# Patient Record
Sex: MALE | Race: BLACK OR AFRICAN AMERICAN | NOT HISPANIC OR LATINO | Employment: OTHER | URBAN - METROPOLITAN AREA
[De-identification: names, ages, dates, MRNs, and addresses within clinical notes are randomized per-mention and may not be internally consistent; named-entity substitution may affect disease eponyms.]

---

## 2017-03-29 ENCOUNTER — HOSPITAL ENCOUNTER (EMERGENCY)
Facility: HOSPITAL | Age: 67
Discharge: HOME | End: 2017-03-29
Attending: EMERGENCY MEDICINE
Payer: MEDICARE

## 2017-03-29 VITALS
OXYGEN SATURATION: 95 % | TEMPERATURE: 99 F | WEIGHT: 235 LBS | RESPIRATION RATE: 18 BRPM | SYSTOLIC BLOOD PRESSURE: 157 MMHG | HEIGHT: 70 IN | HEART RATE: 77 BPM | BODY MASS INDEX: 33.64 KG/M2 | DIASTOLIC BLOOD PRESSURE: 84 MMHG

## 2017-03-29 DIAGNOSIS — I88.9 LYMPHADENITIS: ICD-10-CM

## 2017-03-29 DIAGNOSIS — C62.92 SEMINOMA, LEFT: Primary | ICD-10-CM

## 2017-03-29 DIAGNOSIS — N50.89 MASS OF LEFT TESTICLE: ICD-10-CM

## 2017-03-29 PROCEDURE — 99283 EMERGENCY DEPT VISIT LOW MDM: CPT

## 2017-03-29 RX ORDER — DOXYCYCLINE 100 MG/1
100 CAPSULE ORAL 2 TIMES DAILY
Qty: 20 CAPSULE | Refills: 0 | Status: SHIPPED | OUTPATIENT
Start: 2017-03-29 | End: 2017-04-08

## 2017-03-29 NOTE — ED AVS SNAPSHOT
[facility]  [address] [place] [address]               Bharathi Garrido   3/29/2017  7:26 PM   ED    Description:  Male : 1950   Department:  [facility]           Your Care was Coordinated By:     Provider Role From To    Sanjeev Campos MD Attending Provider [id number] --      Reason for Visit     Groin Swelling           Diagnoses this Visit        Comments    Seminoma, left    -  Primary     Mass of left testicle         Lymphadenitis           ED Disposition     None           To Do List           Follow-up Information     Follow up with Miguel Dick MD. Schedule an appointment as soon as possible for a visit in 1 day.    Specialty:  Urology    Contact information:    [address]  [address]  [place] [address]  [phone]         These Medications        Disp Refills Start End    doxycycline (VIBRAMYCIN) 100 MG Cap 20 capsule 0 3/29/2017 2017    Take 1 capsule (100 mg total) by mouth 2 (two) times daily. - Oral      [facility]     [facility] Care [facility] -  Assistance  Registered nurses in the [facility] provide clinical advisement, health education, appointment booking, and other advisory services.  Call for this free service at [phone].             Medications           START taking these NEW medications        Refills    doxycycline (VIBRAMYCIN) 100 MG Cap 0    Sig: Take 1 capsule (100 mg total) by mouth 2 (two) times daily.    Class: Print    Route: Oral           Verify that the below list of medications is an accurate representation of the medications you are currently taking.  If none reported, the list may be blank. If incorrect, please contact your healthcare provider. Carry this list with you in case of emergency.           Current Medications     doxycycline (VIBRAMYCIN) 100 MG Cap Take 1 capsule (100 mg total) by mouth 2 (two) times daily.           Clinical Reference  "Information           Your Vitals Were     BP Pulse Temp Resp Height Weight    166/80 84 99.4 °F (37.4 °C) (Oral) 18 5' 10" (1.778 m) 106.6 kg (235 lb)    SpO2 BMI             95% 33.72 kg/m2         Allergies as of 3/29/2017     No Known Allergies      Immunizations Administered on Date of Encounter - 3/29/2017     None      ED Micro, Lab, POCT     None      ED Imaging Orders     Start Ordered       Status Ordering Provider    03/29/17 1950 03/29/17 1949  US Scrotum And Testicles  1 time imaging      Final result         Discharge Instructions       Follow up with urology as soon as possible.  Return to the emergency Department with any worsening, Not better, or any other concerns.    [facility] Sign-Up     Activating your [facility] account is as easy as 1-2-3!     1) Visit [email or web address], select Sign Up Now, enter this activation code and your date of birth, then select Next.  [email or web address]  Expires: 5/13/2017  9:22 PM      2) Create a username and password to use when you visit [facility] in the future and select a security question in case you lose your password and select Next.    3) Enter your e-mail address and click Sign Up!    Additional Information  If you have questions, please e-mail [email or web address] or call [phone] to talk to our [facility] staff. Remember, [facility] is NOT to be used for urgent needs. For medical emergencies, dial 911.         Smoking Cessation     If you would like to quit smoking:   You may be eligible for free services if you are a [place] resident and started smoking cigarettes before September 1, 1988.  Call the [employer]oking Cessation [employer] (SCT) toll free at (913) 833-7712 or (382) 036-9125.   Call 9-988-QUIT-NOW if you do not meet the above criteria.             [facility] complies with applicable Federal civil rights laws and does not discriminate on the basis of race, color, national origin, age, disability, or sex.        Language Assistance " Services     ATTENTION: Language assistance services are available, free of charge. Please call [phone].      ATENCIÓN: Si habla español, tiene a grissom disposición servicios gratuitos de asistencia lingüística. Llame al [phone].     CHÚ Ý: N?u b?n nói Ti?ng Vi?t, có các d?ch v? h? tr? ngôn ng? mi?n phí dành cho b?n. G?i s? [phone].

## 2017-03-30 ENCOUNTER — LAB VISIT (OUTPATIENT)
Dept: LAB | Facility: HOSPITAL | Age: 67
End: 2017-03-30
Attending: UROLOGY
Payer: MEDICARE

## 2017-03-30 ENCOUNTER — OFFICE VISIT (OUTPATIENT)
Dept: UROLOGY | Facility: CLINIC | Age: 67
End: 2017-03-30
Payer: MEDICARE

## 2017-03-30 ENCOUNTER — TELEPHONE (OUTPATIENT)
Dept: UROLOGY | Facility: CLINIC | Age: 67
End: 2017-03-30

## 2017-03-30 VITALS
WEIGHT: 235 LBS | SYSTOLIC BLOOD PRESSURE: 134 MMHG | TEMPERATURE: 99 F | BODY MASS INDEX: 33.64 KG/M2 | DIASTOLIC BLOOD PRESSURE: 78 MMHG | HEART RATE: 93 BPM | HEIGHT: 70 IN

## 2017-03-30 DIAGNOSIS — R93.89 NONSPECIFIC (ABNORMAL) FINDINGS ON RADIOLOGICAL AND OTHER EXAMINATION OF GENITOURINARY ORGANS: ICD-10-CM

## 2017-03-30 DIAGNOSIS — L72.3 INFECTED SEBACEOUS CYST OF SKIN: ICD-10-CM

## 2017-03-30 DIAGNOSIS — L08.9 INFECTED SEBACEOUS CYST OF SKIN: ICD-10-CM

## 2017-03-30 DIAGNOSIS — Z12.5 PROSTATE CANCER SCREENING: ICD-10-CM

## 2017-03-30 DIAGNOSIS — L08.9 INFECTED SEBACEOUS CYST OF SKIN: Primary | ICD-10-CM

## 2017-03-30 DIAGNOSIS — C62.90 SEMINOMA, UNSPECIFIED LATERALITY: ICD-10-CM

## 2017-03-30 DIAGNOSIS — L72.3 INFECTED SEBACEOUS CYST OF SKIN: Primary | ICD-10-CM

## 2017-03-30 LAB
ALBUMIN SERPL BCP-MCNC: 3.8 G/DL
ALP SERPL-CCNC: 77 U/L
ALT SERPL W/O P-5'-P-CCNC: 22 U/L
ANION GAP SERPL CALC-SCNC: 11 MMOL/L
AST SERPL-CCNC: 17 U/L
BASOPHILS # BLD AUTO: 0.03 K/UL
BASOPHILS NFR BLD: 0.2 %
BILIRUB SERPL-MCNC: 0.8 MG/DL
BUN SERPL-MCNC: 10 MG/DL
CALCIUM SERPL-MCNC: 9.9 MG/DL
CHLORIDE SERPL-SCNC: 104 MMOL/L
CO2 SERPL-SCNC: 24 MMOL/L
COMPLEXED PSA SERPL-MCNC: 21.8 NG/ML
CREAT SERPL-MCNC: 1 MG/DL
DIFFERENTIAL METHOD: ABNORMAL
EOSINOPHIL # BLD AUTO: 0.2 K/UL
EOSINOPHIL NFR BLD: 1.3 %
ERYTHROCYTE [DISTWIDTH] IN BLOOD BY AUTOMATED COUNT: 13.7 %
EST. GFR  (AFRICAN AMERICAN): >60 ML/MIN/1.73 M^2
EST. GFR  (NON AFRICAN AMERICAN): >60 ML/MIN/1.73 M^2
GLUCOSE SERPL-MCNC: 86 MG/DL
HCT VFR BLD AUTO: 41.7 %
HGB BLD-MCNC: 14.4 G/DL
LDH SERPL L TO P-CCNC: 222 U/L
LYMPHOCYTES # BLD AUTO: 2.2 K/UL
LYMPHOCYTES NFR BLD: 15.3 %
MCH RBC QN AUTO: 28.2 PG
MCHC RBC AUTO-ENTMCNC: 34.5 %
MCV RBC AUTO: 82 FL
MONOCYTES # BLD AUTO: 1.7 K/UL
MONOCYTES NFR BLD: 11.7 %
NEUTROPHILS # BLD AUTO: 10.2 K/UL
NEUTROPHILS NFR BLD: 71.3 %
PLATELET # BLD AUTO: 217 K/UL
PMV BLD AUTO: 11.6 FL
POTASSIUM SERPL-SCNC: 4 MMOL/L
PROT SERPL-MCNC: 8.2 G/DL
RBC # BLD AUTO: 5.1 M/UL
SODIUM SERPL-SCNC: 139 MMOL/L
WBC # BLD AUTO: 14.24 K/UL

## 2017-03-30 PROCEDURE — 85025 COMPLETE CBC W/AUTO DIFF WBC: CPT

## 2017-03-30 PROCEDURE — 82105 ALPHA-FETOPROTEIN SERUM: CPT

## 2017-03-30 PROCEDURE — 83615 LACTATE (LD) (LDH) ENZYME: CPT

## 2017-03-30 PROCEDURE — 99999 PR PBB SHADOW E&M-EST. PATIENT-LVL III: CPT | Mod: PBBFAC,,, | Performed by: UROLOGY

## 2017-03-30 PROCEDURE — 36415 COLL VENOUS BLD VENIPUNCTURE: CPT

## 2017-03-30 PROCEDURE — 99204 OFFICE O/P NEW MOD 45 MIN: CPT | Mod: S$PBB,,, | Performed by: UROLOGY

## 2017-03-30 PROCEDURE — 80053 COMPREHEN METABOLIC PANEL: CPT

## 2017-03-30 PROCEDURE — 99213 OFFICE O/P EST LOW 20 MIN: CPT | Mod: PBBFAC,PO | Performed by: UROLOGY

## 2017-03-30 PROCEDURE — 84153 ASSAY OF PSA TOTAL: CPT

## 2017-03-30 RX ORDER — CIPROFLOXACIN 500 MG/1
500 TABLET ORAL 2 TIMES DAILY
Qty: 28 TABLET | Refills: 0 | Status: SHIPPED | OUTPATIENT
Start: 2017-03-30 | End: 2017-04-06 | Stop reason: SDUPTHER

## 2017-03-30 RX ORDER — BRIMONIDINE TARTRATE 2 MG/ML
1 SOLUTION/ DROPS OPHTHALMIC 2 TIMES DAILY
Refills: 6 | COMMUNITY
Start: 2017-03-16

## 2017-03-30 RX ORDER — TIMOLOL MALEATE 5 MG/ML
1 SOLUTION/ DROPS OPHTHALMIC 2 TIMES DAILY
Refills: 7 | COMMUNITY
Start: 2017-03-16

## 2017-03-30 NOTE — TELEPHONE ENCOUNTER
----- Message from Ivelisse Baez sent at 3/30/2017  7:50 AM CDT -----  Contact: [phone]  Pt would like to be seen today. Pt was seen in the ER dept on last night. Please advise.

## 2017-03-30 NOTE — ED PROVIDER NOTES
Encounter Date: 3/29/2017       History     Chief Complaint   Patient presents with    Groin Swelling     pt c/o groin swelling for months that has now worsened.  Pt states he has been dealing with this for years     Review of patient's allergies indicates:  No Known Allergies  HPI   67 y.o. male presents to ER with complaint of left testicle swelling worsening of the last few months.  Has been present for several years.  No associated fever nausea or vomiting.  Patient states that he has been treated with antibiotics in the past and it has resolved.      Past Medical History:   Diagnosis Date    Glaucoma      History reviewed. No pertinent surgical history.  History reviewed. No pertinent family history.  Social History   Substance Use Topics    Smoking status: Former Smoker    Smokeless tobacco: None    Alcohol use None     Review of Systems  Constitution - denies fever   Eyes - No change in vision   Ear, Nose, Mouth, Throat - no dysphagia  Respiratory - no shortness of breath   Cardiovascular - Denies chest pain   Gastrointestinal -  Denies abdominal pain  Genitourinary - denies dysuria  Musculoskeletal - no change in joint pain  Skin - No rash or changes in skin   Neurological - No weakness, headache, loss of consciousness   All other systems reviewed and negative    Physical Exam   Initial Vitals   BP Pulse Resp Temp SpO2   03/29/17 1911 03/29/17 1911 03/29/17 1911 03/29/17 1911 03/29/17 1911   166/80 84 18 99.4 °F (37.4 °C) 95 %     Physical Exam  Nurses notes reviewed and confirmed.  Constitutional: Vitals reviewed.    Vitals:    03/29/17 1911   BP: (!) 166/80   Pulse: 84   Resp: 18   Temp: 99.4 °F (37.4 °C)    . No apparent distress   Head: Atraumatic. Normocephalic   Eyes: EOM are normal. Pupils are equal, round, and reactive to light. Normal conjunctiva and lids   HENT: Mucous membranes moist, pharynx normal, external ears and nose normal in appearance, Hearing grossly normal   Neck: Normal range of  motion. Neck supple. No masses, trachea midline  Pulmonary/Chest: normal respiratory effort  Abdominal:  Not Distended  : uncircumcised male with no urethral discharge, bilateral testicles nontender, mild edema present to the foreskin and left testicle, no masses, no lesions, matted left inguinal lymphadenopathy, left testicle is firm irregular and large  Musculoskeletal: Normal range of motion without pain of major joints. No clubbing or digits, no obvious effusions.   Neurological: Alert and oriented to person, place, and time. No focal neurological deficits.  Skin: Skin is warm and dry. No evidence of rash or cellulitis   Lymphatics: no cervical or axillary lymphadenopathy   Psychiatric: Normal mood and affect. Normal recent and remote memory    ED Course   Procedures  Labs Reviewed - No data to display     67-year-old male with left inguinal swelling.  We will obtain an ultrasound to determine the nature of this testicular abnormality.  I'm concerned the patient has testicular cancer.  Differential diagnosis includes abscess versus cellulitis.  Skin finding is not consistent with cellulitis.  No fluctuance palpable on exam.    Given the edematous skin in the scrotum and foreskin we will treat with doxycycline since the patient states that this is helped in the past.  Ultrasound demonstrates seminoma.  Patient requires urgent follow up with urology.    I have discussed these findings with the patient and the need to follow-up with urology.                             ED Course     Clinical Impression:   The primary encounter diagnosis was Seminoma, left. Diagnoses of Mass of left testicle and Lymphadenitis were also pertinent to this visit.          Sanjeev Campos MD  03/29/17 [id number]

## 2017-03-30 NOTE — PROGRESS NOTES
"HPI:  Bharathi Garrido is a 67 y.o. year old male that  presents with No chief complaint on file.  .  This patient referred from the emergency room with possible testicular cancer per ultrasound.  Patient was seen in the emergency room for groin swelling which patient was started on doxycycline.  Scrotal ultrasound was obtained which shows "subcentimeter hypoechoic lesion in each testicle nonspecific finding, question seminoma spectrum of disease versus possible metastasis".  This image is reviewed by me and I do not feel that these lesions are in fact consistent with testicular cancer.  I will however review these images with the radiologist.  Patient states that over the past several years he has had intermittent skin infections of his groin.  He states that one time he needed operative incision of this but that usually they drain with antibiotics.  She has been having no fever or chills.  He does not have diabetes however he does not follow with a primary care physician    Was a strong stream with minimal nocturia.  He does not strain to void and he has no dysuria    There is no family history of prostate cancer and the patient has never had urologic surgery      Past Medical History:   Diagnosis Date    Glaucoma      Social History     Social History    Marital status:      Spouse name: N/A    Number of children: N/A    Years of education: N/A     Occupational History    Not on file.     Social History Main Topics    Smoking status: Former Smoker    Smokeless tobacco: Not on file    Alcohol use No    Drug use: Not on file    Sexual activity: Yes     Partners: Female     Other Topics Concern    Not on file     Social History Narrative     History reviewed. No pertinent surgical history.  Family History   Problem Relation Age of Onset    Prostate cancer Neg Hx     Kidney disease Neg Hx            Review of Systems  The patient has no chest pains.  The patient has no shortness of breath  Patient " "wears glasses.  All other review of systems are negative.      Physical Exam:  /78  Pulse 93  Temp 98.7 °F (37.1 °C)  Ht 5' 10" (1.778 m)  Wt 106.6 kg (235 lb)  BMI 33.72 kg/m2  General appearance: alert, cooperative, no distress  Constitutional:Oriented to person, place, and time.appears well-developed and well-nourished.   HEENT: Normocephalic, atraumatic, neck symmetrical, no nasal discharge   Eyes: conjunctivae/corneas clear, PERRL, EOM's intact  Lungs: clear to auscultation bilaterally, no dullness to percussion bilaterally  Heart: regular rate and rhythm without rub; no displacement of the PMI   Abdomen: soft, non-tender; bowel sounds normoactive; no organomegaly  :Penis/perineum without lesions,  urethral meatus in normal location normal size, no penile plaques palpated, prostate:   Smooth enlarged and not suspicious                    seminal vesicles not palpated.  No rectal masses, sphincter tone normal.  Normal right testicle without masses.  Induration of left scrotal skin with infected appearing sebaceous cyst.  I am unable to palpate left testicle due to scrotal skin edema  Extremities: extremities symmetric; no clubbing, cyanosis, or edema  Integument: Skin color, texture, turgor normal; no rashes; hair distrubution normal  Neurologic: Alert and oriented X 3, normal strength, normal coordination and gait  Psychiatric: no pressured speech; normal affect; no evidence of impaired cognition     LABS:    Complete Blood Count  No results found for: RBC, HGB, HCT, MCV, MCH, MCHC, RDW, PLT, MPV, GRAN, LYMPH, MONO, EOS, BASO, GRAN, LYMPH, MONO, EOSINOPHIL, BASOPHIL, DIFFMETHOD    Comprehensive Metabolic Panel  No results found for: GLU, BUN, CREATININE, NA, K, CL, PROT, ALBUMIN, BILITOT, AST, ALKPHOS, CO2, ALT, ANIONGAP, EGFRNONAA, ESTGFRAFRICA    PSA  No results found for: PSA      Assessment:    ICD-10-CM ICD-9-CM    1. Infected sebaceous cyst of skin L72.3 706.2 CBC auto differential    L08.9  " Comprehensive metabolic panel   2. Nonspecific (abnormal) findings on radiological and other examination of genitourinary organs R93.8 793.5 Lactate dehydrogenase   3. Prostate cancer screening Z12.5 V76.44 PSA, Screening   4. Seminoma, unspecified laterality C62.90 186.9 AFP tumor marker     The primary encounter diagnosis was Infected sebaceous cyst of skin. Diagnoses of Nonspecific (abnormal) findings on radiological and other examination of genitourinary organs, Prostate cancer screening, and Seminoma, unspecified laterality were also pertinent to this visit.      Plan: #1 infected sebaceous cyst of left hemiscrotal skin.  Prescription for Cipro given.  Patient instructed on sitz baths 3 times per day.  Follow-up one week.  Patient instructed to go to emergency room if he has spiking fevers or there is significant progression of skin abnormality.  Both patient and wife voice understanding.    Numbers 2 and 3 nonspecific finding on scrotal ultrasound of testicles with possible diagnoses of seminoma.  Plan.  I discussed at length in detail with the patient and his wife that I doubt that cancer is present.  Lab tests obtained however the computer will not let me enter in a beta hCG.  Further evaluation of testicles after current infection process has resolved    #4 states cancer screening.  Plan.  We will check a PSA today  Orders Placed This Encounter   Procedures    CBC auto differential    Lactate dehydrogenase    PSA, Screening    Comprehensive metabolic panel    AFP tumor marker           Melvin Gee MD

## 2017-03-30 NOTE — ED TRIAGE NOTES
pt c/o groin swelling for months that has now worsened.  Pt states he has been dealing with this for years, states he has been treated for infection in the past and that helps swelling go away, but always returns, pt denies any urinary symptoms, denies pain

## 2017-03-30 NOTE — DISCHARGE INSTRUCTIONS
Follow up with urology as soon as possible.  Return to the emergency Department with any worsening, Not better, or any other concerns.

## 2017-03-31 LAB — AFP SERPL-MCNC: 2.1 NG/ML

## 2017-04-06 ENCOUNTER — OFFICE VISIT (OUTPATIENT)
Dept: UROLOGY | Facility: CLINIC | Age: 67
End: 2017-04-06
Payer: COMMERCIAL

## 2017-04-06 VITALS
HEIGHT: 70 IN | TEMPERATURE: 98 F | DIASTOLIC BLOOD PRESSURE: 82 MMHG | SYSTOLIC BLOOD PRESSURE: 128 MMHG | WEIGHT: 235 LBS | BODY MASS INDEX: 33.64 KG/M2 | HEART RATE: 78 BPM

## 2017-04-06 DIAGNOSIS — N40.0 BPH NOS W/O UR OBS/LUTS: ICD-10-CM

## 2017-04-06 DIAGNOSIS — L08.9 INFECTED SEBACEOUS CYST OF SKIN: Primary | ICD-10-CM

## 2017-04-06 DIAGNOSIS — L72.3 INFECTED SEBACEOUS CYST OF SKIN: Primary | ICD-10-CM

## 2017-04-06 DIAGNOSIS — R93.89 NONSPECIFIC (ABNORMAL) FINDINGS ON RADIOLOGICAL AND OTHER EXAMINATION OF GENITOURINARY ORGANS: ICD-10-CM

## 2017-04-06 DIAGNOSIS — R97.20 ELEVATED PSA: ICD-10-CM

## 2017-04-06 DIAGNOSIS — R30.0 DYSURIA: ICD-10-CM

## 2017-04-06 PROCEDURE — 99999 PR PBB SHADOW E&M-EST. PATIENT-LVL III: CPT | Mod: PBBFAC,,, | Performed by: UROLOGY

## 2017-04-06 PROCEDURE — 99215 OFFICE O/P EST HI 40 MIN: CPT | Mod: S$GLB,,, | Performed by: UROLOGY

## 2017-04-06 PROCEDURE — 99213 OFFICE O/P EST LOW 20 MIN: CPT | Mod: PBBFAC,PO | Performed by: UROLOGY

## 2017-04-06 RX ORDER — CIPROFLOXACIN 500 MG/1
500 TABLET ORAL 2 TIMES DAILY
Qty: 28 TABLET | Refills: 0 | Status: SHIPPED | OUTPATIENT
Start: 2017-04-06 | End: 2017-04-13 | Stop reason: SDUPTHER

## 2017-04-06 NOTE — PROGRESS NOTES
"This patient was last seen by me last week for evaluation of infected sebaceous cyst on scrotum and abnormal scrotal ultrasound.  At that time patient was started on Cipro and sitz baths and he now comes in follow-up.  She reports no fevers and chills.  He reports that his scrotum is getting better    Alpha-fetoprotein protein, comprehensive metabolic panel and LDH were normal.  Serum PSA came back elevated at 21.8    Patient has in-between strong strength of stream with minimal occasional dysuria.  He does not strain to void he has minimal nocturia    Physical exam reveals reveals a well-developed well-nourished patient  in no acute distress.  Patient is alert and oriented ×3 with normal mood and affect.  Respiratory effort is normal and there is no peripheral edema.  Skin is normal to inspection and palpation.Penis/perineum without lesions,  epididymis nontender bilaterally, urethral meatus in normal location normal size, no penile plaques palpated, prostate:   Smooth enlarged and not really suspicious                    seminal vesicles not palpated.  No rectal masses, sphincter tone normal.  Testes equal in size without masses, left hemiscrotum has significantly less edema.  There are still induration present    /82  Pulse 78  Temp 98.3 °F (36.8 °C)  Ht 5' 10" (1.778 m)  Wt 106.6 kg (235 lb)  BMI 33.72 kg/m2  Review of Systems  General ROS: negative for chills, fever or weight loss  Respiratory ROS: no cough, shortness of breath, or wheezing  Cardiovascular ROS: no chest pain or dyspnea on exertion  Musculoskeletal ROS: negative for gait disturbance or muscular weakness    Family History   Problem Relation Age of Onset    Prostate cancer Neg Hx     Kidney disease Neg Hx      Past Medical History:   Diagnosis Date    Glaucoma      Family History   Problem Relation Age of Onset    Prostate cancer Neg Hx     Kidney disease Neg Hx      Social History   Substance Use Topics    Smoking status: Former " Smoker    Smokeless tobacco: Not on file    Alcohol use No       Impression:      ICD-10-CM ICD-9-CM    1. Infected sebaceous cyst of skin L72.3 706.2     L08.9     2. Nonspecific (abnormal) findings on radiological and other examination of genitourinary organs R93.8 793.5    3. Elevated PSA R97.20 790.93    4. BPH NOS w/o ur obs/LUTS N40.0 600.90    5. Dysuria R30.0 788.1 Urine culture    plan #1 infected sebaceous cyst of skin.  Plan.  Patient to continue sitz baths and Cipro.  Follow-up one week.  Reviewed indications for visit to emergency room including spiking fevers and progression of skin infection.  At next office visit we will assess as to whether needs formal incision and drainage in the operating room    #2.  Tiny abnormalities on scrotal ultrasound.  Plan.  Findings reviewed with radiologist.  Given normal testicular exam, at this point we will plan on rechecking scrotal ultrasound once skin infection has resolved.  Patient and wife voice understanding and agrees    #3 elevated PSA.  Plan.  I discussed the possibility presence of prostate cancer.  We'll plan on rechecking after resolution of skin infection.  Discussed the possible ability of need for prostate biopsy but that this needs to wait until skin infection has completely resolved.  Patient wife voice understanding and agree    #4 BPH with minimal symptoms.  Plan.  No therapy needed    #5 dysuria, minimal.Patient's urine will be cultured and once results are available ,we will contact the patient if antibiotics are indicated.

## 2017-04-06 NOTE — MR AVS SNAPSHOT
[facility]  [address] [place] [address] Ave  [place] [address]  Phone: [phone]                  Bharathi Garrido   2017 3:15 PM   Office Visit    Description:  Male : 1950   Provider:  Melvin Gee MD   Department:  [facility]           Diagnoses this Visit        Comments    Infected sebaceous cyst of skin    -  Primary     Nonspecific (abnormal) findings on radiological and other examination of genitourinary organs         Elevated PSA         BPH NOS w/o ur obs/LUTS         Dysuria                To Do List           Future Appointments        Provider Department Dept Phone    2017 11:15 AM Melvin Gee MD [facility] [phone]      Goals (5 Years of Data)     None      Follow-Up and Disposition     Return in about 1 week (around 2017).       These Medications        Disp Refills Start End    ciprofloxacin HCl (CIPRO) 500 MG tablet 28 tablet 0 2017    Take 1 tablet (500 mg total) by mouth 2 (two) times daily. - Oral      [facility]     [facility] On [facility] Nurse Care Line -  Assistance  Unless otherwise directed by your provider, please contact [facility], our nurse care line that is available for  assistance.     Registered nurses in the [facility] provide: appointment scheduling, clinical advisement, health education, and other advisory services.  Call: [phone] (toll free)               Medications                Verify that the below list of medications is an accurate representation of the medications you are currently taking.  If none reported, the list may be blank. If incorrect, please contact your healthcare provider. Carry this list with you in case of emergency.           Current Medications     brimonidine 0.2% (ALPHAGAN) 0.2 % Drop Place 1 drop into both eyes 2 (two) times daily.    ciprofloxacin HCl (CIPRO) 500 MG tablet Take 1 tablet (500 mg total) by mouth 2 (two) times daily.    doxycycline  "(VIBRAMYCIN) 100 MG Cap Take 1 capsule (100 mg total) by mouth 2 (two) times daily.    timolol maleate 0.5% (TIMOPTIC) 0.5 % Drop Place 1 drop into both eyes 2 (two) times daily.           Clinical Reference Information           Your Vitals Were     BP Pulse Temp Height Weight BMI    128/82 78 98.3 °F (36.8 °C) 5' 10" (1.778 m) 106.6 kg (235 lb) 33.72 kg/m2      Blood Pressure          Most Recent Value    BP  128/82      Allergies as of 4/6/2017     No Known Allergies      Immunizations Administered on Date of Encounter - 4/6/2017     None      Orders Placed During Today's Visit      Normal Orders This Visit    Urine culture       [facility] Sign-Up     Activating your [facility] account is as easy as 1-2-3!     1) Visit [email or web address], select Sign Up Now, enter this activation code and your date of birth, then select Next.  [email or web address]  Expires: 5/13/2017  9:22 PM      2) Create a username and password to use when you visit [facility] in the future and select a security question in case you lose your password and select Next.    3) Enter your e-mail address and click Sign Up!    Additional Information  If you have questions, please e-mail [email or web address] or call [phone] to talk to our [facility] staff. Remember, [facility] is NOT to be used for urgent needs. For medical emergencies, dial 911.         Language Assistance Services     ATTENTION: Language assistance services are available, free of charge. Please call [phone].      ATENCIÓN: Si habla español, tiene a grissom disposición servicios gratuitos de asistencia lingüística. Llame al [phone].     CHÚ Ý: N?u b?n nói Ti?ng Vi?t, có các d?ch v? h? tr? ngôn ng? mi?n phí dành cho b?n. G?i s? [phone].         [place] [facility] complies with applicable Federal civil rights laws and does not discriminate on the basis of race, color, national origin, age, disability, or sex.        "

## 2017-04-13 ENCOUNTER — OFFICE VISIT (OUTPATIENT)
Dept: UROLOGY | Facility: CLINIC | Age: 67
End: 2017-04-13
Payer: COMMERCIAL

## 2017-04-13 VITALS
TEMPERATURE: 98 F | BODY MASS INDEX: 33.64 KG/M2 | HEIGHT: 70 IN | WEIGHT: 235 LBS | HEART RATE: 68 BPM | SYSTOLIC BLOOD PRESSURE: 125 MMHG | DIASTOLIC BLOOD PRESSURE: 78 MMHG

## 2017-04-13 DIAGNOSIS — L08.9 INFECTED SEBACEOUS CYST OF SKIN: Primary | ICD-10-CM

## 2017-04-13 DIAGNOSIS — R30.0 DYSURIA: ICD-10-CM

## 2017-04-13 DIAGNOSIS — R93.89 NONSPECIFIC (ABNORMAL) FINDINGS ON RADIOLOGICAL AND OTHER EXAMINATION OF GENITOURINARY ORGANS: ICD-10-CM

## 2017-04-13 DIAGNOSIS — R97.20 ELEVATED PSA: ICD-10-CM

## 2017-04-13 DIAGNOSIS — L72.3 INFECTED SEBACEOUS CYST OF SKIN: Primary | ICD-10-CM

## 2017-04-13 PROCEDURE — 99214 OFFICE O/P EST MOD 30 MIN: CPT | Mod: S$GLB,,, | Performed by: UROLOGY

## 2017-04-13 PROCEDURE — 87086 URINE CULTURE/COLONY COUNT: CPT

## 2017-04-13 PROCEDURE — 99213 OFFICE O/P EST LOW 20 MIN: CPT | Mod: PBBFAC,PO | Performed by: UROLOGY

## 2017-04-13 PROCEDURE — 99999 PR PBB SHADOW E&M-EST. PATIENT-LVL III: CPT | Mod: PBBFAC,,, | Performed by: UROLOGY

## 2017-04-13 RX ORDER — CIPROFLOXACIN 500 MG/1
500 TABLET ORAL 2 TIMES DAILY
Qty: 28 TABLET | Refills: 0 | Status: SHIPPED | OUTPATIENT
Start: 2017-04-13 | End: 2017-04-27

## 2017-04-13 NOTE — PROGRESS NOTES
"This patient was last seen by me April 6, 2017 in follow-up for infected sebaceous cyst left hemiscrotum.  Patient is continued on sitz baths and oral antibiotics and reports some improvement of his left hemiscrotum.  He has had no fevers chills and has minimal dysuria or gross hematuria    Physical exam reveals reveals a well-developed well-nourished patient  in no acute distress.  Patient is alert and oriented ×3 with normal mood and affect.  Respiratory effort is normal and there is no peripheral edema.  Skin is normal to inspection and palpation.  Scrotal exam shows continued improvement of the left hemiscrotum.  No drainage present however significant induration is still present.  No erythema.  Testicles are palpable and are without masses.  Prostate is smooth and large not really suspicious    /78  Pulse 68  Temp 97.8 °F (36.6 °C)  Ht 5' 10" (1.778 m)  Wt 106.6 kg (235 lb)  BMI 33.72 kg/m2  Review of Systems  General ROS: negative for chills, fever or weight loss  Respiratory ROS: no cough, shortness of breath, or wheezing  Cardiovascular ROS: no chest pain or dyspnea on exertion  Musculoskeletal ROS: negative for gait disturbance or muscular weakness    Family History   Problem Relation Age of Onset    Prostate cancer Neg Hx     Kidney disease Neg Hx      Past Medical History:   Diagnosis Date    Glaucoma      Family History   Problem Relation Age of Onset    Prostate cancer Neg Hx     Kidney disease Neg Hx      Social History   Substance Use Topics    Smoking status: Former Smoker    Smokeless tobacco: Not on file    Alcohol use No       Impression:      ICD-10-CM ICD-9-CM    1. Infected sebaceous cyst of skin L72.3 706.2     L08.9     2. Nonspecific (abnormal) findings on radiological and other examination of genitourinary organs R93.8 793.5    3. Elevated PSA R97.20 790.93    4. Dysuria R30.0 788.1 Urine culture       Plan #1.  Infected sebaceous cyst left hemiscrotum.  Plan.  Continue " antibiotics and sitz baths.  Follow-up 2 weeks.  If still no complete resolution, then we'll consider incision and drainage    #2.  Elevated PSA.  Plan.  Discussed need for recheck once scrotal infection resolved and subsequent possible need for biopsy  #3.  Nonspecific finding on scrotal ultrasound of testicles.  Plan.  Plan on repeat ultrasound once scrotal infection resolved    #4 minimal dysuria.  Plan.Patient's urine will be cultured and once results are available ,we will contact the patient if antibiotics are indicated.

## 2017-04-13 NOTE — MR AVS SNAPSHOT
[facility]  [address] [place] [address] Ave  [place] [address]  Phone: [phone]                  Bharathi Garrido   2017 11:15 AM   Office Visit    Description:  Male : 1950   Provider:  Melvin Gee MD   Department:  [facility]           Diagnoses this Visit        Comments    Infected sebaceous cyst of skin    -  Primary     Nonspecific (abnormal) findings on radiological and other examination of genitourinary organs         Elevated PSA         Dysuria                To Do List           Future Appointments        Provider Department Dept Phone    2017 3:00 PM Melvin Gee MD [facility] [phone]      Goals (5 Years of Data)     None      Follow-Up and Disposition     Return in about 2 weeks (around 2017).       These Medications        Disp Refills Start End    ciprofloxacin HCl (CIPRO) 500 MG tablet 28 tablet 0 2017    Take 1 tablet (500 mg total) by mouth 2 (two) times daily. - Oral      [facility]     [facility] On Call Nurse Care Line -  Assistance  Unless otherwise directed by your provider, please contact [facility], our nurse care line that is available for  assistance.     Registered nurses in the [facility] On Call Center provide: appointment scheduling, clinical advisement, health education, and other advisory services.  Call: [phone] (toll free)               Medications                Verify that the below list of medications is an accurate representation of the medications you are currently taking.  If none reported, the list may be blank. If incorrect, please contact your healthcare provider. Carry this list with you in case of emergency.           Current Medications     brimonidine 0.2% (ALPHAGAN) 0.2 % Drop Place 1 drop into both eyes 2 (two) times daily.    ciprofloxacin HCl (CIPRO) 500 MG tablet Take 1 tablet (500 mg total) by mouth 2 (two) times daily.    timolol maleate 0.5% (TIMOPTIC) 0.5 %  "Drop Place 1 drop into both eyes 2 (two) times daily.           Clinical Reference Information           Your Vitals Were     BP Pulse Temp Height Weight BMI    125/78 68 97.8 °F (36.6 °C) 5' 10" (1.778 m) 106.6 kg (235 lb) 33.72 kg/m2      Blood Pressure          Most Recent Value    BP  125/78      Allergies as of 4/13/2017     No Known Allergies      Immunizations Administered on Date of Encounter - 4/13/2017     None      Orders Placed During Today's Visit      Normal Orders This Visit    Urine culture       [facility] Sign-Up     Activating your [facility] account is as easy as 1-2-3!     1) Visit [email or web address], select Sign Up Now, enter this activation code and your date of birth, then select Next.  [email or web address]  Expires: 5/13/2017  9:22 PM      2) Create a username and password to use when you visit [facility] in the future and select a security question in case you lose your password and select Next.    3) Enter your e-mail address and click Sign Up!    Additional Information  If you have questions, please e-mail [email or web address] or call [phone] to talk to our [facility] staff. Remember, [facility] is NOT to be used for urgent needs. For medical emergencies, dial 911.         Language Assistance Services     ATTENTION: Language assistance services are available, free of charge. Please call [phone].      ATENCIÓN: Si habla español, tiene a grissom disposición servicios gratuitos de asistencia lingüística. Llame al [phone].     CHÚ Ý: N?u b?n nói Ti?ng Vi?t, có các d?ch v? h? tr? ngôn ng? mi?n phí dành cho b?n. G?i s? [phone].         [facility] complies with applicable Federal civil rights laws and does not discriminate on the basis of race, color, national origin, age, disability, or sex.        "

## 2017-04-14 LAB — BACTERIA UR CULT: NO GROWTH

## 2017-04-28 ENCOUNTER — OFFICE VISIT (OUTPATIENT)
Dept: UROLOGY | Facility: CLINIC | Age: 67
End: 2017-04-28
Payer: COMMERCIAL

## 2017-04-28 VITALS
WEIGHT: 235 LBS | BODY MASS INDEX: 33.64 KG/M2 | SYSTOLIC BLOOD PRESSURE: 111 MMHG | HEIGHT: 70 IN | DIASTOLIC BLOOD PRESSURE: 74 MMHG | TEMPERATURE: 99 F | HEART RATE: 76 BPM

## 2017-04-28 DIAGNOSIS — L08.9 INFECTED SEBACEOUS CYST OF SKIN: Primary | ICD-10-CM

## 2017-04-28 DIAGNOSIS — R97.20 ELEVATED PSA: ICD-10-CM

## 2017-04-28 DIAGNOSIS — L72.3 INFECTED SEBACEOUS CYST OF SKIN: Primary | ICD-10-CM

## 2017-04-28 DIAGNOSIS — R93.89 NONSPECIFIC (ABNORMAL) FINDINGS ON RADIOLOGICAL AND OTHER EXAMINATION OF GENITOURINARY ORGANS: ICD-10-CM

## 2017-04-28 PROCEDURE — 99215 OFFICE O/P EST HI 40 MIN: CPT | Mod: S$GLB,,, | Performed by: UROLOGY

## 2017-04-28 PROCEDURE — 99999 PR PBB SHADOW E&M-EST. PATIENT-LVL III: CPT | Mod: PBBFAC,,, | Performed by: UROLOGY

## 2017-04-28 PROCEDURE — 99213 OFFICE O/P EST LOW 20 MIN: CPT | Mod: PBBFAC,PO | Performed by: UROLOGY

## 2017-04-28 RX ORDER — LATANOPROST 50 UG/ML
SOLUTION/ DROPS OPHTHALMIC
Refills: 6 | COMMUNITY
Start: 2017-04-20

## 2017-04-28 NOTE — PROGRESS NOTES
"This patient was last seen by me April 13, 2017 in follow-up for infected sebaceous cyst of left hemiscrotum.  Patient has no fevers or chills and has been taking antibiotics and doing sitz baths.  He still has some drainage from the area    Physical exam reveals reveals a well-developed well-nourished patient  in no acute distress.  Patient is alert and oriented ×3 with normal mood and affect.  Respiratory effort is normal and there is no peripheral edema.  Skin is normal to inspection and palpation. Penis/perineum without lesions, scrotum without rash/cysts, epididymis nontender bilaterally, urethral meatus in normal location normal size, no penile plaques palpated, prostate:      Smooth enlarged and not really suspicious                 seminal vesicles not palpated.  No rectal masses, sphincter tone normal.  Testes equal in size without masses, infected area left hemiscrotum about the same with a small amount of purulent material present.  No erythema    /74  Pulse 76  Temp 98.7 °F (37.1 °C)  Ht 5' 10" (1.778 m)  Wt 106.6 kg (235 lb)  BMI 33.72 kg/m2  Review of Systems  General ROS: negative for chills, fever or weight loss  Respiratory ROS: no cough, shortness of breath, or wheezing  Cardiovascular ROS: no chest pain or dyspnea on exertion  Musculoskeletal ROS: negative for gait disturbance or muscular weakness    Family History   Problem Relation Age of Onset    Prostate cancer Neg Hx     Kidney disease Neg Hx      Past Medical History:   Diagnosis Date    Glaucoma      Family History   Problem Relation Age of Onset    Prostate cancer Neg Hx     Kidney disease Neg Hx      Social History   Substance Use Topics    Smoking status: Former Smoker    Smokeless tobacco: Not on file    Alcohol use No       Impression:      ICD-10-CM ICD-9-CM    1. Infected sebaceous cyst of skin L72.3 706.2 Basic metabolic panel    L08.9  CBC auto differential      EKG 12-lead      X-Ray Chest PA And Lateral   2. " Nonspecific (abnormal) findings on radiological and other examination of genitourinary organs R93.8 793.5    3. Elevated PSA R97.20 790.93        Plan:    #1.  Infected sebaceous cyst of scrotal skin.  Plan.  Conservative therapy has not resolved the issue and therefore incision drainage and possible debridement needed.  Patient wants to do this on May 9.  Patient understands that since this has been a recurrent problem, that in the future he might have recurrence of infection of his scrotal skin.  Patient further understands that the wound will be left open to heal by secondary intention and then wound care including sitz baths and packing will be needed.  Patient understands that this will be done as an outpatient.    #2.  Nonspecific findings on scrotal ultrasound.  Plan.  We'll plan on repeating scrotal ultrasound once scrotal skin is fully healed    #3.  Elevated PSA.  Plan.  We'll plan on repeating PSA once patient has healed from this current episode.  I again discussed the possibility of need of prostate biopsy once this current episode has resolved    Today I spent more than 40 minutes with the patient, more than half of which was spent in counseling and coordinating care with discussions as detailed above.    PLEASE NOTE:  Please be advised that portions of this note were dictated using voice recognition software and may contain dictation related errors in spelling/grammar/appropriate pronouns/syntax or other errors that might have not been found and or corrected on text review.

## 2017-05-04 ENCOUNTER — CLINICAL SUPPORT (OUTPATIENT)
Dept: LAB | Facility: HOSPITAL | Age: 67
End: 2017-05-04
Attending: UROLOGY
Payer: COMMERCIAL

## 2017-05-04 ENCOUNTER — HOSPITAL ENCOUNTER (OUTPATIENT)
Dept: PREADMISSION TESTING | Facility: HOSPITAL | Age: 67
Discharge: HOME | End: 2017-05-04
Attending: UROLOGY
Payer: COMMERCIAL

## 2017-05-04 ENCOUNTER — HOSPITAL ENCOUNTER (OUTPATIENT)
Dept: RADIOLOGY | Facility: HOSPITAL | Age: 67
Discharge: HOME | End: 2017-05-04
Attending: UROLOGY
Payer: COMMERCIAL

## 2017-05-04 ENCOUNTER — ANESTHESIA EVENT (OUTPATIENT)
Dept: SURGERY | Facility: HOSPITAL | Age: 67
End: 2017-05-04
Payer: COMMERCIAL

## 2017-05-04 VITALS
DIASTOLIC BLOOD PRESSURE: 77 MMHG | RESPIRATION RATE: 18 BRPM | WEIGHT: 245.38 LBS | HEART RATE: 83 BPM | TEMPERATURE: 99 F | SYSTOLIC BLOOD PRESSURE: 130 MMHG | BODY MASS INDEX: 36.34 KG/M2 | OXYGEN SATURATION: 95 % | HEIGHT: 69 IN

## 2017-05-04 DIAGNOSIS — L08.9 INFECTED SEBACEOUS CYST OF SKIN: ICD-10-CM

## 2017-05-04 DIAGNOSIS — L72.3 INFECTED SEBACEOUS CYST OF SKIN: ICD-10-CM

## 2017-05-04 PROCEDURE — 71020 XR CHEST PA AND LATERAL: CPT | Mod: TC

## 2017-05-04 PROCEDURE — 93005 ELECTROCARDIOGRAM TRACING: CPT

## 2017-05-04 PROCEDURE — 71020 XR CHEST PA AND LATERAL: CPT | Mod: 26,,, | Performed by: RADIOLOGY

## 2017-05-04 RX ORDER — LIDOCAINE HYDROCHLORIDE 10 MG/ML
1 INJECTION, SOLUTION EPIDURAL; INFILTRATION; INTRACAUDAL; PERINEURAL ONCE
Status: CANCELLED | OUTPATIENT
Start: 2017-05-04 | End: 2017-05-04

## 2017-05-04 RX ORDER — SODIUM CHLORIDE, SODIUM LACTATE, POTASSIUM CHLORIDE, CALCIUM CHLORIDE 600; 310; 30; 20 MG/100ML; MG/100ML; MG/100ML; MG/100ML
INJECTION, SOLUTION INTRAVENOUS CONTINUOUS
Status: CANCELLED | OUTPATIENT
Start: 2017-05-04

## 2017-05-04 NOTE — IP AVS SNAPSHOT
[facility]  [address]  [place] [address]  Phone: [phone]           Patient Discharge Instructions  Our goal is to set you up for success. This packet includes information on your condition, medications, and your home care. It will help you care for yourself to prevent having to return to the hospital.     Please ask your nurse if you have any questions.      There are many details to remember when preparing for your surgery. Here is what you will need to do, please ask your nurse if there are more specific instructions and if you have any questions:    1. Before procedure Do not smoke or drink alcoholic beverages 24 hours prior to your procedure. Do not eat or drink anything 8 hours before your procedure - this includes gum, mints, and candy.     2. Day of procedure Please remove all jewelry for the procedure. If you wear contact lenses, dentures, hearing aids or glasses, bring a container to put them in during your surgery and give to a family member.  If your doctor has scheduled you for an overnight stay, bring a small overnight bag with any personal items that you need.      3. After procedure  Make arrangements in advance for transportation home by a responsible adult. It is not safe to drive a vehicle during the 24 hours following surgery.     PLEASE NOTE: You may be contacted the day before your surgery to confirm your surgery date and arrival time. The Surgery schedule has many variables which may affect the time of your surgery case. Family members should be available if your surgery time changes.           [facility]  Unless otherwise directed by your provider, please   contact [facility], our nurse care line   that is available for 24/7 assistance.     [phone] (toll-free)     Registered nurses in the [facility]   provide: appointment scheduling, clinical advisement, health education, and other advisory services.                  ** Verify the list  of medication(s) below is accurate and up to date. Carry this with you in case of emergency. If your medications have changed, please notify your healthcare provider.             Medication List      TAKE these medications        Additional Info                      brimonidine 0.2% 0.2 % Drop   Commonly known as:  ALPHAGAN   Refills:  6   Dose:  1 drop    Instructions:  Place 1 drop into both eyes 2 (two) times daily.     Begin Date    AM    Noon    PM    Bedtime       latanoprost 0.005 % ophthalmic solution   Refills:  6    Instructions:  INTALL 1 DROP TO BOTH EYES CONNOR NIGHT     Begin Date    AM    Noon    PM    Bedtime       timolol maleate 0.5% 0.5 % Drop   Commonly known as:  TIMOPTIC   Refills:  7   Dose:  1 drop    Instructions:  Place 1 drop into both eyes 2 (two) times daily.     Begin Date    AM    Noon    PM    Bedtime                  Please bring to all follow up appointments:    1. A copy of your discharge instructions.  2. All medicines you are currently taking in their original bottles.  3. Identification and insurance card.    Please arrive 15 minutes ahead of scheduled appointment time.    Please call 24 hours in advance if you must reschedule your appointment and/or time.        Your Future Surgeries/Procedures     May 09, 2017   Surgery with Melvin Gee MD   [facility] ([facility])    [address] [place] [address] [id number] [phone]                  Discharge Instructions       Your surgery is scheduled for 05/09/17.    Please report to Outpatient Surgery Intake Office on the 2nd FLOOR at 0530 a.m.          INSTRUCTIONS IMPORTANT!!!  ¨ Do not eat or drink after 11pm -including water. OK to brush teeth, no   gum, candy or mints!    ¨         ____  Proceed to [facility] on 05/04/17 for additional blood test.        ____  Do not wear makeup, including mascara.  ____  No powder, lotions or creams to surgical area.  ____  Please  remove all jewelry, including piercings and leave at home.  ____  No money or valuables needed. Please leave at home.  ____  Please bring any documents given by your doctor.  ____  If going home the same day, arrange for a ride home. You will not be able to             drive if Anesthesia was used.  ____  Children under 18 years require a parent / guardian present the entire time             they are in surgery / recovery.  ____  Wear loose fitting clothing. Allow for dressings, bandages.  ____  Stop Aspirin, Ibuprofen, Motrin and Aleve at least 3-5 days before surgery, unless otherwise instructed by your doctor, or the nurse.   You MAY use Tylenol/acetaminophen until day of surgery.  ____  Wash the surgical area with the night before surgery, and again the             morning of surgery.  Be sure to rinse  off completely (if instructed by   nurse).  ____  If you take diabetic medication, do not take am of surgery unless instructed by Doctor.  ____  Call MD for temperature above 101 degrees.  ____ Stop taking any Fish Oil supplement or any Vitamins that contain Vitamin E at least 5 days prior to surgery.  ____ Do Not wear your contact lenses the day of your procedure.  You may wear your glasses.        I have read or had read and explained to me, and understand the above information.  Additional comments or instructions:  For additional questions call [phone]     Take a  shower twice a day for 3 days prior to surgery, including the morning of surgery.   Gargle with Listerine twice a day for 3 days prior to surgery, including the morning of surgery.      Pre-Op Bathing Instructions    Before surgery, you can play an important role in your own health.    Because skin is not sterile, we need to be sure that your skin is as free of germs as possible. By following the instructions below, you can reduce the number of germs on your skin before surgery.    IMPORTANT: You will need to shower with a special soap called  Hibiclens*, available at any pharmacy.  If you are allergic to Chlorhexidine (the antiseptic in Hibiclens), use an antibacterial soap such as Dial Soap for your preoperative shower.  You will shower with Hibiclens both the night before your surgery and the morning of your surgery.  Do not use Hibiclens on the head, face or genitals to avoid injury to those areas.    STEP #1: THE NIGHT BEFORE YOUR SURGERY     1. Do not shave the area of your body where your surgery will be performed.  2. Shower and wash your hair and body as usual with your normal soap and shampoo.  3. Rinse your hair and body thoroughly after you shower to remove all soap residue.  4. With your hand, apply one packet of Hibiclens soap to the surgical site.   5. Wash the site gently for five (5) minutes. Do not scrub your skin too hard.   6. Do not wash with your regular soap after Hibiclens is used.  7. Rinse your body thoroughly.  8. Pat yourself dry with a clean, soft towel.  9. Do not use lotion, cream, or powder.  10. Wear clean clothes.    STEP #2: THE MORNING OF YOUR SURGERY     1. Repeat Step #1.    * Not to be used by people allergic to Chlorhexidine.          Anesthesia: General Anesthesia  Youre due to have surgery. During surgery, youll be given medication called anesthesia. (It is also called anesthetic.) This will keep you comfortable and pain-free. Your anesthesia provider will use general anesthesia. This sheet tells you more about it.  What is general anesthesia?     You are watched continuously during your procedure by the anesthesia provider   General anesthesia puts you into a state like deep sleep. It goes into the bloodstream (IV anesthetics), into the lungs (gas anesthetics), or both. You feel nothing during the procedure. You will not remember it. During the procedure, the anesthesia provider monitors you continuously. He or she checks your heart rate and rhythm, blood pressure, breathing, and blood oxygen.  · IV  Anesthetics. IV anesthetics are given through an IV line in your arm. Theyre often given first. This is so you are asleep before a gas anesthetic is started. Some kinds of IV anesthetics relieve pain. Others relax you. Your doctor will decide which kind is best in your case.  · Gas Anesthetics. Gas anesthetics are breathed into the lungs. They are often used to keep you asleep. They can be given through a facemask or a tube placed in your larynx or trachea (breathing tube).  ¨ If you have a facemask, your anesthesia provider will most likely place it over your nose and mouth while youre still awake. Youll breathe oxygen through the mask as your IV anesthetic is started. Gas anesthetic may be added through the mask.  ¨ If you have a tube in the larynx or trachea, it will be inserted into your throat after youre asleep.  Anesthesia tools and medications  You will likely have:  · IV anesthetics. These are put into an IV line into your bloodstream.  · Gas anesthetics. You breathe these anesthetics into your lungs, where they pass into your bloodstream.  · Pulse oximeter. This is a small clip that is attached to the end of your finger. This measures your blood oxygen level.  · Electrocardiography leads (electrodes). These are small sticky pads that are placed on your chest. They record your heart rate and rhythm.  · Blood pressure cuff. This reads your blood pressure.  Risks and possible complications  General anesthesia has some risks. These include:  · Breathing problems  · Nausea and vomiting  · Sore throat or hoarseness (usually temporary)  · Allergic reaction to the anesthetic  · Irregular heartbeat (rare)  · Cardiac arrest (rare)   Anesthesia safety  · Follow all instructions you are given for how long not to eat or drink before your procedure.  · Be sure your doctor knows what medications and drugs you take. This includes over-the-counter medications, herbs, supplements, alcohol or other drugs. You will be  "asked when those were last taken.  · Have an adult family member or friend drive you home after the procedure.  · For the first 24 hours after your surgery:  ¨ Do not drive or use heavy equipment.  ¨ Have a trusted family member or spouse make important decisions or sign documents.  ¨ Avoid alcohol.  ¨ Have a responsible adult stay with you. He or she can watch for problems and help keep you safe.  Date Last Reviewed: 10/16/2014  © 1064-7367 [employer]. [address] [place] [address]. All rights reserved. This information is not intended as a substitute for professional medical care. Always follow your healthcare professional's instructions.            Admission Information     Date & Time Provider Department CSN    5/4/2017  9:30 AM Melvin Gee MD [facility] [id number]      Care Providers     Provider Role Specialty Primary office phone    Melvin Gee MD Attending Provider Urology [phone]      Your Vitals Were     BP Pulse Temp Resp Height Weight    130/77 (BP Location: Left arm, Patient Position: Sitting, BP Method: Automatic) 83 98.7 °F (37.1 °C) (Oral) 18 5' 9" (1.753 m) 111.3 kg (245 lb 6 oz)    SpO2 BMI             95% 36.24 kg/m2         Recent Lab Values     No lab values to display.      Allergies as of 5/4/2017     No Known Allergies      Advance Directives     An advance directive is a document which, in the event you are no longer able to make decisions for yourself, tells your healthcare team what kind of treatment you do or do not want to receive, or who you would like to make those decisions for you.  If you do not currently have an advance directive, [facility] encourages you to create one.  For more information call:  [phone]), [phone] ([phone]),  or log on to [email or web address].        Smoking Cessation     If you would like to quit smoking:   You may be eligible for free services if you are a " [place] resident and started smoking cigarettes before September 1, 1988.  Call the Smoking Cessation Trust (SCT) toll free at (876) 903-2407 or (677) 643-6977.   Call 1-800-QUIT-NOW if you do not meet the above criteria.   Contact us via email: [email or web address]   View our website for more information: [email or web address]        Language Assistance Services     ATTENTION: Language assistance services are available, free of charge. Please call [phone].      ATENCIÓN: Si habla español, tiene a grissom disposición servicios gratuitos de asistencia lingüística. Llame al [phone].     CHÚ Ý: N?u b?n nói Ti?ng Vi?t, có các d?ch v? h? tr? ngôn ng? mi?n phí dành cho b?n. G?i s? [phone].        [facility] Sign-Up     Activating your [facility] account is as easy as 1-2-3!     1) Visit [email or web address], select Sign Up Now, enter this activation code and your date of birth, then select Next.  [email or web address]  Expires: 5/13/2017  9:22 PM      2) Create a username and password to use when you visit [facility] in the future and select a security question in case you lose your password and select Next.    3) Enter your e-mail address and click Sign Up!    Additional Information  If you have questions, please e-mail [email or web address] or call [phone] to talk to our [facility] staff. Remember, [facility] is NOT to be used for urgent needs. For medical emergencies, dial 911.          [facility] complies with applicable Federal civil rights laws and does not discriminate on the basis of race, color, national origin, age, disability, or sex.

## 2017-05-04 NOTE — DISCHARGE INSTRUCTIONS
Your surgery is scheduled for 05/09/17.    Please report to Outpatient Surgery Intake Office on the 2nd FLOOR at 0530 a.m.          INSTRUCTIONS IMPORTANT!!!  ¨ Do not eat or drink after 11pm -including water. OK to brush teeth, no   gum, candy or mints!    ¨         ____  Proceed to [facility] on 05/04/17 for additional blood test.        ____  Do not wear makeup, including mascara.  ____  No powder, lotions or creams to surgical area.  ____  Please remove all jewelry, including piercings and leave at home.  ____  No money or valuables needed. Please leave at home.  ____  Please bring any documents given by your doctor.  ____  If going home the same day, arrange for a ride home. You will not be able to             drive if Anesthesia was used.  ____  Children under 18 years require a parent / guardian present the entire time             they are in surgery / recovery.  ____  Wear loose fitting clothing. Allow for dressings, bandages.  ____  Stop Aspirin, Ibuprofen, Motrin and Aleve at least 3-5 days before surgery, unless otherwise instructed by your doctor, or the nurse.   You MAY use Tylenol/acetaminophen until day of surgery.  ____  Wash the surgical area with the night before surgery, and again the             morning of surgery.  Be sure to rinse  off completely (if instructed by   nurse).  ____  If you take diabetic medication, do not take am of surgery unless instructed by Doctor.  ____  Call MD for temperature above 101 degrees.  ____ Stop taking any Fish Oil supplement or any Vitamins that contain Vitamin E at least 5 days prior to surgery.  ____ Do Not wear your contact lenses the day of your procedure.  You may wear your glasses.        I have read or had read and explained to me, and understand the above information.  Additional comments or instructions:  For additional questions call [phone]     Take a  shower twice a day for 3 days prior to surgery, including the morning of  surgery.   Gargle with Listerine twice a day for 3 days prior to surgery, including the morning of surgery.      Pre-Op Bathing Instructions    Before surgery, you can play an important role in your own health.    Because skin is not sterile, we need to be sure that your skin is as free of germs as possible. By following the instructions below, you can reduce the number of germs on your skin before surgery.    IMPORTANT: You will need to shower with a special soap called Hibiclens*, available at any pharmacy.  If you are allergic to Chlorhexidine (the antiseptic in Hibiclens), use an antibacterial soap such as Dial Soap for your preoperative shower.  You will shower with Hibiclens both the night before your surgery and the morning of your surgery.  Do not use Hibiclens on the head, face or genitals to avoid injury to those areas.    STEP #1: THE NIGHT BEFORE YOUR SURGERY     1. Do not shave the area of your body where your surgery will be performed.  2. Shower and wash your hair and body as usual with your normal soap and shampoo.  3. Rinse your hair and body thoroughly after you shower to remove all soap residue.  4. With your hand, apply one packet of Hibiclens soap to the surgical site.   5. Wash the site gently for five (5) minutes. Do not scrub your skin too hard.   6. Do not wash with your regular soap after Hibiclens is used.  7. Rinse your body thoroughly.  8. Pat yourself dry with a clean, soft towel.  9. Do not use lotion, cream, or powder.  10. Wear clean clothes.    STEP #2: THE MORNING OF YOUR SURGERY     1. Repeat Step #1.    * Not to be used by people allergic to Chlorhexidine.          Anesthesia: General Anesthesia  Youre due to have surgery. During surgery, youll be given medication called anesthesia. (It is also called anesthetic.) This will keep you comfortable and pain-free. Your anesthesia provider will use general anesthesia. This sheet tells you more about it.  What is general  anesthesia?     You are watched continuously during your procedure by the anesthesia provider   General anesthesia puts you into a state like deep sleep. It goes into the bloodstream (IV anesthetics), into the lungs (gas anesthetics), or both. You feel nothing during the procedure. You will not remember it. During the procedure, the anesthesia provider monitors you continuously. He or she checks your heart rate and rhythm, blood pressure, breathing, and blood oxygen.  · IV Anesthetics. IV anesthetics are given through an IV line in your arm. Theyre often given first. This is so you are asleep before a gas anesthetic is started. Some kinds of IV anesthetics relieve pain. Others relax you. Your doctor will decide which kind is best in your case.  · Gas Anesthetics. Gas anesthetics are breathed into the lungs. They are often used to keep you asleep. They can be given through a facemask or a tube placed in your larynx or trachea (breathing tube).  ¨ If you have a facemask, your anesthesia provider will most likely place it over your nose and mouth while youre still awake. Youll breathe oxygen through the mask as your IV anesthetic is started. Gas anesthetic may be added through the mask.  ¨ If you have a tube in the larynx or trachea, it will be inserted into your throat after youre asleep.  Anesthesia tools and medications  You will likely have:  · IV anesthetics. These are put into an IV line into your bloodstream.  · Gas anesthetics. You breathe these anesthetics into your lungs, where they pass into your bloodstream.  · Pulse oximeter. This is a small clip that is attached to the end of your finger. This measures your blood oxygen level.  · Electrocardiography leads (electrodes). These are small sticky pads that are placed on your chest. They record your heart rate and rhythm.  · Blood pressure cuff. This reads your blood pressure.  Risks and possible complications  General anesthesia has some risks. These  include:  · Breathing problems  · Nausea and vomiting  · Sore throat or hoarseness (usually temporary)  · Allergic reaction to the anesthetic  · Irregular heartbeat (rare)  · Cardiac arrest (rare)   Anesthesia safety  · Follow all instructions you are given for how long not to eat or drink before your procedure.  · Be sure your doctor knows what medications and drugs you take. This includes over-the-counter medications, herbs, supplements, alcohol or other drugs. You will be asked when those were last taken.  · Have an adult family member or friend drive you home after the procedure.  · For the first 24 hours after your surgery:  ¨ Do not drive or use heavy equipment.  ¨ Have a trusted family member or spouse make important decisions or sign documents.  ¨ Avoid alcohol.  ¨ Have a responsible adult stay with you. He or she can watch for problems and help keep you safe.  Date Last Reviewed: 10/16/2014  © 5943-4149 [employer]. [address] [place] [address], [place] [address]. All rights reserved. This information is not intended as a substitute for professional medical care. Always follow your healthcare professional's instructions.

## 2017-05-04 NOTE — ANESTHESIA PREPROCEDURE EVALUATION
05/04/2017     Bharathi Garrido is a 67 y.o., male is scheduled for I&D of infected sebaceous cyst of scrotum under GETA on 5/09/2017.    Past Surgical History:   Procedure Laterality Date    INCISION AND DRAINAGE OF WOUND Left 2011    scrotum         Anesthesia Evaluation    I have reviewed the Patient Summary Reports.    I have reviewed the Nursing Notes.   I have reviewed the Medications.     Review of Systems  Anesthesia Hx:  No problems with previous Anesthesia  History of prior surgery of interest to airway management or planning: Previous anesthesia: General Denies Family Hx of Anesthesia complications.   Denies Personal Hx of Anesthesia complications.   Social:  Former Smoker, Social Alcohol Use    Hematology/Oncology:  Hematology Normal        EENT/Dental:  EENT/Dental Normal Eyes: Eye Disease: Glaucoma:      Cardiovascular:   Exercise tolerance: good Denies Hypertension.  Denies Dysrhythmias.   Denies Angina.        Pulmonary:   Denies Shortness of breath.    Renal/:  Renal/ Normal     Hepatic/GI:   GERD, well controlled    Neurological:  Neurology Normal    Endocrine:  Endocrine Normal    Dermatological:   Painful cyst to scrotum; pt on antibiotics; denies fever/chills       Physical Exam  General:  Obesity    Airway/Jaw/Neck:  Airway Findings: Mouth Opening: Small, but > 3cm Tongue: Normal  General Airway Assessment: Adult  Mallampati: II  TM Distance: Normal, at least 6 cm  Jaw/Neck Findings:  Neck ROM: Normal ROM  Neck Findings:  Girth Increased, Short Neck     Eyes/Ears/Nose:  EYES/EARS/NOSE FINDINGS: Normal   Dental:  Dental Findings: Periodontal disease, Severe    Chest/Lungs:  Chest/Lungs Clear    Heart/Vascular:  Heart Findings: Normal Heart murmur: negative    Abdomen:  Abdomen Findings: Normal     Skin:  Skin Findings:  Sebaceous cyst to scrotum no examined     Mental Status:  Mental  Status Findings: Normal        Anesthesia Plan  Type of Anesthesia, risks & benefits discussed:  Anesthesia Type:  general  Patient's Preference:   Intra-op Monitoring Plan:   Intra-op Monitoring Plan Comments:   Post Op Pain Control Plan:   Post Op Pain Control Plan Comments:   Induction:   IV  Beta Blocker:         Informed Consent: Patient understands risks and agrees with Anesthesia plan.  Questions answered. Anesthesia consent signed with patient.  ASA Score: 2     Day of Surgery Review of History & Physical:        Anesthesia Plan Notes:           Ready For Surgery From Anesthesia Perspective.

## 2017-05-09 ENCOUNTER — ANESTHESIA (OUTPATIENT)
Dept: SURGERY | Facility: HOSPITAL | Age: 67
End: 2017-05-09
Payer: COMMERCIAL

## 2017-05-09 ENCOUNTER — HOSPITAL ENCOUNTER (OUTPATIENT)
Facility: HOSPITAL | Age: 67
Discharge: HOME | End: 2017-05-09
Attending: UROLOGY | Admitting: UROLOGY
Payer: COMMERCIAL

## 2017-05-09 DIAGNOSIS — N49.2 SCROTAL INFECTION: ICD-10-CM

## 2017-05-09 PROCEDURE — 88305 TISSUE EXAM BY PATHOLOGIST: CPT | Performed by: PATHOLOGY

## 2017-05-09 PROCEDURE — 87075 CULTR BACTERIA EXCEPT BLOOD: CPT

## 2017-05-09 PROCEDURE — 63600175 PHARM REV CODE 636 W HCPCS: Performed by: NURSE ANESTHETIST, CERTIFIED REGISTERED

## 2017-05-09 PROCEDURE — 63600175 PHARM REV CODE 636 W HCPCS: Performed by: ANESTHESIOLOGY

## 2017-05-09 PROCEDURE — 37000008 HC ANESTHESIA 1ST 15 MINUTES: Performed by: UROLOGY

## 2017-05-09 PROCEDURE — 11004 DBRDMT SKIN XTRNL GENT&PER: CPT | Mod: ,,, | Performed by: UROLOGY

## 2017-05-09 PROCEDURE — 87076 CULTURE ANAEROBE IDENT EACH: CPT | Mod: 59

## 2017-05-09 PROCEDURE — 87077 CULTURE AEROBIC IDENTIFY: CPT

## 2017-05-09 PROCEDURE — 36000704 HC OR TIME LEV I 1ST 15 MIN: Performed by: UROLOGY

## 2017-05-09 PROCEDURE — 63600175 PHARM REV CODE 636 W HCPCS: Performed by: UROLOGY

## 2017-05-09 PROCEDURE — 25000003 PHARM REV CODE 250: Performed by: NURSE PRACTITIONER

## 2017-05-09 PROCEDURE — 71000016 HC POSTOP RECOV ADDL HR: Performed by: UROLOGY

## 2017-05-09 PROCEDURE — 71000039 HC RECOVERY, EACH ADD'L HOUR: Performed by: UROLOGY

## 2017-05-09 PROCEDURE — 87070 CULTURE OTHR SPECIMN AEROBIC: CPT

## 2017-05-09 PROCEDURE — 37000009 HC ANESTHESIA EA ADD 15 MINS: Performed by: UROLOGY

## 2017-05-09 PROCEDURE — 71000033 HC RECOVERY, INTIAL HOUR: Performed by: UROLOGY

## 2017-05-09 PROCEDURE — 25000003 PHARM REV CODE 250: Performed by: NURSE ANESTHETIST, CERTIFIED REGISTERED

## 2017-05-09 PROCEDURE — 36000705 HC OR TIME LEV I EA ADD 15 MIN: Performed by: UROLOGY

## 2017-05-09 PROCEDURE — 88305 TISSUE EXAM BY PATHOLOGIST: CPT | Mod: 26,,, | Performed by: PATHOLOGY

## 2017-05-09 PROCEDURE — 71000015 HC POSTOP RECOV 1ST HR: Performed by: UROLOGY

## 2017-05-09 PROCEDURE — 25000003 PHARM REV CODE 250: Performed by: UROLOGY

## 2017-05-09 RX ORDER — ONDANSETRON 2 MG/ML
4 INJECTION INTRAMUSCULAR; INTRAVENOUS DAILY PRN
Status: DISCONTINUED | OUTPATIENT
Start: 2017-05-09 | End: 2017-05-09 | Stop reason: HOSPADM

## 2017-05-09 RX ORDER — SODIUM CHLORIDE 0.9 % (FLUSH) 0.9 %
3 SYRINGE (ML) INJECTION
Status: DISCONTINUED | OUTPATIENT
Start: 2017-05-09 | End: 2017-05-09 | Stop reason: HOSPADM

## 2017-05-09 RX ORDER — PHENYLEPHRINE HYDROCHLORIDE 10 MG/ML
INJECTION INTRAVENOUS
Status: DISCONTINUED | OUTPATIENT
Start: 2017-05-09 | End: 2017-05-09

## 2017-05-09 RX ORDER — SUCCINYLCHOLINE CHLORIDE 20 MG/ML
INJECTION INTRAMUSCULAR; INTRAVENOUS
Status: DISCONTINUED | OUTPATIENT
Start: 2017-05-09 | End: 2017-05-09

## 2017-05-09 RX ORDER — GLYCOPYRROLATE 0.2 MG/ML
INJECTION INTRAMUSCULAR; INTRAVENOUS
Status: DISCONTINUED | OUTPATIENT
Start: 2017-05-09 | End: 2017-05-09

## 2017-05-09 RX ORDER — SODIUM CHLORIDE, SODIUM LACTATE, POTASSIUM CHLORIDE, CALCIUM CHLORIDE 600; 310; 30; 20 MG/100ML; MG/100ML; MG/100ML; MG/100ML
INJECTION, SOLUTION INTRAVENOUS CONTINUOUS
Status: DISCONTINUED | OUTPATIENT
Start: 2017-05-09 | End: 2017-05-09 | Stop reason: HOSPADM

## 2017-05-09 RX ORDER — PROPOFOL 10 MG/ML
VIAL (ML) INTRAVENOUS
Status: DISCONTINUED | OUTPATIENT
Start: 2017-05-09 | End: 2017-05-09

## 2017-05-09 RX ORDER — SODIUM CHLORIDE 0.9 % (FLUSH) 0.9 %
3 SYRINGE (ML) INJECTION EVERY 8 HOURS
Status: DISCONTINUED | OUTPATIENT
Start: 2017-05-09 | End: 2017-05-09 | Stop reason: HOSPADM

## 2017-05-09 RX ORDER — MIDAZOLAM HYDROCHLORIDE 1 MG/ML
INJECTION INTRAMUSCULAR; INTRAVENOUS
Status: DISCONTINUED | OUTPATIENT
Start: 2017-05-09 | End: 2017-05-09

## 2017-05-09 RX ORDER — LIDOCAINE HCL/PF 100 MG/5ML
SYRINGE (ML) INTRAVENOUS
Status: DISCONTINUED | OUTPATIENT
Start: 2017-05-09 | End: 2017-05-09

## 2017-05-09 RX ORDER — HYDROCODONE BITARTRATE AND ACETAMINOPHEN 5; 325 MG/1; MG/1
1 TABLET ORAL EVERY 6 HOURS PRN
Qty: 20 TABLET | Refills: 0 | Status: SHIPPED | OUTPATIENT
Start: 2017-05-09 | End: 2017-06-23

## 2017-05-09 RX ORDER — HYDROCODONE BITARTRATE AND ACETAMINOPHEN 5; 325 MG/1; MG/1
1 TABLET ORAL EVERY 4 HOURS PRN
Status: DISCONTINUED | OUTPATIENT
Start: 2017-05-09 | End: 2017-05-09 | Stop reason: HOSPADM

## 2017-05-09 RX ORDER — FENTANYL CITRATE 50 UG/ML
INJECTION, SOLUTION INTRAMUSCULAR; INTRAVENOUS
Status: DISCONTINUED | OUTPATIENT
Start: 2017-05-09 | End: 2017-05-09

## 2017-05-09 RX ORDER — HYDROMORPHONE HYDROCHLORIDE 2 MG/ML
0.4 INJECTION, SOLUTION INTRAMUSCULAR; INTRAVENOUS; SUBCUTANEOUS EVERY 5 MIN PRN
Status: DISCONTINUED | OUTPATIENT
Start: 2017-05-09 | End: 2017-05-09 | Stop reason: HOSPADM

## 2017-05-09 RX ORDER — LIDOCAINE HYDROCHLORIDE 10 MG/ML
1 INJECTION, SOLUTION EPIDURAL; INFILTRATION; INTRACAUDAL; PERINEURAL ONCE
Status: DISCONTINUED | OUTPATIENT
Start: 2017-05-09 | End: 2017-05-09 | Stop reason: HOSPADM

## 2017-05-09 RX ORDER — CEFAZOLIN SODIUM 2 G/50ML
2 SOLUTION INTRAVENOUS
Status: COMPLETED | OUTPATIENT
Start: 2017-05-09 | End: 2017-05-09

## 2017-05-09 RX ORDER — CIPROFLOXACIN 500 MG/1
500 TABLET ORAL EVERY 12 HOURS
COMMUNITY
End: 2017-05-19

## 2017-05-09 RX ORDER — ONDANSETRON HYDROCHLORIDE 2 MG/ML
INJECTION, SOLUTION INTRAMUSCULAR; INTRAVENOUS
Status: DISCONTINUED | OUTPATIENT
Start: 2017-05-09 | End: 2017-05-09

## 2017-05-09 RX ORDER — ROCURONIUM BROMIDE 10 MG/ML
INJECTION, SOLUTION INTRAVENOUS
Status: DISCONTINUED | OUTPATIENT
Start: 2017-05-09 | End: 2017-05-09

## 2017-05-09 RX ADMIN — PROPOFOL 10 MG: 10 INJECTION, EMULSION INTRAVENOUS at 07:05

## 2017-05-09 RX ADMIN — SUCCINYLCHOLINE CHLORIDE 160 MG: 20 INJECTION, SOLUTION INTRAMUSCULAR; INTRAVENOUS at 07:05

## 2017-05-09 RX ADMIN — HYDROMORPHONE HYDROCHLORIDE 0.4 MG: 2 INJECTION INTRAMUSCULAR; INTRAVENOUS; SUBCUTANEOUS at 09:05

## 2017-05-09 RX ADMIN — HYDROCODONE BITARTRATE AND ACETAMINOPHEN 1 TABLET: 5; 325 TABLET ORAL at 10:05

## 2017-05-09 RX ADMIN — GLYCOPYRROLATE 0.2 MG: 0.2 INJECTION, SOLUTION INTRAMUSCULAR; INTRAVENOUS at 08:05

## 2017-05-09 RX ADMIN — SODIUM CHLORIDE, SODIUM LACTATE, POTASSIUM CHLORIDE, AND CALCIUM CHLORIDE: .6; .31; .03; .02 INJECTION, SOLUTION INTRAVENOUS at 07:05

## 2017-05-09 RX ADMIN — ROCURONIUM BROMIDE 10 MG: 10 INJECTION, SOLUTION INTRAVENOUS at 07:05

## 2017-05-09 RX ADMIN — MIDAZOLAM HYDROCHLORIDE 2 MG: 1 INJECTION, SOLUTION INTRAMUSCULAR; INTRAVENOUS at 06:05

## 2017-05-09 RX ADMIN — FENTANYL CITRATE 50 MCG: 50 INJECTION, SOLUTION INTRAMUSCULAR; INTRAVENOUS at 07:05

## 2017-05-09 RX ADMIN — SODIUM CHLORIDE, SODIUM LACTATE, POTASSIUM CHLORIDE, AND CALCIUM CHLORIDE: .6; .31; .03; .02 INJECTION, SOLUTION INTRAVENOUS at 08:05

## 2017-05-09 RX ADMIN — FENTANYL CITRATE 100 MCG: 50 INJECTION, SOLUTION INTRAMUSCULAR; INTRAVENOUS at 07:05

## 2017-05-09 RX ADMIN — PROPOFOL 200 MG: 10 INJECTION, EMULSION INTRAVENOUS at 07:05

## 2017-05-09 RX ADMIN — CEFAZOLIN SODIUM 2 G: 2 SOLUTION INTRAVENOUS at 07:05

## 2017-05-09 RX ADMIN — ONDANSETRON 8 MG: 2 INJECTION, SOLUTION INTRAMUSCULAR; INTRAVENOUS at 07:05

## 2017-05-09 RX ADMIN — LIDOCAINE HYDROCHLORIDE 100 MG: 20 INJECTION, SOLUTION INTRAVENOUS at 07:05

## 2017-05-09 RX ADMIN — SODIUM CHLORIDE, SODIUM LACTATE, POTASSIUM CHLORIDE, AND CALCIUM CHLORIDE: .6; .31; .03; .02 INJECTION, SOLUTION INTRAVENOUS at 06:05

## 2017-05-09 RX ADMIN — PHENYLEPHRINE HYDROCHLORIDE 100 MCG: 10 INJECTION INTRAVENOUS at 08:05

## 2017-05-09 NOTE — PLAN OF CARE
Pt resting in bed. Respirations even and unlabored. Pt encouraged to cough and deep breath. No signs of discomfort noted. Dressing remains c/d/i

## 2017-05-09 NOTE — OP NOTE
[facility]  Surgery Department  Operative Note    SUMMARY     Date of Procedure: 5/9/2017     Procedure: Procedure(s) (LRB):  INCISION AND DRAINAGE (I&D), SCROTAL (N/A)     Surgeon(s) and Role:     * Melvin Gee MD - Primary    Assisting Surgeon: None    Pre-Operative Diagnosis: Infected sebaceous cyst of skin [L72.3, L08.9]    Post-Operative Diagnosis: Post-Op Diagnosis Codes:     * Infected sebaceous cyst of skin [L72.3, L08.9]    Anesthesia: General    Technical Procedures Used: Incision and drainage of scrotal abscess with excision of infected scrotal skin    Description of the Findings of the Procedure: After placed on the operating room table and induction of general endotracheal anesthesia, patient's genitalia are prepped and draped in the usual sterile fashion.  Preliminary material is seen draining from one site of the scrotum which is sent for aerobic and anaerobic cultures.  This draining area is incised.  Next using a marking pen the surrounding indurated area is marked.  Using sharp dissection, all of this indurated and infected appearing scrotal skin is excised and sent for permanent section.  The measurement of the total excised area is approximately 3 x 4 cm.  Excision is brought down to fascia which appears uninfected.  Pinpoint hemostasis is assured.  The wound is left open and packed with iodoform gauze.  Dry sterile dressings are applied as is a scrotal support and the patient was awakened and returned to the recovery room having tolerated the procedure well.    Significant Surgical Tasks Conducted by the Assistant(s), if Applicable: None    Complications: No    Estimated Blood Loss (EBL): * No values recorded between 5/9/2017  7:27 AM and 5/9/2017  8:42 AM *           Implants: * No implants in log *    Specimens:   Specimen (12h ago through future)    Start     Ordered    05/09/17 0821  Specimen to Pathology - Surgery  Once     Comments:  Scrotal abscess  - perm     05/09/17 0821                  Condition: Good    Disposition: PACU - hemodynamically stable.    Attestation: I was present and scrubbed for the entire procedure.

## 2017-05-09 NOTE — DISCHARGE SUMMARY
[facility]  Discharge Note  Short Stay    Admit Date: 5/9/2017    Discharge Date and Time: 5/9/2017 12:50 PM     Attending Physician: Melvin Gee    Discharge Provider: Melvin Gee    Diagnoses:  Active Hospital Problems    Diagnosis  POA    Scrotal infection [N49.2]  Yes      Resolved Hospital Problems    Diagnosis Date Resolved POA   No resolved problems to display.       Discharged Condition: good    Hospital Course: Patient was admitted for an outpatient procedure and tolerated the procedure well with no complications.    Final Diagnoses: Same as principal problem.    Disposition: Home or Self Care    Follow up/Patient Instructions:    Medications:  Reconciled Home Medications:   Discharge Medication List as of 5/9/2017 11:36 AM      START taking these medications    Details   hydrocodone-acetaminophen 5-325mg (NORCO) 5-325 mg per tablet Take 1 tablet by mouth every 6 (six) hours as needed for Pain., Starting 5/9/2017, Until Discontinued, Print         CONTINUE these medications which have NOT CHANGED    Details   brimonidine 0.2% (ALPHAGAN) 0.2 % Drop Place 1 drop into both eyes 2 (two) times daily., Starting 3/16/2017, Until Discontinued, Historical Med      ciprofloxacin HCl (CIPRO) 500 MG tablet Take 500 mg by mouth every 12 (twelve) hours., Until Discontinued, Historical Med      latanoprost 0.005 % ophthalmic solution INTALL 1 DROP TO BOTH EYES CONNOR NIGHT, Historical Med      timolol maleate 0.5% (TIMOPTIC) 0.5 % Drop Place 1 drop into both eyes 2 (two) times daily., Starting 3/16/2017, Until Discontinued, Historical Med             Discharge Procedure Orders  Diet general     Keep surgical extremity elevated     Ice to affected area     Call MD for:  severe uncontrolled pain     Leave dressing on - Keep it clean, dry, and intact until clinic visit       Follow-up Information     Follow up with Melvin Gee MD In 1 day.    Specialty:  Urology    Why:  For wound re-check     Contact information:    [address]  [address]  [place]  [phone]          Follow up with Melvin Gee MD. Call in 1 day.    Specialty:  Urology    Contact information:    [address]  [address]  [id number] [phone]            Discharge Procedure Orders (must include Diet, Follow-up, Activity):    Discharge Procedure Orders (must include Diet, Follow-up, Activity)  Diet general     Keep surgical extremity elevated     Ice to affected area     Call MD for:  severe uncontrolled pain     Leave dressing on - Keep it clean, dry, and intact until clinic visit

## 2017-05-09 NOTE — ANESTHESIA POSTPROCEDURE EVALUATION
"Anesthesia Post Evaluation    Patient: Bharathi Garrido    Procedure(s) Performed: Procedure(s) (LRB):  INCISION AND DRAINAGE (I&D), SCROTAL (N/A)    Final Anesthesia Type: general  Patient location during evaluation: PACU  Patient participation: Yes- Able to Participate  Level of consciousness: awake and alert  Post-procedure vital signs: reviewed and stable  Pain management: adequate  Airway patency: patent  PONV status at discharge: No PONV  Anesthetic complications: no      Cardiovascular status: blood pressure returned to baseline and hemodynamically stable  Respiratory status: spontaneous ventilation, unassisted and room air  Hydration status: euvolemic  Follow-up not needed.        Visit Vitals    /86    Pulse 74    Temp 36.3 °C (97.3 °F)    Resp 20    Ht 5' 9" (1.753 m)    Wt 111.1 kg (245 lb)    SpO2 96%    BMI 36.18 kg/m2       Pain/Sarah Score: Pain Assessment Performed: Yes (5/9/2017 12:45 PM)  Presence of Pain: denies (5/9/2017 12:45 PM)  Pain Rating Prior to Med Admin: 3 (5/9/2017 10:05 AM)  Sarah Score: 10 (5/9/2017 12:45 PM)      "

## 2017-05-09 NOTE — IP AVS SNAPSHOT
[facility]  [address]  [place] [address]  Phone: [phone]           Patient Discharge Instructions   Our goal is to set you up for success. This packet includes information on your condition, medications, and your home care.  It will help you care for yourself to prevent having to return to the hospital.     Please ask your nurse if you have any questions.      There are many details to remember when preparing to leave the hospital. Here is what you will need to do:    1. Take your medicine. If you are prescribed medications, review your Medication List on the following pages. You may have new medications to  at the pharmacy and others that you'll need to stop taking. Review the instructions for how and when to take your medications. Talk with your doctor or nurses if you are unsure of what to do.     2. Go to your follow-up appointments. Specific follow-up information is listed in the following pages. Your may be contacted by a nurse or clinical provider about future appointments. Be sure we have all of the phone numbers to reach you. Please contact your provider's office if you are unable to make an appointment.     3. Watch for warning signs. Your doctor or nurse will give you detailed warning signs to watch for and when to call for assistance. These instructions may also include educational information about your condition. If you experience any of warning signs to your health, call your doctor.               ** Verify the list of medication(s) below is accurate and up to date. Carry this with you in case of emergency. If your medications have changed, please notify your healthcare provider.             Medication List      START taking these medications        Additional Info                      hydrocodone-acetaminophen 5-325mg 5-325 mg per tablet   Commonly known as:  NORCO   Quantity:  20 tablet   Refills:  0   Dose:  1 tablet    Last time this was given:  1 tablet on  5/9/2017 10:05 AM   Instructions:  Take 1 tablet by mouth every 6 (six) hours as needed for Pain.     Begin Date    AM    Noon    PM    Bedtime         CONTINUE taking these medications        Additional Info                      brimonidine 0.2% 0.2 % Drop   Commonly known as:  ALPHAGAN   Refills:  6   Dose:  1 drop    Instructions:  Place 1 drop into both eyes 2 (two) times daily.     Begin Date    AM    Noon    PM    Bedtime       ciprofloxacin HCl 500 MG tablet   Commonly known as:  CIPRO   Refills:  0   Dose:  500 mg    Instructions:  Take 500 mg by mouth every 12 (twelve) hours.     Begin Date    AM    Noon    PM    Bedtime       latanoprost 0.005 % ophthalmic solution   Refills:  6    Instructions:  INTALL 1 DROP TO BOTH EYES CONNOR NIGHT     Begin Date    AM    Noon    PM    Bedtime       timolol maleate 0.5% 0.5 % Drop   Commonly known as:  TIMOPTIC   Refills:  7   Dose:  1 drop    Instructions:  Place 1 drop into both eyes 2 (two) times daily.     Begin Date    AM    Noon    PM    Bedtime            Where to Get Your Medications      You can get these medications from any pharmacy     Bring a paper prescription for each of these medications     hydrocodone-acetaminophen 5-325mg 5-325 mg per tablet                  Please bring to all follow up appointments:    1. A copy of your discharge instructions.  2. All medicines you are currently taking in their original bottles.  3. Identification and insurance card.    Please arrive 15 minutes ahead of scheduled appointment time.    Please call 24 hours in advance if you must reschedule your appointment and/or time.        Follow-up Information     Follow up with Melvin Gee MD In 1 day.    Specialty:  Urology    Why:  For wound re-check    Contact information:    [address] [address] [phone]          Follow up with Melvin Gee MD. Call in 1 day.    Specialty:  Urology    Contact information:    [address]  [facility]  [address]  [place] [address]  [phone]          Discharge Instructions     Future Orders    Call MD for:  severe uncontrolled pain     Diet general     Questions:    Total calories:      Fat restriction, if any:      Protein restriction, if any:      Na restriction, if any:      Fluid restriction:      Additional restrictions:      Leave dressing on - Keep it clean, dry, and intact until clinic visit         Discharge Instructions       DIET: You may resume your home diet. If nausea is present, increase your diet gradually with fluids and bland foods    ACTIVITY LEVEL: If you have received sedation or an anesthetic, you may feel sleepy for several hours. Rest until you are more awake. Gradually resume your normal activities    Medications: Pain medication should be taken only if needed and as directed. If antibiotics are prescribed, the medication should be taken until completed. You will be given an updated list of you medications.    No driving, alcoholic beverages or signing legal documents for next 24 hours or while taking pain medication.       CALL THE DOCTOR:    For any obvious bleeding (some dried blood over the incision is normal).      Redness, swelling, foul smell around incision or fever over 101.   Shortness of breath, Coughing up Bloody sputum, Pains or Swelling in your Calves .   Persistent pain or nausea not relieved by medication.    If any unusual problems or difficulties occur contact your doctor. If you cannot contact your doctor but feel your signs and symptoms warrant a physicians attention return to the emergency room.          Admission Information     Date & Time Provider Department CSN    5/9/2017  5:27 AM Melvin Gee MD [facility] [id number]      Care Providers     Provider Role Specialty Primary office phone    Melvin Gee MD Attending Provider Urology [phone]    Melvin Gee MD Surgeon  Urology [phone]     "  Your Vitals Were     BP Pulse Temp Resp Height Weight    136/86 72 97.2 °F (36.2 °C) 20 5' 9" (1.753 m) 111.1 kg (245 lb)    SpO2 BMI             95% 36.18 kg/m2         Recent Lab Values     No lab values to display.      Pending Labs     Order Current Status    Aerobic culture In process    Culture, Anaerobe In process    Specimen to Pathology - Surgery In process      Allergies as of 5/9/2017     No Known Allergies      [facility]     [facility] Care Line - 24/7 Assistance  Unless otherwise directed by your provider, please contact [facility], our nurse care line that is available for 24/7 assistance.     Registered nurses in the [facility] provide clinical advisement, health education, appointment booking, and other advisory services.  Call for this free service at [phone].        Advance Directives     An advance directive is a document which, in the event you are no longer able to make decisions for yourself, tells your healthcare team what kind of treatment you do or do not want to receive, or who you would like to make those decisions for you.  If you do not currently have an advance directive, [facility] encourages you to create one.  For more information call:  [phone]), [phone] ([phone]),  or log on to [email or web address].        Smoking Cessation     If you would like to quit smoking:   You may be eligible for free services if you are a [place] resident and started smoking cigarettes before September 1, 1988.  Call the Smoking Cessation Trust (SCT) toll free at (172) 444-9848 or (696) 079-0021.   Call 1-094-QUIT-NOW if you do not meet the above criteria.   Contact us via email: [email or web address]   View our website for more information: [email or web address]        Language Assistance Services     ATTENTION: Language assistance services are available, free of charge. Please call [phone].      ATENCIÓN: Si " habla emperatrizañol, tiene a grissom disposición servicios gratuitos de asistencia lingüística. Llame al [phone].     TYLOR Ý: N?u b?n nói Ti?ng Vi?t, có các d?ch v? h? tr? ngôn ng? mi?n phí dành cho b?n. G?i s? [phone].        [facility] Sign-Up     Activating your [facility] account is as easy as 1-2-3!     1) Visit [email or web address], select Sign Up Now, enter this activation code and your date of birth, then select Next.  [email or web address]  Expires: 5/13/2017  9:22 PM      2) Create a username and password to use when you visit [facility] in the future and select a security question in case you lose your password and select Next.    3) Enter your e-mail address and click Sign Up!    Additional Information  If you have questions, please e-mail [email or web address] or call [phone] to talk to our [facility] staff. Remember, [facility] is NOT to be used for urgent needs. For medical emergencies, dial 911.          [facility] complies with applicable Federal civil rights laws and does not discriminate on the basis of race, color, national origin, age, disability, or sex.

## 2017-05-09 NOTE — TRANSFER OF CARE
"Anesthesia Transfer of Care Note    Patient: Bharathi Garrido    Procedure(s) Performed: Procedure(s) (LRB):  INCISION AND DRAINAGE (I&D), SCROTAL (N/A)    Patient location: PACU    Anesthesia Type: general    Transport from OR: Transported from OR on 100% O2 by closed face mask with adequate spontaneous ventilation    Post pain: adequate analgesia    Post assessment: no apparent anesthetic complications    Post vital signs: stable    Level of consciousness: awake and alert    Nausea/Vomiting: no nausea/vomiting    Complications: none    Transfer of care protocol was followed      Last vitals:   Visit Vitals    /89 (BP Location: Left arm, Patient Position: Lying, BP Method: Automatic)    Pulse 66    Temp 36.9 °C (98.5 °F) (Oral)    Resp 20    Ht 5' 9" (1.753 m)    Wt 111.1 kg (245 lb)    SpO2 (!) 94%    BMI 36.18 kg/m2     "

## 2017-05-09 NOTE — PLAN OF CARE
Pt arrived to PACU  With KATHIA Shah and JANNETTE Younger. Pt sleeping but arousable with touch. Dressing to scrotum c/d/i

## 2017-05-09 NOTE — OR NURSING
Pt arrived to OR with his dentures in. Pt removed them and placed them into a denture cup. The cup was labeled with a patient label. The dentures will be transported to recovery with the patient after completion of surgery.

## 2017-05-09 NOTE — PLAN OF CARE
Report called to TERRENCE Borrero RN. Pt resting comfortably in bed. Pt transported to OPS by ADILENE Martínez.

## 2017-05-09 NOTE — PLAN OF CARE
Pt aaox3. Pt complaining of mild pain. Pain med administered as per md order. Dressing remains c/d/i

## 2017-05-09 NOTE — BRIEF OP NOTE
[facility]  Brief Operative Note    SUMMARY     Surgery Date: 5/9/2017     Surgeon(s) and Role:     * Melvin Gee MD - Primary    Assisting Surgeon: None    Pre-op Diagnosis:  Infected sebaceous cyst of skin [L72.3, L08.9]    Post-op Diagnosis:  Post-Op Diagnosis Codes:     * Infected sebaceous cyst of skin [L72.3, L08.9]    Procedure(s) (LRB):  INCISION AND DRAINAGE (I&D), SCROTAL (N/A)    Anesthesia: General    Description of Procedure: I&D infected scrotal skin with excision of infected scrotal skin    Description of the findings of the procedure: infected sebaceous cyst scrotal skin    Estimated Blood Loss: minimal    Specimens: scrotal abcess  Specimen (12h ago through future)    Start     Ordered    05/09/17 0821  Specimen to Pathology - Surgery  Once     Comments:  Scrotal abscess  - perm    05/09/17 0821

## 2017-05-10 ENCOUNTER — OFFICE VISIT (OUTPATIENT)
Dept: UROLOGY | Facility: CLINIC | Age: 67
End: 2017-05-10
Payer: COMMERCIAL

## 2017-05-10 VITALS
SYSTOLIC BLOOD PRESSURE: 133 MMHG | RESPIRATION RATE: 20 BRPM | OXYGEN SATURATION: 96 % | BODY MASS INDEX: 36.29 KG/M2 | DIASTOLIC BLOOD PRESSURE: 86 MMHG | TEMPERATURE: 97 F | HEART RATE: 74 BPM | WEIGHT: 245 LBS | HEIGHT: 69 IN

## 2017-05-10 VITALS
WEIGHT: 245 LBS | BODY MASS INDEX: 36.29 KG/M2 | SYSTOLIC BLOOD PRESSURE: 114 MMHG | HEIGHT: 69 IN | DIASTOLIC BLOOD PRESSURE: 64 MMHG | TEMPERATURE: 99 F | HEART RATE: 73 BPM

## 2017-05-10 DIAGNOSIS — L72.3 INFECTED SEBACEOUS CYST OF SKIN: Primary | ICD-10-CM

## 2017-05-10 DIAGNOSIS — R93.89 NONSPECIFIC (ABNORMAL) FINDINGS ON RADIOLOGICAL AND OTHER EXAMINATION OF GENITOURINARY ORGANS: ICD-10-CM

## 2017-05-10 DIAGNOSIS — R97.20 ELEVATED PSA: ICD-10-CM

## 2017-05-10 DIAGNOSIS — L08.9 INFECTED SEBACEOUS CYST OF SKIN: Primary | ICD-10-CM

## 2017-05-10 PROCEDURE — 99214 OFFICE O/P EST MOD 30 MIN: CPT | Mod: S$GLB,,, | Performed by: UROLOGY

## 2017-05-10 PROCEDURE — 99999 PR PBB SHADOW E&M-EST. PATIENT-LVL III: CPT | Mod: PBBFAC,,, | Performed by: UROLOGY

## 2017-05-10 RX ORDER — OXYCODONE AND ACETAMINOPHEN 5; 325 MG/1; MG/1
1 TABLET ORAL EVERY 6 HOURS PRN
Qty: 20 TABLET | Refills: 0 | Status: SHIPPED | OUTPATIENT
Start: 2017-05-10 | End: 2017-06-23

## 2017-05-10 NOTE — MR AVS SNAPSHOT
[facility]  [address]  [place] [address]  Phone: [phone]                  Bharathi Garrido   5/10/2017 11:15 AM   Office Visit    Description:  Male : 1950   Provider:  Melvin Gee MD   Department:  [facility]           Diagnoses this Visit        Comments    Infected sebaceous cyst of skin    -  Primary     Elevated PSA                To Do List           Future Appointments        Provider Department Dept Phone    5/10/2017 11:15 AM Melvin Gee MD [facility] [phone]    2017 9:45 AM Melvin Gee MD [facility] [phone]      Goals (5 Years of Data)     None       These Medications        Disp Refills Start End    oxycodone-acetaminophen (PERCOCET) 5-325 mg per tablet 20 tablet 0 5/10/2017     Take 1 tablet by mouth every 6 (six) hours as needed for Pain. [id number] - Oral      [facility]     [facility] On Call Nurse Care Line -  Assistance  Unless otherwise directed by your provider, please contact [facility], our nurse care line that is available for  assistance.     Registered nurses in the [facility] On Call Center provide: appointment scheduling, clinical advisement, health education, and other advisory services.  Call: [phone] (toll free)               Medications           START taking these NEW medications        Refills    oxycodone-acetaminophen (PERCOCET) 5-325 mg per tablet 0    Sig: Take 1 tablet by mouth every 6 (six) hours as needed for Pain. [id number]    Class: Print    Route: Oral           Verify that the below list of medications is an accurate representation of the medications you are currently taking.  If none reported, the list may be blank. If incorrect, please contact your healthcare provider. Carry this list with you in case of emergency.           Current Medications     brimonidine 0.2% (ALPHAGAN) 0.2 % Drop Place 1 drop into both eyes 2 (two) times daily.    ciprofloxacin HCl  "(CIPRO) 500 MG tablet Take 500 mg by mouth every 12 (twelve) hours.    hydrocodone-acetaminophen 5-325mg (NORCO) 5-325 mg per tablet Take 1 tablet by mouth every 6 (six) hours as needed for Pain.    latanoprost 0.005 % ophthalmic solution INTALL 1 DROP TO BOTH EYES CONNOR NIGHT    timolol maleate 0.5% (TIMOPTIC) 0.5 % Drop Place 1 drop into both eyes 2 (two) times daily.    oxycodone-acetaminophen (PERCOCET) 5-325 mg per tablet Take 1 tablet by mouth every 6 (six) hours as needed for Pain. [id number]           Clinical Reference Information           Your Vitals Were     BP Pulse Temp Height Weight BMI    114/64 73 98.6 °F (37 °C) 5' 9" (1.753 m) 111.1 kg (245 lb) 36.18 kg/m2      Blood Pressure          Most Recent Value    BP  114/64      Allergies as of 5/10/2017     No Known Allergies      Immunizations Administered on Date of Encounter - 5/10/2017     None      [facility] Sign-Up     Activating your [facility] account is as easy as 1-2-3!     1) Visit [email or web address], select Sign Up Now, enter this activation code and your date of birth, then select Next.  [email or web address]  Expires: 5/13/2017  9:22 PM      2) Create a username and password to use when you visit [facility] in the future and select a security question in case you lose your password and select Next.    3) Enter your e-mail address and click Sign Up!    Additional Information  If you have questions, please e-mail [email or web address] or call [phone] to talk to our [facility] staff. Remember, [facility] is NOT to be used for urgent needs. For medical emergencies, dial 911.         Language Assistance Services     ATTENTION: Language assistance services are available, free of charge. Please call [phone].      ATENCIÓN: Si habla español, tiene a grissom disposición servicios gratuitos de asistencia lingüística. Llame al [phone].     CHÚ Ý: N?u b?n nói Ti?ng Vi?t, có các d?ch v? h? tr? ngôn ng? mi?n phí roseh cho b?n. G?i s? [phone].      "    [place]  [facility] complies with applicable Federal civil rights laws and does not discriminate on the basis of race, color, national origin, age, disability, or sex.

## 2017-05-10 NOTE — PROGRESS NOTES
"HPI:  Bharathi Garrido is a 67 y.o. year old male that  presents with No chief complaint on file.  .  This patient comes in follow-up for incision and drainage of scrotal abscess with excision of  infected scrotal skin.  He has some pain from his surgical site and minimal drainage.  He reports no fevers or chills    The patient does have a history of elevated PSA and will need repeat PSA with possible prostate biopsy once he is healed from this current surgery    The patient does have nonspecific x-ray findings of his testicles which also needs to be rechecked once he is healed from this current surgery      Past Medical History:   Diagnosis Date    Glaucoma      Social History     Social History    Marital status:      Spouse name: N/A    Number of children: N/A    Years of education: N/A     Occupational History    Not on file.     Social History Main Topics    Smoking status: Former Smoker    Smokeless tobacco: Not on file    Alcohol use No    Drug use: No    Sexual activity: Yes     Partners: Female     Other Topics Concern    Not on file     Social History Narrative     Past Surgical History:   Procedure Laterality Date    INCISION AND DRAINAGE OF WOUND Left 2011    scrotum     Family History   Problem Relation Age of Onset    Prostate cancer Neg Hx     Kidney disease Neg Hx            Review of Systems  The patient has no chest pains.  The patient has no shortness of breath  Patient wears        glasses.  All other review of systems are negative.      Physical Exam:  /64  Pulse 73  Temp 98.6 °F (37 °C)  Ht 5' 9" (1.753 m)  Wt 111.1 kg (245 lb)  BMI 36.18 kg/m2  General appearance: alert, cooperative, no distress  Constitutional:Oriented to person, place, and time.appears well-developed and well-nourished.   HEENT: Normocephalic, atraumatic, neck symmetrical, no nasal discharge   Eyes: conjunctivae/corneas clear, PERRL, EOM's intact  Lungs: clear to auscultation bilaterally, no " dullness to percussion bilaterally  Heart: regular rate and rhythm without rub; no displacement of the PMI   Abdomen: soft, non-tender; bowel sounds normoactive; no organomegaly  :Penis/perineum without lesions, scrotum without rash/cysts, epididymis nontender bilaterally, urethral meatus in normal location normal size, no penile plaques palpated, prostate:      Smooth enlarged and benign feeling                 seminal vesicles not palpated.  No rectal masses, sphincter tone normal.  Testes equal in size without masses, packing removed from scrotal surgical site and it looks clean without infection  Extremities: extremities symmetric; no clubbing, cyanosis, or edema  Integument: Skin color, texture, turgor normal; no rashes; hair distrubution normal  Neurologic: Alert and oriented X 3, normal strength, normal coordination and gait  Psychiatric: no pressured speech; normal affect; no evidence of impaired cognition     LABS:    Complete Blood Count  Lab Results   Component Value Date    RBC 4.91 05/04/2017    HGB 13.6 (L) 05/04/2017    HCT 40.1 05/04/2017    MCV 82 05/04/2017    MCH 27.7 05/04/2017    MCHC 33.9 05/04/2017    RDW 13.9 05/04/2017     05/04/2017    MPV 12.2 05/04/2017    GRAN 5.1 05/04/2017    GRAN 60.8 05/04/2017    LYMPH 2.2 05/04/2017    LYMPH 26.1 05/04/2017    MONO 0.9 05/04/2017    MONO 10.6 05/04/2017    EOS 0.2 05/04/2017    BASO 0.04 05/04/2017    EOSINOPHIL 1.9 05/04/2017    BASOPHIL 0.5 05/04/2017    DIFFMETHOD Automated 05/04/2017       Comprehensive Metabolic Panel  Lab Results   Component Value Date    GLU 96 05/04/2017    BUN 12 05/04/2017    CREATININE 1.0 05/04/2017     05/04/2017    K 4.0 05/04/2017     05/04/2017    PROT 8.2 03/30/2017    ALBUMIN 3.8 03/30/2017    BILITOT 0.8 03/30/2017    AST 17 03/30/2017    ALKPHOS 77 03/30/2017    CO2 26 05/04/2017    ALT 22 03/30/2017    ANIONGAP 8 05/04/2017    EGFRNONAA >60 05/04/2017    ESTGFRAFRICA >60 05/04/2017        PSA  Lab Results   Component Value Date    PSA 21.8 (H) 03/30/2017         Assessment:    ICD-10-CM ICD-9-CM    1. Infected sebaceous cyst of skin L72.3 706.2     L08.9     2. Elevated PSA R97.20 790.93    3. Nonspecific (abnormal) findings on radiological and other examination of genitourinary organs R93.8 793.5      The primary encounter diagnosis was Infected sebaceous cyst of skin. Diagnoses of Elevated PSA and Nonspecific (abnormal) findings on radiological and other examination of genitourinary organs were also pertinent to this visit.      Plan: #1.  Stable status post I&D and excision infected scrotal skin.  Finish current course of antibiotics.  2-3 times per day sitz baths.  Continued packing of wound.  Discussed need for healing with secondary intention.  Follow-up 10 days    #2.  Elevated PSA.  Plan.  This will be addressed after patient has healed from current surgery    #3.  On specific findings of testicle on ultrasound.  Plan.  This also will be addressed after patient has healed from current surgery  No orders of the defined types were placed in this encounter.          Melvin Gee MD    PLEASE NOTE:  Please be advised that portions of this note were dictated using voice recognition software and may contain dictation related errors in spelling/grammar/appropriate pronouns/syntax or other errors that might have not been found and or corrected on text review.

## 2017-05-11 LAB — BACTERIA SPEC AEROBE CULT: NORMAL

## 2017-05-15 LAB
BACTERIA SPEC ANAEROBE CULT: NORMAL
BACTERIA SPEC ANAEROBE CULT: NORMAL

## 2017-05-19 ENCOUNTER — OFFICE VISIT (OUTPATIENT)
Dept: UROLOGY | Facility: CLINIC | Age: 67
End: 2017-05-19
Payer: COMMERCIAL

## 2017-05-19 VITALS
HEART RATE: 70 BPM | DIASTOLIC BLOOD PRESSURE: 68 MMHG | TEMPERATURE: 99 F | WEIGHT: 245 LBS | BODY MASS INDEX: 36.29 KG/M2 | HEIGHT: 69 IN | SYSTOLIC BLOOD PRESSURE: 120 MMHG

## 2017-05-19 DIAGNOSIS — L72.3 INFECTED SEBACEOUS CYST OF SKIN: Primary | ICD-10-CM

## 2017-05-19 DIAGNOSIS — L08.9 INFECTED SEBACEOUS CYST OF SKIN: Primary | ICD-10-CM

## 2017-05-19 DIAGNOSIS — R03.0 BLOOD PRESSURE ELEVATED WITHOUT HISTORY OF HTN: ICD-10-CM

## 2017-05-19 DIAGNOSIS — R97.20 ELEVATED PSA: ICD-10-CM

## 2017-05-19 DIAGNOSIS — R93.89 NONSPECIFIC (ABNORMAL) FINDINGS ON RADIOLOGICAL AND OTHER EXAMINATION OF GENITOURINARY ORGANS: ICD-10-CM

## 2017-05-19 PROCEDURE — 99214 OFFICE O/P EST MOD 30 MIN: CPT | Mod: S$GLB,,, | Performed by: UROLOGY

## 2017-05-19 PROCEDURE — 99999 PR PBB SHADOW E&M-EST. PATIENT-LVL III: CPT | Mod: PBBFAC,,, | Performed by: UROLOGY

## 2017-05-19 NOTE — PROGRESS NOTES
"This patient was last seen by me May 10, 2017 which was postop day 1 status post incision and drainage of scrotal abscess with excision of surrounding infected skin.     He now comes in follow-up and reports no problems with his surgical site.  He has been doing sitz baths is had no fevers or chills.  There is been no drainage from the site    Physical exam reveals reveals a well-developed well-nourished patient  in no acute distress.  Patient is alert and oriented ×3 with normal mood and affect.  Respiratory effort is normal and there is no peripheral edema.  Skin is normal to inspection and palpation.  Surgical site on scrotum shows healthy-appearing granulation tissue with no evidence of infection    /68  Pulse 70  Temp 98.8 °F (37.1 °C)  Ht 5' 9" (1.753 m)  Wt 111.1 kg (245 lb)  BMI 36.18 kg/m2  Review of Systems  General ROS: negative for chills, fever or weight loss  Respiratory ROS: no cough, shortness of breath, or wheezing  Cardiovascular ROS: no chest pain or dyspnea on exertion  Musculoskeletal ROS: negative for gait disturbance or muscular weakness    Family History   Problem Relation Age of Onset    Prostate cancer Neg Hx     Kidney disease Neg Hx      Past Medical History:   Diagnosis Date    Glaucoma      Family History   Problem Relation Age of Onset    Prostate cancer Neg Hx     Kidney disease Neg Hx      Social History   Substance Use Topics    Smoking status: Former Smoker    Smokeless tobacco: Not on file    Alcohol use No       Impression:      ICD-10-CM ICD-9-CM    1. Infected sebaceous cyst of skin L72.3 706.2     L08.9     2. Elevated PSA R97.20 790.93 Prostate Specific Antigen, Diagnostic   3. Nonspecific (abnormal) findings on radiological and other examination of genitourinary organs R93.8 793.5        Plan:    #1 status post incision and drainage of scrotal abscess.  Plan.  Continue wound care.  Can decrease sitz baths to 1 per day.  Follow-up 1 month for recheck    #2. "  Elevated PSA.  Plan.  We'll check PSA one week prior to next office visit and  review results with patient at that time    #3.  Nonspecific findings on ultrasound of testicles.  Plan.  Once patient completely healed from surgery we will plan on rechecking scrotal ultrasound    PLEASE NOTE:  Please be advised that portions of this note were dictated using voice recognition software and may contain dictation related errors in spelling/grammar/appropriate pronouns/syntax or other errors that might have not been found and or corrected on text review.

## 2017-05-19 NOTE — MR AVS SNAPSHOT
[facility]  [address]  [place] [address]  Phone: [phone]                  Bharathi Garrido   2017 9:45 AM   Office Visit    Description:  Male : 1950   Provider:  Melvin Gee MD   Department:  [facility]           Diagnoses this Visit        Comments    Infected sebaceous cyst of skin    -  Primary     Elevated PSA         Nonspecific (abnormal) findings on radiological and other examination of genitourinary organs                To Do List           Future Appointments        Provider Department Dept Phone    2017 9:45 AM Melvin Gee MD [facility] [phone]      Goals (5 Years of Data)     None      Follow-Up and Disposition     Return in about 1 month (around 2017).      [facility]     [facility] Nurse Care Line -  Assistance  Unless otherwise directed by your provider, please contact [facility], our nurse care line that is available for  assistance.     Registered nurses in the [facility] provide: appointment scheduling, clinical advisement, health education, and other advisory services.  Call: [phone] (toll free)               Medications           STOP taking these medications     ciprofloxacin HCl (CIPRO) 500 MG tablet Take 500 mg by mouth every 12 (twelve) hours.           Verify that the below list of medications is an accurate representation of the medications you are currently taking.  If none reported, the list may be blank. If incorrect, please contact your healthcare provider. Carry this list with you in case of emergency.           Current Medications     brimonidine 0.2% (ALPHAGAN) 0.2 % Drop Place 1 drop into both eyes 2 (two) times daily.    hydrocodone-acetaminophen 5-325mg (NORCO) 5-325 mg per tablet Take 1 tablet by mouth every 6 (six) hours as needed for Pain.    latanoprost 0.005 % ophthalmic solution INTALL 1 DROP TO BOTH EYES CONNOR NIGHT    oxycodone-acetaminophen  "(PERCOCET) 5-325 mg per tablet Take 1 tablet by mouth every 6 (six) hours as needed for Pain. [id number]    timolol maleate 0.5% (TIMOPTIC) 0.5 % Drop Place 1 drop into both eyes 2 (two) times daily.           Clinical Reference Information           Your Vitals Were     BP Pulse Temp Height Weight BMI    120/68 70 98.8 °F (37.1 °C) 5' 9" (1.753 m) 111.1 kg (245 lb) 36.18 kg/m2      Blood Pressure          Most Recent Value    BP  120/68      Allergies as of 5/19/2017     No Known Allergies      Immunizations Administered on Date of Encounter - 5/19/2017     None      Orders Placed During Today's Visit     Future Labs/Procedures Expected by Expires    Prostate Specific Antigen, Diagnostic  5/19/2017 7/18/2018      [facility] Sign-Up     Activating your [facility] account is as easy as 1-2-3!     1) Visit [email or web address], select Sign Up Now, enter this activation code and your date of birth, then select Next.  [email or web address]  Expires: 7/3/2017 10:12 AM      2) Create a username and password to use when you visit [facility] in the future and select a security question in case you lose your password and select Next.    3) Enter your e-mail address and click Sign Up!    Additional Information  If you have questions, please e-mail [email or web address] or call [phone] to talk to our [facility] staff. Remember, [facility] is NOT to be used for urgent needs. For medical emergencies, dial 911.         Language Assistance Services     ATTENTION: Language assistance services are available, free of charge. Please call [phone].      ATENCIÓN: Si habla español, tiene a grissom disposición servicios gratuitos de asistencia lingüística. Llame al [phone].     TYLOR Ý: N?u b?n nói Ti?ng Vi?t, có các d?ch v? h? tr? ngôn ng? mi?n phí dành cho b?n. G?i s? [phone].         [facility] complies with applicable Federal civil rights laws and does not discriminate on the basis of race, color, national origin, age, " disability, or sex.

## 2017-06-23 ENCOUNTER — OFFICE VISIT (OUTPATIENT)
Dept: UROLOGY | Facility: CLINIC | Age: 67
End: 2017-06-23
Payer: COMMERCIAL

## 2017-06-23 VITALS
BODY MASS INDEX: 36.29 KG/M2 | WEIGHT: 245 LBS | DIASTOLIC BLOOD PRESSURE: 74 MMHG | TEMPERATURE: 99 F | HEIGHT: 69 IN | HEART RATE: 63 BPM | SYSTOLIC BLOOD PRESSURE: 122 MMHG

## 2017-06-23 DIAGNOSIS — L72.3 INFECTED SEBACEOUS CYST OF SKIN: ICD-10-CM

## 2017-06-23 DIAGNOSIS — R97.20 ELEVATED PSA: Primary | ICD-10-CM

## 2017-06-23 DIAGNOSIS — L08.9 INFECTED SEBACEOUS CYST OF SKIN: ICD-10-CM

## 2017-06-23 DIAGNOSIS — R93.89 NONSPECIFIC (ABNORMAL) FINDINGS ON RADIOLOGICAL AND OTHER EXAMINATION OF GENITOURINARY ORGANS: ICD-10-CM

## 2017-06-23 PROCEDURE — 99214 OFFICE O/P EST MOD 30 MIN: CPT | Mod: S$GLB,,, | Performed by: UROLOGY

## 2017-06-23 PROCEDURE — 99999 PR PBB SHADOW E&M-EST. PATIENT-LVL III: CPT | Mod: PBBFAC,,, | Performed by: UROLOGY

## 2017-06-23 PROCEDURE — 87086 URINE CULTURE/COLONY COUNT: CPT

## 2017-06-23 NOTE — PROGRESS NOTES
"This patient was last seen by me May 19, 2017 in follow-up for excision of infected sebaceous cyst of the scrotum    Outcomes in follow-up and scrotum is almost completely healed.  There is no evidence of any drainage.  A small amount of granulation tissue present looks healthy    Patient has a history narrative elevated serum PSA and recheck shows to be stable at 21.    Physical exam reveals reveals a well-developed well-nourished patient  in no acute distress.  Patient is alert and oriented ×3 with normal mood and affect.  Respiratory effort is normal and there is no peripheral edema.  Skin is normal to inspection and palpation. Penis/perineum without lesions, , epididymis nontender bilaterally, urethral meatus in normal location normal size, no penile plaques palpated, prostate:     Smooth significantly enlarged and benign feeling                  seminal vesicles not palpated.  No rectal masses, sphincter tone normal.  Testes equal in size without masses.  Scrotal wound is healing nicely.  Small amount of granulation tissue still present.  Towards the bottom part of the site of resection there still is a small amount of firm feeling subcutaneous tissue.  /74   Pulse 63   Temp 98.7 °F (37.1 °C)   Ht 5' 9" (1.753 m)   Wt 111.1 kg (245 lb)   BMI 36.18 kg/m²   Review of Systems  General ROS: negative for chills, fever or weight loss  Respiratory ROS: no cough, shortness of breath, or wheezing  Cardiovascular ROS: no chest pain or dyspnea on exertion  Musculoskeletal ROS: negative for gait disturbance or muscular weakness    Family History   Problem Relation Age of Onset    Prostate cancer Neg Hx     Kidney disease Neg Hx      Past Medical History:   Diagnosis Date    Glaucoma      Family History   Problem Relation Age of Onset    Prostate cancer Neg Hx     Kidney disease Neg Hx      Social History   Substance Use Topics    Smoking status: Former Smoker    Smokeless tobacco: Not on file    Alcohol " use No       Impression:      ICD-10-CM ICD-9-CM    1. Elevated PSA R97.20 790.93    2. Infected sebaceous cyst of skin L72.3 706.2 Urine culture    L08.9     3. Nonspecific (abnormal) findings on radiological and other examination of genitourinary organs R93.8 793.5        Plan:    #1.  Elevated PSA.  Plan.  PSA has been stable over 2 months.  I discussed at length in detail with the patient and his wife that at some point he will need prostate biopsy however I think it wisest to wait until his scrotum is entirely healed before proceeding with this.  Patient and wife voice understanding and agreement with this plan    #2.  Infected sebaceous cyst of skin of scrotum.  Plan.  This is healing nicely.  I'll up 6 weeks for recheck    #3.  Nonspecific finding on ultrasound of testicles.  Plan.  1 scrotum is completely healed we will recheck scrotal ultrasound    PLEASE NOTE:  Please be advised that portions of this note were dictated using voice recognition software and may contain dictation related errors in spelling/grammar/appropriate pronouns/syntax or other errors that might have not been found and or corrected on text review.

## 2017-06-25 LAB — BACTERIA UR CULT: NO GROWTH

## 2017-07-24 ENCOUNTER — TELEPHONE (OUTPATIENT)
Dept: UROLOGY | Facility: CLINIC | Age: 67
End: 2017-07-24

## 2017-07-24 NOTE — TELEPHONE ENCOUNTER
----- Message from Teresa Rodriguez sent at 7/24/2017  1:38 PM CDT -----  Contact: [phone] / Dinora pt wife   Requesting to speak with you regarding the patients [facility] paperwork. Please advise.

## 2017-08-18 ENCOUNTER — LAB VISIT (OUTPATIENT)
Dept: LAB | Facility: HOSPITAL | Age: 67
End: 2017-08-18
Attending: UROLOGY
Payer: COMMERCIAL

## 2017-08-18 ENCOUNTER — OFFICE VISIT (OUTPATIENT)
Dept: UROLOGY | Facility: CLINIC | Age: 67
End: 2017-08-18
Payer: COMMERCIAL

## 2017-08-18 ENCOUNTER — CLINICAL SUPPORT (OUTPATIENT)
Dept: LAB | Facility: HOSPITAL | Age: 67
End: 2017-08-18
Attending: UROLOGY
Payer: COMMERCIAL

## 2017-08-18 VITALS
DIASTOLIC BLOOD PRESSURE: 81 MMHG | HEART RATE: 66 BPM | SYSTOLIC BLOOD PRESSURE: 129 MMHG | BODY MASS INDEX: 36.29 KG/M2 | HEIGHT: 69 IN | WEIGHT: 245 LBS

## 2017-08-18 DIAGNOSIS — L72.3 INFECTED SEBACEOUS CYST OF SKIN: ICD-10-CM

## 2017-08-18 DIAGNOSIS — L08.9 INFECTED SEBACEOUS CYST OF SKIN: ICD-10-CM

## 2017-08-18 DIAGNOSIS — R97.20 ELEVATED PSA: ICD-10-CM

## 2017-08-18 DIAGNOSIS — R93.89 NONSPECIFIC (ABNORMAL) FINDINGS ON RADIOLOGICAL AND OTHER EXAMINATION OF GENITOURINARY ORGANS: ICD-10-CM

## 2017-08-18 DIAGNOSIS — L72.3 INFECTED SEBACEOUS CYST OF SKIN: Primary | ICD-10-CM

## 2017-08-18 DIAGNOSIS — L08.9 INFECTED SEBACEOUS CYST OF SKIN: Primary | ICD-10-CM

## 2017-08-18 LAB
ANION GAP SERPL CALC-SCNC: 7 MMOL/L
BASOPHILS # BLD AUTO: 0.05 K/UL
BASOPHILS NFR BLD: 0.9 %
BUN SERPL-MCNC: 12 MG/DL
CALCIUM SERPL-MCNC: 9.3 MG/DL
CHLORIDE SERPL-SCNC: 107 MMOL/L
CO2 SERPL-SCNC: 26 MMOL/L
CREAT SERPL-MCNC: 0.9 MG/DL
DIFFERENTIAL METHOD: ABNORMAL
EOSINOPHIL # BLD AUTO: 0.2 K/UL
EOSINOPHIL NFR BLD: 3.2 %
ERYTHROCYTE [DISTWIDTH] IN BLOOD BY AUTOMATED COUNT: 14.5 %
EST. GFR  (AFRICAN AMERICAN): >60 ML/MIN/1.73 M^2
EST. GFR  (NON AFRICAN AMERICAN): >60 ML/MIN/1.73 M^2
GLUCOSE SERPL-MCNC: 80 MG/DL
HCT VFR BLD AUTO: 40.4 %
HGB BLD-MCNC: 13.7 G/DL
LYMPHOCYTES # BLD AUTO: 2.5 K/UL
LYMPHOCYTES NFR BLD: 45.3 %
MCH RBC QN AUTO: 27.5 PG
MCHC RBC AUTO-ENTMCNC: 33.9 G/DL
MCV RBC AUTO: 81 FL
MONOCYTES # BLD AUTO: 0.6 K/UL
MONOCYTES NFR BLD: 10.6 %
NEUTROPHILS # BLD AUTO: 2.2 K/UL
NEUTROPHILS NFR BLD: 40 %
PLATELET # BLD AUTO: 205 K/UL
PMV BLD AUTO: 11.3 FL
POTASSIUM SERPL-SCNC: 3.8 MMOL/L
RBC # BLD AUTO: 4.98 M/UL
SODIUM SERPL-SCNC: 140 MMOL/L
WBC # BLD AUTO: 5.56 K/UL

## 2017-08-18 PROCEDURE — 80048 BASIC METABOLIC PNL TOTAL CA: CPT

## 2017-08-18 PROCEDURE — 3008F BODY MASS INDEX DOCD: CPT | Mod: S$GLB,,, | Performed by: UROLOGY

## 2017-08-18 PROCEDURE — 81001 URINALYSIS AUTO W/SCOPE: CPT | Mod: S$GLB,,, | Performed by: UROLOGY

## 2017-08-18 PROCEDURE — 99214 OFFICE O/P EST MOD 30 MIN: CPT | Mod: 25,S$GLB,, | Performed by: UROLOGY

## 2017-08-18 PROCEDURE — 93010 EKG 12-LEAD: ICD-10-PCS | Mod: ,,, | Performed by: INTERNAL MEDICINE

## 2017-08-18 PROCEDURE — 93010 ELECTROCARDIOGRAM REPORT: CPT | Mod: ,,, | Performed by: INTERNAL MEDICINE

## 2017-08-18 PROCEDURE — 93005 ELECTROCARDIOGRAM TRACING: CPT

## 2017-08-18 PROCEDURE — 85025 COMPLETE CBC W/AUTO DIFF WBC: CPT

## 2017-08-18 PROCEDURE — 87086 URINE CULTURE/COLONY COUNT: CPT

## 2017-08-18 PROCEDURE — 99999 PR PBB SHADOW E&M-EST. PATIENT-LVL V: CPT | Mod: PBBFAC,,, | Performed by: UROLOGY

## 2017-08-18 PROCEDURE — 36415 COLL VENOUS BLD VENIPUNCTURE: CPT

## 2017-08-18 NOTE — PROGRESS NOTES
"This patient was last seen by me June 23, 2017 in follow-up for incision and drainage of infected scrotal skin.    Hemiscrotum has almost healed however there is a tiny spot laterally with continued granulation tissue and appears to be a small sinus with minimal purulent drainage..  Patient has no fever or chills    Physical exam reveals reveals a well-developed well-nourished patient  in no acute distress.  Patient is alert and oriented ×3 with normal mood and affect.  Respiratory effort is normal and there is no peripheral edema.  Skin is normal to inspection and palpationPenis/perineum without lesions,, epididymis nontender bilaterally, urethral meatus in normal location normal size, no penile plaques palpated, prostate:    Smooth and enlarged                   seminal vesicles not palpated.  No rectal masses, sphincter tone normal.  Testes equal in size without masses.  Scrotal exam as above    /81   Pulse 66   Ht 5' 9" (1.753 m)   Wt 111.1 kg (245 lb)   BMI 36.18 kg/m²   Review of Systems  General ROS: negative for chills, fever or weight loss  Respiratory ROS: no cough, shortness of breath, or wheezing  Cardiovascular ROS: no chest pain or dyspnea on exertion  Musculoskeletal ROS: negative for gait disturbance or muscular weakness    Family History   Problem Relation Age of Onset    Prostate cancer Neg Hx     Kidney disease Neg Hx      Past Medical History:   Diagnosis Date    Glaucoma      Family History   Problem Relation Age of Onset    Prostate cancer Neg Hx     Kidney disease Neg Hx      Social History   Substance Use Topics    Smoking status: Former Smoker    Smokeless tobacco: Not on file    Alcohol use No       Impression:      ICD-10-CM ICD-9-CM    1. Infected sebaceous cyst of skin L72.3 706.2 Basic metabolic panel    L08.9  CBC auto differential      EKG 12-lead      Urine culture   2. Elevated PSA R97.20 790.93    3. Nonspecific (abnormal) findings on radiological and other " examination of genitourinary organs R93.8 793.5        Plan:    #1.  Infected area on scrotum.  This is management including continued conservative therapy versus excision discussed.  I recommend excision to which the patient agrees.  Possible complications including for subsequent excision discussed.  Consent obtained.  Patient understands that he will have an open wound as per before however this will be much smaller than his first operation.  This will be posted for September 5    #2.  Elevated PSA.  Plan.  Patient will need prostate biopsy once he is infection free    #3.  Nonspecific finding on scrotal ultrasound.  Plan.  Patient will need repeat scrotal ultrasound once scrotum is completely healed    PLEASE NOTE:  Please be advised that portions of this note were dictated using voice recognition software and may contain dictation related errors in spelling/grammar/appropriate pronouns/syntax or other errors that might have not been found and or corrected on text review.

## 2017-08-20 LAB — BACTERIA UR CULT: NO GROWTH

## 2017-08-23 ENCOUNTER — TELEPHONE (OUTPATIENT)
Dept: UROLOGY | Facility: CLINIC | Age: 67
End: 2017-08-23

## 2017-08-23 LAB
BILIRUB SERPL-MCNC: NORMAL MG/DL
BLOOD URINE, POC: NORMAL
COLOR, POC UA: NORMAL
GLUCOSE UR QL STRIP: NORMAL
KETONES UR QL STRIP: NORMAL
LEUKOCYTE ESTERASE URINE, POC: NORMAL
NITRITE, POC UA: NORMAL
PH, POC UA: 5
PROTEIN, POC: NORMAL
SPECIFIC GRAVITY, POC UA: 1025
UROBILINOGEN, POC UA: NORMAL

## 2017-08-23 NOTE — TELEPHONE ENCOUNTER
----- Message from Melvin Gee MD sent at 8/23/2017  3:03 PM CDT -----  Contact: wife/mary ann/[phone]  Please let wife know that I have filled out the forms today.  ----- Message -----  From: Cornelia Lockwood MA  Sent: 8/22/2017   2:00 PM  To: Melvin Gee MD        ----- Message -----  From: Dennys Hodgson  Sent: 8/21/2017   4:26 PM  To: [facility] Staff    She is calling in reference to her [facility] paperwork.

## 2017-09-01 ENCOUNTER — HOSPITAL ENCOUNTER (OUTPATIENT)
Dept: PREADMISSION TESTING | Facility: HOSPITAL | Age: 67
Discharge: HOME | End: 2017-09-01
Attending: UROLOGY
Payer: COMMERCIAL

## 2017-09-01 ENCOUNTER — ANESTHESIA EVENT (OUTPATIENT)
Dept: SURGERY | Facility: HOSPITAL | Age: 67
End: 2017-09-01
Payer: COMMERCIAL

## 2017-09-01 VITALS
WEIGHT: 228 LBS | RESPIRATION RATE: 18 BRPM | DIASTOLIC BLOOD PRESSURE: 72 MMHG | SYSTOLIC BLOOD PRESSURE: 123 MMHG | OXYGEN SATURATION: 98 % | HEIGHT: 69 IN | BODY MASS INDEX: 33.77 KG/M2 | HEART RATE: 62 BPM

## 2017-09-01 DIAGNOSIS — H40.10X0 OPEN-ANGLE GLAUCOMA OF BOTH EYES, UNSPECIFIED GLAUCOMA STAGE, UNSPECIFIED OPEN-ANGLE GLAUCOMA TYPE: ICD-10-CM

## 2017-09-01 PROBLEM — H40.9 GLAUCOMA: Status: ACTIVE | Noted: 2017-09-01

## 2017-09-01 RX ORDER — SODIUM CHLORIDE, SODIUM LACTATE, POTASSIUM CHLORIDE, CALCIUM CHLORIDE 600; 310; 30; 20 MG/100ML; MG/100ML; MG/100ML; MG/100ML
INJECTION, SOLUTION INTRAVENOUS CONTINUOUS
Status: CANCELLED | OUTPATIENT
Start: 2017-09-01

## 2017-09-01 RX ORDER — LIDOCAINE HYDROCHLORIDE 10 MG/ML
1 INJECTION, SOLUTION EPIDURAL; INFILTRATION; INTRACAUDAL; PERINEURAL ONCE
Status: CANCELLED | OUTPATIENT
Start: 2017-09-01 | End: 2017-09-01

## 2017-09-01 NOTE — ANESTHESIA PREPROCEDURE EVALUATION
09/01/2017     Bharathi Garrido is a 67 y.o., male is scheduled for I&D of infected sebaceous cyst of scrotum under GETA on 9/05/2017.    Past Surgical History:   Procedure Laterality Date    INCISION AND DRAINAGE OF WOUND Left 2011, 2017    scrotum; multiple I&D         Anesthesia Evaluation    I have reviewed the Patient Summary Reports.    I have reviewed the Nursing Notes.   I have reviewed the Medications.     Review of Systems  Anesthesia Hx:  No problems with previous Anesthesia  History of prior surgery of interest to airway management or planning: Previous anesthesia: General, MAC  Procedure performed at an [facility].  colonoscopy with MAC.  Procedure performed at an [facility]. Denies Family Hx of Anesthesia complications.   Denies Personal Hx of Anesthesia complications.   Social:  Former Smoker, Social Alcohol Use    Hematology/Oncology:  Hematology Normal        EENT/Dental:  EENT/Dental Normal Eyes: Eye Disease: Glaucoma:      Cardiovascular:   Exercise tolerance: good Denies Hypertension.  Denies Dysrhythmias.   Denies Angina.        Pulmonary:   Denies Shortness of breath.    Renal/:  Renal/ Normal     Hepatic/GI:   GERD, well controlled    Neurological:  Neurology Normal    Endocrine:  Endocrine Normal    Dermatological:   Painful cyst to scrotum that is much improved since last visit 5/2017; denies fever/chills       Physical Exam  General:  Obesity    Airway/Jaw/Neck:  Airway Findings: Mouth Opening: Small, but > 3cm Tongue: Normal  General Airway Assessment: Adult  Mallampati: II  TM Distance: Normal, at least 6 cm  Jaw/Neck Findings:  Neck ROM: Normal ROM  Neck Findings:  Girth Increased, Short Neck     Eyes/Ears/Nose:  EYES/EARS/NOSE FINDINGS: Normal   Dental:  Dental Findings: Periodontal disease, Severe    Chest/Lungs:  Chest/Lungs Clear    Heart/Vascular:  Heart  Findings: Normal Heart murmur: negative    Abdomen:  Abdomen Findings: Normal     Skin:  Skin Findings:  Sebaceous cyst to scrotum not examined     Mental Status:  Mental Status Findings: Normal      EKG 8/18/2017  Sinus bradycardia  Otherwise normal ECG  No previous ECGs available  Confirmed by Luiza Albrecht MD ([id number]) on 8/18/2017 1:05:22 PM    Anesthesia Plan  Type of Anesthesia, risks & benefits discussed:  Anesthesia Type:  general  Patient's Preference:   Intra-op Monitoring Plan:   Intra-op Monitoring Plan Comments:   Post Op Pain Control Plan:   Post Op Pain Control Plan Comments:   Induction:   IV  Beta Blocker:  Patient is not currently on a Beta-Blocker (No further documentation required).       Informed Consent: Patient understands risks and agrees with Anesthesia plan.  Questions answered.   ASA Score: 2     Day of Surgery Review of History & Physical:    H&P update referred to the surgeon.     Anesthesia Plan Notes: Anesthesia consent will be obtained prior to procedure on 9/05/2017.          Ready For Surgery From Anesthesia Perspective.

## 2017-09-01 NOTE — DISCHARGE INSTRUCTIONS
Your surgery is scheduled for 9/5/17.    Please report to Outpatient Surgery Intake Office on the 2nd FLOOR at 5:30 a.m.          INSTRUCTIONS IMPORTANT!!!  ¨ Do not eat or drink after 12 midnight-including water. OK to brush teeth, no   gum, candy or mints!          ____  No powder, lotions or creams to surgical area.  ____  Please remove all jewelry, including piercings and leave at home.  ____  No money or valuables needed. Please leave at home.  ____  Please bring any documents given by your doctor.  ____  If going home the same day, arrange for a ride home. You will not be able to             drive if Anesthesia was used.  ____  Wear loose fitting clothing. Allow for dressings, bandages.  ____  Stop Aspirin, Ibuprofen, Motrin and Aleve at least 3-5 days before surgery, unless otherwise instructed by your doctor, or the nurse.   You MAY use Tylenol/acetaminophen until day of surgery.  ____  Wash the surgical area with Hibiclens the night before surgery, and again the             morning of surgery.  Be sure to rinse hibiclens off completely (if instructed by   nurse).  ____  If you take diabetic medication, do not take am of surgery unless instructed by Doctor.  ____  Call MD for temperature above 101 degrees.  ____ Stop taking any Fish Oil supplement or any Vitamins that contain Vitamin E at least 5 days prior to surgery.  ____ Do Not wear your contact lenses the day of your procedure.  You may wear your glasses.        I have read or had read and explained to me, and understand the above information.  Additional comments or instructions:  For additional questions [phone]     Take a Hibiclens shower twice a day for 3 days prior to surgery, including the morning of surgery.   Gargle with Listerine twice a day for 3 days prior to surgery, including the morning of surgery.      Pre-Op Bathing Instructions    Before surgery, you can play an important role in your own health.    Because skin is not sterile,  we need to be sure that your skin is as free of germs as possible. By following the instructions below, you can reduce the number of germs on your skin before surgery.    IMPORTANT: You will need to shower with a special soap called Hibiclens*, available at any pharmacy.  If you are allergic to Chlorhexidine (the antiseptic in Hibiclens), use an antibacterial soap such as Dial Soap for your preoperative shower.  You will shower with Hibiclens both the night before your surgery and the morning of your surgery.  Do not use Hibiclens on the head, face or genitals to avoid injury to those areas.    STEP #1: THE NIGHT BEFORE YOUR SURGERY     1. Do not shave the area of your body where your surgery will be performed.  2. Shower and wash your hair and body as usual with your normal soap and shampoo.  3. Rinse your hair and body thoroughly after you shower to remove all soap residue.  4. With your hand, apply one packet of Hibiclens soap to the surgical site.   5. Wash the site gently for five (5) minutes. Do not scrub your skin too hard.   6. Do not wash with your regular soap after Hibiclens is used.  7. Rinse your body thoroughly.  8. Pat yourself dry with a clean, soft towel.  9. Do not use lotion, cream, or powder.  10. Wear clean clothes.    STEP #2: THE MORNING OF YOUR SURGERY     1. Repeat Step #1.    * Not to be used by people allergic to Chlorhexidine.      Anesthesia: General Anesthesia  Youre due to have surgery. During surgery, youll be given medication called anesthesia. (It is also called anesthetic.) This will keep you comfortable and pain-free. Your anesthesia provider will use general anesthesia. This sheet tells you more about it.  What is general anesthesia?     You are watched continuously during your procedure by the anesthesia provider   General anesthesia puts you into a state like deep sleep. It goes into the bloodstream (IV anesthetics), into the lungs (gas anesthetics), or both. You feel nothing  during the procedure. You will not remember it. During the procedure, the anesthesia provider monitors you continuously. He or she checks your heart rate and rhythm, blood pressure, breathing, and blood oxygen.  · IV Anesthetics. IV anesthetics are given through an IV line in your arm. Theyre often given first. This is so you are asleep before a gas anesthetic is started. Some kinds of IV anesthetics relieve pain. Others relax you. Your doctor will decide which kind is best in your case.  · Gas Anesthetics. Gas anesthetics are breathed into the lungs. They are often used to keep you asleep. They can be given through a facemask or a tube placed in your larynx or trachea (breathing tube).  ¨ If you have a facemask, your anesthesia provider will most likely place it over your nose and mouth while youre still awake. Youll breathe oxygen through the mask as your IV anesthetic is started. Gas anesthetic may be added through the mask.  ¨ If you have a tube in the larynx or trachea, it will be inserted into your throat after youre asleep.  Anesthesia tools and medications  You will likely have:  · IV anesthetics. These are put into an IV line into your bloodstream.  · Gas anesthetics. You breathe these anesthetics into your lungs, where they pass into your bloodstream.  · Pulse oximeter. This is a small clip that is attached to the end of your finger. This measures your blood oxygen level.  · Electrocardiography leads (electrodes). These are small sticky pads that are placed on your chest. They record your heart rate and rhythm.  · Blood pressure cuff. This reads your blood pressure.  Risks and possible complications  General anesthesia has some risks. These include:  · Breathing problems  · Nausea and vomiting  · Sore throat or hoarseness (usually temporary)  · Allergic reaction to the anesthetic  · Irregular heartbeat (rare)  · Cardiac arrest (rare)   Anesthesia safety  · Follow all instructions you are given for how  long not to eat or drink before your procedure.  · Be sure your doctor knows what medications and drugs you take. This includes over-the-counter medications, herbs, supplements, alcohol or other drugs. You will be asked when those were last taken.  · Have an adult family member or friend drive you home after the procedure.  · For the first 24 hours after your surgery:  ¨ Do not drive or use heavy equipment.  ¨ Have a trusted family member or spouse make important decisions or sign documents.  ¨ Avoid alcohol.  ¨ Have a responsible adult stay with you. He or she can watch for problems and help keep you safe.  Date Last Reviewed: 10/16/2014  © 9849-1141 [employer]. [address] [place] [address], [place] [address]. All rights reserved. This information is not intended as a substitute for professional medical care. Always follow your healthcare professional's instructions.

## 2017-09-05 ENCOUNTER — SURGERY (OUTPATIENT)
Age: 67
End: 2017-09-05

## 2017-09-05 ENCOUNTER — HOSPITAL ENCOUNTER (OUTPATIENT)
Facility: HOSPITAL | Age: 67
Discharge: HOME | End: 2017-09-05
Attending: UROLOGY | Admitting: UROLOGY
Payer: COMMERCIAL

## 2017-09-05 ENCOUNTER — ANESTHESIA (OUTPATIENT)
Dept: SURGERY | Facility: HOSPITAL | Age: 67
End: 2017-09-05
Payer: COMMERCIAL

## 2017-09-05 DIAGNOSIS — N49.2 SCROTAL INFECTION: ICD-10-CM

## 2017-09-05 DIAGNOSIS — N49.2 SCROTAL ABSCESS: Primary | ICD-10-CM

## 2017-09-05 PROCEDURE — 11426 EXC H-F-NK-SP B9+MARG >4 CM: CPT | Mod: ,,, | Performed by: UROLOGY

## 2017-09-05 PROCEDURE — 88305 TISSUE EXAM BY PATHOLOGIST: CPT | Mod: 26,,, | Performed by: PATHOLOGY

## 2017-09-05 PROCEDURE — 71000033 HC RECOVERY, INTIAL HOUR: Performed by: UROLOGY

## 2017-09-05 PROCEDURE — 88305 TISSUE EXAM BY PATHOLOGIST: CPT | Performed by: PATHOLOGY

## 2017-09-05 PROCEDURE — 25000003 PHARM REV CODE 250: Performed by: UROLOGY

## 2017-09-05 PROCEDURE — 25000003 PHARM REV CODE 250: Performed by: NURSE ANESTHETIST, CERTIFIED REGISTERED

## 2017-09-05 PROCEDURE — 37000009 HC ANESTHESIA EA ADD 15 MINS: Performed by: UROLOGY

## 2017-09-05 PROCEDURE — 37000008 HC ANESTHESIA 1ST 15 MINUTES: Performed by: UROLOGY

## 2017-09-05 PROCEDURE — 71000039 HC RECOVERY, EACH ADD'L HOUR: Performed by: UROLOGY

## 2017-09-05 PROCEDURE — 25000003 PHARM REV CODE 250: Performed by: ANESTHESIOLOGY

## 2017-09-05 PROCEDURE — 87075 CULTR BACTERIA EXCEPT BLOOD: CPT

## 2017-09-05 PROCEDURE — 36000705 HC OR TIME LEV I EA ADD 15 MIN: Performed by: UROLOGY

## 2017-09-05 PROCEDURE — 63600175 PHARM REV CODE 636 W HCPCS: Performed by: NURSE ANESTHETIST, CERTIFIED REGISTERED

## 2017-09-05 PROCEDURE — 25000003 PHARM REV CODE 250: Performed by: NURSE PRACTITIONER

## 2017-09-05 PROCEDURE — 71000016 HC POSTOP RECOV ADDL HR: Performed by: UROLOGY

## 2017-09-05 PROCEDURE — 87076 CULTURE ANAEROBE IDENT EACH: CPT

## 2017-09-05 PROCEDURE — 63600175 PHARM REV CODE 636 W HCPCS: Performed by: ANESTHESIOLOGY

## 2017-09-05 PROCEDURE — 71000015 HC POSTOP RECOV 1ST HR: Performed by: UROLOGY

## 2017-09-05 PROCEDURE — 87070 CULTURE OTHR SPECIMN AEROBIC: CPT

## 2017-09-05 PROCEDURE — 36000704 HC OR TIME LEV I 1ST 15 MIN: Performed by: UROLOGY

## 2017-09-05 RX ORDER — SODIUM CHLORIDE 0.9 % (FLUSH) 0.9 %
3 SYRINGE (ML) INJECTION
Status: DISCONTINUED | OUTPATIENT
Start: 2017-09-05 | End: 2017-09-05 | Stop reason: HOSPADM

## 2017-09-05 RX ORDER — LIDOCAINE HYDROCHLORIDE 10 MG/ML
INJECTION INFILTRATION; PERINEURAL
Status: DISCONTINUED | OUTPATIENT
Start: 2017-09-05 | End: 2017-09-05 | Stop reason: HOSPADM

## 2017-09-05 RX ORDER — ROCURONIUM BROMIDE 10 MG/ML
INJECTION, SOLUTION INTRAVENOUS
Status: DISCONTINUED | OUTPATIENT
Start: 2017-09-05 | End: 2017-09-05

## 2017-09-05 RX ORDER — LIDOCAINE HYDROCHLORIDE 10 MG/ML
1 INJECTION, SOLUTION EPIDURAL; INFILTRATION; INTRACAUDAL; PERINEURAL ONCE
Status: DISCONTINUED | OUTPATIENT
Start: 2017-09-05 | End: 2017-09-05 | Stop reason: HOSPADM

## 2017-09-05 RX ORDER — ONDANSETRON 2 MG/ML
4 INJECTION INTRAMUSCULAR; INTRAVENOUS DAILY PRN
Status: DISCONTINUED | OUTPATIENT
Start: 2017-09-05 | End: 2017-09-05 | Stop reason: HOSPADM

## 2017-09-05 RX ORDER — OXYCODONE AND ACETAMINOPHEN 5; 325 MG/1; MG/1
1 TABLET ORAL
Status: DISCONTINUED | OUTPATIENT
Start: 2017-09-05 | End: 2017-09-05 | Stop reason: HOSPADM

## 2017-09-05 RX ORDER — MIDAZOLAM HYDROCHLORIDE 1 MG/ML
INJECTION INTRAMUSCULAR; INTRAVENOUS
Status: DISCONTINUED | OUTPATIENT
Start: 2017-09-05 | End: 2017-09-05

## 2017-09-05 RX ORDER — FENTANYL CITRATE 50 UG/ML
INJECTION, SOLUTION INTRAMUSCULAR; INTRAVENOUS
Status: DISCONTINUED | OUTPATIENT
Start: 2017-09-05 | End: 2017-09-05

## 2017-09-05 RX ORDER — SODIUM CHLORIDE, SODIUM LACTATE, POTASSIUM CHLORIDE, CALCIUM CHLORIDE 600; 310; 30; 20 MG/100ML; MG/100ML; MG/100ML; MG/100ML
INJECTION, SOLUTION INTRAVENOUS CONTINUOUS
Status: DISCONTINUED | OUTPATIENT
Start: 2017-09-05 | End: 2017-09-05 | Stop reason: HOSPADM

## 2017-09-05 RX ORDER — ACETAMINOPHEN 10 MG/ML
INJECTION, SOLUTION INTRAVENOUS
Status: DISCONTINUED | OUTPATIENT
Start: 2017-09-05 | End: 2017-09-05

## 2017-09-05 RX ORDER — HYDROMORPHONE HYDROCHLORIDE 2 MG/ML
0.4 INJECTION, SOLUTION INTRAMUSCULAR; INTRAVENOUS; SUBCUTANEOUS EVERY 5 MIN PRN
Status: DISPENSED | OUTPATIENT
Start: 2017-09-05 | End: 2017-09-05

## 2017-09-05 RX ORDER — SUCCINYLCHOLINE CHLORIDE 20 MG/ML
INJECTION INTRAMUSCULAR; INTRAVENOUS
Status: DISCONTINUED | OUTPATIENT
Start: 2017-09-05 | End: 2017-09-05

## 2017-09-05 RX ORDER — PROPOFOL 10 MG/ML
VIAL (ML) INTRAVENOUS
Status: DISCONTINUED | OUTPATIENT
Start: 2017-09-05 | End: 2017-09-05

## 2017-09-05 RX ORDER — HYDROCODONE BITARTRATE AND ACETAMINOPHEN 5; 325 MG/1; MG/1
1 TABLET ORAL EVERY 4 HOURS PRN
Status: DISCONTINUED | OUTPATIENT
Start: 2017-09-05 | End: 2017-09-05 | Stop reason: HOSPADM

## 2017-09-05 RX ORDER — SODIUM CHLORIDE 0.9 % (FLUSH) 0.9 %
3 SYRINGE (ML) INJECTION EVERY 8 HOURS
Status: DISCONTINUED | OUTPATIENT
Start: 2017-09-05 | End: 2017-09-05 | Stop reason: HOSPADM

## 2017-09-05 RX ORDER — CEPHALEXIN 500 MG/1
500 CAPSULE ORAL 3 TIMES DAILY
Qty: 30 CAPSULE | Refills: 0 | Status: SHIPPED | OUTPATIENT
Start: 2017-09-05 | End: 2017-09-15

## 2017-09-05 RX ORDER — CEFAZOLIN SODIUM 2 G/50ML
2 SOLUTION INTRAVENOUS
Status: DISCONTINUED | OUTPATIENT
Start: 2017-09-05 | End: 2017-09-05 | Stop reason: HOSPADM

## 2017-09-05 RX ORDER — ONDANSETRON HYDROCHLORIDE 2 MG/ML
INJECTION, SOLUTION INTRAMUSCULAR; INTRAVENOUS
Status: DISCONTINUED | OUTPATIENT
Start: 2017-09-05 | End: 2017-09-05

## 2017-09-05 RX ORDER — OXYCODONE AND ACETAMINOPHEN 5; 325 MG/1; MG/1
1 TABLET ORAL EVERY 4 HOURS PRN
Qty: 20 TABLET | Refills: 0 | Status: SHIPPED | OUTPATIENT
Start: 2017-09-05 | End: 2017-11-07

## 2017-09-05 RX ORDER — LIDOCAINE HCL/PF 100 MG/5ML
SYRINGE (ML) INTRAVENOUS
Status: DISCONTINUED | OUTPATIENT
Start: 2017-09-05 | End: 2017-09-05

## 2017-09-05 RX ADMIN — LIDOCAINE HYDROCHLORIDE 20 ML: 10 INJECTION, SOLUTION INFILTRATION; PERINEURAL at 10:09

## 2017-09-05 RX ADMIN — FENTANYL CITRATE 50 MCG: 50 INJECTION, SOLUTION INTRAMUSCULAR; INTRAVENOUS at 10:09

## 2017-09-05 RX ADMIN — SUCCINYLCHOLINE CHLORIDE 100 MG: 20 INJECTION, SOLUTION INTRAMUSCULAR; INTRAVENOUS at 09:09

## 2017-09-05 RX ADMIN — MIDAZOLAM HYDROCHLORIDE 2 MG: 1 INJECTION, SOLUTION INTRAMUSCULAR; INTRAVENOUS at 09:09

## 2017-09-05 RX ADMIN — PROPOFOL 40 MG: 10 INJECTION, EMULSION INTRAVENOUS at 10:09

## 2017-09-05 RX ADMIN — OXYCODONE HYDROCHLORIDE AND ACETAMINOPHEN 1 TABLET: 5; 325 TABLET ORAL at 11:09

## 2017-09-05 RX ADMIN — LIDOCAINE HYDROCHLORIDE 100 MG: 20 INJECTION, SOLUTION INTRAVENOUS at 09:09

## 2017-09-05 RX ADMIN — PROPOFOL 100 MG: 10 INJECTION, EMULSION INTRAVENOUS at 10:09

## 2017-09-05 RX ADMIN — HYDROMORPHONE HYDROCHLORIDE 0.4 MG: 2 INJECTION, SOLUTION INTRAMUSCULAR; INTRAVENOUS; SUBCUTANEOUS at 12:09

## 2017-09-05 RX ADMIN — ONDANSETRON 8 MG: 2 INJECTION, SOLUTION INTRAMUSCULAR; INTRAVENOUS at 09:09

## 2017-09-05 RX ADMIN — ROCURONIUM BROMIDE 5 MG: 10 INJECTION, SOLUTION INTRAVENOUS at 09:09

## 2017-09-05 RX ADMIN — PROPOFOL 160 MG: 10 INJECTION, EMULSION INTRAVENOUS at 09:09

## 2017-09-05 RX ADMIN — FENTANYL CITRATE 100 MCG: 50 INJECTION, SOLUTION INTRAMUSCULAR; INTRAVENOUS at 09:09

## 2017-09-05 RX ADMIN — SODIUM CHLORIDE, SODIUM LACTATE, POTASSIUM CHLORIDE, AND CALCIUM CHLORIDE: .6; .31; .03; .02 INJECTION, SOLUTION INTRAVENOUS at 09:09

## 2017-09-05 RX ADMIN — ACETAMINOPHEN 1000 MG: 10 INJECTION, SOLUTION INTRAVENOUS at 10:09

## 2017-09-05 NOTE — PLAN OF CARE
Problem: Patient Care Overview  Goal: Individualization & Mutuality  Outcome: Outcome(s) achieved Date Met: 09/05/17  No issues

## 2017-09-05 NOTE — ANESTHESIA POSTPROCEDURE EVALUATION
"Anesthesia Post Evaluation    Patient: Bharathi Garrido    Procedure(s) Performed: Procedure(s) (LRB):  INCISION AND DRAINAGE (I&D), SCROTAL (Left)    Final Anesthesia Type: general  Patient location during evaluation: PACU  Patient participation: Yes- Able to Participate  Level of consciousness: awake and alert  Post-procedure vital signs: reviewed and stable  Pain management: adequate  Airway patency: patent  PONV status at discharge: No PONV  Anesthetic complications: no      Cardiovascular status: blood pressure returned to baseline  Respiratory status: unassisted  Hydration status: euvolemic  Follow-up not needed.        Visit Vitals  BP (!) 144/77   Pulse (!) 17   Temp 36.6 °C (97.9 °F) (Oral)   Resp 18   Ht 5' 9" (1.753 m)   Wt 104.3 kg (230 lb)   SpO2 95%   BMI 33.97 kg/m²       Pain/Sarah Score: Pain Assessment Performed: Yes (9/5/2017  2:19 PM)  Presence of Pain: complains of pain/discomfort (9/5/2017  2:19 PM)  Pain Rating Prior to Med Admin: 6 (9/5/2017 12:28 PM)  Pain Rating Post Med Admin: 5 (9/5/2017 11:55 AM)  Sarah Score: 10 (9/5/2017  2:19 PM)  Modified Sarah Score: 18 (9/5/2017  2:19 PM)      "

## 2017-09-05 NOTE — DISCHARGE SUMMARY
[facility]  Discharge Note  Short Stay    Admit Date: 9/5/2017    Discharge Date and Time: No discharge date for patient encounter.     Attending Physician: Melvin Gee MD     Discharge Provider: Melvin Gee    Diagnoses:  Active Hospital Problems    Diagnosis  POA    *Scrotal abscess [N49.2]  Yes      Resolved Hospital Problems    Diagnosis Date Resolved POA   No resolved problems to display.       Discharged Condition: good    Hospital Course: Patient was admitted for an outpatient procedure and tolerated the procedure well with no complications.    Final Diagnoses: Same as principal problem.    Disposition: Home or Self Care    Follow up/Patient Instructions:    Medications:  Reconciled Home Medications:   Current Discharge Medication List      START taking these medications    Details   cephALEXin (KEFLEX) 500 MG capsule Take 1 capsule (500 mg total) by mouth 3 (three) times daily.  Qty: 30 capsule, Refills: 0      oxycodone-acetaminophen (PERCOCET) 5-325 mg per tablet Take 1 tablet by mouth every 4 (four) hours as needed for Pain.  Qty: 20 tablet, Refills: 0         CONTINUE these medications which have NOT CHANGED    Details   brimonidine 0.2% (ALPHAGAN) 0.2 % Drop Place 1 drop into both eyes 2 (two) times daily.  Refills: 6      latanoprost 0.005 % ophthalmic solution INTALL 1 DROP TO BOTH EYES CONNOR NIGHT  Refills: 6      timolol maleate 0.5% (TIMOPTIC) 0.5 % Drop Place 1 drop into both eyes 2 (two) times daily.  Refills: 7             Discharge Procedure Orders  Diet general     Sponge bath only until clinic visit     Call MD for:  temperature >100.4     Leave dressing on - Keep it clean, dry, and intact until clinic visit       Follow-up Information     Melvin Gee MD. Go in 1 day.    Specialty:  Urology  Why:  For wound re-check .   f/u in my office 9am 9/6/2017  Contact information:  [address] [place] [address]  [facility]  [place] [id number] [phone]                    Discharge Procedure Orders (must include Diet, Follow-up, Activity):    Discharge Procedure Orders (must include Diet, Follow-up, Activity)  Diet general     Sponge bath only until clinic visit     Call MD for:  temperature >100.4     Leave dressing on - Keep it clean, dry, and intact until clinic visit

## 2017-09-05 NOTE — TRANSFER OF CARE
"Anesthesia Transfer of Care Note    Patient: Bharathi Garrido    Procedure(s) Performed: Procedure(s) (LRB):  INCISION AND DRAINAGE (I&D), SCROTAL (Left)    Patient location: PACU    Anesthesia Type: general    Transport from OR: Transported from OR on 6-10 L/min O2 by face mask with adequate spontaneous ventilation    Post pain: adequate analgesia    Post assessment: no apparent anesthetic complications    Post vital signs: stable    Level of consciousness: awake and oriented    Nausea/Vomiting: no nausea/vomiting    Complications: none    Transfer of care protocol was followed      Last vitals:   Visit Vitals  /71 (BP Location: Right arm, Patient Position: Lying)   Pulse (!) 56   Temp 36.5 °C (97.7 °F) (Oral)   Resp 16   Ht 5' 9" (1.753 m)   Wt 104.3 kg (230 lb)   SpO2 97%   BMI 33.97 kg/m²     "

## 2017-09-05 NOTE — OP NOTE
[facility]  Surgery Department  Operative Note    SUMMARY     Date of Procedure: 9/5/2017     Procedure: Procedure(s) (LRB):  INCISION AND DRAINAGE (I&D), SCROTAL (Left)     Surgeon(s) and Role:     * Melvin Gee MD - Primary    Assisting Surgeon: None    Pre-Operative Diagnosis: Infected sebaceous cyst of skin [L72.3, L08.9]    Post-Operative Diagnosis: Post-Op Diagnosis Codes:     * Infected sebaceous cyst of skin [L72.3, L08.9]    Anesthesia: General    Technical Procedures Used: Incision and drainage of scrotal abscess    Description of the Findings of the Procedure: After placed on the operating room table and induction of general endotracheal anesthesia, the patient's genitalia are prepped and draped in the usual sterile fashion.  The draining sinus is identified on the left hemiscrotum.  Aerobic and anaerobic cultures sent.  Using a marking pen, the site of drainage and the surrounding indurated skin are marked.  Now using sharp dissection, the marked area is sharply excised.  The specimen is sent for permanent section.  Pinpoint hemostasis is then assured.  Xeroform gauze is then used to pack the wound and then fluffs and 4 x 4's are applied and a mesh underwear is then applied to keep the dressing in place.    Patient was then awakened and returned to the recovery room having tolerated the procedure well    Significant Surgical Tasks Conducted by the Assistant(s), if Applicable: None    Complications: No    Estimated Blood Loss (EBL): * No values recorded between 9/5/2017 10:01 AM and 9/5/2017 10:52 AM *           Implants: * No implants in log *    Specimens:   Specimen (12h ago through future)    Start     Ordered    09/05/17 1022  Specimen to Pathology - Surgery  Once     Comments:  Scrotal abcess      09/05/17 1022                  Condition: Good    Disposition: PACU - hemodynamically stable.    Attestation: I was present and scrubbed for the entire procedure.

## 2017-09-05 NOTE — INTERVAL H&P NOTE
The patient has been examined and the H&P has been reviewed:    I concur with the findings and no changes have occurred since H&P was written.    Anesthesia/Surgery risks, benefits and alternative options discussed and understood by patient/family.          Active Hospital Problems    Diagnosis  POA    Scrotal abscess [N49.2]  Yes      Resolved Hospital Problems    Diagnosis Date Resolved POA   No resolved problems to display.

## 2017-09-05 NOTE — PLAN OF CARE
Patient notified of having no insurance clearance, Dr Gee notified and doesn't want to make it medical necessity so patient will wait till after 8 and can talk to pre cert

## 2017-09-05 NOTE — BRIEF OP NOTE
[facility]  Brief Operative Note    SUMMARY     Surgery Date: 9/5/2017     Surgeon(s) and Role:     * Melvin Gee MD - Primary    Assisting Surgeon: None    Pre-op Diagnosis:  Infected sebaceous cyst of skin [L72.3, L08.9]    Post-op Diagnosis:  Post-Op Diagnosis Codes:     * Infected sebaceous cyst of skin [L72.3, L08.9]    Procedure(s) (LRB):  INCISION AND DRAINAGE (I&D), SCROTAL (Left)    Anesthesia: General    Description of Procedure: I&D scrotal abcess    Description of the findings of the procedure: scrotal abcess    Estimated Blood Loss: minimal         Specimens: scrotal abcess  Specimen (12h ago through future)    Start     Ordered    09/05/17 1022  Specimen to Pathology - Surgery  Once     Comments:  Scrotal abcess      09/05/17 1022

## 2017-09-05 NOTE — DISCHARGE INSTRUCTIONS
DRESSING CARE AND ACTIVITY  -Keep dressing clean, dry and intact until follow up visit which is tomorrow at 9am, dressing care to be given for post op in office   -NO HEAVY LIFTING OR STRENUOUS ACTIVITY UNTIL CLEARED BY MD    Anesthesia: General Anesthesia  Youre due to have surgery. During surgery, youll be given medication called anesthesia. (It is also called anesthetic.) This will keep you comfortable and pain-free. Your anesthesia provider will use general anesthesia. This sheet tells you more about it.  What is general anesthesia?     You are watched continuously during your procedure by the anesthesia provider   General anesthesia puts you into a state like deep sleep. It goes into the bloodstream (IV anesthetics), into the lungs (gas anesthetics), or both. You feel nothing during the procedure. You will not remember it. During the procedure, the anesthesia provider monitors you continuously. He or she checks your heart rate and rhythm, blood pressure, breathing, and blood oxygen.  · IV Anesthetics. IV anesthetics are given through an IV line in your arm. Theyre often given first. This is so you are asleep before a gas anesthetic is started. Some kinds of IV anesthetics relieve pain. Others relax you. Your doctor will decide which kind is best in your case.  · Gas Anesthetics. Gas anesthetics are breathed into the lungs. They are often used to keep you asleep. They can be given through a facemask or a tube placed in your larynx or trachea (breathing tube).  ¨ If you have a facemask, your anesthesia provider will most likely place it over your nose and mouth while youre still awake. Youll breathe oxygen through the mask as your IV anesthetic is started. Gas anesthetic may be added through the mask.  ¨ If you have a tube in the larynx or trachea, it will be inserted into your throat after youre asleep.  Anesthesia tools and medications  You will likely have:  · IV anesthetics. These are put into an IV  line into your bloodstream.  · Gas anesthetics. You breathe these anesthetics into your lungs, where they pass into your bloodstream.  · Pulse oximeter. This is a small clip that is attached to the end of your finger. This measures your blood oxygen level.  · Electrocardiography leads (electrodes). These are small sticky pads that are placed on your chest. They record your heart rate and rhythm.  · Blood pressure cuff. This reads your blood pressure.  Risks and possible complications  General anesthesia has some risks. These include:  · Breathing problems  · Nausea and vomiting  · Sore throat or hoarseness (usually temporary)  · Allergic reaction to the anesthetic  · Irregular heartbeat (rare)  · Cardiac arrest (rare)   Anesthesia safety  · Follow all instructions you are given for how long not to eat or drink before your procedure.  · Be sure your doctor knows what medications and drugs you take. This includes over-the-counter medications, herbs, supplements, alcohol or other drugs. You will be asked when those were last taken.  · Have an adult family member or friend drive you home after the procedure.  · For the first 24 hours after your surgery:  ¨ Do not drive or use heavy equipment.  ¨ Have a trusted family member or spouse make important decisions or sign documents.  ¨ Avoid alcohol.  ¨ Have a responsible adult stay with you. He or she can watch for problems and help keep you safe.  Date Last Reviewed: 10/16/2014  © 9533-1982 [employer]. [address] [place] [address]. All rights reserved. This information is not intended as a substitute for professional medical care. Always follow your healthcare professional's instructions.    Acetaminophen; Oxycodone capsules  What is this medicine?  ACETAMINOPHEN; OXYCODONE (a set a CHAYO cynthia fen; ox i KOE done) is a pain reliever. It is used to treat moderate to severe pain.  How should I use this medicine?  Take this medicine by mouth with a full  glass of water. Follow the directions on the prescription label. You can take it with or without food. If it upsets your stomach, take it with food. Take your medicine at regular intervals. Do not take it more often than directed.  A special MedGuide will be given to you by the pharmacist with each prescription and refill. Be sure to read this information carefully each time.  Talk to your pediatrician regarding the use of this medicine in children. Special care may be needed.  What side effects may I notice from receiving this medicine?  Side effects that you should report to your doctor or health care professional as soon as possible:  · allergic reactions like skin rash, itching or hives, swelling of the face, lips, or tongue  · breathing problems  · confusion  · redness, blistering, peeling or loosening of the skin, including inside the mouth  · signs and symptoms of liver injury like dark yellow or brown urine; general ill feeling or flu-like symptoms; light-colored stools; loss of appetite; nausea; right upper belly pain; unusually weak or tired; yellowing of the eyes or skin  · signs and symptoms of low blood pressure like dizziness; feeling faint or lightheaded, falls; unusually weak or tired  · trouble passing urine or change in the amount of urine  Side effects that usually do not require medical attention (report to your doctor or health care professional if they continue or are bothersome):  · constipation  · dry mouth  · nausea, vomiting  · tiredness  What may interact with this medicine?  This medicine may interact with the following medications:  · alcohol  · antihistamines for allergy, cough and cold  · antiviral medicines for HIV or AIDS  · atropine  · certain antibiotics like clarithromycin, erythromycin, linezolid, rifampin  · certain medicines for anxiety or sleep  · certain medicines for bladder problems like oxybutynin, tolterodine  · certain medicines for depression like amitriptyline,  fluoxetine, sertraline  · certain medicines for fungal infections like ketoconazole, itraconazole, voriconazole  · certain medicines for migraine headache like almotriptan, eletriptan, frovatriptan, naratriptan, rizatriptan, sumatriptan, zolmitriptan  · certain medicines for nausea or vomiting like dolasetron, ondansetron, palonosetron  · certain medicines for Parkinson's disease like benztropine, trihexyphenidyl  · certain medicines for seizures like phenobarbital, phenytoin, primidone  · certain medicines for stomach problems like dicyclomine, hyoscyamine  · certain medicines for travel sickness like scopolamine  · diuretics  · general anesthetics like halothane, isoflurane, methoxyflurane, propofol  · ipratropium  · local anesthetics like lidocaine, pramoxine, tetracaine  · MAOIs like Carbex, Eldepryl, Marplan, Nardil, and Parnate  · medicines that relax muscles for surgery  · methylene blue  · nilotinib  · other medicines with acetaminophen  · other narcotic medicines for pain or cough  · phenothiazines like chlorpromazine, mesoridazine, prochlorperazine, thioridazine  What if I miss a dose?  If you miss a dose, take it as soon as you can. If it is almost time for your next dose, take only that dose. Do not take double or extra doses.  Where should I keep my medicine?  Keep out of the reach of children. This medicine can be abused. Keep your medicine in a safe place to protect it from theft. Do not share this medicine with anyone. Selling or giving away this medicine is dangerous and against the law.  This medicine may cause accidental overdose and death if it taken by other adults, children, or pets. Mix any unused medicine with a substance like cat litter or coffee grounds. Then throw the medicine away in a sealed container like a sealed bag or a coffee can with a lid. Do not use the medicine after the expiration date.  Store at room temperature between 15 and 30 degrees C (59 and 86 degrees F). Keep container  tightly closed. Protect from light.  What should I tell my health care provider before I take this medicine?  They need to know if you have any of these conditions:  · brain tumor  · Crohn's disease, inflammatory bowel disease, or ulcerative colitis  · drug abuse or addiction  · head injury  · heart or circulation problems  · if you often drink alcohol  · kidney disease or problems going to the bathroom  · liver disease  · lung disease, asthma, or breathing problems  · an unusual or allergic reaction to salicylates, acetaminophen, oxycodone, other opioid analgesics, other medicines, foods, dyes, or preservatives  · pregnant or trying to get pregnant  · breast-feeding  What should I watch for while using this medicine?  Tell your doctor or health care professional if your pain does not go away, if it gets worse, or if you have new or a different type of pain. You may develop tolerance to the medicine. Tolerance means that you will need a higher dose of the medication for pain relief. Tolerance is normal and is expected if you take this medicine for a long time.  Do not suddenly stop taking your medicine because you may develop a severe reaction. Your body becomes used to the medicine. This does NOT mean you are addicted. Addiction is a behavior related to getting and using a drug for a nonmedical reason. If you have pain, you have a medical reason to take pain medicine. Your doctor will tell you how much medicine to take. If your doctor wants you to stop the medicine, the dose will be slowly lowered over time to avoid any side effects.  There are different types of narcotic medicines (opiates). If you take more than one type at the same time or if you are taking another medicine that also causes drowsiness, you may have more side effects. Give your health care provider a list of all medicines you use. Your doctor will tell you how much medicine to take. Do not take more medicine than directed. Call emergency for help  if you have problems breathing or unusual sleepiness.  Do not take other medicines that contain acetaminophen with this medicine. Always read labels carefully. If you have questions, ask your doctor or pharmacist.  If you take too much acetaminophen get medical help right away. Too much acetaminophen can be very dangerous and cause liver damage. Even if you do not have symptoms, it is important to get help right away.  You may get drowsy or dizzy. Do not drive, use machinery, or do anything that needs mental alertness until you know how this medicine affects you. Do not stand or sit up quickly, especially if you are an older patient. This reduces the risk of dizzy or fainting spells. Alcohol may interfere with the effect of this medicine. Avoid alcoholic drinks.  The medicine will cause constipation. Try to have a bowel movement at least every 2 to 3 days. If you do not have a bowel movement for 3 days, call your doctor or health care professional.  Your mouth may get dry. Chewing sugarless gum or sucking hard candy, and drinking plenty of water may help. Contact your doctor if the problem does not go away or is severe.  Date Last Reviewed:   NOTE:This sheet is a summary. It may not cover all possible information. If you have questions about this medicine, talk to your doctor, pharmacist, or health care provider. Copyright© 2016 [employer]    Cephalexin tablets or capsules  What is this medicine?  CEPHALEXIN (sef a BROWN in) is a cephalosporin antibiotic. It is used to treat certain kinds of bacterial infections It will not work for colds, flu, or other viral infections.  How should I use this medicine?  Take this medicine by mouth with a full glass of water. Follow the directions on the prescription label. This medicine can be taken with or without food. Take your medicine at regular intervals. Do not take your medicine more often than directed. Take all of your medicine as directed even if you think you are  better. Do not skip doses or stop your medicine early.  Talk to your pediatrician regarding the use of this medicine in children. While this drug may be prescribed for selected conditions, precautions do apply.  What side effects may I notice from receiving this medicine?  Side effects that you should report to your doctor or health care professional as soon as possible:  · allergic reactions like skin rash, itching or hives, swelling of the face, lips, or tongue  · breathing problems  · pain or trouble passing urine  · redness, blistering, peeling or loosening of the skin, including inside the mouth  · severe or watery diarrhea  · unusually weak or tired  · yellowing of the eyes, skin  Side effects that usually do not require medical attention (report to your doctor or health care professional if they continue or are bothersome):  · gas or heartburn  · genital or anal irritation  · headache  · joint or muscle pain  · nausea, vomiting  What may interact with this medicine?  · probenecid  · some other antibiotics  What if I miss a dose?  If you miss a dose, take it as soon as you can. If it is almost time for your next dose, take only that dose. Do not take double or extra doses. There should be at least 4 to 6 hours between doses.  Where should I keep my medicine?  Keep out of the reach of children.  Store at room temperature between 59 and 86 degrees F (15 and 30 degrees C). Throw away any unused medicine after the expiration date.  What should I tell my health care provider before I take this medicine?  They need to know if you have any of these conditions:  · kidney disease  · stomach or intestine problems, especially colitis  · an unusual or allergic reaction to cephalexin, other cephalosporins, penicillins, other antibiotics, medicines, foods, dyes or preservatives  · pregnant or trying to get pregnant  · breast-feeding  What should I watch for while using this medicine?  Tell your doctor or health care  professional if your symptoms do not begin to improve in a few days.  Do not treat diarrhea with over the counter products. Contact your doctor if you have diarrhea that lasts more than 2 days or if it is severe and watery.  If you have diabetes, you may get a false-positive result for sugar in your urine. Check with your doctor or health care professional.  Date Last Reviewed:   NOTE:This sheet is a summary. It may not cover all possible information. If you have questions about this medicine, talk to your doctor, pharmacist, or health care provider. Copyright© 2016 [employer]

## 2017-09-05 NOTE — PLAN OF CARE
Problem: Patient Care Overview  Goal: Plan of Care Review  Outcome: Outcome(s) achieved Date Met: 09/05/17  POC and discharge discussed, vss

## 2017-09-05 NOTE — PLAN OF CARE
Patient oob and urinated with no issues, vss, pain at tolerable level and med delivered will discharge to home with instructions soon

## 2017-09-06 ENCOUNTER — OFFICE VISIT (OUTPATIENT)
Dept: UROLOGY | Facility: CLINIC | Age: 67
End: 2017-09-06
Payer: COMMERCIAL

## 2017-09-06 VITALS
WEIGHT: 230 LBS | RESPIRATION RATE: 18 BRPM | HEART RATE: 17 BPM | BODY MASS INDEX: 34.07 KG/M2 | HEIGHT: 69 IN | TEMPERATURE: 98 F | DIASTOLIC BLOOD PRESSURE: 77 MMHG | SYSTOLIC BLOOD PRESSURE: 144 MMHG | OXYGEN SATURATION: 95 %

## 2017-09-06 VITALS
WEIGHT: 230 LBS | HEART RATE: 75 BPM | SYSTOLIC BLOOD PRESSURE: 110 MMHG | HEIGHT: 69 IN | DIASTOLIC BLOOD PRESSURE: 65 MMHG | BODY MASS INDEX: 34.07 KG/M2

## 2017-09-06 DIAGNOSIS — R93.89 NONSPECIFIC (ABNORMAL) FINDINGS ON RADIOLOGICAL AND OTHER EXAMINATION OF GENITOURINARY ORGANS: ICD-10-CM

## 2017-09-06 DIAGNOSIS — R97.20 ELEVATED PSA: ICD-10-CM

## 2017-09-06 DIAGNOSIS — L72.3 INFECTED SEBACEOUS CYST OF SKIN: Primary | ICD-10-CM

## 2017-09-06 DIAGNOSIS — L08.9 INFECTED SEBACEOUS CYST OF SKIN: Primary | ICD-10-CM

## 2017-09-06 PROCEDURE — 81001 URINALYSIS AUTO W/SCOPE: CPT | Mod: S$GLB,,, | Performed by: UROLOGY

## 2017-09-06 PROCEDURE — 99999 PR PBB SHADOW E&M-EST. PATIENT-LVL III: CPT | Mod: PBBFAC,,, | Performed by: UROLOGY

## 2017-09-06 PROCEDURE — 99024 POSTOP FOLLOW-UP VISIT: CPT | Mod: S$GLB,,, | Performed by: UROLOGY

## 2017-09-06 PROCEDURE — 3008F BODY MASS INDEX DOCD: CPT | Mod: S$GLB,,, | Performed by: UROLOGY

## 2017-09-06 NOTE — PROGRESS NOTES
"This patient was last seen by me yesterday at which time he had excision of infected scrotal skin.  Wound was packed open and he now comes for postoperative check.    She reports no problems with his wound.  Dressings removed and wound is clean and dry.  Wound is repacked with 4 x 4's    Physical exam reveals reveals a well-developed well-nourished patient  in no acute distress.  Patient is alert and oriented ×3 with normal mood and affect.  Respiratory effort is normal and there is no peripheral edema.  Skin is normal to inspection and palpation.Penis/perineum without lesions,  epididymis nontender bilaterally, urethral meatus in normal location normal size, no penile plaques palpated, prostate:     Smooth and enlarged and benign feeling                  seminal vesicles not palpated.  No rectal masses, sphincter tone normal.  Testes equal in size without masses.  Scrotal wound as reported above.    /65   Pulse 75   Ht 5' 9" (1.753 m)   Wt 104.3 kg (230 lb)   BMI 33.97 kg/m²   Review of Systems  General ROS: negative for chills, fever or weight loss  Respiratory ROS: no cough, shortness of breath, or wheezing  Cardiovascular ROS: no chest pain or dyspnea on exertion  Musculoskeletal ROS: negative for gait disturbance or muscular weakness    Family History   Problem Relation Age of Onset    Prostate cancer Neg Hx     Kidney disease Neg Hx      Past Medical History:   Diagnosis Date    Glaucoma     Sebaceous cyst of scrotum      Family History   Problem Relation Age of Onset    Prostate cancer Neg Hx     Kidney disease Neg Hx      Social History   Substance Use Topics    Smoking status: Former Smoker    Smokeless tobacco: Never Used    Alcohol use No       Impression:      ICD-10-CM ICD-9-CM    1. Infected sebaceous cyst of skin L72.3 706.2 POCT urinalysis, dipstick or tablet reag    L08.9     2. Elevated PSA R97.20 790.93    3. Nonspecific (abnormal) findings on radiological and other examination " of genitourinary organs R93.8 793.5        Plan:    #1.  Infected scrotal skin with abscess.  Patient stable status post excision.  Restart twice daily sitz baths and wound care.  Follow-up 2 weeks    #2.  Elevated PSA.  Plan.  Patient will need prostate biopsy once scrotal skin has healed    #3.  Nonspecific finding on testicular ultrasound.  Plan.  Plan on recheck of scrotal ultrasound once scrotum has healed    PLEASE NOTE:  Please be advised that portions of this note were dictated using voice recognition software and may contain dictation related errors in spelling/grammar/appropriate pronouns/syntax or other errors that might have not been found and or corrected on text review.

## 2017-09-07 LAB
BILIRUB SERPL-MCNC: NORMAL MG/DL
BLOOD URINE, POC: NORMAL
COLOR, POC UA: NORMAL
GLUCOSE UR QL STRIP: NORMAL
KETONES UR QL STRIP: NORMAL
LEUKOCYTE ESTERASE URINE, POC: NORMAL
NITRITE, POC UA: NORMAL
PH, POC UA: 5
PROTEIN, POC: NORMAL
SPECIFIC GRAVITY, POC UA: 1.02
UROBILINOGEN, POC UA: NORMAL

## 2017-09-08 LAB — BACTERIA SPEC AEROBE CULT: NO GROWTH

## 2017-09-11 LAB — BACTERIA SPEC ANAEROBE CULT: NORMAL

## 2017-09-22 ENCOUNTER — OFFICE VISIT (OUTPATIENT)
Dept: UROLOGY | Facility: CLINIC | Age: 67
End: 2017-09-22
Payer: COMMERCIAL

## 2017-09-22 VITALS
HEART RATE: 61 BPM | SYSTOLIC BLOOD PRESSURE: 130 MMHG | WEIGHT: 230 LBS | BODY MASS INDEX: 34.07 KG/M2 | DIASTOLIC BLOOD PRESSURE: 83 MMHG | HEIGHT: 69 IN

## 2017-09-22 DIAGNOSIS — R93.89 NONSPECIFIC (ABNORMAL) FINDINGS ON RADIOLOGICAL AND OTHER EXAMINATION OF GENITOURINARY ORGANS: ICD-10-CM

## 2017-09-22 DIAGNOSIS — L72.3 INFECTED SEBACEOUS CYST OF SKIN: Primary | ICD-10-CM

## 2017-09-22 DIAGNOSIS — R97.20 ELEVATED PSA: ICD-10-CM

## 2017-09-22 DIAGNOSIS — L08.9 INFECTED SEBACEOUS CYST OF SKIN: Primary | ICD-10-CM

## 2017-09-22 PROCEDURE — 99999 PR PBB SHADOW E&M-EST. PATIENT-LVL III: CPT | Mod: PBBFAC,,, | Performed by: UROLOGY

## 2017-09-22 PROCEDURE — 3008F BODY MASS INDEX DOCD: CPT | Mod: S$GLB,,, | Performed by: UROLOGY

## 2017-09-22 PROCEDURE — 99214 OFFICE O/P EST MOD 30 MIN: CPT | Mod: S$GLB,,, | Performed by: UROLOGY

## 2017-09-22 RX ORDER — OXYCODONE AND ACETAMINOPHEN 5; 325 MG/1; MG/1
1 TABLET ORAL EVERY 6 HOURS PRN
Qty: 25 TABLET | Refills: 0 | Status: SHIPPED | OUTPATIENT
Start: 2017-09-22 | End: 2017-11-07

## 2017-09-22 NOTE — PROGRESS NOTES
"This patient was last seen by me September 6, 2017 on postop day 1 status post excision of infected scrotal skin.      Patient now comes in follow-up and has been doing wound care at home.  Scrotum shows healthy appearing granulation tissue    Patient has no dysuria or gross hematuria and no significant voiding symptoms    Physical exam reveals reveals a well-developed well-nourished patient  in no acute distress.  Patient is alert and oriented ×3 with normal mood and affect.  Respiratory effort is normal and there is no peripheral edema.  Skin is normal to inspection and palpation.Penis/perineum without lesions, scrotum without rash/cysts, epididymis nontender bilaterally, urethral meatus in normal location normal size, no penile plaques palpated, prostate:    Smooth and enlarged                   seminal vesicles not palpated.  No rectal masses, sphincter tone normal.  Testes equal in size without masses.  Healthy-appearing granulation tissue left hemiscrotum    /83   Pulse 61   Ht 5' 9" (1.753 m)   Wt 104.3 kg (230 lb)   BMI 33.97 kg/m²   Review of Systems  General ROS: negative for chills, fever or weight loss  Respiratory ROS: no cough, shortness of breath, or wheezing  Cardiovascular ROS: no chest pain or dyspnea on exertion  Musculoskeletal ROS: negative for gait disturbance or muscular weakness    Family History   Problem Relation Age of Onset    Prostate cancer Neg Hx     Kidney disease Neg Hx      Past Medical History:   Diagnosis Date    Glaucoma     Sebaceous cyst of scrotum      Family History   Problem Relation Age of Onset    Prostate cancer Neg Hx     Kidney disease Neg Hx      Social History   Substance Use Topics    Smoking status: Former Smoker    Smokeless tobacco: Never Used    Alcohol use No       Impression:      ICD-10-CM ICD-9-CM    1. Infected sebaceous cyst of skin L72.3 706.2     L08.9     2. Elevated PSA R97.20 790.93    3. Nonspecific (abnormal) findings on " radiological and other examination of genitourinary organs R93.8 793.5        Plan:    #1.  Stable status post excision of infected scrotal skin.  Plan.  Continue local wound care.  Follow-up 3 weeks.    #2.  Elevated PSA.  Plan.  At next office visit we will work on scheduling prostate biopsy    #3.  Nonspecific finding on scrotal ultrasound.  Plan.  We'll plan on repeating scrotal ultrasound once scrotum is completely healed    PLEASE NOTE:  Please be advised that portions of this note were dictated using voice recognition software and may contain dictation related errors in spelling/grammar/appropriate pronouns/syntax or other errors that might have not been found and or corrected on text review.

## 2017-10-20 ENCOUNTER — OFFICE VISIT (OUTPATIENT)
Dept: UROLOGY | Facility: CLINIC | Age: 67
End: 2017-10-20
Payer: COMMERCIAL

## 2017-10-20 VITALS
HEART RATE: 62 BPM | WEIGHT: 230 LBS | SYSTOLIC BLOOD PRESSURE: 104 MMHG | DIASTOLIC BLOOD PRESSURE: 67 MMHG | BODY MASS INDEX: 34.07 KG/M2 | HEIGHT: 69 IN

## 2017-10-20 DIAGNOSIS — R97.20 ELEVATED PSA: Primary | ICD-10-CM

## 2017-10-20 DIAGNOSIS — L72.3 INFECTED SEBACEOUS CYST OF SKIN: ICD-10-CM

## 2017-10-20 DIAGNOSIS — L08.9 INFECTED SEBACEOUS CYST OF SKIN: ICD-10-CM

## 2017-10-20 DIAGNOSIS — R93.89 NONSPECIFIC ABNORMAL FINDINGS ON DIAGNOSTIC IMAGING: ICD-10-CM

## 2017-10-20 LAB
BILIRUB SERPL-MCNC: ABNORMAL MG/DL
BLOOD URINE, POC: ABNORMAL
COLOR, POC UA: YELLOW
GLUCOSE UR QL STRIP: ABNORMAL
KETONES UR QL STRIP: ABNORMAL
LEUKOCYTE ESTERASE URINE, POC: ABNORMAL
NITRITE, POC UA: ABNORMAL
PH, POC UA: 5
PROTEIN, POC: ABNORMAL
SPECIFIC GRAVITY, POC UA: 1.02
UROBILINOGEN, POC UA: ABNORMAL

## 2017-10-20 PROCEDURE — 81001 URINALYSIS AUTO W/SCOPE: CPT | Mod: S$GLB,,, | Performed by: UROLOGY

## 2017-10-20 PROCEDURE — 99214 OFFICE O/P EST MOD 30 MIN: CPT | Mod: 25,S$GLB,, | Performed by: UROLOGY

## 2017-10-20 PROCEDURE — 99999 PR PBB SHADOW E&M-EST. PATIENT-LVL III: CPT | Mod: PBBFAC,,, | Performed by: UROLOGY

## 2017-10-20 PROCEDURE — 87086 URINE CULTURE/COLONY COUNT: CPT

## 2017-10-20 RX ORDER — CIPROFLOXACIN 500 MG/1
500 TABLET ORAL 2 TIMES DAILY
Qty: 10 TABLET | Refills: 0 | Status: SHIPPED | OUTPATIENT
Start: 2017-10-20 | End: 2017-10-30

## 2017-10-20 RX ORDER — OXYCODONE AND ACETAMINOPHEN 5; 325 MG/1; MG/1
1 TABLET ORAL EVERY 6 HOURS PRN
Qty: 20 TABLET | Refills: 0 | Status: SHIPPED | OUTPATIENT
Start: 2017-10-20 | End: 2017-12-08 | Stop reason: ALTCHOICE

## 2017-10-20 NOTE — PROGRESS NOTES
"This patient was last seen by me September 22 follow-up for repeat excision of infected scrotal skin on September 5, 2017.  Initial excision was performed on May 9, 2017    Patient reports no problems with his scrotum.  He has no pain.  Site of the excision is almost healed.  Patient has no dysuria or fever    He has history of elevated serum PSA which we have been waiting for his scrotum to heal prior to biopsy area he also has a history of an indeterminate finding on scrotal ultrasound which was obtained at the time of significant scrotal skin abnormality    Physical exam reveals reveals a well-developed well-nourished patient  in no acute distress.  Patient is alert and oriented ×3 with normal mood and affect.  Respiratory effort is normal and there is no peripheral edema.  Skin is normal to inspection and palpation.Penis/perineum without lesions, , epididymis nontender bilaterally, urethral meatus in normal location normal size, no penile plaques palpated testes equal in size without masses.  Site of scrotal skin excision almost healed.  Area shows healthy granulation tissue approximately the size of a dime.          /67   Pulse 62   Ht 5' 9" (1.753 m)   Wt 104.3 kg (230 lb)   BMI 33.97 kg/m²   Review of Systems  General ROS: negative for chills, fever or weight loss  Respiratory ROS: no cough, shortness of breath, or wheezing  Cardiovascular ROS: no chest pain or dyspnea on exertion  Musculoskeletal ROS: negative for gait disturbance or muscular weakness    Family History   Problem Relation Age of Onset    Prostate cancer Neg Hx     Kidney disease Neg Hx      Past Medical History:   Diagnosis Date    Glaucoma     Sebaceous cyst of scrotum      Family History   Problem Relation Age of Onset    Prostate cancer Neg Hx     Kidney disease Neg Hx      Social History   Substance Use Topics    Smoking status: Former Smoker    Smokeless tobacco: Never Used    Alcohol use No       Impression:      " ICD-10-CM ICD-9-CM    1. Elevated PSA R97.20 790.93 Transrectal Ultrasound w/ Biopsy      POCT urinalysis, dipstick or tablet reag   2. Infected sebaceous cyst of skin L72.3 706.2 Urine culture    L08.9     3. Nonspecific abnormal findings on diagnostic imaging R93.8 793.99     Equivocal findings on scrotal ultrasound of testicles       Plan:    #1.  Related PSA.  Plan.  I discussed that scrotum is healed well enough that we can proceed with prostate biopsy.  Risks of prostate biopsy including bleeding infection difficulty voiding discussed.  Patient and wife voice understanding and patient agrees to proceed with biopsy.  Preparation sheet reviewed with patient and consent is obtained.    #2 read infected scrotal skin.  Plan.  This is just about healed and we can proceed with biopsy of prostate as detailed above    #3.  Equivocal findings on scrotal ultrasound.  Plan.  We'll plan on rechecking scrotal ultrasound once scrotal skin is completely healed graft    Today I spent 25 minutes with the patient, more than 50% of which was spent counseling and coordinating care concerning treatment of issues as detailed above    PLEASE NOTE:  Please be advised that portions of this note were dictated using voice recognition software and may contain dictation related errors in spelling/grammar/appropriate pronouns/syntax or other errors that might have not been found and or corrected on text review.

## 2017-10-21 LAB — BACTERIA UR CULT: NORMAL

## 2017-10-26 ENCOUNTER — PROCEDURE VISIT (OUTPATIENT)
Dept: UROLOGY | Facility: CLINIC | Age: 67
End: 2017-10-26
Payer: COMMERCIAL

## 2017-10-26 VITALS
SYSTOLIC BLOOD PRESSURE: 126 MMHG | HEART RATE: 67 BPM | DIASTOLIC BLOOD PRESSURE: 84 MMHG | BODY MASS INDEX: 34.07 KG/M2 | WEIGHT: 230 LBS | HEIGHT: 69 IN

## 2017-10-26 DIAGNOSIS — L72.3 INFECTED SEBACEOUS CYST OF SKIN: ICD-10-CM

## 2017-10-26 DIAGNOSIS — R93.89 NONSPECIFIC ABNORMAL FINDINGS ON DIAGNOSTIC IMAGING: ICD-10-CM

## 2017-10-26 DIAGNOSIS — L08.9 INFECTED SEBACEOUS CYST OF SKIN: ICD-10-CM

## 2017-10-26 DIAGNOSIS — R97.20 ELEVATED PSA: Primary | ICD-10-CM

## 2017-10-26 PROCEDURE — 76872 US TRANSRECTAL: CPT | Mod: S$GLB,,, | Performed by: UROLOGY

## 2017-10-26 PROCEDURE — 99215 OFFICE O/P EST HI 40 MIN: CPT | Mod: S$GLB,,, | Performed by: UROLOGY

## 2017-10-26 RX ORDER — CIPROFLOXACIN 500 MG/1
500 TABLET ORAL 2 TIMES DAILY
Qty: 6 TABLET | Refills: 0 | Status: SHIPPED | OUTPATIENT
Start: 2017-10-26 | End: 2017-10-29

## 2017-10-26 NOTE — PROCEDURES
Procedures     This patient presents for prostate biopsy due to elevated PSA.  Patient also has history of infected scrotal skin status post resection ×2.  This is almost completely healed    Patient also has indeterminate finding on scrotal ultrasound for which plan is repeat ultrasound once scrotal skin is normal    Physical exam reveals a well-developed well-nourished patient in no acute distress. Vital signs are reviewed.  The patient is alert and oriented ×3 with normal mood and affect.  Respiratory effort is normal and there is no peripheral edema.  Skin is normal to inspection and palpation.Penis/perineum without lesions, scrotum without rash/cysts, epididymis nontender bilaterally, urethral meatus in normal location normal size, no penile plaques palpated, prostate:      Smooth enlarged and benign feeling                 seminal vesicles not palpated.  No rectal masses, sphincter tone normal.  Testes equal in size without masses.  Site of excision in scrotum is just about healed.    Transrectal ultrasound is performed . images and report are entered into the computer.    Patient has significant discomfort with just the ultrasound exam and therefore I elected not to proceed with biopsy but rather to reschedule this to be done under general anesthesia patient agrees with this plan    1.  Elevated PSA.  Plan.  Plan prostate biopsy under general anesthesia.  The patient again reviewed and the importance of not having aspirin Motrin starting 10 days before.  Patient instructed to have fleets enema 3 hours before and to start antibiotics day prior    #2.  Infected scrotal skin.  Plan.  Just about healed.    #3.  On specific finding on scrotal ultrasound.  Plan.  Once scrotal skin is normal, we'll repeat scrotal ultrasound    Today I spent 45 minutes with the patient, more than 50% of which was spent counseling and coordinating care concerning treatment of issues as detailed above

## 2017-11-07 ENCOUNTER — ANESTHESIA EVENT (OUTPATIENT)
Dept: SURGERY | Facility: HOSPITAL | Age: 67
End: 2017-11-07
Payer: COMMERCIAL

## 2017-11-07 ENCOUNTER — CLINICAL SUPPORT (OUTPATIENT)
Dept: LAB | Facility: HOSPITAL | Age: 67
End: 2017-11-07
Attending: UROLOGY
Payer: COMMERCIAL

## 2017-11-07 ENCOUNTER — HOSPITAL ENCOUNTER (OUTPATIENT)
Dept: PREADMISSION TESTING | Facility: HOSPITAL | Age: 67
Discharge: HOME | End: 2017-11-07
Attending: UROLOGY
Payer: COMMERCIAL

## 2017-11-07 DIAGNOSIS — R97.20 ELEVATED PSA: ICD-10-CM

## 2017-11-07 PROCEDURE — 93010 EKG 12-LEAD: ICD-10-PCS | Mod: ,,, | Performed by: INTERNAL MEDICINE

## 2017-11-07 PROCEDURE — 93005 ELECTROCARDIOGRAM TRACING: CPT

## 2017-11-07 PROCEDURE — 93010 ELECTROCARDIOGRAM REPORT: CPT | Mod: ,,, | Performed by: INTERNAL MEDICINE

## 2017-11-07 RX ORDER — SODIUM CHLORIDE, SODIUM LACTATE, POTASSIUM CHLORIDE, CALCIUM CHLORIDE 600; 310; 30; 20 MG/100ML; MG/100ML; MG/100ML; MG/100ML
INJECTION, SOLUTION INTRAVENOUS CONTINUOUS
Status: CANCELLED | OUTPATIENT
Start: 2017-11-07

## 2017-11-07 RX ORDER — LIDOCAINE HYDROCHLORIDE 10 MG/ML
1 INJECTION, SOLUTION EPIDURAL; INFILTRATION; INTRACAUDAL; PERINEURAL ONCE
Status: CANCELLED | OUTPATIENT
Start: 2017-11-07 | End: 2017-11-07

## 2017-11-07 NOTE — PRE-PROCEDURE INSTRUCTIONS
Eber Alex  [phone]    Allergies, medical, surgical, family and psychosocial histories reviewed with patient. Periop plan of care reviewed. Preop instructions given, including medications to take and to hold. Time allotted for questions to be addressed.  Patient verbalized understanding.

## 2017-11-07 NOTE — DISCHARGE INSTRUCTIONS
Your surgery is scheduled for 11/10/17.    Please report to Outpatient Surgery Intake Office on the 2nd FLOOR at 5:30 a.m.          INSTRUCTIONS IMPORTANT!!!  ¨ Do not eat or drink after 12 midnight-including water. OK to brush teeth, no   gum, candy or mints!      ____  Proceed to [facility] on 11/7/17 for additional blood test.        ____  No powder, lotions or creams to surgical area.  ____  Please remove all jewelry, including piercings and leave at home.  ____  No money or valuables needed. Please leave at home.  ____  Please bring any documents given by your doctor.  ____  If going home the same day, arrange for a ride home. You will not be able to             drive if Anesthesia was used.  ____  Wear loose fitting clothing. Allow for dressings, bandages.  ____  Stop Aspirin, Ibuprofen, Motrin and Aleve at least 3-5 days before surgery, unless otherwise instructed by your doctor, or the nurse.   You MAY use Tylenol/acetaminophen until day of surgery.  ____  If you take diabetic medication, do not take am of surgery unless instructed by Doctor.  ____  Call MD for temperature above 101 degrees.  ____ Stop taking any Fish Oil supplement or any Vitamins that contain Vitamin E at least 5 days prior to surgery.  ____ Do Not wear your contact lenses the day of your procedure.  You may wear your glasses.        I have read or had read and explained to me, and understand the above information.  Additional comments or instructions:  For additional questions call [phone]          Prostate Biopsy    Cancer occurs when abnormal cells form a tumor. A tumor is a lump of cells that grow uncontrolled. Initial tests that may indicate cancer of the prostate include a digital rectal exam, a PSA (prostate specific antigen blood test), ultrasound, and others. A core needle biopsy will be done if your healthcare provider thinks you have prostate cancer. A thin needle is used to remove small samples of prostate  tissue. Usually multiple biopsies are taken. These samples are checked for cancer.  Taking tissue samples  A biopsy takes about 15 to 20 minutes. You may be given an enema or suppository before the biopsy to clear the bowels. Antibiotics are given at least an hour before the biopsy. During the procedure:  · You will be given antibiotics to prevent infection.  · You may be given a sedative, local pain killer, or pain medicine.  · A small, ultrasound  probe is put into the rectum as you lie on your side. A picture of your prostate can then be seen on a monitor. This is called a transrectal ultrasound (TRUS).  · Your healthcare provider will use the TRUS picture as a guide. He or she will use a thin needle to remove tiny tissue samples from some sites in the prostate.  · These tissue samples are sent to the pathology department. They are looked at under a microscope so a diagnosis can be made.   Risks and complications of core needle biopsy  · Infection  · Blood in urine, stool, or semen  · Pain  · The biopsy misses the tumor   Home care  You may have had the biopsy done through your rectum, your urethra, or through the skin between your scrotum and rectum. Your healthcare provider will tell you what to do after the biopsy. These instructions are based on your health condition, the type of biopsy, and your providers practices.  · Your provider may give you pain medicine such as acetaminophen or ibuprofen for discomfort or pain. Follow your providers instructions for taking these medicines. Dont take aspirin or ibuprofen after the procedure. If you have a chronic liver or kidney disease, talk with your provider before taking these medicines. Also talk with your provider if youve had a stomach ulcer or gastrointestinal bleeding. Let your provider know all the medicines you currently take.  · You may need to take antibiotics for 1 to 2 days. This will help prevent an infection. Signs of an infection include chills,  pain, or fever.  · You may be told to drink 8 ounces of water every 30 minutes for 2 hours. This will help ease any discomfort. You can also take a warm bath or put a warm, damp washcloth over your urethra to help ease the pain.  · You may see minor bleeding after the procedure. This is normal and usually needs no treatment. You may see blood in your urine or semen (rust color). You may also have light bleeding from your rectum if you have hemorrhoids.  · Your provider will tell you when you can go back to your normal activities. This includes sex, exercise, and straining physically. Discuss these with your provider.  When to seek medical advice  Call your healthcare provider right away if any of these occur:  · You arent able to urinate, or see that you have less flow of urine  · Chills and fever of 100.4°F (38°C)    · Severe pain  · Signs of an infection that is getting worse. These include worsening pain, pain in your side under the rib cage or in the low back, or foul-smelling urine.  · Blood clots or bright red blood in your stool or urine  · You feel confused or very tired  Date Last Reviewed: 1/1/2017  © 2273-5972 [employer]. [address] [place] [address], [place] [address]. All rights reserved. This information is not intended as a substitute for professional medical care. Always follow your healthcare professional's instructions.

## 2017-11-10 ENCOUNTER — ANESTHESIA (OUTPATIENT)
Dept: SURGERY | Facility: HOSPITAL | Age: 67
End: 2017-11-10
Payer: COMMERCIAL

## 2017-11-10 ENCOUNTER — SURGERY (OUTPATIENT)
Age: 67
End: 2017-11-10

## 2017-11-10 ENCOUNTER — HOSPITAL ENCOUNTER (OUTPATIENT)
Facility: HOSPITAL | Age: 67
Discharge: HOME | End: 2017-11-10
Attending: UROLOGY | Admitting: UROLOGY
Payer: COMMERCIAL

## 2017-11-10 VITALS
SYSTOLIC BLOOD PRESSURE: 117 MMHG | RESPIRATION RATE: 15 BRPM | DIASTOLIC BLOOD PRESSURE: 66 MMHG | HEART RATE: 59 BPM | TEMPERATURE: 98 F | HEIGHT: 70 IN | BODY MASS INDEX: 32.93 KG/M2 | WEIGHT: 230 LBS | OXYGEN SATURATION: 98 %

## 2017-11-10 DIAGNOSIS — R97.20 ELEVATED PSA: Primary | ICD-10-CM

## 2017-11-10 PROCEDURE — 37000008 HC ANESTHESIA 1ST 15 MINUTES: Performed by: UROLOGY

## 2017-11-10 PROCEDURE — 63600175 PHARM REV CODE 636 W HCPCS: Performed by: UROLOGY

## 2017-11-10 PROCEDURE — 36000704 HC OR TIME LEV I 1ST 15 MIN: Performed by: UROLOGY

## 2017-11-10 PROCEDURE — 63600175 PHARM REV CODE 636 W HCPCS: Performed by: NURSE ANESTHETIST, CERTIFIED REGISTERED

## 2017-11-10 PROCEDURE — 71000016 HC POSTOP RECOV ADDL HR: Performed by: UROLOGY

## 2017-11-10 PROCEDURE — 88305 TISSUE EXAM BY PATHOLOGIST: CPT | Mod: 26,,, | Performed by: PATHOLOGY

## 2017-11-10 PROCEDURE — 76942 ECHO GUIDE FOR BIOPSY: CPT | Mod: 26,59,, | Performed by: UROLOGY

## 2017-11-10 PROCEDURE — 88305 TISSUE EXAM BY PATHOLOGIST: CPT | Performed by: PATHOLOGY

## 2017-11-10 PROCEDURE — 25000003 PHARM REV CODE 250: Performed by: NURSE ANESTHETIST, CERTIFIED REGISTERED

## 2017-11-10 PROCEDURE — 76872 US TRANSRECTAL: CPT | Mod: 26,,, | Performed by: UROLOGY

## 2017-11-10 PROCEDURE — 25000003 PHARM REV CODE 250: Performed by: UROLOGY

## 2017-11-10 PROCEDURE — 55700 PR BIOPSY OF PROSTATE,NEEDLE/PUNCH: CPT | Mod: ,,, | Performed by: UROLOGY

## 2017-11-10 PROCEDURE — 37000009 HC ANESTHESIA EA ADD 15 MINS: Performed by: UROLOGY

## 2017-11-10 PROCEDURE — 36000705 HC OR TIME LEV I EA ADD 15 MIN: Performed by: UROLOGY

## 2017-11-10 PROCEDURE — 71000015 HC POSTOP RECOV 1ST HR: Performed by: UROLOGY

## 2017-11-10 RX ORDER — HYDROMORPHONE HYDROCHLORIDE 2 MG/ML
0.5 INJECTION, SOLUTION INTRAMUSCULAR; INTRAVENOUS; SUBCUTANEOUS EVERY 5 MIN PRN
Status: DISCONTINUED | OUTPATIENT
Start: 2017-11-10 | End: 2017-11-10 | Stop reason: HOSPADM

## 2017-11-10 RX ORDER — CIPROFLOXACIN 500 MG/1
500 TABLET ORAL EVERY 12 HOURS
Qty: 10 TABLET | Refills: 0 | Status: SHIPPED | OUTPATIENT
Start: 2017-11-10 | End: 2017-11-20

## 2017-11-10 RX ORDER — MIDAZOLAM HYDROCHLORIDE 1 MG/ML
INJECTION, SOLUTION INTRAMUSCULAR; INTRAVENOUS
Status: DISCONTINUED | OUTPATIENT
Start: 2017-11-10 | End: 2017-11-10

## 2017-11-10 RX ORDER — LIDOCAINE HCL/PF 100 MG/5ML
SYRINGE (ML) INTRAVENOUS
Status: DISCONTINUED | OUTPATIENT
Start: 2017-11-10 | End: 2017-11-10

## 2017-11-10 RX ORDER — OXYCODONE AND ACETAMINOPHEN 5; 325 MG/1; MG/1
1 TABLET ORAL EVERY 6 HOURS PRN
Qty: 10 TABLET | Refills: 0 | Status: SHIPPED | OUTPATIENT
Start: 2017-11-10 | End: 2017-12-08 | Stop reason: ALTCHOICE

## 2017-11-10 RX ORDER — SODIUM CHLORIDE 9 MG/ML
INJECTION, SOLUTION INTRAVENOUS CONTINUOUS PRN
Status: DISCONTINUED | OUTPATIENT
Start: 2017-11-10 | End: 2017-11-10

## 2017-11-10 RX ORDER — KETAMINE HYDROCHLORIDE 50 MG/ML
INJECTION, SOLUTION INTRAMUSCULAR; INTRAVENOUS
Status: DISCONTINUED | OUTPATIENT
Start: 2017-11-10 | End: 2017-11-10

## 2017-11-10 RX ORDER — HYDROCODONE BITARTRATE AND ACETAMINOPHEN 5; 325 MG/1; MG/1
1 TABLET ORAL EVERY 4 HOURS PRN
Status: DISCONTINUED | OUTPATIENT
Start: 2017-11-10 | End: 2017-11-10 | Stop reason: HOSPADM

## 2017-11-10 RX ORDER — ONDANSETRON 2 MG/ML
4 INJECTION INTRAMUSCULAR; INTRAVENOUS DAILY PRN
Status: DISCONTINUED | OUTPATIENT
Start: 2017-11-10 | End: 2017-11-10 | Stop reason: HOSPADM

## 2017-11-10 RX ORDER — SODIUM CHLORIDE, SODIUM LACTATE, POTASSIUM CHLORIDE, CALCIUM CHLORIDE 600; 310; 30; 20 MG/100ML; MG/100ML; MG/100ML; MG/100ML
INJECTION, SOLUTION INTRAVENOUS CONTINUOUS
Status: DISCONTINUED | OUTPATIENT
Start: 2017-11-10 | End: 2017-11-10 | Stop reason: HOSPADM

## 2017-11-10 RX ORDER — FENTANYL CITRATE 50 UG/ML
INJECTION, SOLUTION INTRAMUSCULAR; INTRAVENOUS
Status: DISCONTINUED | OUTPATIENT
Start: 2017-11-10 | End: 2017-11-10

## 2017-11-10 RX ORDER — SODIUM CHLORIDE 0.9 % (FLUSH) 0.9 %
3 SYRINGE (ML) INJECTION
Status: DISCONTINUED | OUTPATIENT
Start: 2017-11-10 | End: 2017-11-10 | Stop reason: HOSPADM

## 2017-11-10 RX ORDER — PROPOFOL 10 MG/ML
VIAL (ML) INTRAVENOUS CONTINUOUS PRN
Status: DISCONTINUED | OUTPATIENT
Start: 2017-11-10 | End: 2017-11-10

## 2017-11-10 RX ORDER — LIDOCAINE HYDROCHLORIDE 10 MG/ML
1 INJECTION, SOLUTION EPIDURAL; INFILTRATION; INTRACAUDAL; PERINEURAL ONCE
Status: DISCONTINUED | OUTPATIENT
Start: 2017-11-10 | End: 2017-11-10 | Stop reason: HOSPADM

## 2017-11-10 RX ORDER — SODIUM CHLORIDE 0.9 % (FLUSH) 0.9 %
3 SYRINGE (ML) INJECTION EVERY 8 HOURS
Status: DISCONTINUED | OUTPATIENT
Start: 2017-11-10 | End: 2017-11-10 | Stop reason: HOSPADM

## 2017-11-10 RX ORDER — ONDANSETRON 2 MG/ML
INJECTION INTRAMUSCULAR; INTRAVENOUS
Status: DISCONTINUED | OUTPATIENT
Start: 2017-11-10 | End: 2017-11-10

## 2017-11-10 RX ORDER — GLYCOPYRROLATE 0.2 MG/ML
INJECTION INTRAMUSCULAR; INTRAVENOUS
Status: DISCONTINUED | OUTPATIENT
Start: 2017-11-10 | End: 2017-11-10

## 2017-11-10 RX ORDER — PROPOFOL 10 MG/ML
VIAL (ML) INTRAVENOUS
Status: DISCONTINUED | OUTPATIENT
Start: 2017-11-10 | End: 2017-11-10

## 2017-11-10 RX ADMIN — KETAMINE HYDROCHLORIDE 25 MG: 50 INJECTION, SOLUTION, CONCENTRATE INTRAMUSCULAR; INTRAVENOUS at 07:11

## 2017-11-10 RX ADMIN — PROPOFOL 150 MCG/KG/MIN: 10 INJECTION, EMULSION INTRAVENOUS at 06:11

## 2017-11-10 RX ADMIN — GLYCOPYRROLATE 0.2 MG: 0.2 INJECTION, SOLUTION INTRAMUSCULAR; INTRAVENOUS at 07:11

## 2017-11-10 RX ADMIN — MIDAZOLAM 2 MG: 1 INJECTION INTRAMUSCULAR; INTRAVENOUS at 06:11

## 2017-11-10 RX ADMIN — LIDOCAINE HYDROCHLORIDE 80 MG: 20 INJECTION, SOLUTION INTRAVENOUS at 06:11

## 2017-11-10 RX ADMIN — PROPOFOL 40 MG: 10 INJECTION, EMULSION INTRAVENOUS at 07:11

## 2017-11-10 RX ADMIN — GENTAMICIN SULFATE 128 MG: 40 INJECTION, SOLUTION INTRAMUSCULAR; INTRAVENOUS at 06:11

## 2017-11-10 RX ADMIN — FENTANYL CITRATE 25 MCG: 50 INJECTION, SOLUTION INTRAMUSCULAR; INTRAVENOUS at 07:11

## 2017-11-10 RX ADMIN — ONDANSETRON 4 MG: 2 INJECTION, SOLUTION INTRAMUSCULAR; INTRAVENOUS at 07:11

## 2017-11-10 RX ADMIN — SODIUM CHLORIDE: 0.9 INJECTION, SOLUTION INTRAVENOUS at 06:11

## 2017-11-10 NOTE — PLAN OF CARE
..VSS  NAD  Discharge instructions given with claimed understanding by pt and family. Patient has ride home with family or friend. Claims pain level is ___0___ at this time.  Has voided without difficulty if required by surgical type.

## 2017-11-10 NOTE — ANESTHESIA POSTPROCEDURE EVALUATION
"Anesthesia Post Evaluation    Patient: Bharathi Garrido    Procedure(s) Performed: Procedure(s) (LRB):  BIOPSY-PROSTATE (Left)    Final Anesthesia Type: MAC  Patient location during evaluation: OPS  Patient participation: Yes- Able to Participate  Level of consciousness: awake and alert and oriented  Post-procedure vital signs: reviewed and stable  Pain management: adequate  Airway patency: patent  PONV status at discharge: No PONV  Anesthetic complications: no      Cardiovascular status: hemodynamically stable, blood pressure returned to baseline and stable  Respiratory status: spontaneous ventilation, unassisted and room air  Hydration status: euvolemic  Follow-up not needed.        Visit Vitals  /72 (BP Location: Right arm, Patient Position: Lying)   Pulse 60   Temp 36.9 °C (98.4 °F) (Skin)   Resp 18   Ht 5' 10" (1.778 m)   Wt 104.3 kg (230 lb)   SpO2 (!) 94%   BMI 33.00 kg/m²       Pain/Sarah Score: No Data Recorded      "

## 2017-11-10 NOTE — BRIEF OP NOTE
[facility]  Brief Operative Note    SUMMARY     Surgery Date: 11/10/2017     Surgeon(s) and Role:     * Melvin Gee MD - Primary    Assisting Surgeon: None    Pre-op Diagnosis:  Elevated PSA [R97.20]    Post-op Diagnosis:  Post-Op Diagnosis Codes:     * Elevated PSA [R97.20]    Procedure(s) (LRB):  BIOPSY-PROSTATE , ultrasound of prostate with ultrasound guideance of biopsy    Anesthesia: General/MAC    Description of Procedure: Ultrasound of prostate and ultrasound guided prostate biopsy    Description of the findings of the procedure: elevated PSA    Estimated Blood Loss: minimal       Specimens:   Specimen (12h ago through future)    Start     Ordered    11/10/17 0718  Specimen to Pathology - Surgery  Once     Comments:  Procedure: Prostate Biopsy1. Left lateral base2. Left lateral mid3. Left lateral apex4. Right lateral base5. Right lateral mid6. Right lateral apex7. Left middle base8 Left middle mid9. Left middle apex10. Right middle base11. Right middle mid12. Right middle apex      11/10/17 0770

## 2017-11-10 NOTE — TRANSFER OF CARE
"Anesthesia Transfer of Care Note    Patient: Bharathi Garrido    Procedure(s) Performed: Procedure(s) (LRB):  BIOPSY-PROSTATE (Left)    Patient location: OPS    Anesthesia Type: MAC    Transport from OR: Transported from OR on room air with adequate spontaneous ventilation    Post pain: adequate analgesia    Post assessment: no apparent anesthetic complications    Post vital signs: stable    Level of consciousness: awake, alert and oriented    Nausea/Vomiting: no nausea/vomiting    Complications: none    Transfer of care protocol was followed      Last vitals:   Visit Vitals  /72 (BP Location: Right arm, Patient Position: Lying)   Pulse 60   Temp 36.9 °C (98.4 °F) (Skin)   Resp 18   Ht 5' 10" (1.778 m)   Wt 104.3 kg (230 lb)   SpO2 (!) 94%   BMI 33.00 kg/m²     "

## 2017-11-10 NOTE — OP NOTE
[facility]  Operative Note    Date: 11/10/2017    Pre-Op Diagnosis: Elevated PSA     Post-Op Diagnosis: same    Procedure(s) Performed:   1.  Transrectal ultrasound  2.  Transrectal prostate biopsy    Specimen(s): 12 cores    Surgeon :  Melvin Gee MD    Anesthesia: Gen.    Indications: Bharathi Garrido is a 67 y.o. malewith elevated PSA presents now for prostate biopsy.  He started his antibiotics yesterday and took a Fleet's enema this morning and has had no aspirin Motrin or similar products in the past 10 days    Findings: Elevated PSA    Estimated Blood Loss: none    Drains: none    Procedure in detail:  After induction of general anesthesia, the patient is placed in the left lateral position.  A transrectal prostate ultrasound is performed and an image and a written report are made part of the permanent record.    Next under ultrasound control, bilateral biopsies are obtained.  These are sent for permanent section.    Impression.  Prostate biopsy.      Disposition and Plan.  The patient tolerated the procedure well and was transferred to the recovery room in stable condition.  Patient to finish current course of antibiotics and avoid aspirin, Motrin or similar products.  He has been instructed to avoid heavy lifting or strenuous activity today and 2 more days.  Patient to follow-up with me in approximately 2 weeks to review results of biopsy.    Melvin Gee MD

## 2017-11-10 NOTE — INTERVAL H&P NOTE
The patient has been examined and the H&P has been reviewed:    I concur with the findings and no changes have occurred since H&P was written.    Anesthesia/Surgery risks, benefits and alternative options discussed and understood by patient/family.          Active Hospital Problems    Diagnosis  POA    Elevated PSA [R97.20]  Yes      Resolved Hospital Problems    Diagnosis Date Resolved POA   No resolved problems to display.

## 2017-11-10 NOTE — DISCHARGE SUMMARY
[facility]  Discharge Note  Short Stay    Admit Date: 11/10/2017    Discharge Date and Time: No discharge date for patient encounter.     Attending Physician: Melvin Gee MD     Discharge Provider: Melvin Gee    Diagnoses:  Active Hospital Problems    Diagnosis  POA    *Elevated PSA [R97.20]  Yes      Resolved Hospital Problems    Diagnosis Date Resolved POA   No resolved problems to display.       Discharged Condition: good    Hospital Course: Patient was admitted for an outpatient procedure and tolerated the procedure well with no complications.    Final Diagnoses: Same as principal problem.    Disposition: Home or Self Care    Follow up/Patient Instructions:    Medications:  Reconciled Home Medications:   Current Discharge Medication List      START taking these medications    Details   ciprofloxacin HCl (CIPRO) 500 MG tablet Take 1 tablet (500 mg total) by mouth every 12 (twelve) hours.  Qty: 10 tablet, Refills: 0      !! oxyCODONE-acetaminophen (PERCOCET) 5-325 mg per tablet Take 1 tablet by mouth every 6 (six) hours as needed for Pain.  Qty: 10 tablet, Refills: 0       !! - Potential duplicate medications found. Please discuss with provider.      CONTINUE these medications which have NOT CHANGED    Details   brimonidine 0.2% (ALPHAGAN) 0.2 % Drop Place 1 drop into both eyes 2 (two) times daily.  Refills: 6      latanoprost 0.005 % ophthalmic solution INTALL 1 DROP TO BOTH EYES CONNOR NIGHT  Refills: 6      !! oxycodone-acetaminophen (PERCOCET) 5-325 mg per tablet Take 1 tablet by mouth every 6 (six) hours as needed for Pain. [id number]  Qty: 20 tablet, Refills: 0      timolol maleate 0.5% (TIMOPTIC) 0.5 % Drop Place 1 drop into both eyes 2 (two) times daily.  Refills: 7       !! - Potential duplicate medications found. Please discuss with provider.          Discharge Procedure Orders  Diet general     Activity as tolerated     No dressing needed       Follow-up Information     Follow  up In 2 weeks.           Melvin Gee MD In 2 weeks.    Specialty:  Urology  Contact information:  [address]  [address]  [place] [address] [phone]                   Discharge Procedure Orders (must include Diet, Follow-up, Activity):    Discharge Procedure Orders (must include Diet, Follow-up, Activity)  Diet general     Activity as tolerated     No dressing needed

## 2017-11-13 ENCOUNTER — TELEPHONE (OUTPATIENT)
Dept: UROLOGY | Facility: CLINIC | Age: 67
End: 2017-11-13

## 2017-11-13 NOTE — TELEPHONE ENCOUNTER
----- Message from Olivia Zarate sent at 11/13/2017  2:06 PM CST -----  Contact: [phone]  Patient is returning your call

## 2017-11-21 ENCOUNTER — TELEPHONE (OUTPATIENT)
Dept: UROLOGY | Facility: CLINIC | Age: 67
End: 2017-11-21

## 2017-11-21 NOTE — TELEPHONE ENCOUNTER
----- Message from Luann Mojica sent at 11/21/2017  1:02 PM CST -----  Contact: [phone] self  Patient called in returning your call. Please advise.

## 2017-12-08 ENCOUNTER — LAB VISIT (OUTPATIENT)
Dept: LAB | Facility: HOSPITAL | Age: 67
End: 2017-12-08
Attending: UROLOGY
Payer: COMMERCIAL

## 2017-12-08 ENCOUNTER — OFFICE VISIT (OUTPATIENT)
Dept: UROLOGY | Facility: CLINIC | Age: 67
End: 2017-12-08
Payer: COMMERCIAL

## 2017-12-08 VITALS — HEIGHT: 70 IN | DIASTOLIC BLOOD PRESSURE: 82 MMHG | SYSTOLIC BLOOD PRESSURE: 148 MMHG | HEART RATE: 70 BPM

## 2017-12-08 DIAGNOSIS — L08.9 INFECTED SEBACEOUS CYST OF SKIN: ICD-10-CM

## 2017-12-08 DIAGNOSIS — R93.89 NONSPECIFIC ABNORMAL FINDINGS ON DIAGNOSTIC IMAGING: ICD-10-CM

## 2017-12-08 DIAGNOSIS — C61 PROSTATE CANCER: ICD-10-CM

## 2017-12-08 DIAGNOSIS — R03.0 BLOOD PRESSURE ELEVATED WITHOUT HISTORY OF HTN: ICD-10-CM

## 2017-12-08 DIAGNOSIS — C61 PROSTATE CANCER: Primary | ICD-10-CM

## 2017-12-08 DIAGNOSIS — L72.3 INFECTED SEBACEOUS CYST OF SKIN: ICD-10-CM

## 2017-12-08 LAB
ANION GAP SERPL CALC-SCNC: 9 MMOL/L
BILIRUB SERPL-MCNC: NORMAL MG/DL
BLOOD URINE, POC: NORMAL
BUN SERPL-MCNC: 14 MG/DL
CALCIUM SERPL-MCNC: 9.3 MG/DL
CHLORIDE SERPL-SCNC: 106 MMOL/L
CO2 SERPL-SCNC: 26 MMOL/L
COLOR, POC UA: YELLOW
CREAT SERPL-MCNC: 1.1 MG/DL
EST. GFR  (AFRICAN AMERICAN): >60 ML/MIN/1.73 M^2
EST. GFR  (NON AFRICAN AMERICAN): >60 ML/MIN/1.73 M^2
GLUCOSE SERPL-MCNC: 100 MG/DL
GLUCOSE UR QL STRIP: NORMAL
KETONES UR QL STRIP: NORMAL
LEUKOCYTE ESTERASE URINE, POC: NORMAL
NITRITE, POC UA: NORMAL
PH, POC UA: 6
POTASSIUM SERPL-SCNC: 4.2 MMOL/L
PROTEIN, POC: NORMAL
SODIUM SERPL-SCNC: 141 MMOL/L
SPECIFIC GRAVITY, POC UA: 1.01
UROBILINOGEN, POC UA: NORMAL

## 2017-12-08 PROCEDURE — 99214 OFFICE O/P EST MOD 30 MIN: CPT | Mod: 25,S$GLB,, | Performed by: UROLOGY

## 2017-12-08 PROCEDURE — 80048 BASIC METABOLIC PNL TOTAL CA: CPT

## 2017-12-08 PROCEDURE — 81001 URINALYSIS AUTO W/SCOPE: CPT | Mod: S$GLB,,, | Performed by: UROLOGY

## 2017-12-08 PROCEDURE — 87086 URINE CULTURE/COLONY COUNT: CPT

## 2017-12-08 PROCEDURE — 36415 COLL VENOUS BLD VENIPUNCTURE: CPT

## 2017-12-08 PROCEDURE — 99999 PR PBB SHADOW E&M-EST. PATIENT-LVL IV: CPT | Mod: PBBFAC,,, | Performed by: UROLOGY

## 2017-12-08 NOTE — PROGRESS NOTES
"This patient was last seen by me several weeks ago at which time he underwent prostate biopsy under general anesthesia    He now comes in follow-up to review results which showed 2 cores being 4+3 positive for prostate cancer and 9 cores positive for 3+3 prostate cancer    Physical exam reveals reveals a well-developed well-nourished patient  in no acute distress.  Patient is alert and oriented ×3 with normal mood and affect.  Respiratory effort is normal and there is no peripheral edema.  Scrotal skin is just about back to normal.  No evidence of infection.  Scar present left upper scrotum      . BP (!) 148/82 (BP Location: Left arm, BP Method: Medium (Automatic))   Pulse 70   Ht 5' 10" (1.778 m)   Review of Systems  General ROS: negative for chills, fever or weight loss  Respiratory ROS: no cough, shortness of breath, or wheezing  Cardiovascular ROS: no chest pain or dyspnea on exertion  Musculoskeletal ROS: negative for gait disturbance or muscular weakness    Family History   Problem Relation Age of Onset    Prostate cancer Neg Hx     Kidney disease Neg Hx      Past Medical History:   Diagnosis Date    Elevated PSA 11/10/2017    Glaucoma     Sebaceous cyst of scrotum      Family History   Problem Relation Age of Onset    Prostate cancer Neg Hx     Kidney disease Neg Hx      Social History   Substance Use Topics    Smoking status: Former Smoker    Smokeless tobacco: Never Used    Alcohol use No       Impression:      ICD-10-CM ICD-9-CM    1. Prostate cancer C61 185 NM Bone Scan Whole Body      CT Pelvis W Wo  Contrast      Urine culture      POCT urinalysis, dipstick or tablet reag      Basic metabolic panel   2. Nonspecific abnormal findings on diagnostic imaging R93.8 793.99 US Scrotum And Testicles   3. Blood pressure elevated without history of HTN R03.0 796.2    4. Infected sebaceous cyst of skin L72.3 706.2     L08.9         Plan:    #1.Newly Diagnosed prostate cancer.  Plan.  We'll stage " patient with CT pelvis and total body bone scan.  I discussed in general terms options of management of organ confined prostate cancer, but will discuss further after CT and bone scan    #2.  Nonspecific finding on scrotal ultrasound at initial presentation with infected scrotal skin.  Plan.  We will scrotal skin being normal now, we will recheck scrotal ultrasound .    #3.     Elevated blood pressure.  Plan.  This finding pointed out to the patient.  I recommend that the patient follow up with the patient's PCP for this.    #4.  Infected scrotal skin.  Plan.  Status post excision ×2 and currently looks good    Today I spent 25 minutes with the patient, more than 50% of which was spent counseling and coordinating care concerning treatment of issues as detailed above     PLEASE NOTE:  Please be advised that portions of this note were dictated using voice recognition software and may contain dictation related errors in spelling/grammar/appropriate pronouns/syntax or other errors that might have not been found and or corrected on text review.

## 2017-12-09 LAB — BACTERIA UR CULT: NO GROWTH

## 2017-12-11 ENCOUNTER — TELEPHONE (OUTPATIENT)
Dept: UROLOGY | Facility: CLINIC | Age: 67
End: 2017-12-11

## 2017-12-11 NOTE — TELEPHONE ENCOUNTER
Spoke with wife about [facility] paperwork,that needed to be completed throught June 2018.

## 2017-12-11 NOTE — TELEPHONE ENCOUNTER
----- Message from Melvin Gee MD sent at 12/11/2017  8:39 AM CST -----  Please contact patient and let patient know that recent urine culture was negative.

## 2017-12-11 NOTE — TELEPHONE ENCOUNTER
----- Message from Sarai Guillen sent at 12/11/2017 11:43 AM CST -----  Contact: Wife [phone]  Patient requesting to speak with you regarding [place] paperwork for wife for the care she is giving her . Patient states that [facility] says to proceed with certifying through June 2018, and if a change needs to be made, they will change it later. Please call and advise.

## 2017-12-13 ENCOUNTER — TELEPHONE (OUTPATIENT)
Dept: UROLOGY | Facility: CLINIC | Age: 67
End: 2017-12-13

## 2017-12-13 NOTE — TELEPHONE ENCOUNTER
----- Message from Nancy Gómez sent at 12/13/2017 12:08 PM CST -----  Contact: [phone]/self  Patient requesting to speak with you regarding insurance. Please advise.

## 2017-12-14 ENCOUNTER — DOCUMENTATION ONLY (OUTPATIENT)
Dept: UROLOGY | Facility: CLINIC | Age: 67
End: 2017-12-14

## 2017-12-14 NOTE — PROGRESS NOTES
Per Yuly Silva, phone #[phone] patient's CT pelvis, CPT 43562, has been approved.  Approval number is a [id number].

## 2017-12-18 ENCOUNTER — HOSPITAL ENCOUNTER (OUTPATIENT)
Dept: RADIOLOGY | Facility: HOSPITAL | Age: 67
Discharge: HOME | End: 2017-12-18
Attending: UROLOGY
Payer: COMMERCIAL

## 2017-12-18 DIAGNOSIS — C61 PROSTATE CANCER: ICD-10-CM

## 2017-12-18 PROCEDURE — 78306 BONE IMAGING WHOLE BODY: CPT | Mod: 26,,, | Performed by: RADIOLOGY

## 2017-12-18 PROCEDURE — A9503 TC99M MEDRONATE: HCPCS

## 2017-12-21 ENCOUNTER — HOSPITAL ENCOUNTER (OUTPATIENT)
Dept: RADIOLOGY | Facility: HOSPITAL | Age: 67
Discharge: HOME | End: 2017-12-21
Attending: UROLOGY
Payer: COMMERCIAL

## 2017-12-21 DIAGNOSIS — R93.89 NONSPECIFIC ABNORMAL FINDINGS ON DIAGNOSTIC IMAGING: ICD-10-CM

## 2017-12-21 DIAGNOSIS — C61 PROSTATE CANCER: ICD-10-CM

## 2017-12-21 PROCEDURE — 76870 US EXAM SCROTUM: CPT | Mod: TC

## 2017-12-21 PROCEDURE — 72194 CT PELVIS W/O & W/DYE: CPT | Mod: 26,,, | Performed by: RADIOLOGY

## 2017-12-21 PROCEDURE — 76870 US EXAM SCROTUM: CPT | Mod: 26,,, | Performed by: RADIOLOGY

## 2017-12-21 PROCEDURE — 25500020 PHARM REV CODE 255: Performed by: UROLOGY

## 2017-12-21 PROCEDURE — 72194 CT PELVIS W/O & W/DYE: CPT | Mod: TC

## 2017-12-21 RX ADMIN — IOHEXOL 100 ML: 350 INJECTION, SOLUTION INTRAVENOUS at 12:12

## 2017-12-28 ENCOUNTER — OFFICE VISIT (OUTPATIENT)
Dept: UROLOGY | Facility: CLINIC | Age: 67
End: 2017-12-28
Payer: COMMERCIAL

## 2017-12-28 VITALS
WEIGHT: 230 LBS | HEART RATE: 76 BPM | BODY MASS INDEX: 32.93 KG/M2 | DIASTOLIC BLOOD PRESSURE: 70 MMHG | HEIGHT: 70 IN | SYSTOLIC BLOOD PRESSURE: 121 MMHG

## 2017-12-28 DIAGNOSIS — R93.89 NONSPECIFIC ABNORMAL FINDINGS ON DIAGNOSTIC IMAGING: ICD-10-CM

## 2017-12-28 DIAGNOSIS — C61 PROSTATE CANCER: Primary | ICD-10-CM

## 2017-12-28 PROCEDURE — 99214 OFFICE O/P EST MOD 30 MIN: CPT | Mod: S$GLB,,, | Performed by: UROLOGY

## 2017-12-28 PROCEDURE — 99999 PR PBB SHADOW E&M-EST. PATIENT-LVL IV: CPT | Mod: PBBFAC,,, | Performed by: UROLOGY

## 2017-12-28 PROCEDURE — 81001 URINALYSIS AUTO W/SCOPE: CPT | Mod: S$GLB,,, | Performed by: UROLOGY

## 2017-12-28 NOTE — PROGRESS NOTES
"This patient was last seen by me earlier this month in follow-up for elevated PSA status post prostate biopsy which came back positive for cancer.  Patient now comes in follow-up to review results of staging tests.  CT pelvis and total body bone scan are negative for metastatic disease    Patient also has a history of indeterminate scrotal ultrasound at the time of infected scrotal skin.  Follow-up ultrasound is unchanged and most likely consistent with benign disease.  Repeat ultrasound recommended in 6 months    Physical exam reveals reveals a well-developed well-nourished patient  in no acute distress.  Patient is alert and oriented ×3 with normal mood and affect.  Respiratory effort is normal and there is no peripheral edema.  Skin is normal to inspection and palpation    /70   Pulse 76   Ht 5' 10" (1.778 m)   Wt 104.3 kg (230 lb)   BMI 33.00 kg/m²   Review of Systems  General ROS: negative for chills, fever or weight loss  Respiratory ROS: no cough, shortness of breath, or wheezing  Cardiovascular ROS: no chest pain or dyspnea on exertion  Musculoskeletal ROS: negative for gait disturbance or muscular weakness    Family History   Problem Relation Age of Onset    Prostate cancer Neg Hx     Kidney disease Neg Hx      Past Medical History:   Diagnosis Date    Elevated PSA 11/10/2017    Glaucoma     Sebaceous cyst of scrotum      Family History   Problem Relation Age of Onset    Prostate cancer Neg Hx     Kidney disease Neg Hx      Social History   Substance Use Topics    Smoking status: Former Smoker    Smokeless tobacco: Never Used    Alcohol use No       Impression:      ICD-10-CM ICD-9-CM    1. Prostate cancer C61 185 Ambulatory consult to Radiation Oncology      Ambulatory consult to Urology      POCT urinalysis, dipstick or tablet reag   2. Nonspecific abnormal findings on diagnostic imaging R93.8 793.99        Plan:    #1 1.  Prostate cancer.  Plan. Newly diagnosed prostate cancer.  Plan. "  I discussed at length in detail with the patient various options of management of prostate cancer.  I discussed risks and benefits of radical surgery versus radiation therapy.  Various modes of radiation therapy discussed.  I discussed other options of management including observation only, primary hormonal manipulation, and other less common modes of therapy including cryosurgery and ultrasound therapy.  All the patient's questions were answered.  Patient voiced understanding.  Even patient's overall good health status, and history of longevity of his male relatives,  My  recommendation would be to proceed with radical prostatectomy.  Patient wife voice understanding and wants to think about it.  Referral made to Dr. Pederson for further discussion concerning radical prostatectomy, as she would be the operating surgeon if they decide to proceed with it.  Referral also made to radiation oncology, as patient and his wife want to discuss external beam radiation therapy with them.    If patient chooses to proceed with radical prostatectomy, then further follow-up will be with Dr. Pederson.,  If patient chooses external beam radiation therapy, then patient should follow up with me in 6 months    #2.  Nonspecific finding on scrotal ultrasound.  Plan.  Patient needs repeat scrotal ultrasound 6 months    Today I spent 25 minutes with the patient, more than 50% of which was spent counseling and coordinating care concerning treatment of issues as detailed above    PLEASE NOTE:  Please be advised that portions of this note were dictated using voice recognition software and may contain dictation related errors in spelling/grammar/appropriate pronouns/syntax or other errors that might have not been found and or corrected on text review.

## 2018-01-04 ENCOUNTER — OFFICE VISIT (OUTPATIENT)
Dept: UROLOGY | Facility: CLINIC | Age: 68
End: 2018-01-04
Payer: COMMERCIAL

## 2018-01-04 VITALS
SYSTOLIC BLOOD PRESSURE: 125 MMHG | HEIGHT: 70 IN | BODY MASS INDEX: 32.93 KG/M2 | DIASTOLIC BLOOD PRESSURE: 79 MMHG | HEART RATE: 76 BPM | WEIGHT: 230 LBS

## 2018-01-04 DIAGNOSIS — C61 PROSTATE CANCER: Primary | ICD-10-CM

## 2018-01-04 DIAGNOSIS — R97.20 ELEVATED PSA: ICD-10-CM

## 2018-01-04 PROCEDURE — 99213 OFFICE O/P EST LOW 20 MIN: CPT | Mod: S$GLB,,, | Performed by: STUDENT IN AN ORGANIZED HEALTH CARE EDUCATION/TRAINING PROGRAM

## 2018-01-04 PROCEDURE — 99999 PR PBB SHADOW E&M-EST. PATIENT-LVL II: CPT | Mod: PBBFAC,,, | Performed by: STUDENT IN AN ORGANIZED HEALTH CARE EDUCATION/TRAINING PROGRAM

## 2018-01-04 NOTE — LETTER
January 4, 2018      Melvin Gee MD  [address]  [address]  [place] [address]           [place] [facility]  [address] [address]  Phone: [phone]          Patient: Bharathi Garrido   MR Number: [id number]   YOB: 1950   Date of Visit: 1/4/2018       Dear Dr. Melvin Gee:    Thank you for referring Bharathi Garrido to me for evaluation. Attached you will find relevant portions of my assessment and plan of care.    If you have questions, please do not hesitate to call me. I look forward to following Bharathi Garrido along with you.    Sincerely,    Marta Pederson MD    Enclosure  CC:  No Recipients    If you would like to receive this communication electronically, please contact [email or web address] or [phone] to request more information on [employer] access.    For providers and/or their staff who would like to refer a patient to [facility], please contact us through our one-stop-shop provider referral line, [facility], at [phone].    If you feel you have received this communication in error or would no longer like to receive these types of communications, please e-mail [email or web address]

## 2018-01-04 NOTE — PROGRESS NOTES
Subjective:       Patient ID: Bharathi Garrido is a 67 y.o. male.    Chief Complaint: prostate cancer  This is a 67 y.o.  male patient that is new to me but not new to the system..  he is referred to me by Dr. Gee for prostate cancer evaluation.    He was originally being treated for a scrotal infection which is status post debridement in the operating room by Dr. Gee.  Follow-up ultrasound is unchanged and most likely consistent with benign disease.  Repeat ultrasound recommended in 6 months.  No abdominal surgery. He is not on blood thinning medications.     For his PSA elevation up to 21, he underwent a TRUS biopsy which demonstrated 63.4g volume, Pathology:   1. Left lateral base:  -Prostatic adenocarcinoma, Lisa score: 4+3 = 7  -Number of cores positive: 1  -Total number of cores: 1  -Total linear millimeters of carcinoma: 4-mm  -Total linear millimeters: 20-mm  2. Left lateral mid:  -Prostatic adenocarcinoma, Canjilon score: 3+3 = 6  -Number of cores positive: 1  -Total number of cores: 1  -Total linear millimeters of carcinoma: 6-mm  -Total linear millimeters: 20-mm  3. Left lateral apex:  -Prostatic adenocarcinoma, Lisa score: 3+3 = 6  -Number of cores positive: 1  -Total number of cores: 1  -Total linear millimeters of carcinoma: 6-mm  -Total linear millimeters: 16-mm  4. Right lateral base:  -Prostatic adenocarcinoma, Canjilon score: 3+3 = 6  -Number of cores positive: 1  -Total number of cores: 1  -Total linear millimeters of carcinoma: ~3.5-mm  -Total linear millimeters: 8-mm  5. Right lateral mid:  -Prostatic adenocarcinoma, Lisa score: 3+3 = 6  -Number of cores positive: 1  -Total number of cores: 1  -Total linear millimeters of carcinoma: 5-mm  -Total linear millimeters: 11-mm  6. Right lateral apex:  -Prostatic adenocarcinoma, Lisa score: 3+3 = 6  -Number of cores positive: 1  -Total number of cores: 1  -Total linear millimeters of carcinoma: 7-mm  -Total linear millimeters: 17-mm    7.  Left middle base:  -Microscopic focus of prostatic adenocarcinoma  -Number of cores positive: 1  -Total number of cores: 1  -Total linear millimeters of carcinoma: ~2-mm  -Total linear millimeters: 14-mm  8. Left middle mid:  -Benign prostate tissue  -No evidence of malignancy  9. Left middle apex:  -Prostatic adenocarcinoma, Lisa score: 3+3 = 6  -Number of cores positive: 1  -Total number of cores: 1  -Total linear millimeters of carcinoma: 10-mm  -Total linear millimeters: 23-mm  -Perineural invasion identified    10. Right middle base:  -Prostatic adenocarcinoma, Lisa score: 3+4 = 7  -Number of cores positive: 1  -Total number of cores: 1  -Total linear millimeters of carcinoma: 7-mm  -Total linear millimeters: 16-mm  -Perineural invasion identified  11. Right middle mid:  -Prostatic adenocarcinoma, Lisa score: 3+3 = 6  -Number of cores positive: 1  -Total number of cores: 2 fragments  -Total linear millimeters of carcinoma: 7-mm  -Total linear millimeters: 19-mm  -Perineural invasion identified  12. Right middle apex:  -Prostatic adenocarcinoma, Charleston score: 3+3 =6  -Number of cores positive: 1  -Total number of cores: 1  -Total linear millimeters of carcinoma: 6-mm  -Total linear millimeters: 15-mm    LAST PSA  Lab Results   Component Value Date    PSA 21.8 (H) 03/30/2017    PSADIAG 21.1 (H) 06/15/2017       Lab Results   Component Value Date    CREATININE 1.1 12/08/2017       I personally reviewed the images:   CT 12/21/17 - neg for mets  Prostamegaly.  No evidence of lymphadenopathy.    Bone scan 12/18/17 - neg for mets   No strong indication for metastatic disease from known prostate carcinoma.      ---  Past Medical History:   Diagnosis Date    Elevated PSA 11/10/2017    Glaucoma     Sebaceous cyst of scrotum        Past Surgical History:   Procedure Laterality Date    INCISION AND DRAINAGE OF WOUND Left 2011, 2017    scrotum; multiple I&D       Family History   Problem Relation Age of Onset     Prostate cancer Neg Hx     Kidney disease Neg Hx        Social History   Substance Use Topics    Smoking status: Former Smoker    Smokeless tobacco: Never Used    Alcohol use No       Current Outpatient Prescriptions on File Prior to Visit   Medication Sig Dispense Refill    brimonidine 0.2% (ALPHAGAN) 0.2 % Drop Place 1 drop into both eyes 2 (two) times daily.  6    latanoprost 0.005 % ophthalmic solution INTALL 1 DROP TO BOTH EYES CONNOR NIGHT  6    timolol maleate 0.5% (TIMOPTIC) 0.5 % Drop Place 1 drop into both eyes 2 (two) times daily.  7     No current facility-administered medications on file prior to visit.        Review of patient's allergies indicates:  No Known Allergies    Review of Systems   Constitutional: Negative for chills.   HENT: Negative for congestion.    Eyes: Negative for visual disturbance.   Respiratory: Negative for shortness of breath.    Cardiovascular: Negative for chest pain.   Gastrointestinal: Negative for abdominal distention.   Musculoskeletal: Negative for gait problem.   Skin: Negative for color change.   Neurological: Negative for dizziness.   Psychiatric/Behavioral: Negative for agitation.       Objective:      Physical Exam   Constitutional: He appears well-developed and well-nourished.   HENT:   Head: Normocephalic.   Eyes: Pupils are equal, round, and reactive to light.   Neck: Normal range of motion.   Cardiovascular: Intact distal pulses.    Pulmonary/Chest: Effort normal.   Abdominal: Soft. He exhibits no distension. There is no tenderness.   Genitourinary:   Genitourinary Comments: YAYA >50g large gland   Musculoskeletal: Normal range of motion.   Neurological: He is alert.   Skin: Skin is warm and dry.   Psychiatric: He has a normal mood and affect.       Assessment:       1. Prostate cancer    2. Elevated PSA        Plan:       1. Today's visit was spent almost entirely on prostate cancer counseling. 30 minutes of counseling time was spent.  We reviewed his  diagnosis, stage, grade, and prognosis.    We discussed the concept of low risk, moderate risk, and high risk disease. His PSA places him at a high risk category his clinical stage is [id number] PSA 21 = because of his PSA being so elevated his staging is IIB.     We discussed the different treatment options including radiation therapy and robotic prostatectomy.  We also discussed the advantages, disadvantages, risks and benefits of the different options. Regarding radiation therapy we discussed treatment planning, the different techniques, short and long term complications. These included radiation cystitis, radiation proctitis, and impotence. We discussed success, failure, and salvage therapeutic options. Most of his radiation discussion was carried out with Dr. Gee, they note they are here to discuss the surgical option with me.    We discussed surgical therapy in depth including preoperative preparation, surgical technique (including bladder neck and nerve-sparing techniques), postoperative recuperation and recovery, and short and long term complications. We discussed the risks of pain, infection, bleeding, scaring, damage to internal hollow and solid organs, damage to blood vessels and nerves, urinary incontinence and erectile dysfunction. We discussed the possibility of rectal injury, obturator nerve injury, and occult injury to the bowel during Veress needle access.  We discussed the possibility of an ileus or a small bowel obstruction postoperatively necessitating additional procedures/surgeries.     We discussed preop and postop Kegels, and treatment options for incontinence and impotence. We discussed success, failure and salvage therapeutic options. We discussed the possible indications for adjuvant radiation therapy.   He has a good performance status ECOG 0.    2. He and his wife are electing to proceed with robotic laparoscopic assisted radical prostatectomy, bilateral pelvic lymph node dissection,  possible bilateral nerve sparing  3. He will have a preop anesthesia visit. At his h&p clinic visit, please order cbc, cmp, pt/inr, psa (so we have another psa closer to the day of the surgery), testosterone, urinalysis, urine culture, chest xray, ekg, type and screen.   4. Will sign surgical and blood consents at preop visit. Case request sheet faxed to [facility].    Prostate cancer    Elevated PSA

## 2018-01-08 LAB
BILIRUB SERPL-MCNC: NORMAL MG/DL
BLOOD URINE, POC: NORMAL
COLOR, POC UA: YELLOW
GLUCOSE UR QL STRIP: NORMAL
KETONES UR QL STRIP: NORMAL
LEUKOCYTE ESTERASE URINE, POC: NORMAL
NITRITE, POC UA: NORMAL
PH, POC UA: 5
PROTEIN, POC: NORMAL
SPECIFIC GRAVITY, POC UA: 1.02
UROBILINOGEN, POC UA: NORMAL

## 2018-01-09 ENCOUNTER — TELEPHONE (OUTPATIENT)
Dept: UROLOGY | Facility: CLINIC | Age: 68
End: 2018-01-09

## 2018-01-09 NOTE — TELEPHONE ENCOUNTER
----- Message from Raven Gaspar sent at 1/9/2018  2:45 PM CST -----  No. [phone]     Patient is ready to schedule his surgery.   Please call.

## 2018-01-10 ENCOUNTER — TELEPHONE (OUTPATIENT)
Dept: UROLOGY | Facility: CLINIC | Age: 68
End: 2018-01-10

## 2018-01-10 DIAGNOSIS — C61 PROSTATE CANCER: Primary | ICD-10-CM

## 2018-01-18 ENCOUNTER — PATIENT MESSAGE (OUTPATIENT)
Dept: UROLOGY | Facility: CLINIC | Age: 68
End: 2018-01-18

## 2018-01-19 ENCOUNTER — TELEPHONE (OUTPATIENT)
Dept: UROLOGY | Facility: CLINIC | Age: 68
End: 2018-01-19

## 2018-01-19 ENCOUNTER — ANESTHESIA EVENT (OUTPATIENT)
Dept: SURGERY | Facility: HOSPITAL | Age: 68
DRG: 708 | End: 2018-01-19
Payer: COMMERCIAL

## 2018-01-19 DIAGNOSIS — Z01.818 PREOP TESTING: Primary | ICD-10-CM

## 2018-01-19 DIAGNOSIS — C61 PROSTATE CANCER: Primary | ICD-10-CM

## 2018-01-19 NOTE — PRE ADMISSION SCREENING
Anesthesia Assessment: Preoperative EQUATION    Planned Procedure: Procedure(s) (LRB):  ROBOTIC ASSISTED LAPAROSCOPIC PROSTATECTOMY (N/A)  DISSECTION-LYMPH NODE-PELVIC/ poss bilateral nerve sparing (Bilateral)  Requested Anesthesia Type:General  Surgeon: Marta Pederson MD  Service: Urology  Known or anticipated Date of Surgery:2/8/2018    Surgeon notes: reviewed    Electronic QUestionnaire Assessment completed via nurse interview with patient.        No Aq        Triage considerations:     The patient has no apparent active cardiac condition (No unstable coronary Syndrome such as severe unstable angina or recent [<1 month] myocardial infarction, decompensated CHF, severe valvular   disease or significant arrhythmia)    Previous anesthesia records:[facility]  11/10/17  Trus / biopsy    Last PCP note: states Dr. Silva is PCP but chart states he is a urologist     Subspecialty notes: urology    Other important co-morbidities:     Hx scrotal abcess  Glaucoma  Elevated PSA     Tests already available:  Available tests,  within 3 months , [facility] .     Normal sinus rhythm  Nonspecific ST abnormality  Abnormal ECG  When compared with ECG of 18-AUG-2017 11:56,  No significant change was found  Confirmed by Luiza Albrecht MD ([id number]) on 11/7/2017 6:38:54 PM    Referred By: BRIAN SILVA           Confirmed By:Luiza Albrecht MD            Instructions given. (See in Nurse's note)    Optimization:  Anesthesia Preop Clinic Assessment  Indicated    Medical Opinion Indicated      Plan:    Testing:  T&S  // Has had ekg in november   Pre-anesthesia  visit       Visit focus: concerns in complex and/or prolonged anesthesia      Consultation:IM Perioperative Hospitalist     Navigation: Tests Scheduled.              Consults scheduled.             Results will be tracked by Preop Clinic.

## 2018-01-19 NOTE — PRE-PROCEDURE INSTRUCTIONS
Discussed appointments with wife will try to get everything done 2/1. Pt lives in [place] and wife works.

## 2018-01-19 NOTE — PROGRESS NOTES
[facility] paperwork for patient's wife filled out today, faxed, and scanned into media.

## 2018-01-19 NOTE — ANESTHESIA PREPROCEDURE EVALUATION
[facility]  Anesthesia Pre-Operative Evaluation         Patient Name: Bharathi Garrido  YOB: 1950  MRN: [id number]    SUBJECTIVE:     Pre-operative evaluation for Procedure(s) (LRB):  ROBOTIC ASSISTED LAPAROSCOPIC PROSTATECTOMY (N/A)  DISSECTION-LYMPH NODE-PELVIC/ poss bilateral nerve sparing (Bilateral)     02/07/2018    Bharathi Garrido is a 68 y.o. male w/ a significant PMHx of glaucoma and adenocarcinoma of the prostate cancer ([id number] Lisa 3+4=7) who presents for the above procedure(s).    LDA: Peripheral IV.    Prev airway:     Present Prior to Hospital Arrival?: No; Placement Date: 09/05/17; Placement Time: 0947; Method of Intubation: Direct laryngoscopy; Inserted by: CRNA; Airway Device: Endotracheal Tube; Mask Ventilation: Easy - oral; Intubated: Postinduction; Blade: Prateek #4; Airway Device Size: 7.5; Style: Cuffed; Cuff Inflation: Minimal occlusive pressure; Inflation Amount: 5; Placement Verified By: Auscultation, Capnometry, ETT Condensation; Grade: Grade II; Complicating Factors: None; Intubation Findings: Positive EtCO2, Bilateral breath sounds, Atraumatic/Condition of teeth unchanged;  Depth of Insertion: 23; Securment: Lips; Complications: None; Breath Sounds: Equal Bilateral; Insertion Attempts: 1; Removal Date: 09/05/17;  Removal Time: 1047    Drips: Normal Saline.       Patient Active Problem List   Diagnosis    Glaucoma    Elevated PSA    Prostate cancer    Class 1 obesity with body mass index (BMI) of 33.0 to 33.9 in adult    Low grade fever       Review of patient's allergies indicates:  No Known Allergies    Current Inpatient Medications:      No current facility-administered medications on file prior to encounter.      Current Outpatient Prescriptions on File Prior to Encounter   Medication Sig Dispense Refill    brimonidine 0.2% (ALPHAGAN) 0.2 % Drop Place 1 drop into both eyes 2 (two) times daily.  6    latanoprost 0.005 % ophthalmic solution INTALL 1  DROP TO BOTH EYES CONNOR NIGHT  6    timolol maleate 0.5% (TIMOPTIC) 0.5 % Drop Place 1 drop into both eyes 2 (two) times daily.  7       Past Surgical History:   Procedure Laterality Date    INCISION AND DRAINAGE OF WOUND Left ,     scrotum; multiple I&D       Social History     Social History    Marital status:      Spouse name: N/A    Number of children: N/A    Years of education: N/A     Occupational History    Not on file.     Social History Main Topics    Smoking status: Former Smoker     Quit date:     Smokeless tobacco: Never Used    Alcohol use No      Comment: rarely     Drug use: No    Sexual activity: Yes     Partners: Female     Other Topics Concern    Not on file     Social History Narrative    No narrative on file       OBJECTIVE:     Vital Signs Range (Last 24H):         CBC:   No results for input(s): WBC, RBC, HGB, HCT, PLT, MCV, MCH, MCHC in the last 72 hours.    CMP: No results for input(s): NA, K, CL, CO2, BUN, CREATININE, GLU, MG, PHOS, CALCIUM, ALBUMIN, PROT, ALKPHOS, ALT, AST, BILITOT in the last 72 hours.    INR:  No results for input(s): PT, INR, PROTIME, APTT in the last 72 hours.    Diagnostic Studies:     EK/7/17    Test Reason : R97.20  Vent. Rate : 063 BPM     Atrial Rate : 063 BPM     P-R Int : 142 ms          QRS Dur : 102 ms      QT Int : 398 ms       P-R-T Axes : 073 089 -03 degrees     QTc Int : 407 ms    Normal sinus rhythm  Nonspecific ST abnormality  Abnormal ECG  When compared with ECG of 18-AUG-2017 11:56,  No significant change was found  Confirmed by Luiza Albrecht MD ([id number]) on 2017 6:38:54 PM      2D ECHO:  No results found for this or any previous visit.      ASSESSMENT/PLAN:         Anesthesia Evaluation    I have reviewed the Patient Summary Reports.    I have reviewed the Nursing Notes.   I have reviewed the Medications.     Review of Systems  Anesthesia Hx:  No problems with previous Anesthesia  History of prior surgery of  interest to airway management or planning: Previous anesthesia: MAC  prostate bx 11/2017 with MAC.  Procedure performed at an [facility]. Denies Family Hx of Anesthesia complications.   Denies Personal Hx of Anesthesia complications.   Social:  Former Smoker, Social Alcohol Use    Hematology/Oncology:  Hematology Normal      Current/Recent Cancer. (prostate )   EENT/Dental:   Glaucoma; states no peripheral vision   Cardiovascular:  Cardiovascular Normal  Ride bike, cut grass, climb 1 FOS; denies CP, SOB Functional Capacity good / => 4 METS    Pulmonary:  Pulmonary Normal    Renal/:  Renal/ Normal  Elevated PSA; prostate cancer   Hepatic/GI:  Hepatic/GI Normal    Musculoskeletal:  Musculoskeletal Normal    Neurological:  Neurology Normal  Denies Pain    Endocrine:  Endocrine Normal    Dermatological:  Skin Normal    Psych:  Psychiatric Normal           Physical Exam  General:  Obesity    Airway/Jaw/Neck:  Airway Findings: Mouth Opening: Normal Tongue: Normal  General Airway Assessment: Adult  Mallampati: III  TM Distance: Normal, at least 6 cm  Jaw/Neck Findings:     Neck ROM: Normal ROM  Neck Findings:  Girth Increased     Eyes/Ears/Nose:  EYES/EARS/NOSE FINDINGS: Normal   Dental:  Dental Findings: Upper Dentures    Chest/Lungs:  Chest/Lungs Findings: Clear to auscultation, Normal Respiratory Rate     Heart/Vascular:  Heart Findings: Rate: Normal  Rhythm: Regular Rhythm  Sounds: Normal  Heart murmur: negative Vascular Findings: Normal    Abdomen:  Abdomen Findings: Normal    Musculoskeletal:  Musculoskeletal Findings: Normal   Skin:  Skin Findings: Normal    Mental Status:  Mental Status Findings:  Cooperative, Alert and Oriented           Anesthesia Plan  Type of Anesthesia, risks & benefits discussed:  Anesthesia Type:  general  Patient's Preference:   Intra-op Monitoring Plan: standard ASA monitors  Intra-op Monitoring Plan Comments:   Post Op Pain Control Plan: multimodal analgesia and per primary  service following discharge from PACU  Post Op Pain Control Plan Comments:   Induction:   IV  Beta Blocker:  Patient is on a Beta-Blocker and has received one dose within the past 24 hours (No further documentation required).       Informed Consent: Patient understands risks and agrees with Anesthesia plan.  Questions answered. Anesthesia consent signed with patient.  ASA Score: 2     Day of Surgery Review of History & Physical:    H&P update referred to the surgeon.         Ready For Surgery From Anesthesia Perspective.

## 2018-02-01 ENCOUNTER — INITIAL CONSULT (OUTPATIENT)
Dept: INTERNAL MEDICINE | Facility: CLINIC | Age: 68
End: 2018-02-01
Payer: COMMERCIAL

## 2018-02-01 ENCOUNTER — HOSPITAL ENCOUNTER (OUTPATIENT)
Dept: PREADMISSION TESTING | Facility: HOSPITAL | Age: 68
Discharge: HOME | End: 2018-02-01
Attending: ANESTHESIOLOGY
Payer: COMMERCIAL

## 2018-02-01 ENCOUNTER — OFFICE VISIT (OUTPATIENT)
Dept: UROLOGY | Facility: CLINIC | Age: 68
End: 2018-02-01
Payer: COMMERCIAL

## 2018-02-01 VITALS
HEIGHT: 69 IN | TEMPERATURE: 99 F | DIASTOLIC BLOOD PRESSURE: 79 MMHG | BODY MASS INDEX: 37.37 KG/M2 | HEART RATE: 66 BPM | SYSTOLIC BLOOD PRESSURE: 129 MMHG | WEIGHT: 252.31 LBS | OXYGEN SATURATION: 95 % | RESPIRATION RATE: 18 BRPM

## 2018-02-01 DIAGNOSIS — R50.9 LOW GRADE FEVER: ICD-10-CM

## 2018-02-01 DIAGNOSIS — C61 PROSTATE CANCER: ICD-10-CM

## 2018-02-01 DIAGNOSIS — H40.10X0 OPEN-ANGLE GLAUCOMA OF BOTH EYES, UNSPECIFIED GLAUCOMA STAGE, UNSPECIFIED OPEN-ANGLE GLAUCOMA TYPE: ICD-10-CM

## 2018-02-01 DIAGNOSIS — E66.9 CLASS 1 OBESITY WITH BODY MASS INDEX (BMI) OF 33.0 TO 33.9 IN ADULT, UNSPECIFIED OBESITY TYPE, UNSPECIFIED WHETHER SERIOUS COMORBIDITY PRESENT: ICD-10-CM

## 2018-02-01 PROBLEM — N49.2 SCROTAL ABSCESS: Status: RESOLVED | Noted: 2017-09-05 | Resolved: 2018-02-01

## 2018-02-01 PROBLEM — N49.2 SCROTAL INFECTION: Status: RESOLVED | Noted: 2017-05-09 | Resolved: 2018-02-01

## 2018-02-01 PROCEDURE — 3008F BODY MASS INDEX DOCD: CPT | Mod: S$GLB,,, | Performed by: HOSPITALIST

## 2018-02-01 PROCEDURE — 99499 UNLISTED E&M SERVICE: CPT | Mod: S$GLB,,, | Performed by: STUDENT IN AN ORGANIZED HEALTH CARE EDUCATION/TRAINING PROGRAM

## 2018-02-01 PROCEDURE — 99999 PR PBB SHADOW E&M-EST. PATIENT-LVL III: CPT | Mod: PBBFAC,,, | Performed by: HOSPITALIST

## 2018-02-01 PROCEDURE — 99204 OFFICE O/P NEW MOD 45 MIN: CPT | Mod: S$GLB,,, | Performed by: HOSPITALIST

## 2018-02-01 RX ORDER — ACETAMINOPHEN 10 MG/ML
1000 INJECTION, SOLUTION INTRAVENOUS EVERY 8 HOURS
Status: CANCELLED | OUTPATIENT
Start: 2018-02-01 | End: 2018-02-02

## 2018-02-01 RX ORDER — SODIUM CHLORIDE 9 MG/ML
INJECTION, SOLUTION INTRAVENOUS CONTINUOUS
Status: CANCELLED | OUTPATIENT
Start: 2018-02-01

## 2018-02-01 NOTE — LETTER
February 1, 2018      Marta Pederson MD  [address]  [address]  [facility] [address]           [address] - Pre Op Consult  [address] [facility] [address]  Phone: [phone]          Patient: Bharathi Garrido   MR Number: [id number]   YOB: 1950   Date of Visit: 2/1/2018       Dear Dr. Marta Pederson:    Thank you for referring Bharathi Garrido to me for evaluation. Attached you will find relevant portions of my assessment and plan of care.    If you have questions, please do not hesitate to call me. I look forward to following Bharathi Garrido along with you.    Sincerely,    Yary Monk MD    Enclosure  CC:  Melvin Gee MD    If you would like to receive this communication electronically, please contact [email or web address] or [phone] to request more information on [employer] Link access.    For providers and/or their staff who would like to refer a patient to [facility], please contact us through our one-stop-shop provider referral line, [facility], at [phone].    If you feel you have received this communication in error or would no longer like to receive these types of communications, please e-mail [email or web address]

## 2018-02-01 NOTE — OUTPATIENT SUBJECTIVE & OBJECTIVE
Outpatient Subjective & Objective     Chief complaint-Preoperative evaluation, Perioperative Medical management, complication reduction plan     Active cardiac conditions- none    Revised cardiac risk index predictors- none    Functional capacity -Examples of physical activity, rides a bicycle in the neighborhood , goes to grocery , does not drive, washing the car   can take 1 flight of stairs, cutting the grass with a mower, raking lawn, painting and planting shrubs----- He can undertake all the above activities without  chest pain,chest tightness, Shortness of breath ,dizziness,lightheadedness making his exercise tolerance more  than 4 Mets.       Review of Systems   Constitutional: Negative for chills and fever.        No unusual weight changes   HENT:        STOPBANG    Snoring    Age over 50 years  Neck size over 40 CM  Male gender    Suggested follow up    Eyes:        Cannot drive due to peripheral vision being effected by Glaucoma  No unusual vision changes   Respiratory:        No Cough ,Phlegm   No hemoptysis    Cardiovascular:        As noted   Gastrointestinal:        Bowels- Regular   No overt GI/ blood losses   Endocrine:        Recent steroid use- none   Genitourinary: Negative for dysuria.        Night time urinary frequency   Drink cranberry juice at night    Musculoskeletal:          No unusual muscle/ joint pains   Skin: Negative for rash.   Neurological: Negative for syncope.        No unilateral weakness   Hematological:        Current use of Anticoagulants/ Antiplatelet agents    None   Psychiatric/Behavioral:        Depression  Anxiety    None     No vascular stenting       Past Surgical History:   Procedure Laterality Date    INCISION AND DRAINAGE OF WOUND Left 2011, 2017    scrotum; multiple I&D       No anesthesia, bleeding, cardiac, PONV problems with previous surgeries/procedures.  Medications and Allergies reviewed in epic.   FH- No anesthesia, thrombosis / bleeding , early onset  heart disease in the immediate family   Lives with family - help available post op    Physical Exam   HENT:   Head: Normocephalic.       Physical Exam   HENT:   Head: Normocephalic.     Constitutional- Vitals - Body mass index is 37.26 kg/m².,   Vitals:    02/01/18 1100   BP: 129/79   Pulse: 66   Resp: 18   Temp: 99.2 °F (37.3 °C)     General appearance-Conscious,Coherent  Eyes- No conjunctival icterus,pupils  round  and reactive to light   ENT-Oral cavity- moist  and  upper denture , Hearing grossly normal   Neck- No thyromegaly ,Trachea -central, No jugular venous distension,   No Carotid Bruit   Cardiovascular -Heart Sounds- Normal  and  no murmur   , No gallop rhythm   Respiratory - Normal Respiratory Effort, Normal breath sounds and  no wheeze    Peripheral pitting pedal edema-- none , no calf pain   Gastrointestinal -Soft abdomen, No palpable masses, Non Tender,Liver,Spleen not palpable. No-- free fluid and shifting dullness  Musculoskeletal- No finger Clubbing. Strength grossly normal   Lymphatic-No Palpable cervical, axillary,Inguinal lymphadenopathy   Psychiatric - normal effect,Orientation  Rt Dorsalis pedis pulses-palpable    Lt Dorsalis pedis pulses- palpable   Rt Posterior tibial pulses -palpable   Left posterior tibial pulses -palpable   Miscellaneous -  no renal bruit  There were no vitals taken for this visit.      Investigations  Lab and Imaging have been reviewed in epic.    CXR 5/4/2017    No abnormality seen    Review of Medicine tests    EKG- I had independently reviewed the EKG from--11/7/2017   It was reported to be showing     Normal sinus rhythm  Nonspecific ST abnormality  Abnormal ECG  When compared with ECG of 18-AUG-2017 11:56,  No significant change was found    Review of clinical lab tests-Date--12/8/2017- Creatinine-1.1  Date--11/7/2017 Hemoglobin-- 13.9 Platelet count--N ( Follow up suggested , suggested colonoscopy )     Review of old records- Was done and information gathered  regards to events leading to surgery and health conditions of significance in the perioperative period.    Outpatient Subjective & Objective

## 2018-02-01 NOTE — PROGRESS NOTES
Urology ([facility]) H&P for upcoming procedure  Staff:  MD Bg    Is this patient in a research study?  No    CC: prostate adencarcinoma    HPI:  Bharathi Garrido is a 67 y.o. male with [id number] Volga 3+4=7 prostate adenocarcinoma.      The patient initially presented with with elevated PSA.  TRUS biopsy revealed the following:    For his PSA elevation up to 21, he underwent a TRUS biopsy which demonstrated 63.4g volume, Pathology:   1. Left lateral base:  -Prostatic adenocarcinoma, Lisa score: 4+3 = 7    2. Left lateral mid:  -Prostatic adenocarcinoma, Lisa score: 3+3 = 6    3. Left lateral apex:  -Prostatic adenocarcinoma, Volga score: 3+3 = 6    4. Right lateral base:  -Prostatic adenocarcinoma, Lisa score: 3+3 = 6    5. Right lateral mid:  -Prostatic adenocarcinoma, Lisa score: 3+3 = 6    6. Right lateral apex:  -Prostatic adenocarcinoma, Lisa score: 3+3 = 6     7. Left middle base:  -Microscopic focus of prostatic adenocarcinoma    8. Left middle mid:  -Benign prostate tissue    9. Left middle apex:  -Prostatic adenocarcinoma, Lisa score: 3+3 = 6     10. Right middle base:  -Prostatic adenocarcinoma, Volga score: 3+4 = 7  -Perineural invasion identified    11. Right middle mid:  -Prostatic adenocarcinoma, Volga score: 3+3 = 6  -Perineural invasion identified    12. Right middle apex:  -Prostatic adenocarcinoma, Volga score: 3+3 =6    Date of Biopsy: 11/10/2017  PSA: 21  Volume: 63.4 g   Voiding complaints: absent  ED: present takes viagra occasionally   Incontinence: absent    ROS:  Neg except per HPI, specifically no bone pain, no unintentional weight loss, no anorexia, no night sweats    Past Medical History:   Diagnosis Date    Elevated PSA 11/10/2017    Glaucoma     Sebaceous cyst of scrotum        Past Surgical History:   Procedure Laterality Date    INCISION AND DRAINAGE OF WOUND Left 2011, 2017    scrotum; multiple I&D       Social History     Social History    Marital  status:      Spouse name: N/A    Number of children: N/A    Years of education: N/A     Social History Main Topics    Smoking status: Former Smoker     Quit date: 2000    Smokeless tobacco: Never Used    Alcohol use No      Comment: rarely     Drug use: No    Sexual activity: Yes     Partners: Female     Other Topics Concern    Not on file     Social History Narrative    No narrative on file       Family History   Problem Relation Age of Onset    Prostate cancer Neg Hx     Kidney disease Neg Hx        Review of patient's allergies indicates:  No Known Allergies    Current Outpatient Prescriptions on File Prior to Visit   Medication Sig Dispense Refill    brimonidine 0.2% (ALPHAGAN) 0.2 % Drop Place 1 drop into both eyes 2 (two) times daily.  6    latanoprost 0.005 % ophthalmic solution INTALL 1 DROP TO BOTH EYES CONNOR NIGHT  6    timolol maleate 0.5% (TIMOPTIC) 0.5 % Drop Place 1 drop into both eyes 2 (two) times daily.  7     No current facility-administered medications on file prior to visit.        Anticoagulation:  No    Physical Exam:  weight 252 lb/114.4 kg    There is no height or weight on file to calculate BMI.    AAOx4, NAD, WDWN  NC/AT, EOMI, PER, sclerae anicteric, speech normal, tongue midline  Nl effort, CTAB  RRR  Soft, non-tender, non-distended   Scars: no abdominal surgical scars present   Prostate enlarged prostate without any appreciable nodes present.   Penis uncircumcised; left hemiscrotum with healed scar present from previous debridement.     Labs:  Urine dipstick today shows negative for all components.    Lab Results   Component Value Date    WBC 6.15 11/07/2017    HGB 13.9 (L) 11/07/2017    HCT 41.9 11/07/2017    MCV 83 11/07/2017     11/07/2017       BMP  Lab Results   Component Value Date     12/08/2017    K 4.2 12/08/2017     12/08/2017    CO2 26 12/08/2017    BUN 14 12/08/2017    CREATININE 1.1 12/08/2017    CALCIUM 9.3 12/08/2017    ANIONGAP 9  12/08/2017    ESTGFRAFRICA >60 12/08/2017    EGFRNONAA >60 12/08/2017       Lab Results   Component Value Date    PSA 21.8 (H) 03/30/2017    PSADIAG 21.1 (H) 06/15/2017       Imaging:      NM bone scan(12/8/17): no evidence of bony metastasis     CT Abd and Pelvis (12/8/17) no evidence of extra prostatic disease present on CT scan    Assessment: Bharathi Garrido is a 67 y.o. male with rJ0yB1O2 Lisa 3+4=7 prostate adenocarcinoma.      Plan:   1. To OR on 2/8/2018 for RALP with BPLND  2. Consents signed   3. I have explained the risk, benefits, and alternatives of the procedure in detail. The patient voices understanding and all questions have been answered. The patient agrees to proceed as planned.       Rl Henning MD

## 2018-02-01 NOTE — DISCHARGE INSTRUCTIONS
Your surgery has been scheduled for:__________________________________________    You should report to:  ____[facility], located on the [place] side of the first floor of the           [facility] ([phone])  ____[facility], located on the [facility] side of the            Second floor of the [facility] ([phone])  ____3rd Floor [facility] located on the [facility] side of the [facility] [phone]  Please Note   - Tell your doctor if you take Aspirin, products containing Aspirin, herbal medications  or blood thinners, such as Coumadin, Ticlid, or Plavix.  (Consult your provider regarding holding or stopping before surgery).  - Arrange for someone to drive you home following surgery.  You will not be allowed to leave the surgical facility alone or drive yourself home following sedation and anesthesia.  Before Surgery  - Stop taking all herbal medications 14days prior to surgery  - No Motrin/Advil (Ibuprofen) 7 days before surgery  - No Aleve (Naproxen) 7 days before surgery  - Stop Taking Asprin, products containing Asprin _____days before surgery  - Stop taking blood thinners_______days before surgery  - Refrain from drinking alcoholic beverages for 24hours before and after surgery  - Stop or limit smoking _________days before surgery  Night before Surgery  - DO NOT EAT OR DRINK ANYTHING AFTER MIDNIGHT, INCLUDING GUM, HARD CANDY, MINTS, OR CHEWING TOBACCO.  - Take a shower or bath (shower is recommended).  Bathe with Hibiclens soap or an antibacterial soap from the neck down.  If not supplied by your surgeon, hibiclens soap will need to be purchased over the counter in pharmacy.  Rinse soap off thoroughly.  - Shampoo your hair with your regular shampoo  The Day of Surgery  - Take another bath or shower with hibiclens or any antibacterial soap, to reduce the chance of infection.  - Take heart and blood  pressure medications with a small sip of water, as advised by the perioperative team.  - Do not take fluid pills  - You may brush your teeth and rinse your mouth, but do not swall any additional water.   - Do not apply perfumes, powder, body lotions or deodorant on the day of surgery.  - Nail polish should be removed.  - Do not wear makeup or moisturizer  - Wear comfortable clothes, such as a button front shirt and loose fitting pants.  - Leave all jewelry, including body piercings, and valuables at home.    - Bring any devices you will neeed after surgery such as crutches or canes.  - If you have sleep apnea, please bring your CPAP machine  In the event that your physical condition changes including the onset of a cold or respiratory illness, or if you have to delay or cancel your surgery, please notify your surgeon.Anesthesia: General Anesthesia  Youre due to have surgery. During surgery, youll be given medication called anesthesia. (It is also called anesthetic.) This will keep you comfortable and pain-free. Your anesthesia provider will use general anesthesia. This sheet tells you more about it.  What is general anesthesia?     You are watched continuously during your procedure by the anesthesia provider   General anesthesia puts you into a state like deep sleep. It goes into the bloodstream (IV anesthetics), into the lungs (gas anesthetics), or both. You feel nothing during the procedure. You will not remember it. During the procedure, the anesthesia provider monitors you continuously. He or she checks your heart rate and rhythm, blood pressure, breathing, and blood oxygen.  · IV Anesthetics. IV anesthetics are given through an IV line in your arm. Theyre often given first. This is so you are asleep before a gas anesthetic is started. Some kinds of IV anesthetics relieve pain. Others relax you. Your doctor will decide which kind is best in your case.  · Gas Anesthetics. Gas anesthetics are breathed into the  lungs. They are often used to keep you asleep. They can be given through a facemask or a tube placed in your larynx or trachea (breathing tube).  ? If you have a facemask, your anesthesia provider will most likely place it over your nose and mouth while youre still awake. Youll breathe oxygen through the mask as your IV anesthetic is started. Gas anesthetic may be added through the mask.  ? If you have a tube in the larynx or trachea, it will be inserted into your throat after youre asleep.  Anesthesia tools and medications  You will likely have:  · IV anesthetics. These are put into an IV line into your bloodstream.  · Gas anesthetics. You breathe these anesthetics into your lungs, where they pass into your bloodstream.  · Pulse oximeter. This is a small clip that is attached to the end of your finger. This measures your blood oxygen level.  · Electrocardiography leads (electrodes). These are small sticky pads that are placed on your chest. They record your heart rate and rhythm.  · Blood pressure cuff. This reads your blood pressure.  Risks and possible complications  General anesthesia has some risks. These include:  · Breathing problems  · Nausea and vomiting  · Sore throat or hoarseness (usually temporary)  · Allergic reaction to the anesthetic  · Irregular heartbeat (rare)  · Cardiac arrest (rare)   Anesthesia safety  · Follow all instructions you are given for how long not to eat or drink before your procedure.  · Be sure your doctor knows what medications and drugs you take. This includes over-the-counter medications, herbs, supplements, alcohol or other drugs. You will be asked when those were last taken.  · Have an adult family member or friend drive you home after the procedure.  · For the first 24 hours after your surgery:  ? Do not drive or use heavy equipment.  ? Have a trusted family member or spouse make important decisions or sign documents.  ? Avoid alcohol.  ? Have a responsible adult stay with  you. He or she can watch for problems and help keep you safe.  Date Last Reviewed: 10/16/2014  © 6680-2694 [employer]. [address] [place] [address], [place] [address]. All rights reserved. This information is not intended as a substitute for professional medical care. Always follow your healthcare professional's instructions.

## 2018-02-01 NOTE — HPI
History of present illness- I had the pleasure of meeting this pleasant 67 y.o. gentleman in the pre op clinic prior to his elective Urological surgery. The patient is new to me . Bharathi was accompanied by wife Abi who is an [facility] Employee at [employer] .    I have obtained the history by speaking to the patient and by reviewing the electronic health records.    Events leading up to surgery / History of presenting illness -    Prostate cancer   Found incidentally on PSA  No pain from this .No aggravating factors. No relieving factors     Relevant health conditions of significance for the perioperative period/ History of presenting illness -    Patient also has a history of indeterminate scrotal ultrasound at the time of infected scrotal skin.  Follow-up ultrasound is unchanged and most likely consistent with benign disease.  Repeat ultrasound recommended in 6 months     Not Under Primary care which suggested enrolling    Not known to have heart disease , Diabetes Mellitus,HTN,  Lung disease

## 2018-02-01 NOTE — PROGRESS NOTES
[facility] - Pre Op Consult  Progress Note    Patient Name: Bharathi Garrido  MRN: [id number]  Date of Evaluation- 02/01/2018  PCP- Melvin Gee MD    Future cases for Bharathi Garrido [[id number]]     Case ID Status Date Time Reji Procedure Provider Location    [id number] [facility] 2/8/2018  7:00  ROBOTIC ASSISTED LAPAROSCOPIC PROSTATECTOMY Marta Pederson MD [[id number]] [facility] OR 2ND FLR          HPI:  History of present illness- I had the pleasure of meeting this pleasant 67 y.o. gentleman in the pre op clinic prior to his elective Urological surgery. The patient is new to me . Bharathi was accompanied by wife Abi who is an [employer] Employee at [facility] [employer] [facility] .    I have obtained the history by speaking to the patient and by reviewing the electronic health records.    Events leading up to surgery / History of presenting illness -    Prostate cancer   Found incidentally on PSA  No pain from this .No aggravating factors. No relieving factors     Relevant health conditions of significance for the perioperative period/ History of presenting illness -    Patient also has a history of indeterminate scrotal ultrasound at the time of infected scrotal skin.  Follow-up ultrasound is unchanged and most likely consistent with benign disease.  Repeat ultrasound recommended in 6 months     Not Under Primary care which suggested enrolling    Not known to have heart disease , Diabetes Mellitus,HTN,  Lung disease         Subjective/ Objective:          Chief complaint-Preoperative evaluation, Perioperative Medical management, complication reduction plan     Active cardiac conditions- none    Revised cardiac risk index predictors- none    Functional capacity -Examples of physical activity, rides a bicycle in the neighborhood , goes to grocery , does not drive, washing the car   can take 1 flight of stairs, cutting the grass with a mower, raking lawn, painting and planting shrubs----- He can undertake all the above activities without  chest pain,chest  tightness, Shortness of breath ,dizziness,lightheadedness making his exercise tolerance more  than 4 Mets.       Review of Systems   Constitutional: Negative for chills and fever.        No unusual weight changes   HENT:        STOPBANG    Snoring    Age over 50 years  Neck size over 40 CM  Male gender    Suggested follow up    Eyes:        Cannot drive due to peripheral vision being effected by Glaucoma  No unusual vision changes   Respiratory:        No Cough ,Phlegm   No hemoptysis    Cardiovascular:        As noted   Gastrointestinal:        Bowels- Regular   No overt GI/ blood losses   Endocrine:        Recent steroid use- none   Genitourinary: Negative for dysuria.        Night time urinary frequency   Drink cranberry juice at night    Musculoskeletal:          No unusual muscle/ joint pains   Skin: Negative for rash.   Neurological: Negative for syncope.        No unilateral weakness   Hematological:        Current use of Anticoagulants/ Antiplatelet agents    None   Psychiatric/Behavioral:        Depression  Anxiety    None     No vascular stenting       Past Surgical History:   Procedure Laterality Date    INCISION AND DRAINAGE OF WOUND Left 2011, 2017    scrotum; multiple I&D       No anesthesia, bleeding, cardiac, PONV problems with previous surgeries/procedures.  Medications and Allergies reviewed in epic.   FH- No anesthesia, thrombosis / bleeding , early onset heart disease in the immediate family   Lives with family - help available post op    Physical Exam  There were no vitals taken for this visit.      Investigations  Lab and Imaging have been reviewed in epic.    CXR 5/4/2017    No abnormality seen    Review of Medicine tests    EKG- I had independently reviewed the EKG from--11/7/2017   It was reported to be showing     Normal sinus rhythm  Nonspecific ST abnormality  Abnormal ECG  When compared with ECG of 18-AUG-2017 11:56,  No significant change was found    Review of clinical lab  tests-Date--12/8/2017- Creatinine-1.1  Date--11/7/2017 Hemoglobin-- 13.9 Platelet count--N ( Follow up suggested , suggested colonoscopy )     Review of old records- Was done and information gathered regards to events leading to surgery and health conditions of significance in the perioperative period.        Preoperative cardiac risk assessment-  The patient does not have any active cardiac conditions . Revised cardiac risk index predictors- 0---.Functional capacity is more than 4 Mets. He will be undergoing a Urological procedure that carries a intermediate risk     The estimated risk of the rate of adverse cardiac outcomes  0.4%    No further cardiac work up is indicated prior to proceeding with the surgery          American Society of Anesthesiologists Physical status classification ( ASA ) class: 3     Postoperative pulmonary complication risk assessment:      ARISCAT ( Canet) risk index- risk class -   intermediate     American college of Surgeons, NSQUIP risk calculation   Risk of serous complication --3.9 % ( Average risk -5.4  %), Risk of any Complication- 5.9 % ( Average risk-4.0 - %)    Assessment/Plan:     Glaucoma  Using Eye drops  Under Ophthalmology care    Prostate cancer  For OR    Class 1 obesity with body mass index (BMI) of 33.0 to 33.9 in adult  Suggested healthy weight loss        Preventive perioperative care    Thromboembolic prophylaxis:  His risk factors for thrombosis include cancer obesity, surgical procedure and age.I suggest  thromboembolic prophylaxis ( mechanical/pharmacological, weighing the risk benefits of pharmacological agent use considering canid procedural bleeding )  during the perioperative period.I suggested being active in the post operative period.      Postoperative pulmonary complication prophylaxis-Risk factors for post operative pulmonary complications include age over 65 years, surgery lasting over 3 hours, ASA class >2 and proximity of the surgical site to the lungs-  "I suggest incentive spirometry use, early ambulation, end tidal carbon dioxide monitoring and pain control so as to avoid diaphragmatic splinting  , oral care , head end of bed elevation      Renal complication prophylaxis- I suggest keeping him well hydrated .I suggested drinking 2 litre's of water a day      Surgical site Infection Prophylaxis-I  suggest appropriate antibiotic for Prophylaxis against Surgical site infections     In view of urological procedure the patient  is at risk of postoperative urinary retention.  I suggest avoidance / minimizing the of  Benzodiazepines,Anticholinergic medication,antihistamines ( Benadryl) , if possible in the perioperative period. I suggest using the minimum possible use of opioids for the minimum period of time in the perioperative period. Benadryl avoidance suggested      This visit was focused on Preoperative evaluation, Perioperative Medical management, complication reduction plans. I suggest that the patient follows up with primary care or relevant sub specialists for ongoing health care.    I appreciate the opportunity to be involved in this patients care. Please feel free to contact me if there were any questions about this consultation.    Patient is optimized     Patient was instructed to call and update me about any changes to health, changes to medication, office visits , hospitalizations between now and surgery     Yary Monk MD  Perioperative Medicine  [facility]   Pager [phone]  ------    2/1- 17 23    /79 Comment: left  Pulse 66   Temp 99.2 °F (37.3 °C)   Resp 18   Ht 5' 9" (1.753 m)   Wt 114.4 kg (252 lb 4.8 oz)   SpO2 95%   BMI 37.26 kg/m²    Urology clinic note- Urine dipstick today shows negative for all components  ------    2/5 -- 8 46     Called to follow up   Doing good   Does not feel feverish   Call, if needed   ------------    2/7- 13 14     Called to follow up   Left a message to call, if needed         "

## 2018-02-07 ENCOUNTER — TELEPHONE (OUTPATIENT)
Dept: UROLOGY | Facility: CLINIC | Age: 68
End: 2018-02-07

## 2018-02-08 ENCOUNTER — HOSPITAL ENCOUNTER (INPATIENT)
Facility: HOSPITAL | Age: 68
LOS: 1 days | Discharge: HOME | DRG: 708 | End: 2018-02-09
Attending: STUDENT IN AN ORGANIZED HEALTH CARE EDUCATION/TRAINING PROGRAM | Admitting: STUDENT IN AN ORGANIZED HEALTH CARE EDUCATION/TRAINING PROGRAM
Payer: COMMERCIAL

## 2018-02-08 ENCOUNTER — ANESTHESIA (OUTPATIENT)
Dept: SURGERY | Facility: HOSPITAL | Age: 68
DRG: 708 | End: 2018-02-08
Payer: COMMERCIAL

## 2018-02-08 DIAGNOSIS — C61 PROSTATE CANCER: Primary | ICD-10-CM

## 2018-02-08 LAB
ABO + RH BLD: NORMAL
ANION GAP SERPL CALC-SCNC: 6 MMOL/L
BASOPHILS # BLD AUTO: 0.06 K/UL
BASOPHILS NFR BLD: 0.4 %
BLD GP AB SCN CELLS X3 SERPL QL: NORMAL
BUN SERPL-MCNC: 11 MG/DL
CALCIUM SERPL-MCNC: 8 MG/DL
CHLORIDE SERPL-SCNC: 107 MMOL/L
CO2 SERPL-SCNC: 25 MMOL/L
CREAT SERPL-MCNC: 0.9 MG/DL
DIFFERENTIAL METHOD: ABNORMAL
EOSINOPHIL # BLD AUTO: 0 K/UL
EOSINOPHIL NFR BLD: 0 %
ERYTHROCYTE [DISTWIDTH] IN BLOOD BY AUTOMATED COUNT: 13.6 %
EST. GFR  (AFRICAN AMERICAN): >60 ML/MIN/1.73 M^2
EST. GFR  (NON AFRICAN AMERICAN): >60 ML/MIN/1.73 M^2
GLUCOSE SERPL-MCNC: 143 MG/DL
GLUCOSE SERPL-MCNC: 145 MG/DL (ref 70–110)
HCO3 UR-SCNC: 24.2 MMOL/L (ref 24–28)
HCT VFR BLD AUTO: 37.1 %
HCT VFR BLD CALC: 39 %PCV (ref 36–54)
HGB BLD-MCNC: 12.8 G/DL
IMM GRANULOCYTES # BLD AUTO: 0.06 K/UL
IMM GRANULOCYTES NFR BLD AUTO: 0.4 %
LYMPHOCYTES # BLD AUTO: 1.8 K/UL
LYMPHOCYTES NFR BLD: 11.5 %
MCH RBC QN AUTO: 27.9 PG
MCHC RBC AUTO-ENTMCNC: 34.5 G/DL
MCV RBC AUTO: 81 FL
MONOCYTES # BLD AUTO: 1.2 K/UL
MONOCYTES NFR BLD: 7.9 %
NEUTROPHILS # BLD AUTO: 12.6 K/UL
NEUTROPHILS NFR BLD: 79.8 %
NRBC BLD-RTO: 0 /100 WBC
PCO2 BLDA: 47.5 MMHG (ref 35–45)
PH SMN: 7.32 [PH] (ref 7.35–7.45)
PLATELET # BLD AUTO: 180 K/UL
PMV BLD AUTO: 11.1 FL
PO2 BLDA: 41 MMHG (ref 40–60)
POC BE: -2 MMOL/L
POC IONIZED CALCIUM: 1.12 MMOL/L (ref 1.06–1.42)
POC SATURATED O2: 71 % (ref 95–100)
POC TCO2: 26 MMOL/L (ref 24–29)
POTASSIUM BLD-SCNC: 4 MMOL/L (ref 3.5–5.1)
POTASSIUM SERPL-SCNC: 4 MMOL/L
RBC # BLD AUTO: 4.58 M/UL
SAMPLE: ABNORMAL
SODIUM BLD-SCNC: 137 MMOL/L (ref 136–145)
SODIUM SERPL-SCNC: 138 MMOL/L
WBC # BLD AUTO: 15.74 K/UL

## 2018-02-08 PROCEDURE — 71000039 HC RECOVERY, EACH ADD'L HOUR: Performed by: STUDENT IN AN ORGANIZED HEALTH CARE EDUCATION/TRAINING PROGRAM

## 2018-02-08 PROCEDURE — 27100025 HC TUBING, SET FLUID WARMER: Performed by: STUDENT IN AN ORGANIZED HEALTH CARE EDUCATION/TRAINING PROGRAM

## 2018-02-08 PROCEDURE — 63600175 PHARM REV CODE 636 W HCPCS: Performed by: UROLOGY

## 2018-02-08 PROCEDURE — 55866 LAPS SURG PRST8ECT RPBIC RAD: CPT | Mod: 22,,, | Performed by: STUDENT IN AN ORGANIZED HEALTH CARE EDUCATION/TRAINING PROGRAM

## 2018-02-08 PROCEDURE — 88309 TISSUE EXAM BY PATHOLOGIST: CPT | Mod: 26,,, | Performed by: PATHOLOGY

## 2018-02-08 PROCEDURE — 27201040 HC RC 50 FILTER

## 2018-02-08 PROCEDURE — 36415 COLL VENOUS BLD VENIPUNCTURE: CPT

## 2018-02-08 PROCEDURE — 25000003 PHARM REV CODE 250: Performed by: STUDENT IN AN ORGANIZED HEALTH CARE EDUCATION/TRAINING PROGRAM

## 2018-02-08 PROCEDURE — 36000712 HC OR TIME LEV V 1ST 15 MIN: Performed by: STUDENT IN AN ORGANIZED HEALTH CARE EDUCATION/TRAINING PROGRAM

## 2018-02-08 PROCEDURE — 94761 N-INVAS EAR/PLS OXIMETRY MLT: CPT

## 2018-02-08 PROCEDURE — 86920 COMPATIBILITY TEST SPIN: CPT

## 2018-02-08 PROCEDURE — 63600175 PHARM REV CODE 636 W HCPCS: Performed by: STUDENT IN AN ORGANIZED HEALTH CARE EDUCATION/TRAINING PROGRAM

## 2018-02-08 PROCEDURE — 36000713 HC OR TIME LEV V EA ADD 15 MIN: Performed by: STUDENT IN AN ORGANIZED HEALTH CARE EDUCATION/TRAINING PROGRAM

## 2018-02-08 PROCEDURE — 37000008 HC ANESTHESIA 1ST 15 MINUTES: Performed by: STUDENT IN AN ORGANIZED HEALTH CARE EDUCATION/TRAINING PROGRAM

## 2018-02-08 PROCEDURE — 25000003 PHARM REV CODE 250: Performed by: UROLOGY

## 2018-02-08 PROCEDURE — 8E0W4CZ ROBOTIC ASSISTED PROCEDURE OF TRUNK REGION, PERCUTANEOUS ENDOSCOPIC APPROACH: ICD-10-PCS | Performed by: STUDENT IN AN ORGANIZED HEALTH CARE EDUCATION/TRAINING PROGRAM

## 2018-02-08 PROCEDURE — 27000221 HC OXYGEN, UP TO 24 HOURS

## 2018-02-08 PROCEDURE — 71000033 HC RECOVERY, INTIAL HOUR: Performed by: STUDENT IN AN ORGANIZED HEALTH CARE EDUCATION/TRAINING PROGRAM

## 2018-02-08 PROCEDURE — 88305 TISSUE EXAM BY PATHOLOGIST: CPT | Performed by: PATHOLOGY

## 2018-02-08 PROCEDURE — 88305 TISSUE EXAM BY PATHOLOGIST: CPT | Mod: 26,,, | Performed by: PATHOLOGY

## 2018-02-08 PROCEDURE — 11000001 HC ACUTE MED/SURG PRIVATE ROOM

## 2018-02-08 PROCEDURE — 38571 LAPAROSCOPY LYMPHADENECTOMY: CPT | Mod: 51,,, | Performed by: STUDENT IN AN ORGANIZED HEALTH CARE EDUCATION/TRAINING PROGRAM

## 2018-02-08 PROCEDURE — 85025 COMPLETE CBC W/AUTO DIFF WBC: CPT

## 2018-02-08 PROCEDURE — 55866 LAPS SURG PRST8ECT RPBIC RAD: CPT | Mod: AS,,, | Performed by: PHYSICIAN ASSISTANT

## 2018-02-08 PROCEDURE — 86901 BLOOD TYPING SEROLOGIC RH(D): CPT

## 2018-02-08 PROCEDURE — 80048 BASIC METABOLIC PNL TOTAL CA: CPT

## 2018-02-08 PROCEDURE — 38571 LAPAROSCOPY LYMPHADENECTOMY: CPT | Mod: 51,AS,, | Performed by: PHYSICIAN ASSISTANT

## 2018-02-08 PROCEDURE — 37000009 HC ANESTHESIA EA ADD 15 MINS: Performed by: STUDENT IN AN ORGANIZED HEALTH CARE EDUCATION/TRAINING PROGRAM

## 2018-02-08 PROCEDURE — 27201423 OPTIME MED/SURG SUP & DEVICES STERILE SUPPLY: Performed by: STUDENT IN AN ORGANIZED HEALTH CARE EDUCATION/TRAINING PROGRAM

## 2018-02-08 PROCEDURE — D9220A PRA ANESTHESIA: Mod: ,,, | Performed by: ANESTHESIOLOGY

## 2018-02-08 PROCEDURE — 07BC4ZX EXCISION OF PELVIS LYMPHATIC, PERCUTANEOUS ENDOSCOPIC APPROACH, DIAGNOSTIC: ICD-10-PCS | Performed by: STUDENT IN AN ORGANIZED HEALTH CARE EDUCATION/TRAINING PROGRAM

## 2018-02-08 PROCEDURE — 0VT04ZZ RESECTION OF PROSTATE, PERCUTANEOUS ENDOSCOPIC APPROACH: ICD-10-PCS | Performed by: STUDENT IN AN ORGANIZED HEALTH CARE EDUCATION/TRAINING PROGRAM

## 2018-02-08 RX ORDER — SODIUM CHLORIDE 9 MG/ML
INJECTION, SOLUTION INTRAVENOUS CONTINUOUS
Status: DISCONTINUED | OUTPATIENT
Start: 2018-02-08 | End: 2018-02-09

## 2018-02-08 RX ORDER — OXYCODONE HYDROCHLORIDE 5 MG/1
5 TABLET ORAL EVERY 4 HOURS PRN
Status: DISCONTINUED | OUTPATIENT
Start: 2018-02-08 | End: 2018-02-08

## 2018-02-08 RX ORDER — ONDANSETRON 2 MG/ML
4 INJECTION INTRAMUSCULAR; INTRAVENOUS EVERY 6 HOURS PRN
Status: DISCONTINUED | OUTPATIENT
Start: 2018-02-08 | End: 2018-02-10 | Stop reason: HOSPADM

## 2018-02-08 RX ORDER — MEPERIDINE HYDROCHLORIDE 50 MG/ML
12.5 INJECTION INTRAMUSCULAR; INTRAVENOUS; SUBCUTANEOUS ONCE AS NEEDED
Status: DISCONTINUED | OUTPATIENT
Start: 2018-02-08 | End: 2018-02-08 | Stop reason: HOSPADM

## 2018-02-08 RX ORDER — FENTANYL CITRATE 50 UG/ML
25 INJECTION, SOLUTION INTRAMUSCULAR; INTRAVENOUS EVERY 5 MIN PRN
Status: DISCONTINUED | OUTPATIENT
Start: 2018-02-08 | End: 2018-02-08 | Stop reason: HOSPADM

## 2018-02-08 RX ORDER — LIDOCAINE HCL/PF 100 MG/5ML
SYRINGE (ML) INTRAVENOUS
Status: DISCONTINUED | OUTPATIENT
Start: 2018-02-08 | End: 2018-02-08

## 2018-02-08 RX ORDER — ROCURONIUM BROMIDE 10 MG/ML
INJECTION, SOLUTION INTRAVENOUS
Status: DISCONTINUED | OUTPATIENT
Start: 2018-02-08 | End: 2018-02-08

## 2018-02-08 RX ORDER — MIDAZOLAM HYDROCHLORIDE 1 MG/ML
INJECTION, SOLUTION INTRAMUSCULAR; INTRAVENOUS
Status: DISCONTINUED | OUTPATIENT
Start: 2018-02-08 | End: 2018-02-08

## 2018-02-08 RX ORDER — OXYCODONE HYDROCHLORIDE 5 MG/1
10 TABLET ORAL EVERY 4 HOURS PRN
Status: DISCONTINUED | OUTPATIENT
Start: 2018-02-08 | End: 2018-02-10 | Stop reason: HOSPADM

## 2018-02-08 RX ORDER — OXYCODONE HYDROCHLORIDE 5 MG/1
15 TABLET ORAL EVERY 4 HOURS PRN
Status: DISCONTINUED | OUTPATIENT
Start: 2018-02-08 | End: 2018-02-10 | Stop reason: HOSPADM

## 2018-02-08 RX ORDER — DIPHENHYDRAMINE HYDROCHLORIDE 50 MG/ML
6.25 INJECTION INTRAMUSCULAR; INTRAVENOUS EVERY 6 HOURS PRN
Status: DISCONTINUED | OUTPATIENT
Start: 2018-02-08 | End: 2018-02-10 | Stop reason: HOSPADM

## 2018-02-08 RX ORDER — SODIUM CHLORIDE 9 MG/ML
INJECTION, SOLUTION INTRAVENOUS CONTINUOUS
Status: DISCONTINUED | OUTPATIENT
Start: 2018-02-08 | End: 2018-02-08

## 2018-02-08 RX ORDER — SODIUM CHLORIDE 0.9 % (FLUSH) 0.9 %
3 SYRINGE (ML) INJECTION
Status: DISCONTINUED | OUTPATIENT
Start: 2018-02-08 | End: 2018-02-08

## 2018-02-08 RX ORDER — PHENYLEPHRINE HYDROCHLORIDE 10 MG/ML
INJECTION INTRAVENOUS
Status: DISCONTINUED | OUTPATIENT
Start: 2018-02-08 | End: 2018-02-08

## 2018-02-08 RX ORDER — ACETAMINOPHEN 10 MG/ML
1000 INJECTION, SOLUTION INTRAVENOUS EVERY 8 HOURS
Status: COMPLETED | OUTPATIENT
Start: 2018-02-08 | End: 2018-02-09

## 2018-02-08 RX ORDER — PROPOFOL 10 MG/ML
VIAL (ML) INTRAVENOUS
Status: DISCONTINUED | OUTPATIENT
Start: 2018-02-08 | End: 2018-02-08

## 2018-02-08 RX ORDER — ACETAMINOPHEN 10 MG/ML
1000 INJECTION, SOLUTION INTRAVENOUS EVERY 8 HOURS
Status: COMPLETED | OUTPATIENT
Start: 2018-02-08 | End: 2018-02-08

## 2018-02-08 RX ORDER — GLYCOPYRROLATE 0.2 MG/ML
INJECTION INTRAMUSCULAR; INTRAVENOUS
Status: DISCONTINUED | OUTPATIENT
Start: 2018-02-08 | End: 2018-02-08

## 2018-02-08 RX ORDER — ONDANSETRON 2 MG/ML
4 INJECTION INTRAMUSCULAR; INTRAVENOUS DAILY PRN
Status: DISCONTINUED | OUTPATIENT
Start: 2018-02-08 | End: 2018-02-08 | Stop reason: HOSPADM

## 2018-02-08 RX ORDER — OXYCODONE HYDROCHLORIDE 5 MG/1
10 TABLET ORAL EVERY 4 HOURS PRN
Status: DISCONTINUED | OUTPATIENT
Start: 2018-02-08 | End: 2018-02-08

## 2018-02-08 RX ORDER — CEFAZOLIN SODIUM 1 G/3ML
2 INJECTION, POWDER, FOR SOLUTION INTRAMUSCULAR; INTRAVENOUS
Status: COMPLETED | OUTPATIENT
Start: 2018-02-08 | End: 2018-02-08

## 2018-02-08 RX ORDER — ONDANSETRON 2 MG/ML
INJECTION INTRAMUSCULAR; INTRAVENOUS
Status: DISCONTINUED | OUTPATIENT
Start: 2018-02-08 | End: 2018-02-08

## 2018-02-08 RX ORDER — LIDOCAINE HYDROCHLORIDE 10 MG/ML
1 INJECTION, SOLUTION EPIDURAL; INFILTRATION; INTRACAUDAL; PERINEURAL ONCE
Status: COMPLETED | OUTPATIENT
Start: 2018-02-08 | End: 2018-02-08

## 2018-02-08 RX ORDER — FENTANYL CITRATE 50 UG/ML
INJECTION, SOLUTION INTRAMUSCULAR; INTRAVENOUS
Status: DISCONTINUED | OUTPATIENT
Start: 2018-02-08 | End: 2018-02-08

## 2018-02-08 RX ADMIN — ROCURONIUM BROMIDE 20 MG: 10 INJECTION, SOLUTION INTRAVENOUS at 11:02

## 2018-02-08 RX ADMIN — FENTANYL CITRATE 50 MCG: 50 INJECTION, SOLUTION INTRAMUSCULAR; INTRAVENOUS at 08:02

## 2018-02-08 RX ADMIN — SUGAMMADEX 209 MG: 100 INJECTION, SOLUTION INTRAVENOUS at 02:02

## 2018-02-08 RX ADMIN — SODIUM CHLORIDE, SODIUM GLUCONATE, SODIUM ACETATE, POTASSIUM CHLORIDE, MAGNESIUM CHLORIDE, SODIUM PHOSPHATE, DIBASIC, AND POTASSIUM PHOSPHATE: .53; .5; .37; .037; .03; .012; .00082 INJECTION, SOLUTION INTRAVENOUS at 08:02

## 2018-02-08 RX ADMIN — OXYCODONE HYDROCHLORIDE 10 MG: 5 TABLET ORAL at 03:02

## 2018-02-08 RX ADMIN — SODIUM CHLORIDE, SODIUM GLUCONATE, SODIUM ACETATE, POTASSIUM CHLORIDE, MAGNESIUM CHLORIDE, SODIUM PHOSPHATE, DIBASIC, AND POTASSIUM PHOSPHATE: .53; .5; .37; .037; .03; .012; .00082 INJECTION, SOLUTION INTRAVENOUS at 07:02

## 2018-02-08 RX ADMIN — SODIUM CHLORIDE: 0.9 INJECTION, SOLUTION INTRAVENOUS at 06:02

## 2018-02-08 RX ADMIN — ROCURONIUM BROMIDE 30 MG: 10 INJECTION, SOLUTION INTRAVENOUS at 07:02

## 2018-02-08 RX ADMIN — GLYCOPYRROLATE 0.2 MG: 0.2 INJECTION, SOLUTION INTRAMUSCULAR; INTRAVENOUS at 07:02

## 2018-02-08 RX ADMIN — ACETAMINOPHEN 1000 MG: 10 INJECTION, SOLUTION INTRAVENOUS at 06:02

## 2018-02-08 RX ADMIN — FENTANYL CITRATE 50 MCG: 50 INJECTION, SOLUTION INTRAMUSCULAR; INTRAVENOUS at 10:02

## 2018-02-08 RX ADMIN — SODIUM CHLORIDE: 0.9 INJECTION, SOLUTION INTRAVENOUS at 02:02

## 2018-02-08 RX ADMIN — OXYCODONE HYDROCHLORIDE 10 MG: 5 TABLET ORAL at 06:02

## 2018-02-08 RX ADMIN — ROCURONIUM BROMIDE 50 MG: 10 INJECTION, SOLUTION INTRAVENOUS at 07:02

## 2018-02-08 RX ADMIN — MIDAZOLAM HYDROCHLORIDE 2 MG: 1 INJECTION, SOLUTION INTRAMUSCULAR; INTRAVENOUS at 06:02

## 2018-02-08 RX ADMIN — CEFAZOLIN 2 G: 330 INJECTION, POWDER, FOR SOLUTION INTRAMUSCULAR; INTRAVENOUS at 12:02

## 2018-02-08 RX ADMIN — PROPOFOL 200 MG: 10 INJECTION, EMULSION INTRAVENOUS at 07:02

## 2018-02-08 RX ADMIN — LIDOCAINE HYDROCHLORIDE 0.1 MG: 10 INJECTION, SOLUTION EPIDURAL; INFILTRATION; INTRACAUDAL; PERINEURAL at 06:02

## 2018-02-08 RX ADMIN — FENTANYL CITRATE 100 MCG: 50 INJECTION, SOLUTION INTRAMUSCULAR; INTRAVENOUS at 07:02

## 2018-02-08 RX ADMIN — SODIUM CHLORIDE, SODIUM GLUCONATE, SODIUM ACETATE, POTASSIUM CHLORIDE, MAGNESIUM CHLORIDE, SODIUM PHOSPHATE, DIBASIC, AND POTASSIUM PHOSPHATE: .53; .5; .37; .037; .03; .012; .00082 INJECTION, SOLUTION INTRAVENOUS at 11:02

## 2018-02-08 RX ADMIN — ACETAMINOPHEN 1000 MG: 10 INJECTION, SOLUTION INTRAVENOUS at 10:02

## 2018-02-08 RX ADMIN — FENTANYL CITRATE 50 MCG: 50 INJECTION, SOLUTION INTRAMUSCULAR; INTRAVENOUS at 01:02

## 2018-02-08 RX ADMIN — PROPOFOL 50 MG: 10 INJECTION, EMULSION INTRAVENOUS at 07:02

## 2018-02-08 RX ADMIN — PHENYLEPHRINE HYDROCHLORIDE 100 MCG: 10 INJECTION INTRAVENOUS at 11:02

## 2018-02-08 RX ADMIN — LIDOCAINE HYDROCHLORIDE 100 MG: 20 INJECTION, SOLUTION INTRAVENOUS at 07:02

## 2018-02-08 RX ADMIN — ROCURONIUM BROMIDE 20 MG: 10 INJECTION, SOLUTION INTRAVENOUS at 10:02

## 2018-02-08 RX ADMIN — ROCURONIUM BROMIDE 20 MG: 10 INJECTION, SOLUTION INTRAVENOUS at 08:02

## 2018-02-08 RX ADMIN — ONDANSETRON 4 MG: 2 INJECTION INTRAMUSCULAR; INTRAVENOUS at 01:02

## 2018-02-08 RX ADMIN — PHENYLEPHRINE HYDROCHLORIDE 100 MCG: 10 INJECTION INTRAVENOUS at 12:02

## 2018-02-08 RX ADMIN — ACETAMINOPHEN 1000 MG: 10 INJECTION, SOLUTION INTRAVENOUS at 02:02

## 2018-02-08 RX ADMIN — ROCURONIUM BROMIDE 20 MG: 10 INJECTION, SOLUTION INTRAVENOUS at 09:02

## 2018-02-08 RX ADMIN — CEFAZOLIN 2 G: 330 INJECTION, POWDER, FOR SOLUTION INTRAMUSCULAR; INTRAVENOUS at 07:02

## 2018-02-08 NOTE — OP NOTE
Certification of Assistant at Surgery       Surgery Date: 2/8/2018     Participating Surgeons:  Surgeon(s) and Role:     * Tuan Bliss MD - Resident - Assisting     * Marta Pederson MD - Primary    Procedures:  Procedure(s) (LRB):  ROBOTIC ASSISTED LAPAROSCOPIC PROSTATECTOMY (N/A)  DISSECTION-LYMPH NODE-PELVIC/ poss bilateral nerve sparing (Bilateral)    Assistant Surgeon's Certification of Necessity:  I understand that section 1842 (b) (6) (d) of the Social Security Act generally prohibits Medicare Part B reasonable charge payment for the services of assistants at surgery in teaching hospitals when qualified residents are available to furnish such services. I certify that the services for which payment is claimed were medically necessary, and that no qualified resident was available to perform the services. I further understand that these services are subject to post-payment review by the Medicare carrier.      Pauline Collier PA-C    02/08/2018  4:58 PM

## 2018-02-08 NOTE — NURSING TRANSFER
Nursing Transfer Note      2/8/2018     Transfer To: 542A    Transfer via stretcher    Transported by PCT    Medicines sent: [facility]    Chart send with patient: Yes    Notified: spouse    Patient reassessed at: 2/8/18 @ 8784

## 2018-02-08 NOTE — H&P (VIEW-ONLY)
Urology ([facility]) H&P for upcoming procedure  Staff:  MD Bg    Is this patient in a research study?  No    CC: prostate adencarcinoma    HPI:  Bharathi Garrido is a 67 y.o. male with [id number] O'Brien 3+4=7 prostate adenocarcinoma.      The patient initially presented with with elevated PSA.  TRUS biopsy revealed the following:    For his PSA elevation up to 21, he underwent a TRUS biopsy which demonstrated 63.4g volume, Pathology:   1. Left lateral base:  -Prostatic adenocarcinoma, Lisa score: 4+3 = 7    2. Left lateral mid:  -Prostatic adenocarcinoma, Lisa score: 3+3 = 6    3. Left lateral apex:  -Prostatic adenocarcinoma, O'Brien score: 3+3 = 6    4. Right lateral base:  -Prostatic adenocarcinoma, Lisa score: 3+3 = 6    5. Right lateral mid:  -Prostatic adenocarcinoma, Lisa score: 3+3 = 6    6. Right lateral apex:  -Prostatic adenocarcinoma, Lisa score: 3+3 = 6     7. Left middle base:  -Microscopic focus of prostatic adenocarcinoma    8. Left middle mid:  -Benign prostate tissue    9. Left middle apex:  -Prostatic adenocarcinoma, Lisa score: 3+3 = 6     10. Right middle base:  -Prostatic adenocarcinoma, O'Brien score: 3+4 = 7  -Perineural invasion identified    11. Right middle mid:  -Prostatic adenocarcinoma, O'Brien score: 3+3 = 6  -Perineural invasion identified    12. Right middle apex:  -Prostatic adenocarcinoma, O'Brien score: 3+3 =6    Date of Biopsy: 11/10/2017  PSA: 21  Volume: 63.4 g   Voiding complaints: absent  ED: present takes viagra occasionally   Incontinence: absent    ROS:  Neg except per HPI, specifically no bone pain, no unintentional weight loss, no anorexia, no night sweats    Past Medical History:   Diagnosis Date    Elevated PSA 11/10/2017    Glaucoma     Sebaceous cyst of scrotum        Past Surgical History:   Procedure Laterality Date    INCISION AND DRAINAGE OF WOUND Left 2011, 2017    scrotum; multiple I&D       Social History     Social History    Marital  status:      Spouse name: N/A    Number of children: N/A    Years of education: N/A     Social History Main Topics    Smoking status: Former Smoker     Quit date: 2000    Smokeless tobacco: Never Used    Alcohol use No      Comment: rarely     Drug use: No    Sexual activity: Yes     Partners: Female     Other Topics Concern    Not on file     Social History Narrative    No narrative on file       Family History   Problem Relation Age of Onset    Prostate cancer Neg Hx     Kidney disease Neg Hx        Review of patient's allergies indicates:  No Known Allergies    Current Outpatient Prescriptions on File Prior to Visit   Medication Sig Dispense Refill    brimonidine 0.2% (ALPHAGAN) 0.2 % Drop Place 1 drop into both eyes 2 (two) times daily.  6    latanoprost 0.005 % ophthalmic solution INTALL 1 DROP TO BOTH EYES CONNOR NIGHT  6    timolol maleate 0.5% (TIMOPTIC) 0.5 % Drop Place 1 drop into both eyes 2 (two) times daily.  7     No current facility-administered medications on file prior to visit.        Anticoagulation:  No    Physical Exam:  weight 252 lb/114.4 kg    There is no height or weight on file to calculate BMI.    AAOx4, NAD, WDWN  NC/AT, EOMI, PER, sclerae anicteric, speech normal, tongue midline  Nl effort, CTAB  RRR  Soft, non-tender, non-distended   Scars: no abdominal surgical scars present   Prostate enlarged prostate without any appreciable nodes present.   Penis uncircumcised; left hemiscrotum with healed scar present from previous debridement.     Labs:  Urine dipstick today shows negative for all components.    Lab Results   Component Value Date    WBC 6.15 11/07/2017    HGB 13.9 (L) 11/07/2017    HCT 41.9 11/07/2017    MCV 83 11/07/2017     11/07/2017       BMP  Lab Results   Component Value Date     12/08/2017    K 4.2 12/08/2017     12/08/2017    CO2 26 12/08/2017    BUN 14 12/08/2017    CREATININE 1.1 12/08/2017    CALCIUM 9.3 12/08/2017    ANIONGAP 9  12/08/2017    ESTGFRAFRICA >60 12/08/2017    EGFRNONAA >60 12/08/2017       Lab Results   Component Value Date    PSA 21.8 (H) 03/30/2017    PSADIAG 21.1 (H) 06/15/2017       Imaging:      NM bone scan(12/8/17): no evidence of bony metastasis     CT Abd and Pelvis (12/8/17) no evidence of extra prostatic disease present on CT scan    Assessment: Bharathi Garrido is a 67 y.o. male with [id number] Lisa 3+4=7 prostate adenocarcinoma.      Plan:   1. To OR on 2/8/2018 for RALP with BPLND  2. Consents signed   3. I have explained the risk, benefits, and alternatives of the procedure in detail. The patient voices understanding and all questions have been answered. The patient agrees to proceed as planned.       Rl Henning MD

## 2018-02-08 NOTE — TRANSFER OF CARE
"Anesthesia Transfer of Care Note    Patient: Bharathi Garrido    Procedure(s) Performed: Procedure(s) (LRB):  ROBOTIC ASSISTED LAPAROSCOPIC PROSTATECTOMY (N/A)  DISSECTION-LYMPH NODE-PELVIC/ poss bilateral nerve sparing (Bilateral)    Patient location: PACU    Anesthesia Type: epidural    Transport from OR: Transported from OR on 6-10 L/min O2 by face mask with adequate spontaneous ventilation    Post pain: adequate analgesia    Post assessment: no apparent anesthetic complications    Post vital signs: stable    Level of consciousness: agitated    Nausea/Vomiting: no nausea/vomiting    Complications: none    Transfer of care protocol was followedComments: Refusing supplemental oxygen.       Last vitals:   Visit Vitals  /79 (BP Location: Right arm, Patient Position: Lying)   Pulse 60   Temp 36.8 °C (98.2 °F) (Oral)   Resp 18   Ht 5' 10" (1.778 m)   Wt 104.3 kg (229 lb 15 oz)   SpO2 96%   BMI 32.99 kg/m²     "

## 2018-02-08 NOTE — H&P (VIEW-ONLY)
[facility] paperwork for patient's wife filled out today, faxed, and scanned into media.

## 2018-02-08 NOTE — PLAN OF CARE
VSS. Patient c/o pain to back when awakened, pt quickly falls asleep when not stimulated. No N&V. SCD's in place throughout duration in PACU. Transferred to the next Phase of Care.

## 2018-02-09 LAB
ANION GAP SERPL CALC-SCNC: 6 MMOL/L
BASOPHILS # BLD AUTO: 0.04 K/UL
BASOPHILS NFR BLD: 0.4 %
BODY FLUID SOURCE, CREATININE: NORMAL
BUN SERPL-MCNC: 11 MG/DL
CALCIUM SERPL-MCNC: 7.8 MG/DL
CHLORIDE SERPL-SCNC: 109 MMOL/L
CO2 SERPL-SCNC: 26 MMOL/L
CREAT FLD-MCNC: 1 MG/DL
CREAT SERPL-MCNC: 1.1 MG/DL
DIFFERENTIAL METHOD: ABNORMAL
EOSINOPHIL # BLD AUTO: 0 K/UL
EOSINOPHIL NFR BLD: 0.2 %
ERYTHROCYTE [DISTWIDTH] IN BLOOD BY AUTOMATED COUNT: 14 %
EST. GFR  (AFRICAN AMERICAN): >60 ML/MIN/1.73 M^2
EST. GFR  (NON AFRICAN AMERICAN): >60 ML/MIN/1.73 M^2
GLUCOSE SERPL-MCNC: 105 MG/DL
HCT VFR BLD AUTO: 33 %
HGB BLD-MCNC: 11.1 G/DL
IMM GRANULOCYTES # BLD AUTO: 0.09 K/UL
IMM GRANULOCYTES NFR BLD AUTO: 0.8 %
LYMPHOCYTES # BLD AUTO: 1.3 K/UL
LYMPHOCYTES NFR BLD: 11.9 %
MCH RBC QN AUTO: 27.3 PG
MCHC RBC AUTO-ENTMCNC: 33.6 G/DL
MCV RBC AUTO: 81 FL
MONOCYTES # BLD AUTO: 1.1 K/UL
MONOCYTES NFR BLD: 10.1 %
NEUTROPHILS # BLD AUTO: 8.2 K/UL
NEUTROPHILS NFR BLD: 76.6 %
NRBC BLD-RTO: 0 /100 WBC
PLATELET # BLD AUTO: 161 K/UL
PMV BLD AUTO: 11.9 FL
POTASSIUM SERPL-SCNC: 3.7 MMOL/L
RBC # BLD AUTO: 4.07 M/UL
SODIUM SERPL-SCNC: 141 MMOL/L
WBC # BLD AUTO: 10.63 K/UL

## 2018-02-09 PROCEDURE — 25000003 PHARM REV CODE 250: Performed by: STUDENT IN AN ORGANIZED HEALTH CARE EDUCATION/TRAINING PROGRAM

## 2018-02-09 PROCEDURE — 82570 ASSAY OF URINE CREATININE: CPT

## 2018-02-09 PROCEDURE — 99024 POSTOP FOLLOW-UP VISIT: CPT | Mod: GC,,, | Performed by: STUDENT IN AN ORGANIZED HEALTH CARE EDUCATION/TRAINING PROGRAM

## 2018-02-09 PROCEDURE — 80048 BASIC METABOLIC PNL TOTAL CA: CPT

## 2018-02-09 PROCEDURE — 63600175 PHARM REV CODE 636 W HCPCS: Performed by: UROLOGY

## 2018-02-09 PROCEDURE — 85025 COMPLETE CBC W/AUTO DIFF WBC: CPT

## 2018-02-09 PROCEDURE — 11000001 HC ACUTE MED/SURG PRIVATE ROOM

## 2018-02-09 PROCEDURE — 36415 COLL VENOUS BLD VENIPUNCTURE: CPT

## 2018-02-09 RX ORDER — POLYETHYLENE GLYCOL 3350 17 G/17G
17 POWDER, FOR SOLUTION ORAL DAILY
Qty: 30 PACKET | Refills: 0 | Status: SHIPPED | OUTPATIENT
Start: 2018-02-09 | End: 2020-04-01

## 2018-02-09 RX ORDER — SULFAMETHOXAZOLE AND TRIMETHOPRIM 800; 160 MG/1; MG/1
1 TABLET ORAL 2 TIMES DAILY
Qty: 20 TABLET | Refills: 0 | Status: SHIPPED | OUTPATIENT
Start: 2018-02-09 | End: 2018-02-19

## 2018-02-09 RX ORDER — DOCUSATE SODIUM 100 MG/1
100 CAPSULE, LIQUID FILLED ORAL 2 TIMES DAILY
Qty: 31 CAPSULE | Refills: 1 | Status: SHIPPED | OUTPATIENT
Start: 2018-02-09 | End: 2019-01-08

## 2018-02-09 RX ORDER — OXYCODONE AND ACETAMINOPHEN 5; 325 MG/1; MG/1
1 TABLET ORAL EVERY 4 HOURS PRN
Qty: 31 TABLET | Refills: 0 | Status: SHIPPED | OUTPATIENT
Start: 2018-02-09 | End: 2018-02-15 | Stop reason: SDUPTHER

## 2018-02-09 RX ORDER — SULFAMETHOXAZOLE AND TRIMETHOPRIM 800; 160 MG/1; MG/1
1 TABLET ORAL 2 TIMES DAILY
Qty: 20 TABLET | Refills: 0 | Status: SHIPPED | OUTPATIENT
Start: 2018-02-09 | End: 2018-02-09

## 2018-02-09 RX ORDER — DOCUSATE SODIUM 100 MG/1
100 CAPSULE, LIQUID FILLED ORAL 2 TIMES DAILY
Qty: 31 CAPSULE | Refills: 1 | Status: SHIPPED | OUTPATIENT
Start: 2018-02-09 | End: 2018-02-09

## 2018-02-09 RX ADMIN — OXYCODONE HYDROCHLORIDE 15 MG: 5 TABLET ORAL at 09:02

## 2018-02-09 RX ADMIN — OXYCODONE HYDROCHLORIDE 15 MG: 5 TABLET ORAL at 03:02

## 2018-02-09 RX ADMIN — OXYCODONE HYDROCHLORIDE 10 MG: 5 TABLET ORAL at 12:02

## 2018-02-09 RX ADMIN — OXYCODONE HYDROCHLORIDE 15 MG: 5 TABLET ORAL at 07:02

## 2018-02-09 RX ADMIN — OXYCODONE HYDROCHLORIDE 10 MG: 5 TABLET ORAL at 04:02

## 2018-02-09 RX ADMIN — ACETAMINOPHEN 1000 MG: 10 INJECTION, SOLUTION INTRAVENOUS at 07:02

## 2018-02-09 NOTE — SUBJECTIVE & OBJECTIVE
Interval History: NAEON. Reports having  Headache and sore throat. Tolerating liquids. Denies N/V. Denies flatus. Ambulated yesterday.     Review of Systems  Objective:     Temp:  [97 °F (36.1 °C)-99.1 °F (37.3 °C)] 97.9 °F (36.6 °C)  Pulse:  [61-76] 72  Resp:  [14-22] 20  SpO2:  [90 %-100 %] 90 %  BP: (116-171)/(58-94) 119/59     Body mass index is 32.99 kg/m².            Drains     Drain                 Closed/Suction Drain 02/08/18 1323 Right Abdomen Bulb 7 Fr. less than 1 day         Urethral Catheter 02/08/18 0745 Straight-tip 16 Fr. less than 1 day                Physical Exam   Constitutional: He is oriented to person, place, and time. He appears well-developed.   HENT:   Head: Normocephalic.   Neck: Normal range of motion.   Cardiovascular: Normal rate.    Pulmonary/Chest: Effort normal.   Abdominal:   Incisions c/d/i; RENETTA drain with SS output. Appropriately tender to palpation on exam.    Genitourinary: Penis normal.   Genitourinary Comments: Perez catheter in place with yellow urine in tubing.    Neurological: He is alert and oriented to person, place, and time.       Significant Labs:    BMP:    Recent Labs  Lab 02/08/18  1446 02/09/18  0406    141   K 4.0 3.7    109   CO2 25 26   BUN 11 11   CREATININE 0.9 1.1   CALCIUM 8.0* 7.8*       CBC:     Recent Labs  Lab 02/08/18  1144 02/08/18  1446   WBC  --  15.74*   HGB  --  12.8*   HCT 39 37.1*   PLT  --  180       All pertinent labs results from the past 24 hours have been reviewed.    Significant Imaging:  All pertinent imaging results/findings from the past 24 hours have been reviewed.

## 2018-02-09 NOTE — PROGRESS NOTES
[facility]  Urology Progress Note    Problems:   Patient Active Problem List   Diagnosis    Glaucoma    Elevated PSA    Prostate cancer    Class 1 obesity with body mass index (BMI) of 33.0 to 33.9 in adult    Low grade fever       Subjective   Afebrile overnight. Pt reports he is having abdominal pain that is slightly improving with different pain regimen change this morning. He has ambulated. He denies nausea, vomiting, flatus.     Meds:      diphenhydrAMINE, ondansetron, oxyCODONE, oxyCODONE    Temp:  [97 °F (36.1 °C)-99.1 °F (37.3 °C)] 97.9 °F (36.6 °C)  Pulse:  [61-76] 72  Resp:  [14-22] 20  SpO2:  [90 %-100 %] 90 %  BP: (116-171)/(58-94) 119/59    I/O last 3 completed shifts:  In: 4156 [I.V.:4156]  Out: 3745 [Urine:2600; Drains:145; Blood:1000]    General:  Alert, cooperative, no distress, appears stated age   Head:  Normocephalic, without obvious abnormality, atraumatic   Eyes:  PERRL, conjunctiva/corneas clear   Lungs:   Respirations unlabored    Heart:  Warm and well perfused   Abdomen:   abdomen is soft with appropriate tenderness, incisions c/d/i. RENETTA drain with serosanguinout output.   : 20 Divehi urethral del toro draining light yellow urine   Drains: (1) Lenny-Kim drain(s) with closed bulb suction in the abdomen and Urinary Catheter (Del Toro)   Extremities: Extremities normal, atraumatic, no cyanosis or edema   Skin: Skin color, texture, turgor normal, no rashes or lesions   Psych: Appropriate   Neurologic: Non-focal     Recent Results (from the past 24 hour(s))   ISTAT PROCEDURE    Collection Time: 02/08/18 11:44 AM   Result Value Ref Range    POC PH 7.316 (L) 7.35 - 7.45    POC PCO2 47.5 (H) 35 - 45 mmHg    POC PO2 41 40 - 60 mmHg    POC HCO3 24.2 24 - 28 mmol/L    POC BE -2 -2 to 2 mmol/L    POC SATURATED O2 71 (L) 95 - 100 %    POC Glucose 145 (H) 70 - 110 mg/dL    POC Sodium 137 136 - 145 mmol/L    POC Potassium 4.0 3.5 - 5.1 mmol/L    POC TCO2 26 24 - 29 mmol/L    POC  Ionized Calcium 1.12 1.06 - 1.42 mmol/L    POC Hematocrit 39 36 - 54 %PCV    Sample VENOUS    CBC auto differential    Collection Time: 02/08/18  2:46 PM   Result Value Ref Range    WBC 15.74 (H) 3.90 - 12.70 K/uL    RBC 4.58 (L) 4.60 - 6.20 M/uL    Hemoglobin 12.8 (L) 14.0 - 18.0 g/dL    Hematocrit 37.1 (L) 40.0 - 54.0 %    MCV 81 (L) 82 - 98 fL    MCH 27.9 27.0 - 31.0 pg    MCHC 34.5 32.0 - 36.0 g/dL    RDW 13.6 11.5 - 14.5 %    Platelets 180 150 - 350 K/uL    MPV 11.1 9.2 - 12.9 fL    Immature Granulocytes 0.4 0.0 - 0.5 %    Gran # (ANC) 12.6 (H) 1.8 - 7.7 K/uL    Immature Grans (Abs) 0.06 (H) 0.00 - 0.04 K/uL    Lymph # 1.8 1.0 - 4.8 K/uL    Mono # 1.2 (H) 0.3 - 1.0 K/uL    Eos # 0.0 0.0 - 0.5 K/uL    Baso # 0.06 0.00 - 0.20 K/uL    nRBC 0 0 /100 WBC    Gran% 79.8 (H) 38.0 - 73.0 %    Lymph% 11.5 (L) 18.0 - 48.0 %    Mono% 7.9 4.0 - 15.0 %    Eosinophil% 0.0 0.0 - 8.0 %    Basophil% 0.4 0.0 - 1.9 %    Differential Method Automated    Basic metabolic panel    Collection Time: 02/08/18  2:46 PM   Result Value Ref Range    Sodium 138 136 - 145 mmol/L    Potassium 4.0 3.5 - 5.1 mmol/L    Chloride 107 95 - 110 mmol/L    CO2 25 23 - 29 mmol/L    Glucose 143 (H) 70 - 110 mg/dL    BUN, Bld 11 8 - 23 mg/dL    Creatinine 0.9 0.5 - 1.4 mg/dL    Calcium 8.0 (L) 8.7 - 10.5 mg/dL    Anion Gap 6 (L) 8 - 16 mmol/L    eGFR if African American >60.0 >60 mL/min/1.73 m^2    eGFR if non African American >60.0 >60 mL/min/1.73 m^2   Basic metabolic panel    Collection Time: 02/09/18  4:06 AM   Result Value Ref Range    Sodium 141 136 - 145 mmol/L    Potassium 3.7 3.5 - 5.1 mmol/L    Chloride 109 95 - 110 mmol/L    CO2 26 23 - 29 mmol/L    Glucose 105 70 - 110 mg/dL    BUN, Bld 11 8 - 23 mg/dL    Creatinine 1.1 0.5 - 1.4 mg/dL    Calcium 7.8 (L) 8.7 - 10.5 mg/dL    Anion Gap 6 (L) 8 - 16 mmol/L    eGFR if African American >60.0 >60 mL/min/1.73 m^2    eGFR if non African American >60.0 >60 mL/min/1.73 m^2   Creatinine, Peritoneal,  Pleural Fluid or RENETTA Drainage, In-House RENETTA Drainage    Collection Time: 02/09/18  9:23 AM   Result Value Ref Range    Body Fluid Source, Creatinine RENETTA Drainage     Creatinine, Body Fluid 1.0 Not established mg/dL       Assessment   68 y.o. AA male with prostate cancer s/p robotic assisted radical prostatectomy, bilateral pelvic lymph node dissection 2/8/18 POD 1 doing well    Plan   1. General - pain regimen changed this morning, encouraged patient to ambulate as sometimes this will relieve back pain associated from being supine on the operating room table.  2. Pulm - I instructed the patient how to use the incentive spirometer today, he has not been using it. He demonstrated an excellent effort today. Encouraged use 10x/hour.  3. CVS - H/H stable yesterday postoperatively, no CBC drawn this morning. I ordered a CBC stat.   4. FEN/GI - clears yesterday, can advance to regular diet today and will continue to monitor his PO intake. Encouraged to not eat too much, and only eat small amounts at a time to avoid nausea/vomiting.  5. Renal - serum Cr stable. UOP stable, light yellow, will continue to monitor today with IV fluids stopped.  6. ID - perioperative cefazolin completed.   7.  - patient is ambulating, RENETTA Cr 1.0, can remove RENETTA drain before discharge. Will followup on CBC ordered this morning. Perez draining well.  8. PPX: SCDs, Is, no need for GI prophylaxis as patient is eating.   9. Dispo - pending toleration of regular diet, additional ambulation today, CBC results. Will remove RENETTA drain closer to discharge. Can discharge with pain medication, colace 100mg po bid, Bactrim DS daily x 10 days. Instruct patient if no bowel movement by Sunday, can purchase over the counter milk of magnesia to aid with a bowel movement. He will followup with me on 2/15/18 in clinic with a cystogram also to be performed on 2/15/18 before seeing me.

## 2018-02-09 NOTE — PROGRESS NOTES
[facility]  Urology  Progress Note    Patient Name: Bharathi Garrido  MRN: [id number]  Admission Date: 2/8/2018  Hospital Length of Stay: 1 days  Code Status: No Order   Attending Provider: Marta Pederson MD   Primary Care Physician: Melvin Gee MD    Subjective:     HPI:  69 yo male with Lisa 3+4=7 prostate cancer admitted for RALP POD#1.     Interval History: NAEON. Reports having  Headache and sore throat. Tolerating liquids. Denies N/V. Denies flatus. Ambulated yesterday.     Review of Systems  Objective:     Temp:  [97 °F (36.1 °C)-99.1 °F (37.3 °C)] 97.9 °F (36.6 °C)  Pulse:  [61-76] 72  Resp:  [14-22] 20  SpO2:  [90 %-100 %] 90 %  BP: (116-171)/(58-94) 119/59     Body mass index is 32.99 kg/m².            Drains     Drain                 Closed/Suction Drain 02/08/18 1323 Right Abdomen Bulb 7 Fr. less than 1 day         Urethral Catheter 02/08/18 0745 Straight-tip 16 Fr. less than 1 day                Physical Exam   Constitutional: He is oriented to person, place, and time. He appears well-developed.   HENT:   Head: Normocephalic.   Neck: Normal range of motion.   Cardiovascular: Normal rate.    Pulmonary/Chest: Effort normal.   Abdominal:   Incisions c/d/i; RENETTA drain with SS output. Appropriately tender to palpation on exam.    Genitourinary: Penis normal.   Genitourinary Comments: Perez catheter in place with yellow urine in tubing.    Neurological: He is alert and oriented to person, place, and time.       Significant Labs:    BMP:    Recent Labs  Lab 02/08/18  1446 02/09/18  0406    141   K 4.0 3.7    109   CO2 25 26   BUN 11 11   CREATININE 0.9 1.1   CALCIUM 8.0* 7.8*       CBC:     Recent Labs  Lab 02/08/18  1144 02/08/18  1446   WBC  --  15.74*   HGB  --  12.8*   HCT 39 37.1*   PLT  --  180       All pertinent labs results from the past 24 hours have been reviewed.    Significant Imaging:  All pertinent imaging results/findings from the past 24 hours have been  reviewed.                  Assessment/Plan:     Prostate cancer        - RENETTA creatinine ordered this am. Will follow up and decide on drain management  - ambulate QID  - will plan to discharge patient home after he is tolerating diet, ambulating and pain well controlled.   - likely dc home this am.   - will discuss plan with staff Dr. Pederson.             VTE Risk Mitigation         Ordered     Medium Risk of VTE  Once      02/08/18 0538     Place sequential compression device  Until discontinued      02/08/18 0538     Place IVANNA hose  Until discontinued      02/08/18 0538          Rl Henning MD  Urology  [facility]

## 2018-02-09 NOTE — DISCHARGE SUMMARY
[facility]  Urology  Discharge Summary      Patient Name: Bharathi Garrido  MRN: [id number]  Admission Date: 2/8/2018  Hospital Length of Stay: 1 days  Discharge Date and Time:  02/09/2018 7:44 AM  Attending Physician: Marta Pederson MD   Discharging Provider: Rl Henning MD  Primary Care Physician: Melvin Gee MD    HPI: 69 yo male with Lisa 3+4=7 disease admitted for RALP.    Procedure(s) (LRB):  ROBOTIC ASSISTED LAPAROSCOPIC PROSTATECTOMY (N/A)  DISSECTION-LYMPH NODE-PELVIC/ poss bilateral nerve sparing (Bilateral)     Indwelling Lines/Drains at time of discharge:   Lines/Drains/Airways     Drain                 Closed/Suction Drain 02/08/18 1323 Right Abdomen Bulb 7 Fr. less than 1 day         Urethral Catheter 02/08/18 0745 Straight-tip 16 Fr. less than 1 day                Hospital Course (synopsis of major diagnoses, care, treatment, and services provided during the course of the hospital stay): Patient admitted for above procedure. Tolerated the procedure well. Admitted overnight for observation. Tolerated diet, pain well controlled, ambulating. POD# 1 RENETTA creatinine sent and returned normal. Patient discharged home with del toro catheter and will return as outpatient for catheter removal.     Consults:     Significant Diagnostic Studies: none    Pending Diagnostic Studies:     None          Final Active Diagnoses:    Diagnosis Date Noted POA    Prostate cancer [C61] 02/01/2018 Yes      Problems Resolved During this Admission:    Diagnosis Date Noted Date Resolved POA       Discharged Condition: good    Disposition:     Follow Up:  Follow-up Information     Marta Pederson MD In 1 week.    Specialty:  Urology  Why:  post op follow up.   Contact information:  [address]  [address]  [place] [address] [phone]                 Patient Instructions:     Diet Adult Regular     Lifting restrictions   Order Comments: Do not perform strenuous activity or heavy lifting for 6  weeks post operatively. Do not lift anything heavier than 10 pounds. Ambulate frequently     Notify your health care provider if you experience any of the following:  temperature >100.4     Notify your health care provider if you experience any of the following:  persistent nausea and vomiting or diarrhea     Notify your health care provider if you experience any of the following:  severe uncontrolled pain     Notify your health care provider if you experience any of the following:  redness, tenderness, or signs of infection (pain, swelling, redness, odor or green/yellow discharge around incision site)     Notify your health care provider if you experience any of the following:  difficulty breathing or increased cough     Notify your health care provider if you experience any of the following:  severe persistent headache     Notify your health care provider if you experience any of the following:  worsening rash     Notify your health care provider if you experience any of the following:  persistent dizziness, light-headedness, or visual disturbances     Notify your health care provider if you experience any of the following:  increased confusion or weakness     No dressing needed   Order Comments: May remove dressings 48 hours after the surgery. Leave steri strips on wound to fall off naturally. May shower 48 hours after surgery. Do not soak wounds in water or take baths.       Medications:  Reconciled Home Medications:   Current Discharge Medication List      START taking these medications    Details   oxyCODONE-acetaminophen (PERCOCET) 5-325 mg per tablet Take 1 tablet by mouth every 4 (four) hours as needed.  Qty: 31 tablet, Refills: 0      polyethylene glycol (GLYCOLAX) 17 gram PwPk Take 17 g by mouth once daily.  Qty: 30 packet, Refills: 0         CONTINUE these medications which have NOT CHANGED    Details   brimonidine 0.2% (ALPHAGAN) 0.2 % Drop Place 1 drop into both eyes 2 (two) times daily.  Refills: 6       latanoprost 0.005 % ophthalmic solution INTALL 1 DROP TO BOTH EYES CONNOR NIGHT  Refills: 6      timolol maleate 0.5% (TIMOPTIC) 0.5 % Drop Place 1 drop into both eyes 2 (two) times daily.  Refills: 7      MULTIVIT-MINERALS/FA/LYCOPENE (ONE-A-DAY MEN'S ORAL) Take by mouth every morning.             Time spent on the discharge of patient: 20 minutes    Rl Henning MD  Urology  [facility]

## 2018-02-09 NOTE — ASSESSMENT & PLAN NOTE
- RENETTA creatinine ordered this am. Will follow up and decide on drain management  - ambulate QID  - will plan to discharge patient home after he is tolerating diet, ambulating and pain well controlled.   - likely dc home this am.   - will discuss plan with staff Dr. Pederson.

## 2018-02-09 NOTE — OP NOTE
[facility]  Operative Note    Date: 02/08/2018    Pre-Op Diagnosis:  high risk [id number] Lisa 3+4=7 prostate adenocarcinoma    Patient Active Problem List   Diagnosis    Glaucoma    Elevated PSA    Prostate cancer    Class 1 obesity with body mass index (BMI) of 33.0 to 33.9 in adult    Low grade fever     Post-Op Diagnosis: same    Procedure(s) Performed:   1.  Robot-assisted laparoscopic radical prostatectomy, bladder neck reconstruction - 22 modifier  2.  Bilateral pelvic lymph node dissection  3.  Lysis of adhesions < 1 hour  4.  Perez catheter insertion by MD    Reason for 22 modifier: The patient's body habitus, redundant bowel, excessive mesenteric fat, narrow pelvis, and large prostate made visualization and maneuvering exceedingly difficult. This required >100% more time and effort to safely perform the procedure than is usual.    Staff Surgeon: Marta Pederson MD    Assistant Surgeon: Tuan Redmond MD    Bedside Assistant:  LISSA Zapata (no qualified resident was available to assist with the case)    Anesthesia:  General endotracheal anesthesia    Indications: Bharathi Garrido is a 68 y.o. male with high risk [id number] [place] with multifocal Lisa 4+3, 3+4, and 3+3 adenocarcinoma of the prostate detected on a prostate biopsy performed for a significantly elevated PSA of 21.  His prostatic volume was determined to be 63.4g during his prostate biopsy with Dr. Melvin Gee, a bone scan was negative on 12/18/17 for metastatic disease. A CT scan of the abdomen and pelvis was negative for metastatic disease on 12/21/17. His BMI is 37.26. He has been counseled on the treatment for prostate cancer and has elected to pursue surgical intervention.          Findings:    - Robotic-assisted laparoscopic prostatectomy performed with considerable difficulty due to redundant bowel, excessive mesenteric fat, narrow pelvis, and large prostate  - Bilateral PLND performed  - Vesicourethral  anastomosis confirmed water tight with negative leak test    Estimated Blood Loss: 1000 mL    IVF:  Per anesthesia    Drains:   1.  20 Fr del toro catheter with 10 mL sterile water in the balloon  2.  7 mm Thanh to LLQ    Specimen(s):   1.  Right pelvic lymph nodes  2.  Left pelvic lymph nodes  3.  Prostate and seminal vesicles    Complications:  None apparent    Procedure in Detail:  After general endotracheal anesthesia, the patient was carefully positioned, padded, prepped and draped.  Positioning and padding was checked by the surgeon and the circulating nurse.  IV antibiotics were administered within 1 hour prior to making initial skin incision.  An OG tube was placed.  A Del Toro catheter was placed and inflated with 10cc of normal saline.  A WHO time was performed and the patient's identity was confirmed with 2 identifiers.  The correct procedure, allergies, blood products were verified by the entire operative team.                                                                        A supraumbilical incision was marked and two penetrating towel clamps were used for tension. This incision was made with a 15 blade. A Veress needle was placed through the incision and in to the peritoneal cavity, and with aspiration and drop test the abdomen was insufflated. The initial pressures were < 10 mm Hg, confirming peritoneal location. The abdomen insufflated to 15 mmHg.  A 12 mm trocar was placed at this site and a camera was introduced.  The abdominal cavity was carefully inspected. There was no evidence of trauma to the peritoneal contents, nor any evidence of injury to the retroperitoneum. There was noticed to be a large amount of mesenteric fat and redundant colon.    Four more trocars were placed under direct vision. We turned our attention first towards the patient's right side.  We identified an area 8cm lateral to the umbilicus and placed an 8mm trocar under direct vision. An 12 mm air seal assistant trocar was  then placed at least 8cm lateral to this and 2 finger breadths off the ASIS. A 5mm assistant trocar was inserted in between the camera port and the right sided 8mm robotic trocar. We then turned our attention towards the patient's left. An 8mm trocar was placed 8cm lateral to the umbilicus. At least 8cm lateral to this and above the ASIS we placed another 8mm trocar. The robot was docked over the pelvis.    First a lysis of adhesions was performed. There were significant adhesions in the left side of the pelvis on the descending colon. The adhesions were ligated without cautery using the monopolar scissors until the descending colon was mobilized. The adhesions were extensive and multi-layered involving the entire descending colon. This portion of the procedure encompassed about 30 minutes.                                                                                                       The peritoneum posterior to the bladder was incised, the right vas deferens was identified, dissected laterally and divided.  The right seminal vesicle was gently dissected away from surrounding tissue with care being taken not to cause stretch or thermal injury to the lateral pedicle.  Similar procedure was performed  on  the right side. This procedure was exceedingly difficult due to the patient's narrow pelvis, redundant bowel, enlarged bilateral seminal vesicles, and pelvic fat. A posterior release was performed bilaterally, with care to protect the rectum.                                              An anterior bladder drop was performed by incising into the medial umbilical ligaments and gentle retraction on the candi-vesical bladder fat downwards.  The endopelvic fascia was  gently dissected from the base of the prostate to the apex on both sides, completing the lateral release. The patient's narrow pelvis and large prostate made this procedure more difficult than usual. A 0- vicryl suture with lapry ties was then used to  ligate and divide the dorsal venous complex.  The bladder neck was then divided.  A bladder neck sparing approach was utilized.  Using the monopolar scissors the plane between the bladder and prostate was entered and divided until the del toro was visualized. Afterwards the del toro was brought out through the cystotomy and elevated anteriorly with the 4th arm. Afterwards a median lobe was visualized and inspected. Taking great care to avoid the bilateral ureteral orifices, the border where the median lobe was present was scored using one blade of the monopolar scissors.  The ureteral orifices which were identified to be actively effluxing clear urine. They the scissors were used to enter the plan between the bladder and prostate.     After dividing the posterior bladder neck, the seminal vesicles were revisualized and retracted anteriorly.   The lateral pedicle on the left was controlled with Weck clips.  The same was used for the right pedicle. In order to mobilize the significantly enlarged prostate, a robotic tenaculum was introduced in the left most robotic port (4th arm) instead of the prograsps. This was used to turn the prostate towards the left of the pelvis when ligating the right sided pedicles and turn the prostate towards the right pelvis when ligating the left sided pedicles. This entire portion of circumferentially dividing the prostate from its surrounding tissues took much more time than the usual procedure time and added about 2 hours to the procedure.     The urethra was divided using cold scissors, the pelvis irrigated and carefully inspected. Excellent preservation of the internal sphincter was achieved circumferentially.  There was no evidence of rectal trauma or bleeding.  A finger by the assistant was inserted into the rectum to perform a digital exam and no rectal entry was visualized.     We then performed a right followed by left pelvic lymph node dissection, with care to avoid injury to the  pelvic vessels as well as the obturator nerves. Both of the lymph node dissections were performed by identifying the external iliac vein and obturator nerve structures. The lymph nodes in between the structures were dissected. Incoming lymphatics were ligated both with a Weck clip as well as cautery during division. The right and left lymph nodes were sent off to pathology separately.    A bladder neck reconstruction was performed due to the wide bladder neck that resulted because of the patient's significantly enlarged median lobe. Starting custodial at the bladder neck, a 2-0 V-isaiah suture was used to narrow the bladder neck in a running fashion. The suture was far away from the ureteral orifices which were identified to be actively effluxing clear urine.     A vesicourethral anastomosis was then performed with a running double armed suture of 2-0 V-isaiah.  After completing the anastomosis, a fresh 20 Bengali urethral Perez catheter was advanced into the bladder and the balloon was inflated.  The bladder irrigated, there was noted to be no extravasation with 120 mL sterile saline in the bladder. The specimen was placed in an EndoCatch bag, and a drain was placed through the LLQ port. The supraumbilical port site fascia was widened under direct vision, for specimen extraction. The 12mm AirSeal port fascia was closed with a 0 vicryl using the Sina- Thomnemo device. The specimen was extracted through the mid-line camera port incision. The extraction site was closed using #1 PDS suture. The skin sites were closed with 4-0 monocryl. Dermabond was applied to the incisions. Needle and sponge counts were correct.  The patient was transferred to the Recovery Room in stable condition.                     Attending attestation: I was present and scrubbed throughout the entire procedure.                                            Disposition: The patient was extubated without any adverse event, he will convalesce in the PACU. He  will be admitted to observation overnight. We will monitor his pain, vital signs, labs as well as output from his RENETTA drain and urethral del toro. Should he meet all criteria, we will make plans for discharge on postoperative day 1.

## 2018-02-09 NOTE — ANESTHESIA POSTPROCEDURE EVALUATION
"Anesthesia Post Evaluation    Patient: Bharathi Garrido    Procedure(s) Performed: Procedure(s) (LRB):  ROBOTIC ASSISTED LAPAROSCOPIC PROSTATECTOMY (N/A)  DISSECTION-LYMPH NODE-PELVIC/ poss bilateral nerve sparing (Bilateral)    Final Anesthesia Type: general  Patient location during evaluation: PACU  Patient participation: Yes- Able to Participate  Level of consciousness: awake and alert  Post-procedure vital signs: reviewed and stable  Pain management: adequate  Airway patency: patent  PONV status at discharge: No PONV  Anesthetic complications: no      Cardiovascular status: blood pressure returned to baseline  Respiratory status: spontaneous ventilation and room air  Hydration status: euvolemic  Follow-up not needed.        Visit Vitals  BP (!) 116/58   Pulse 76   Temp 36.9 °C (98.4 °F) (Oral)   Resp 20   Ht 5' 10" (1.778 m)   Wt 104.3 kg (229 lb 15 oz)   SpO2 (!) 91%   BMI 32.99 kg/m²       Pain/Sarah Score: Pain Assessment Performed: Yes (2/8/2018 10:45 PM)  Presence of Pain: complains of pain/discomfort (2/8/2018 10:45 PM)  Pain Rating Prior to Med Admin: 8 (2/8/2018 10:45 PM)  Pain Rating Post Med Admin: 4 (2/8/2018  8:20 PM)  Sarah Score: 9 (2/8/2018  3:54 PM)  Modified Sarah Score: 20 (2/8/2018  5:51 AM)      "

## 2018-02-10 VITALS
RESPIRATION RATE: 16 BRPM | WEIGHT: 229.94 LBS | DIASTOLIC BLOOD PRESSURE: 82 MMHG | SYSTOLIC BLOOD PRESSURE: 127 MMHG | TEMPERATURE: 99 F | BODY MASS INDEX: 32.92 KG/M2 | HEART RATE: 80 BPM | OXYGEN SATURATION: 90 % | HEIGHT: 70 IN

## 2018-02-10 LAB
ANION GAP SERPL CALC-SCNC: 7 MMOL/L
BASOPHILS # BLD AUTO: 0.05 K/UL
BASOPHILS NFR BLD: 0.5 %
BUN SERPL-MCNC: 10 MG/DL
CALCIUM SERPL-MCNC: 8.5 MG/DL
CHLORIDE SERPL-SCNC: 106 MMOL/L
CO2 SERPL-SCNC: 26 MMOL/L
CREAT SERPL-MCNC: 1.2 MG/DL
DIFFERENTIAL METHOD: ABNORMAL
EOSINOPHIL # BLD AUTO: 0.2 K/UL
EOSINOPHIL NFR BLD: 1.9 %
ERYTHROCYTE [DISTWIDTH] IN BLOOD BY AUTOMATED COUNT: 13.8 %
EST. GFR  (AFRICAN AMERICAN): >60 ML/MIN/1.73 M^2
EST. GFR  (NON AFRICAN AMERICAN): >60 ML/MIN/1.73 M^2
GLUCOSE SERPL-MCNC: 127 MG/DL
HCT VFR BLD AUTO: 33.7 %
HGB BLD-MCNC: 11.3 G/DL
IMM GRANULOCYTES # BLD AUTO: 0.03 K/UL
IMM GRANULOCYTES NFR BLD AUTO: 0.3 %
LYMPHOCYTES # BLD AUTO: 1.8 K/UL
LYMPHOCYTES NFR BLD: 17 %
MCH RBC QN AUTO: 27.6 PG
MCHC RBC AUTO-ENTMCNC: 33.5 G/DL
MCV RBC AUTO: 82 FL
MONOCYTES # BLD AUTO: 1.6 K/UL
MONOCYTES NFR BLD: 14.7 %
NEUTROPHILS # BLD AUTO: 7 K/UL
NEUTROPHILS NFR BLD: 65.6 %
NRBC BLD-RTO: 0 /100 WBC
PLATELET # BLD AUTO: 160 K/UL
PMV BLD AUTO: 11.9 FL
POTASSIUM SERPL-SCNC: 3.4 MMOL/L
RBC # BLD AUTO: 4.09 M/UL
SODIUM SERPL-SCNC: 139 MMOL/L
WBC # BLD AUTO: 10.66 K/UL

## 2018-02-10 PROCEDURE — 25000003 PHARM REV CODE 250: Performed by: STUDENT IN AN ORGANIZED HEALTH CARE EDUCATION/TRAINING PROGRAM

## 2018-02-10 PROCEDURE — 80048 BASIC METABOLIC PNL TOTAL CA: CPT

## 2018-02-10 PROCEDURE — 36415 COLL VENOUS BLD VENIPUNCTURE: CPT

## 2018-02-10 PROCEDURE — 85025 COMPLETE CBC W/AUTO DIFF WBC: CPT

## 2018-02-10 RX ADMIN — OXYCODONE HYDROCHLORIDE 15 MG: 5 TABLET ORAL at 01:02

## 2018-02-10 RX ADMIN — OXYCODONE HYDROCHLORIDE 10 MG: 5 TABLET ORAL at 09:02

## 2018-02-10 RX ADMIN — OXYCODONE HYDROCHLORIDE 15 MG: 5 TABLET ORAL at 04:02

## 2018-02-10 NOTE — PROGRESS NOTES
Pt d/c per MD order. Pt verbalized understanding d/c instructions. IV removed and catheter tip intact. Pt was able teach back to me they understood how to change out there leg bag. Pt sent home with instructions on del toro care and supplies. Pt left via wheelchair escorted by staff and faily.

## 2018-02-10 NOTE — SUBJECTIVE & OBJECTIVE
Interval History:   Did well overnight, says he feels better  Walked multiple times through halls  Tolerated dinner and breakfast  No bm or flatus    Review of Systems  Objective:     Temp:  [98 °F (36.7 °C)-100.4 °F (38 °C)] 98.3 °F (36.8 °C)  Pulse:  [67-77] 70  Resp:  [15-18] 16  SpO2:  [91 %-98 %] 98 %  BP: (117-150)/(61-75) 120/68     Body mass index is 32.99 kg/m².            Drains     Drain                 Urethral Catheter 02/08/18 0745 Straight-tip 16 Fr. 2 days         Closed/Suction Drain 02/08/18 1323 Right Abdomen Bulb 7 Fr. 1 day                Physical Exam   Constitutional: He is oriented to person, place, and time. He appears well-developed.   HENT:   Head: Normocephalic.   Neck: Normal range of motion.   Cardiovascular: Normal rate.    Pulmonary/Chest: Effort normal.   Abdominal:   Incisions c/d/i; RENETTA drain with SS output. Appropriately tender to palpation on exam.    Genitourinary: Penis normal.   Genitourinary Comments: Perez catheter in place with yellow urine in tubing.    Neurological: He is alert and oriented to person, place, and time.       Significant Labs:    BMP:    Recent Labs  Lab 02/08/18  1446 02/09/18  0406 02/10/18  0603    141 139   K 4.0 3.7 3.4*    109 106   CO2 25 26 26   BUN 11 11 10   CREATININE 0.9 1.1 1.2   CALCIUM 8.0* 7.8* 8.5*       CBC:     Recent Labs  Lab 02/08/18  1446 02/09/18  1049 02/10/18  0603   WBC 15.74* 10.63 10.66   HGB 12.8* 11.1* 11.3*   HCT 37.1* 33.0* 33.7*    161 160       All pertinent labs results from the past 24 hours have been reviewed.    Significant Imaging:  All pertinent imaging results/findings from the past 24 hours have been reviewed.

## 2018-02-10 NOTE — PROGRESS NOTES
[facility]  Urology  Progress Note    Patient Name: Bharathi Garrido  MRN: [id number]  Admission Date: 2/8/2018  Hospital Length of Stay: 2 days  Code Status: No Order   Attending Provider: Marta Pederson MD   Primary Care Physician: Melvin Gee MD    Subjective:     HPI:  69 yo male with Lisa 3+4=7 prostate cancer admitted for RALP POD#1.     Interval History:   Did well overnight, says he feels better  Walked multiple times through halls  Tolerated dinner and breakfast  No bm or flatus    Review of Systems  Objective:     Temp:  [98 °F (36.7 °C)-100.4 °F (38 °C)] 98.3 °F (36.8 °C)  Pulse:  [67-77] 70  Resp:  [15-18] 16  SpO2:  [91 %-98 %] 98 %  BP: (117-150)/(61-75) 120/68     Body mass index is 32.99 kg/m².            Drains     Drain                 Urethral Catheter 02/08/18 0745 Straight-tip 16 Fr. 2 days         Closed/Suction Drain 02/08/18 1323 Right Abdomen Bulb 7 Fr. 1 day                Physical Exam   Constitutional: He is oriented to person, place, and time. He appears well-developed.   HENT:   Head: Normocephalic.   Neck: Normal range of motion.   Cardiovascular: Normal rate.    Pulmonary/Chest: Effort normal.   Abdominal:   Incisions c/d/i; RENETTA drain with SS output. Appropriately tender to palpation on exam.    Genitourinary: Penis normal.   Genitourinary Comments: Perez catheter in place with yellow urine in tubing.    Neurological: He is alert and oriented to person, place, and time.       Significant Labs:    BMP:    Recent Labs  Lab 02/08/18  1446 02/09/18  0406 02/10/18  0603    141 139   K 4.0 3.7 3.4*    109 106   CO2 25 26 26   BUN 11 11 10   CREATININE 0.9 1.1 1.2   CALCIUM 8.0* 7.8* 8.5*       CBC:     Recent Labs  Lab 02/08/18  1446 02/09/18  1049 02/10/18  0603   WBC 15.74* 10.63 10.66   HGB 12.8* 11.1* 11.3*   HCT 37.1* 33.0* 33.7*    161 160       All pertinent labs results from the past 24 hours have been reviewed.    Significant  Imaging:  All pertinent imaging results/findings from the past 24 hours have been reviewed.                  Assessment/Plan:     * Prostate cancer        - regular diet  - will remove RENETTA before discharge  - ambulate QID  - pain medicine  - dc today after RENETTA removed            VTE Risk Mitigation         Ordered     Medium Risk of VTE  Once      02/08/18 0538     Place sequential compression device  Until discontinued      02/08/18 0538     Place IVANNA hose  Until discontinued      02/08/18 0538          Luis Manuel Bruce MD  Urology  [facility]

## 2018-02-10 NOTE — PLAN OF CARE
Problem: Patient Care Overview  Goal: Plan of Care Review  Outcome: Ongoing (interventions implemented as appropriate)  Pt AAOx4 and VSS. Pt is progressing with plan of care. Free of skin breakdown as the pt positioned/repositioned well independently. Clean, dry, and intact on abdomen.SCDs in place at all times when not ambulating. Incentive spirometer at bedside and pt instructed on its use. Pain controlled well with PRN meds. Frequent rounds made to assess pain and safety and no complaints at this time noted. Side rails up x 2. Bed locked. Call light within reach. No falls noted. Will continue to monitor.      .

## 2018-02-10 NOTE — DISCHARGE SUMMARY
[facility]  Urology  Discharge Summary      Patient Name: Bharathi Garrido  MRN: [id number]  Admission Date: 2/8/2018  Hospital Length of Stay: 2 days  Discharge Date and Time:  02/10/2018 10:18 AM  Attending Physician: Marta Pederson MD   Discharging Provider: Luis Manuel Bruce MD  Primary Care Physician: Melvin Gee MD    HPI: 67 yo male with Lisa 3+4=7 disease admitted for RALP.    Procedure(s) (LRB):  ROBOTIC ASSISTED LAPAROSCOPIC PROSTATECTOMY (N/A)  DISSECTION-LYMPH NODE-PELVIC/ poss bilateral nerve sparing (Bilateral)     Indwelling Lines/Drains at time of discharge:   Lines/Drains/Airways     Drain                 Urethral Catheter 02/08/18 0745 Straight-tip 16 Fr. 2 days         Closed/Suction Drain 02/08/18 1323 Right Abdomen Bulb 7 Fr. 1 day                Hospital Course (synopsis of major diagnoses, care, treatment, and services provided during the course of the hospital stay): Patient admitted for above procedure. Tolerated the procedure well. Admitted overnight for observation. His pain was well controlled and he was ambulating. A RENETTA creatinine sent and returned consistent with serum creatinine and was removed before discharge. He was tolerating his diet the evening of POD1. Patient discharged home with del toro catheter and will return as outpatient for catheter removal.     Consults:     Significant Diagnostic Studies: none    Pending Diagnostic Studies:     None          Final Active Diagnoses:    Diagnosis Date Noted POA    PRINCIPAL PROBLEM:  Prostate cancer [C61] 02/01/2018 Yes      Problems Resolved During this Admission:    Diagnosis Date Noted Date Resolved POA       Discharged Condition: good    Disposition: Home or Self Care    Follow Up:  Follow-up Information     Marta Pederson MD On 2/15/2018.    Specialty:  Urology  Why:  post op follow up. needs cystogram before coming to clinic.   Contact information:  [address]  [address]  [place]  [id number]  [phone]                 Patient Instructions:     FL Cystogram Minimum 3 Views   Standing Status: Future  Standing Exp. Date: 02/09/19   Order Specific Question Answer Comments   May the Radiologist modify the order per protocol to meet the clinical needs of the patient? Yes      Diet Adult Regular     Lifting restrictions   Order Comments: Do not perform strenuous activity or heavy lifting for 6 weeks post operatively. Do not lift anything heavier than 10 pounds. Ambulate frequently     Notify your health care provider if you experience any of the following:  temperature >100.4     Notify your health care provider if you experience any of the following:  persistent nausea and vomiting or diarrhea     Notify your health care provider if you experience any of the following:  severe uncontrolled pain     Notify your health care provider if you experience any of the following:  redness, tenderness, or signs of infection (pain, swelling, redness, odor or green/yellow discharge around incision site)     Notify your health care provider if you experience any of the following:  difficulty breathing or increased cough     Notify your health care provider if you experience any of the following:  severe persistent headache     Notify your health care provider if you experience any of the following:  worsening rash     Notify your health care provider if you experience any of the following:  persistent dizziness, light-headedness, or visual disturbances     Notify your health care provider if you experience any of the following:  increased confusion or weakness     No dressing needed   Order Comments: May remove dressings 48 hours after the surgery. Leave steri strips on wound to fall off naturally. May shower 48 hours after surgery. Do not soak wounds in water or take baths.       Medications:  Reconciled Home Medications:   Current Discharge Medication List      START taking these medications    Details    docusate sodium (COLACE) 100 MG capsule Take 1 capsule (100 mg total) by mouth 2 (two) times daily.  Qty: 31 capsule, Refills: 1      oxyCODONE-acetaminophen (PERCOCET) 5-325 mg per tablet Take 1 tablet by mouth every 4 (four) hours as needed.  Qty: 31 tablet, Refills: 0      polyethylene glycol (GLYCOLAX) 17 gram PwPk Take 17 g by mouth once daily.  Qty: 30 packet, Refills: 0      sulfamethoxazole-trimethoprim 800-160mg (BACTRIM DS) 800-160 mg Tab Take 1 tablet by mouth 2 (two) times daily.  Qty: 20 tablet, Refills: 0         CONTINUE these medications which have NOT CHANGED    Details   brimonidine 0.2% (ALPHAGAN) 0.2 % Drop Place 1 drop into both eyes 2 (two) times daily.  Refills: 6      latanoprost 0.005 % ophthalmic solution INTALL 1 DROP TO BOTH EYES CONNOR NIGHT  Refills: 6      timolol maleate 0.5% (TIMOPTIC) 0.5 % Drop Place 1 drop into both eyes 2 (two) times daily.  Refills: 7      MULTIVIT-MINERALS/FA/LYCOPENE (ONE-A-DAY MEN'S ORAL) Take by mouth every morning.             Time spent on the discharge of patient: 15 minutes    Luis Manuel Bruce MD  Urology  [facility]

## 2018-02-10 NOTE — ASSESSMENT & PLAN NOTE
- regular diet  - will remove RENETTA before discharge  - ambulate QID  - pain medicine  - dc today after RENETTA removed

## 2018-02-10 NOTE — DISCHARGE INSTRUCTIONS
What to expect with your Robotic Assisted Laparoscopic Prostatectomy.  [facility]  - NO ONE IS TO REMOVE THIS CATHETER OR CHANGE THE CATHETER OTHER THAN SOMEONE FROM [facility].  - If you have to go to the ER because your catheter is not draining, come to the [facility] if possible and they will call us if they are not able to irrigate your catheter.  - If you live out of town and have to go to a local ER, then DO NOT let that doctor remove your catheter. If they are unable to irrigate the catheter or are having troubles with it, have them page the [facility] resident on call immediately at [phone] to help answer any questions.  - The catheter should come out in 7-10 days.   - You will have a midline incision after surgery where the prostate was removed. This will be sewn with absorbable suture so you do not need to worry about having the sutures removed.  - You will have smaller incisions where the instruments were inserted that are also sewn closed with absorbable sutures.  - Do not expect necessarily to have gas or to have a bowel movement - this goes along with the bloating, you may feel like you want to pass gas or have a bowel movement but you cant. This is normal and you will feel like this for a couple days. There are no pills to help with this. Small walks throughout the day should help with this.  - Pain - your pain should be able to be controlled with medicines by mouth that we prescribe. It is important for you to tell us if you are on any pain medications at home before the surgery as you may need stronger pain meds while in the hospital.  - Bladder spasms - this feels like you have to urinate but cant. Sometimes it can be due to clots clogging up your catheter. The nurse will irrigate the catheter, if there are no clots we can prescribe you an anti-spasmodic. We can also send you home with a prescription for one.   If you go home on anti-spasmodics, do not take one  the morning of your appointment to have your catheter removed or you may not be able to void.   You can go home when:  o Pain is controlled with medicines by mouth  o You are able to walk without difficulty or pain  o You are tolerating a regulating diet  o Your catheter is draining freely   When you go home:  o Catheter care  - Blood in your urine (hematuria) is normal. However if you are having clots or your catheter stops draining and you are experiencing abdominal pain, go to the ER.  - If the tip of your penis hurts with the catheter in place, you can put some Vaseline at the end of the catheter to help lubricate the catheter.  o Activity  - Continue to walk - small walks throughout the day are better than one long walk.   - Do not lift anything greater than 8 pounds for 6 weeks - we want your abdominal wall muscles to heal.  o Bowel Movements - Do not strain to have a bowel movement - the pain medicines will make you constipated. That is why we also ask you to take colace 2-3 x per day to help keep your bowels regular. If you are still having trouble, then you can also add Miralax once a day. Do not take any stool softeners if you are having diarrhea.  o Drain - If you have a drain (not your catheter, but a separate drain) then record the output and bring it with you to your next appointment  o Smoking - If you smoke, we encourage you to STOP. Smoking interferes with the healing process and will prolong your healing with continued smoking.  o Driving - Do not drive while you are on pain meds or with your catheter in place.  o Bathing - If you do not have a drain, you can shower 48 hours after your surgery. If you do have a drain, sponge bathe only until the drain is out.  o Dressing - you can remove the dressings if there is no drainage or change them as needed if there is. The little sterile band-aid strips will fall off on their own in 10-14 days. If they have not fallen off then you can remove them  yourself after 14 days.  o Kegels - do not start your Kegels until after your catheter is out.  o Restarting medicines -especially blood thinners (Coumadin, ASA,Plavix), Fish Oil. Discuss this with your physician prior to discharge   When to return to the ER  o Fever - If you have a fever >101.5, this could be due to a number of reasons such as infection of the urine or incision. If your catheter has been removed, you could possibly have a leak. It would be best to come to the ER so they can better evaluate you.  o Severe pain - pain is expected, but severe or new onset of pain is not normal.   o Inability to tolerate food or liquid with nausea and vomiting - it would be best to go to the ER for them to better evaluate you.

## 2018-02-12 ENCOUNTER — PATIENT MESSAGE (OUTPATIENT)
Dept: UROLOGY | Facility: CLINIC | Age: 68
End: 2018-02-12

## 2018-02-12 LAB
BLD PROD TYP BPU: NORMAL
BLOOD UNIT EXPIRATION DATE: NORMAL
BLOOD UNIT TYPE CODE: 6200
BLOOD UNIT TYPE: NORMAL
CODING SYSTEM: NORMAL
DISPENSE STATUS: NORMAL
TRANS ERYTHROCYTES VOL PATIENT: NORMAL ML

## 2018-02-15 ENCOUNTER — OFFICE VISIT (OUTPATIENT)
Dept: UROLOGY | Facility: CLINIC | Age: 68
End: 2018-02-15
Payer: COMMERCIAL

## 2018-02-15 ENCOUNTER — HOSPITAL ENCOUNTER (OUTPATIENT)
Dept: RADIOLOGY | Facility: HOSPITAL | Age: 68
Discharge: HOME | End: 2018-02-15
Attending: UROLOGY
Payer: COMMERCIAL

## 2018-02-15 VITALS — HEIGHT: 70 IN

## 2018-02-15 DIAGNOSIS — C61 PROSTATE CANCER: Primary | ICD-10-CM

## 2018-02-15 DIAGNOSIS — C61 PROSTATE CANCER: ICD-10-CM

## 2018-02-15 DIAGNOSIS — N52.31 ERECTILE DYSFUNCTION FOLLOWING RADICAL PROSTATECTOMY: ICD-10-CM

## 2018-02-15 PROCEDURE — 74430 CONTRAST X-RAY BLADDER: CPT | Mod: 26,,, | Performed by: RADIOLOGY

## 2018-02-15 PROCEDURE — 74430 CONTRAST X-RAY BLADDER: CPT | Mod: TC

## 2018-02-15 PROCEDURE — 25500020 PHARM REV CODE 255: Performed by: UROLOGY

## 2018-02-15 PROCEDURE — 99024 POSTOP FOLLOW-UP VISIT: CPT | Mod: S$GLB,,, | Performed by: STUDENT IN AN ORGANIZED HEALTH CARE EDUCATION/TRAINING PROGRAM

## 2018-02-15 PROCEDURE — 99999 PR PBB SHADOW E&M-EST. PATIENT-LVL II: CPT | Mod: PBBFAC,,, | Performed by: STUDENT IN AN ORGANIZED HEALTH CARE EDUCATION/TRAINING PROGRAM

## 2018-02-15 RX ORDER — OXYCODONE AND ACETAMINOPHEN 5; 325 MG/1; MG/1
1 TABLET ORAL EVERY 6 HOURS PRN
Qty: 30 TABLET | Refills: 0 | Status: SHIPPED | OUTPATIENT
Start: 2018-02-15 | End: 2018-03-12 | Stop reason: SDUPTHER

## 2018-02-15 RX ORDER — TADALAFIL 5 MG/1
5 TABLET ORAL DAILY PRN
Qty: 30 TABLET | Refills: 11 | Status: SHIPPED | OUTPATIENT
Start: 2018-02-15 | End: 2018-05-14

## 2018-02-15 RX ADMIN — IOTHALAMATE MEGLUMINE 200 ML: 172 INJECTION URETERAL at 11:02

## 2018-02-15 NOTE — PROGRESS NOTES
Subjective:       Patient ID: Bharathi Garrido is a 68 y.o. male.    Chief Complaint: postop  This is a 68 y.o.  male patient that is an established patient of mine.   He was originally referred to me by Dr. Gee who met him for a scrotal infection that he debrided. During that process his PSA was noted to be elevated to 21 and he underwent a TRUS biopsy.     For his PSA elevation up to 21, he underwent a TRUS biopsy which demonstrated 63.4g volume, Pathology:   1. Left lateral base:  -Prostatic adenocarcinoma, [place] score: 4+3 = 7  -Number of cores positive: 1  -Total number of cores: 1  -Total linear millimeters of carcinoma: 4-mm  -Total linear millimeters: 20-mm  2. Left lateral mid:  -Prostatic adenocarcinoma, [place] score: 3+3 = 6  -Number of cores positive: 1  -Total number of cores: 1  -Total linear millimeters of carcinoma: 6-mm  -Total linear millimeters: 20-mm  3. Left lateral apex:  -Prostatic adenocarcinoma, Lisa score: 3+3 = 6  -Number of cores positive: 1  -Total number of cores: 1  -Total linear millimeters of carcinoma: 6-mm  -Total linear millimeters: 16-mm  4. Right lateral base:  -Prostatic adenocarcinoma, [place] score: 3+3 = 6  -Number of cores positive: 1  -Total number of cores: 1  -Total linear millimeters of carcinoma: ~3.5-mm  -Total linear millimeters: 8-mm  5. Right lateral mid:  -Prostatic adenocarcinoma, Lisa score: 3+3 = 6  -Number of cores positive: 1  -Total number of cores: 1  -Total linear millimeters of carcinoma: 5-mm  -Total linear millimeters: 11-mm  6. Right lateral apex:  -Prostatic adenocarcinoma, [place] score: 3+3 = 6  -Number of cores positive: 1  -Total number of cores: 1  -Total linear millimeters of carcinoma: 7-mm  -Total linear millimeters: 17-mm    7. Left middle base:  -Microscopic focus of prostatic adenocarcinoma  -Number of cores positive: 1  -Total number of cores: 1  -Total linear millimeters of carcinoma: ~2-mm  -Total linear millimeters: 14-mm  8.  Left middle mid:  -Benign prostate tissue  -No evidence of malignancy  9. Left middle apex:  -Prostatic adenocarcinoma, [place] score: 3+3 = 6  -Number of cores positive: 1  -Total number of cores: 1  -Total linear millimeters of carcinoma: 10-mm  -Total linear millimeters: 23-mm  -Perineural invasion identified    10. Right middle base:  -Prostatic adenocarcinoma, [place] score: 3+4 = 7  -Number of cores positive: 1  -Total number of cores: 1  -Total linear millimeters of carcinoma: 7-mm  -Total linear millimeters: 16-mm  -Perineural invasion identified  11. Right middle mid:  -Prostatic adenocarcinoma, Lisa score: 3+3 = 6  -Number of cores positive: 1  -Total number of cores: 2 fragments  -Total linear millimeters of carcinoma: 7-mm  -Total linear millimeters: 19-mm  -Perineural invasion identified  12. Right middle apex:  -Prostatic adenocarcinoma, [place] score: 3+3 =6  -Number of cores positive: 1  -Total number of cores: 1  -Total linear millimeters of carcinoma: 6-mm  -Total linear millimeters: 15-mm    He underwent a robotic assisted radical prostatectomy, bilateral wide excision, bladder neck reconstruction, bilateral pelvic lymph node dissection on 2/8/18. He underwent a cystogram in radiology earlier today which was negative for extravasation. He is here today in clinic for del toro removal and pathology review.     1. RIGHT PELVIC LYMPH NODE, DISSECTION:  - Three lymph nodes, negative for carcinoma (0/3).  2. LEFT PELVIC LYMPH NODE, DISSECTION:  - One lymph node, negative for carcinoma (0/1).  3. PROSTATE, ROBOT-ASSISTED LAPAROSCOPIC RADICAL PROSTATECTOMY:  - Prostatic adenocarcinoma, [place] score 3+4=7 (Grade group 2) with tertiary pattern 5 involving left to right  posterior apex to base.  - Please see CAP synoptic report below.  SURGICAL PATHOLOGY CANCER CASE SUMMARY- PROSTATE  Procedure: Radical prostatectomy.  Prostate size:  Weight: 75 g.  Size: 6.7 x 6 x 5.2 cm.  Histologic type: Acinar  adenocarcinoma.  Histologic grade: 3+4= 7 with tertiary pattern 5.  Lisa Grade Group: 2.  Percentage of pattern 4: 40%.  Percentage of pattern 5: 5%.  Tumor quantitation:  Estimated percentage of prostate involved by tumor: 20%.    Extraprostatic extension: Present, nonfocal.  Extraprostatic extension is seen at right apex (focal) and left and right posterior mid to base (nonfocal).  Urinary bladder neck invasion: Not identified.  Seminal vesicle invasion: Present, left.  Margins:  Involved by invasive carcinoma, non-limited (3 mm).  Location of positive margin: Left posterior lower mid.  Margin positivity in area of extraprostatic extension.  Lisa pattern at positive margin: Pattern 4.  Treatment effect: No known presurgical therapy.  Lymphovascular invasion: Not identified.  Perineural invasion: Present.  Regional lymph nodes:  Number of lymph nodes involved: 0.  Number of lymph nodes examined: 4.  Pathologic stage classification open (pTNM, AJCC 8th Edition): pT3b pN0.    His pathologic staging is pT3bN0(0/4)MxR1 (3mm focal positive margin). He has been doing well, having bowel movements, tolerating regular diet. He state he is still having some mild abdominal discomfort. He states he has been experiencing some bladder spasms. He just underwent a cystogram earlier in radiology.    I personally reviewed the cystogram and there was no evidence of a leak upon max filling of contrast as well as after the emptying phase.     LAST PSA  Lab Results   Component Value Date    PSA 21.8 (H) 03/30/2017    PSADIAG 21.1 (H) 06/15/2017       Lab Results   Component Value Date    CREATININE 1.2 02/10/2018       ---  Past Medical History:   Diagnosis Date    Difficult intubation     Elevated PSA 11/10/2017    Glaucoma     Sebaceous cyst of scrotum        Past Surgical History:   Procedure Laterality Date    INCISION AND DRAINAGE OF WOUND Left 2011, 2017    scrotum; multiple I&D       Family History   Problem Relation  Age of Onset    Prostate cancer Neg Hx     Kidney disease Neg Hx        Social History   Substance Use Topics    Smoking status: Former Smoker     Quit date: 2000    Smokeless tobacco: Never Used    Alcohol use No      Comment: rarely        Current Outpatient Prescriptions on File Prior to Visit   Medication Sig Dispense Refill    brimonidine 0.2% (ALPHAGAN) 0.2 % Drop Place 1 drop into both eyes 2 (two) times daily.  6    docusate sodium (COLACE) 100 MG capsule Take 1 capsule (100 mg total) by mouth 2 (two) times daily. 31 capsule 1    latanoprost 0.005 % ophthalmic solution INTALL 1 DROP TO BOTH EYES CONNOR NIGHT  6    MULTIVIT-MINERALS/FA/LYCOPENE (ONE-A-DAY MEN'S ORAL) Take by mouth every morning.      polyethylene glycol (GLYCOLAX) 17 gram PwPk Take 17 g by mouth once daily. 30 packet 0    sulfamethoxazole-trimethoprim 800-160mg (BACTRIM DS) 800-160 mg Tab Take 1 tablet by mouth 2 (two) times daily. 20 tablet 0    timolol maleate 0.5% (TIMOPTIC) 0.5 % Drop Place 1 drop into both eyes 2 (two) times daily.  7    [DISCONTINUED] oxyCODONE-acetaminophen (PERCOCET) 5-325 mg per tablet Take 1 tablet by mouth every 4 (four) hours as needed. 31 tablet 0     No current facility-administered medications on file prior to visit.        Review of patient's allergies indicates:  No Known Allergies    Review of Systems   Constitutional: Negative for chills.   HENT: Negative for congestion.    Eyes: Negative for visual disturbance.   Respiratory: Negative for shortness of breath.    Cardiovascular: Negative for chest pain.   Gastrointestinal: Negative for abdominal distention.   Musculoskeletal: Negative for gait problem.   Skin: Negative for color change.   Neurological: Negative for dizziness.   Psychiatric/Behavioral: Negative for agitation.       Objective:      Physical Exam   Constitutional: He appears well-developed and well-nourished.   HENT:   Head: Normocephalic.   Eyes: Pupils are equal, round, and  reactive to light.   Neck: Normal range of motion.   Cardiovascular: Intact distal pulses.    Pulmonary/Chest: Effort normal.   Abdominal: Soft. He exhibits no distension. There is no tenderness.   Well healing incisions; no hernia   Genitourinary:   Genitourinary Comments: 20 french urethral del toro draining light yellow urine - no clots. Removed today in clinic   Musculoskeletal: Normal range of motion.   Neurological: He is alert.   Skin: Skin is warm and dry.   Psychiatric: He has a normal mood and affect.       Assessment:       1. Prostate cancer    2. Erectile dysfunction following radical prostatectomy        Plan:         1. SAUMYA - patient will work on Kegel exercises, use diapers as needed. He understands that he will experience incontinence after the del toro is removed and will begin Kegels as taught by me during clinic and during his preop clinic visit with me.   2. Erectile dysfunction following radical prostatectomy - will start on daily cialis 5mg for penile rehabilitation therapy.  3. Will check first post-RARP prostatectomy PSA in about 6 weeks. He will RTC at that time.   4. Counseled based off of adverse pathologic findings tertiary [place] 5 pattern, seminal vesical invasion, and focal positive margin, he may benefit from adjuvant radiation therapy. He and his wife voiced understanding, typically we wait around 3-4 months to begin adjuvant radiation therapy to give the patient adequate time to heal.     Prostate cancer  -     Prostate Specific Antigen, Diagnostic; Future; Expected date: 03/29/2018    Erectile dysfunction following radical prostatectomy  -     Prostate Specific Antigen, Diagnostic; Future; Expected date: 03/29/2018    Other orders  -     tadalafil (CIALIS) 5 MG tablet; Take 1 tablet (5 mg total) by mouth daily as needed for Erectile Dysfunction.  Dispense: 30 tablet; Refill: 11  -     oxyCODONE-acetaminophen (PERCOCET) 5-325 mg per tablet; Take 1 tablet by mouth every 6 (six) hours as  needed for Pain.  Dispense: 30 tablet; Refill: 0

## 2018-02-18 ENCOUNTER — PATIENT MESSAGE (OUTPATIENT)
Dept: UROLOGY | Facility: CLINIC | Age: 68
End: 2018-02-18

## 2018-02-21 ENCOUNTER — TELEPHONE (OUTPATIENT)
Dept: PHARMACY | Facility: CLINIC | Age: 68
End: 2018-02-21

## 2018-02-21 NOTE — TELEPHONE ENCOUNTER
Good Afternoon.     The prior authorization for Mr. Garrido' Mike prescription has been denied. The medication is a Medicare Part D excluded drug. A copy of the denial letter has been faxed to the prescriber's office.    If there are any additional questions about the denial please contact the pharmacy at, [phone].    Thank You.    Angelita Luong CpT.  Patient Care Advocate  [facility]  [email or web address]

## 2018-02-22 ENCOUNTER — TELEPHONE (OUTPATIENT)
Dept: PHARMACY | Facility: CLINIC | Age: 68
End: 2018-02-22

## 2018-02-23 ENCOUNTER — TELEPHONE (OUTPATIENT)
Dept: UROLOGY | Facility: CLINIC | Age: 68
End: 2018-02-23

## 2018-02-23 DIAGNOSIS — N52.31 ERECTILE DYSFUNCTION FOLLOWING RADICAL PROSTATECTOMY: Primary | ICD-10-CM

## 2018-02-23 DIAGNOSIS — C61 PROSTATE CANCER: ICD-10-CM

## 2018-02-23 RX ORDER — SILDENAFIL CITRATE 20 MG/1
20 TABLET ORAL DAILY
Qty: 90 TABLET | Refills: 3 | Status: ON HOLD | OUTPATIENT
Start: 2018-02-23 | End: 2020-05-19 | Stop reason: HOSPADM

## 2018-02-23 NOTE — TELEPHONE ENCOUNTER
Will send sildenafil prescription to pharmacy.     ----- Message from Sonya Mejía, Suki sent at 2/23/2018 10:26 AM CST -----  Dr. Pederson,  Would you like to send an rx for this patient for the sildenafil 20 mg? He was willing to pay $120.00 for the sildanfil 25 mg, so I believe 1.00 a tablet will be ok. Let me know if this works! Thanks again for all your help.

## 2018-03-09 ENCOUNTER — LAB VISIT (OUTPATIENT)
Dept: LAB | Facility: HOSPITAL | Age: 68
End: 2018-03-09
Attending: STUDENT IN AN ORGANIZED HEALTH CARE EDUCATION/TRAINING PROGRAM
Payer: COMMERCIAL

## 2018-03-09 DIAGNOSIS — N52.31 ERECTILE DYSFUNCTION FOLLOWING RADICAL PROSTATECTOMY: ICD-10-CM

## 2018-03-09 DIAGNOSIS — C61 PROSTATE CANCER: ICD-10-CM

## 2018-03-09 LAB — COMPLEXED PSA SERPL-MCNC: 0.31 NG/ML

## 2018-03-09 PROCEDURE — 84153 ASSAY OF PSA TOTAL: CPT

## 2018-03-09 PROCEDURE — 36415 COLL VENOUS BLD VENIPUNCTURE: CPT

## 2018-03-12 ENCOUNTER — OFFICE VISIT (OUTPATIENT)
Dept: UROLOGY | Facility: CLINIC | Age: 68
End: 2018-03-12
Payer: COMMERCIAL

## 2018-03-12 VITALS
DIASTOLIC BLOOD PRESSURE: 88 MMHG | HEIGHT: 70 IN | HEART RATE: 74 BPM | WEIGHT: 229 LBS | BODY MASS INDEX: 32.78 KG/M2 | SYSTOLIC BLOOD PRESSURE: 133 MMHG

## 2018-03-12 DIAGNOSIS — C61 PROSTATE CANCER: Primary | ICD-10-CM

## 2018-03-12 DIAGNOSIS — N52.31 ERECTILE DYSFUNCTION FOLLOWING RADICAL PROSTATECTOMY: ICD-10-CM

## 2018-03-12 DIAGNOSIS — N39.3 SUI (STRESS URINARY INCONTINENCE), MALE: ICD-10-CM

## 2018-03-12 PROCEDURE — 99024 POSTOP FOLLOW-UP VISIT: CPT | Mod: S$GLB,,, | Performed by: STUDENT IN AN ORGANIZED HEALTH CARE EDUCATION/TRAINING PROGRAM

## 2018-03-12 PROCEDURE — 99999 PR PBB SHADOW E&M-EST. PATIENT-LVL III: CPT | Mod: PBBFAC,,, | Performed by: STUDENT IN AN ORGANIZED HEALTH CARE EDUCATION/TRAINING PROGRAM

## 2018-03-12 RX ORDER — POLYETHYLENE GLYCOL 3350 17 G/17G
POWDER, FOR SOLUTION ORAL
COMMUNITY
Start: 2018-02-09 | End: 2018-05-29

## 2018-03-12 RX ORDER — OXYCODONE AND ACETAMINOPHEN 5; 325 MG/1; MG/1
1 TABLET ORAL EVERY 6 HOURS PRN
Qty: 30 TABLET | Refills: 0 | Status: SHIPPED | OUTPATIENT
Start: 2018-03-12 | End: 2018-05-29

## 2018-03-12 NOTE — PROGRESS NOTES
Subjective:       Patient ID: Bharathi Garrido is a 68 y.o. male.    Chief Complaint: prostatectomy followup  This is a 68 y.o.  male patient that is an established patient of mine.  He has prostate cancer.  He was originally referred to me by Dr. Gee who met him for a scrotal infection that he debrided. During that process his PSA was noted to be elevated to 21 and he underwent a TRUS biopsy.   For his PSA elevation up to 21, he underwent a TRUS biopsy which demonstrated 63.4g volume, Pathology:   1. Left lateral base:  -Prostatic adenocarcinoma, [place] score: 4+3 = 7  -Number of cores positive: 1  -Total number of cores: 1  -Total linear millimeters of carcinoma: 4-mm  -Total linear millimeters: 20-mm  2. Left lateral mid:  -Prostatic adenocarcinoma, [place] score: 3+3 = 6  -Number of cores positive: 1  -Total number of cores: 1  -Total linear millimeters of carcinoma: 6-mm  -Total linear millimeters: 20-mm  3. Left lateral apex:  -Prostatic adenocarcinoma, [place] score: 3+3 = 6  -Number of cores positive: 1  -Total number of cores: 1  -Total linear millimeters of carcinoma: 6-mm  -Total linear millimeters: 16-mm  4. Right lateral base:  -Prostatic adenocarcinoma, [place] score: 3+3 = 6  -Number of cores positive: 1  -Total number of cores: 1  -Total linear millimeters of carcinoma: ~3.5-mm  -Total linear millimeters: 8-mm  5. Right lateral mid:  -Prostatic adenocarcinoma, [place] score: 3+3 = 6  -Number of cores positive: 1  -Total number of cores: 1  -Total linear millimeters of carcinoma: 5-mm  -Total linear millimeters: 11-mm  6. Right lateral apex:  -Prostatic adenocarcinoma, [place] score: 3+3 = 6  -Number of cores positive: 1  -Total number of cores: 1  -Total linear millimeters of carcinoma: 7-mm  -Total linear millimeters: 17-mm    7. Left middle base:  -Microscopic focus of prostatic adenocarcinoma  -Number of cores positive: 1  -Total number of cores: 1  -Total linear millimeters of carcinoma:  ~2-mm  -Total linear millimeters: 14-mm  8. Left middle mid:  -Benign prostate tissue  -No evidence of malignancy  9. Left middle apex:  -Prostatic adenocarcinoma, [place] score: 3+3 = 6  -Number of cores positive: 1  -Total number of cores: 1  -Total linear millimeters of carcinoma: 10-mm  -Total linear millimeters: 23-mm  -Perineural invasion identified    10. Right middle base:  -Prostatic adenocarcinoma, [place] score: 3+4 = 7  -Number of cores positive: 1  -Total number of cores: 1  -Total linear millimeters of carcinoma: 7-mm  -Total linear millimeters: 16-mm  -Perineural invasion identified  11. Right middle mid:  -Prostatic adenocarcinoma, [place] score: 3+3 = 6  -Number of cores positive: 1  -Total number of cores: 2 fragments  -Total linear millimeters of carcinoma: 7-mm  -Total linear millimeters: 19-mm  -Perineural invasion identified  12. Right middle apex:  -Prostatic adenocarcinoma, [place] score: 3+3 =6  -Number of cores positive: 1  -Total number of cores: 1  -Total linear millimeters of carcinoma: 6-mm  -Total linear millimeters: 15-mm    He underwent a robotic assisted radical prostatectomy, bilateral wide excision, bladder neck reconstruction, bilateral pelvic lymph node dissection on 2/8/18. He underwent a cystogram in radiology earlier today which was negative for extravasation. He is here today in clinic for del toro removal and pathology review.     1. RIGHT PELVIC LYMPH NODE, DISSECTION:  - Three lymph nodes, negative for carcinoma (0/3).  2. LEFT PELVIC LYMPH NODE, DISSECTION:  - One lymph node, negative for carcinoma (0/1).  3. PROSTATE, ROBOT-ASSISTED LAPAROSCOPIC RADICAL PROSTATECTOMY:  - Prostatic adenocarcinoma, [place] score 3+4=7 (Grade group 2) with tertiary pattern 5 involving left to right  posterior apex to base.  - Please see CAP synoptic report below.  SURGICAL PATHOLOGY CANCER CASE SUMMARY- PROSTATE  Procedure: Radical prostatectomy.  Prostate size:  Weight: 75 g.  Size: 6.7 x 6  x 5.2 cm.  Histologic type: Acinar adenocarcinoma.  Histologic grade: 3+4= 7 with tertiary pattern 5.  [place] Grade Group: 2.  Percentage of pattern 4: 40%.  Percentage of pattern 5: 5%.  Tumor quantitation:  Estimated percentage of prostate involved by tumor: 20%.    Extraprostatic extension: Present, nonfocal.  Extraprostatic extension is seen at right apex (focal) and left and right posterior mid to base (nonfocal).  Urinary bladder neck invasion: Not identified.  Seminal vesicle invasion: Present, left.  Margins:  Involved by invasive carcinoma, non-limited (3 mm).  Location of positive margin: Left posterior lower mid.  Margin positivity in area of extraprostatic extension.  [place] pattern at positive margin: Pattern 4.  Treatment effect: No known presurgical therapy.  Lymphovascular invasion: Not identified.  Perineural invasion: Present.  Regional lymph nodes:  Number of lymph nodes involved: 0.  Number of lymph nodes examined: 4.  Pathologic stage classification open (pTNM, AJCC 8th Edition): pT3b pN0.    His pathologic staging is pT3bN0(0/4)MxR1 (3mm focal positive margin). He has been doing well, having bowel movements, tolerating regular diet. He state he is still having some mild abdominal discomfort. He states he has been experiencing some bladder spasms. His cystogram on 2/15/18 was negative and his del toro was removed at that time. He returns back to see me today about a little over 4 weeks postoperatively. His most recent PSA was checked 0.31 3/9/18, a little earlier than we originally scheduled. His appointment was moved up because of the patient's brother recent passing. He has been ambulating, having normal bowel movements. He notes his incisions still bother him slightly and this occurs mostly at night. He notes he is still leaking a large amount of urine. He notes it is worse with movement and coughing. He has not been doing his kegel exercises.     LAST PSA  Lab Results   Component Value Date     PSA 21.8 (H) 03/30/2017    PSADIAG 0.31 03/09/2018    PSADIAG 21.1 (H) 06/15/2017       Lab Results   Component Value Date    CREATININE 1.2 02/10/2018   ---  Past Medical History:   Diagnosis Date    Difficult intubation     Elevated PSA 11/10/2017    Glaucoma     Sebaceous cyst of scrotum        Past Surgical History:   Procedure Laterality Date    INCISION AND DRAINAGE OF WOUND Left 2011, 2017    scrotum; multiple I&D       Family History   Problem Relation Age of Onset    Prostate cancer Neg Hx     Kidney disease Neg Hx        Social History   Substance Use Topics    Smoking status: Former Smoker     Quit date: 2000    Smokeless tobacco: Never Used    Alcohol use No      Comment: rarely        Current Outpatient Prescriptions on File Prior to Visit   Medication Sig Dispense Refill    brimonidine 0.2% (ALPHAGAN) 0.2 % Drop Place 1 drop into both eyes 2 (two) times daily.  6    docusate sodium (COLACE) 100 MG capsule Take 1 capsule (100 mg total) by mouth 2 (two) times daily. 31 capsule 1    latanoprost 0.005 % ophthalmic solution INTALL 1 DROP TO BOTH EYES CONNOR NIGHT  6    MULTIVIT-MINERALS/FA/LYCOPENE (ONE-A-DAY MEN'S ORAL) Take by mouth every morning.      polyethylene glycol (GLYCOLAX) 17 gram PwPk Take 17 g by mouth once daily. 30 packet 0    sildenafil, antihypertensive, (REVATIO) 20 mg Tab Take 1 tablet (20 mg total) by mouth Daily. 90 tablet 3    tadalafil (CIALIS) 5 MG tablet Take 1 tablet (5 mg total) by mouth daily as needed for Erectile Dysfunction. 30 tablet 11    timolol maleate 0.5% (TIMOPTIC) 0.5 % Drop Place 1 drop into both eyes 2 (two) times daily.  7    [DISCONTINUED] oxyCODONE-acetaminophen (PERCOCET) 5-325 mg per tablet Take 1 tablet by mouth every 6 (six) hours as needed for Pain. 30 tablet 0     No current facility-administered medications on file prior to visit.        Review of patient's allergies indicates:  No Known Allergies    Review of Systems   Constitutional:  Negative for chills.   HENT: Negative for congestion.    Eyes: Negative for visual disturbance.   Respiratory: Negative for shortness of breath.    Cardiovascular: Negative for chest pain.   Gastrointestinal: Negative for abdominal distention.   Musculoskeletal: Negative for gait problem.   Skin: Negative for color change.   Neurological: Negative for dizziness.   Psychiatric/Behavioral: Negative for agitation.       Objective:      Physical Exam   Constitutional: He appears well-developed and well-nourished.   HENT:   Head: Normocephalic.   Eyes: Pupils are equal, round, and reactive to light.   Neck: Normal range of motion.   Cardiovascular: Intact distal pulses.    Pulmonary/Chest: Effort normal.   Abdominal: Soft. He exhibits no distension. There is tenderness (mild tenderness around incision sites, no hernias).   Musculoskeletal: Normal range of motion.   Neurological: He is alert.   Skin: Skin is warm and dry.   Psychiatric: He has a normal mood and affect.       Assessment:       1. Prostate cancer    2. SAUMYA (stress urinary incontinence), male    3. Erectile dysfunction following radical prostatectomy        Plan:         1. Prostate cancer - PSA significantly lowered but still detectable. Counseled patient and his wife again on need for adjuvant radiation therapy for tertiary [place] 5 pattern, seminal vesical invasion, and focal positive margin. Will see him back in about 2 months with another PSA and will message Dr. Juan at that time.  2. SAUMYA - continue kegels.  3. ED - continue viagra for penile rehabilitation.  4. Last refill of percocet today.   5. RTC in 2 months.      Prostate cancer  -     Prostate Specific Antigen, Diagnostic; Future; Expected date: 05/12/2018    SAUMYA (stress urinary incontinence), male    Erectile dysfunction following radical prostatectomy    Other orders  -     oxyCODONE-acetaminophen (PERCOCET) 5-325 mg per tablet; Take 1 tablet by mouth every 6 (six) hours as needed for  Pain.  Dispense: 30 tablet; Refill: 0

## 2018-04-12 ENCOUNTER — PATIENT MESSAGE (OUTPATIENT)
Dept: RESEARCH | Facility: HOSPITAL | Age: 68
End: 2018-04-12

## 2018-04-30 ENCOUNTER — TELEPHONE (OUTPATIENT)
Dept: PHARMACY | Facility: CLINIC | Age: 68
End: 2018-04-30

## 2018-04-30 NOTE — TELEPHONE ENCOUNTER
Patient's application was denied. He must apply for Low Income Subsidy 1st. We completed the LIS application online today. Patient will be notified by mail

## 2018-05-11 ENCOUNTER — LAB VISIT (OUTPATIENT)
Dept: LAB | Facility: HOSPITAL | Age: 68
End: 2018-05-11
Attending: STUDENT IN AN ORGANIZED HEALTH CARE EDUCATION/TRAINING PROGRAM
Payer: COMMERCIAL

## 2018-05-11 DIAGNOSIS — C61 PROSTATE CANCER: ICD-10-CM

## 2018-05-11 LAB — COMPLEXED PSA SERPL-MCNC: 0.14 NG/ML

## 2018-05-11 PROCEDURE — 84153 ASSAY OF PSA TOTAL: CPT

## 2018-05-11 PROCEDURE — 36415 COLL VENOUS BLD VENIPUNCTURE: CPT

## 2018-05-14 ENCOUNTER — OFFICE VISIT (OUTPATIENT)
Dept: UROLOGY | Facility: CLINIC | Age: 68
End: 2018-05-14
Payer: COMMERCIAL

## 2018-05-14 VITALS
HEART RATE: 70 BPM | WEIGHT: 229 LBS | HEIGHT: 70 IN | BODY MASS INDEX: 32.78 KG/M2 | SYSTOLIC BLOOD PRESSURE: 136 MMHG | DIASTOLIC BLOOD PRESSURE: 86 MMHG

## 2018-05-14 DIAGNOSIS — N39.3 SUI (STRESS URINARY INCONTINENCE), MALE: ICD-10-CM

## 2018-05-14 DIAGNOSIS — N52.31 ERECTILE DYSFUNCTION FOLLOWING RADICAL PROSTATECTOMY: ICD-10-CM

## 2018-05-14 DIAGNOSIS — C61 PROSTATE CANCER: Primary | ICD-10-CM

## 2018-05-14 PROCEDURE — 99999 PR PBB SHADOW E&M-EST. PATIENT-LVL III: CPT | Mod: PBBFAC,,, | Performed by: STUDENT IN AN ORGANIZED HEALTH CARE EDUCATION/TRAINING PROGRAM

## 2018-05-14 PROCEDURE — 99213 OFFICE O/P EST LOW 20 MIN: CPT | Mod: S$GLB,,, | Performed by: STUDENT IN AN ORGANIZED HEALTH CARE EDUCATION/TRAINING PROGRAM

## 2018-05-14 RX ORDER — SILDENAFIL CITRATE 20 MG/1
TABLET ORAL
Qty: 50 TABLET | Refills: 11 | Status: SHIPPED | OUTPATIENT
Start: 2018-05-14 | End: 2019-01-08

## 2018-05-14 NOTE — PROGRESS NOTES
Subjective:       Patient ID: Bharathi Garrido is a 68 y.o. male.    Chief Complaint:  followup  This is a 68 y.o.  male patient that is an established patient of mine.  This is a 68 y.o.  male patient that is an established patient of mine.  He has prostate cancer.  He was originally referred to me by Dr. Gee who met him for a scrotal infection that he debrided. During that process his PSA was noted to be elevated to 21 and he underwent a TRUS biopsy.   For his PSA elevation up to 21, he underwent a TRUS biopsy which demonstrated 63.4g volume, Pathology:   1. Left lateral base:  -Prostatic adenocarcinoma, [place] score: 4+3 = 7  -Number of cores positive: 1  -Total number of cores: 1  -Total linear millimeters of carcinoma: 4-mm  -Total linear millimeters: 20-mm  2. Left lateral mid:  -Prostatic adenocarcinoma, [place] score: 3+3 = 6  -Number of cores positive: 1  -Total number of cores: 1  -Total linear millimeters of carcinoma: 6-mm  -Total linear millimeters: 20-mm  3. Left lateral apex:  -Prostatic adenocarcinoma, [place] score: 3+3 = 6  -Number of cores positive: 1  -Total number of cores: 1  -Total linear millimeters of carcinoma: 6-mm  -Total linear millimeters: 16-mm  4. Right lateral base:  -Prostatic adenocarcinoma, [place] score: 3+3 = 6  -Number of cores positive: 1  -Total number of cores: 1  -Total linear millimeters of carcinoma: ~3.5-mm  -Total linear millimeters: 8-mm  5. Right lateral mid:  -Prostatic adenocarcinoma, [place] score: 3+3 = 6  -Number of cores positive: 1  -Total number of cores: 1  -Total linear millimeters of carcinoma: 5-mm  -Total linear millimeters: 11-mm  6. Right lateral apex:  -Prostatic adenocarcinoma, [place] score: 3+3 = 6  -Number of cores positive: 1  -Total number of cores: 1  -Total linear millimeters of carcinoma: 7-mm  -Total linear millimeters: 17-mm    7. Left middle base:  -Microscopic focus of prostatic adenocarcinoma  -Number of cores positive: 1  -Total number  of cores: 1  -Total linear millimeters of carcinoma: ~2-mm  -Total linear millimeters: 14-mm  8. Left middle mid:  -Benign prostate tissue  -No evidence of malignancy  9. Left middle apex:  -Prostatic adenocarcinoma, [place] score: 3+3 = 6  -Number of cores positive: 1  -Total number of cores: 1  -Total linear millimeters of carcinoma: 10-mm  -Total linear millimeters: 23-mm  -Perineural invasion identified    10. Right middle base:  -Prostatic adenocarcinoma, [place] score: 3+4 = 7  -Number of cores positive: 1  -Total number of cores: 1  -Total linear millimeters of carcinoma: 7-mm  -Total linear millimeters: 16-mm  -Perineural invasion identified  11. Right middle mid:  -Prostatic adenocarcinoma, [place] score: 3+3 = 6  -Number of cores positive: 1  -Total number of cores: 2 fragments  -Total linear millimeters of carcinoma: 7-mm  -Total linear millimeters: 19-mm  -Perineural invasion identified  12. Right middle apex:  -Prostatic adenocarcinoma, [place] score: 3+3 =6  -Number of cores positive: 1  -Total number of cores: 1  -Total linear millimeters of carcinoma: 6-mm  -Total linear millimeters: 15-mm    He underwent a robotic assisted radical prostatectomy, bilateral wide excision, bladder neck reconstruction, bilateral pelvic lymph node dissection on 2/8/18. He underwent a cystogram in radiology earlier today which was negative for extravasation. He is here today in clinic for del toro removal and pathology review.     1. RIGHT PELVIC LYMPH NODE, DISSECTION:  - Three lymph nodes, negative for carcinoma (0/3).  2. LEFT PELVIC LYMPH NODE, DISSECTION:  - One lymph node, negative for carcinoma (0/1).  3. PROSTATE, ROBOT-ASSISTED LAPAROSCOPIC RADICAL PROSTATECTOMY:  - Prostatic adenocarcinoma, [place] score 3+4=7 (Grade group 2) with tertiary pattern 5 involving left to right  posterior apex to base.  - Please see CAP synoptic report below.  SURGICAL PATHOLOGY CANCER CASE SUMMARY- PROSTATE  Procedure: Radical  prostatectomy.  Prostate size:  Weight: 75 g.  Size: 6.7 x 6 x 5.2 cm.  Histologic type: Acinar adenocarcinoma.  Histologic grade: 3+4= 7 with tertiary pattern 5.  [place] Grade Group: 2.  Percentage of pattern 4: 40%.  Percentage of pattern 5: 5%.  Tumor quantitation:  Estimated percentage of prostate involved by tumor: 20%.    Extraprostatic extension: Present, nonfocal.  Extraprostatic extension is seen at right apex (focal) and left and right posterior mid to base (nonfocal).  Urinary bladder neck invasion: Not identified.  Seminal vesicle invasion: Present, left.  Margins:  Involved by invasive carcinoma, non-limited (3 mm).  Location of positive margin: Left posterior lower mid.  Margin positivity in area of extraprostatic extension.  [place] pattern at positive margin: Pattern 4.  Treatment effect: No known presurgical therapy.  Lymphovascular invasion: Not identified.  Perineural invasion: Present.  Regional lymph nodes:  Number of lymph nodes involved: 0.  Number of lymph nodes examined: 4.  Pathologic stage classification open (pTNM, AJCC 8th Edition): pT3b pN0.    His pathologic staging is pT3bN0(0/4)MxR1 (3mm focal positive margin). He has been doing well, having bowel movements, tolerating regular diet. He state he is still having some mild abdominal discomfort. He states he has been experiencing some bladder spasms. His cystogram on 2/15/18 was negative and his del toro was removed at that time. He returns back to see me today about a little over 4 weeks postoperatively. His most recent PSA was checked 0.31 3/9/18, a little earlier than we originally scheduled. His appointment was moved up because of the patient's brother recent passing. He has been ambulating, having normal bowel movements. He notes his incisions still bother him slightly and this occurs mostly at night. He notes he is still leaking a large amount of urine. He notes it is worse with movement and coughing.     He returns back today  5/14/18 for another followup. He has resumed all of his normal activities. He is exercising and trying to lift more. He has been doing his kegel exercises. He is using a Depends and a towel to avoid accidents with his urination. He uses about 7-8 depends during the day. Erections - he tried cialis but noted it caused his eye to become red. So he stopped the cialis.    LAST PSA  Lab Results   Component Value Date    PSA 21.8 (H) 03/30/2017    PSADIAG 0.14 05/11/2018    PSADIAG 0.31 03/09/2018    PSADIAG 21.1 (H) 06/15/2017       Lab Results   Component Value Date    CREATININE 1.2 02/10/2018     ---  Past Medical History:   Diagnosis Date    Difficult intubation     Elevated PSA 11/10/2017    Glaucoma     Sebaceous cyst of scrotum        Past Surgical History:   Procedure Laterality Date    INCISION AND DRAINAGE OF WOUND Left 2011, 2017    scrotum; multiple I&D       Family History   Problem Relation Age of Onset    Prostate cancer Neg Hx     Kidney disease Neg Hx        Social History   Substance Use Topics    Smoking status: Former Smoker     Quit date: 2000    Smokeless tobacco: Never Used    Alcohol use No      Comment: rarely        Current Outpatient Prescriptions on File Prior to Visit   Medication Sig Dispense Refill    brimonidine 0.2% (ALPHAGAN) 0.2 % Drop Place 1 drop into both eyes 2 (two) times daily.  6    docusate sodium (COLACE) 100 MG capsule Take 1 capsule (100 mg total) by mouth 2 (two) times daily. 31 capsule 1    latanoprost 0.005 % ophthalmic solution INTALL 1 DROP TO BOTH EYES CONNOR NIGHT  6    MULTIVIT-MINERALS/FA/LYCOPENE (ONE-A-DAY MEN'S ORAL) Take by mouth every morning.      oxyCODONE-acetaminophen (PERCOCET) 5-325 mg per tablet Take 1 tablet by mouth every 6 (six) hours as needed for Pain. 30 tablet 0    polyethylene glycol (GLYCOLAX) 17 gram PwPk Take 17 g by mouth once daily. 30 packet 0    polyethylene glycol (GLYCOLAX) 17 gram/dose powder       sildenafil,  antihypertensive, (REVATIO) 20 mg Tab Take 1 tablet (20 mg total) by mouth Daily. 90 tablet 3    timolol maleate 0.5% (TIMOPTIC) 0.5 % Drop Place 1 drop into both eyes 2 (two) times daily.  7    [DISCONTINUED] tadalafil (CIALIS) 5 MG tablet Take 1 tablet (5 mg total) by mouth daily as needed for Erectile Dysfunction. 30 tablet 11     No current facility-administered medications on file prior to visit.        Review of patient's allergies indicates:  No Known Allergies    Review of Systems   Constitutional: Negative for chills.   HENT: Negative for congestion.    Eyes: Negative for visual disturbance.   Respiratory: Negative for shortness of breath.    Cardiovascular: Negative for chest pain.   Gastrointestinal: Negative for abdominal distention.   Musculoskeletal: Negative for gait problem.   Skin: Negative for color change.   Neurological: Negative for dizziness.   Psychiatric/Behavioral: Negative for agitation.       Objective:      Physical Exam   Constitutional: He appears well-developed and well-nourished.   HENT:   Head: Normocephalic.   Eyes: Pupils are equal, round, and reactive to light.   Neck: Normal range of motion.   Cardiovascular: Intact distal pulses.    Pulmonary/Chest: Effort normal.   Abdominal: Soft. He exhibits no distension. There is no tenderness.   Well healed robotic incisions; no hernia detected   Musculoskeletal: Normal range of motion.   Neurological: He is alert.   Skin: Skin is warm and dry.   Psychiatric: He has a normal mood and affect.       Assessment:       1. Prostate cancer    2. Erectile dysfunction following radical prostatectomy    3. SAUMYA (stress urinary incontinence), male        Plan:         1. Erectile dysfunction following prostatectomy. Stop cialis due to symptoms of eye redness, will prescribe generic sildenafil to pharmacy at [place] to see if that may help with penile rehabilitation.  2. SAUMYA following prostatectomy- Continue kegels.   3. Prostate cancer - PSA  decreased again to 0.14, but not undetectable. Will refer to Dr. Juan for radiation oncology for adjuvant radiation for adverse features on pathology ( tertiary [place] 5 pattern, seminal vesical invasion, and focal positive margin) and persistently detectable PSA post-robotic prostatectomy.   4. I messaged Dr. Juan today to see if we can bring patient earlier than August.  5. RTC in 3 months for PSA and followup.       Prostate cancer  -     Ambulatory Referral to Radiation Oncology  -     sildenafil, antihypertensive, (REVATIO) 20 mg Tab; Take 1 tablet daily  Dispense: 50 tablet; Refill: 11  -     Prostate Specific Antigen, Diagnostic; Future; Expected date: 08/14/2018    Erectile dysfunction following radical prostatectomy  -     sildenafil, antihypertensive, (REVATIO) 20 mg Tab; Take 1 tablet daily  Dispense: 50 tablet; Refill: 11  -     Prostate Specific Antigen, Diagnostic; Future; Expected date: 08/14/2018    SAUMYA (stress urinary incontinence), male

## 2018-05-15 ENCOUNTER — TELEPHONE (OUTPATIENT)
Dept: RADIATION ONCOLOGY | Facility: CLINIC | Age: 68
End: 2018-05-15

## 2018-05-29 ENCOUNTER — INITIAL CONSULT (OUTPATIENT)
Dept: RADIATION ONCOLOGY | Facility: CLINIC | Age: 68
End: 2018-05-29
Payer: COMMERCIAL

## 2018-05-29 VITALS
BODY MASS INDEX: 36.3 KG/M2 | RESPIRATION RATE: 16 BRPM | DIASTOLIC BLOOD PRESSURE: 73 MMHG | TEMPERATURE: 98 F | HEIGHT: 69 IN | SYSTOLIC BLOOD PRESSURE: 128 MMHG | HEART RATE: 71 BPM | WEIGHT: 245.06 LBS

## 2018-05-29 DIAGNOSIS — C61 PROSTATE CANCER: Primary | ICD-10-CM

## 2018-05-29 PROCEDURE — 99999 PR PBB SHADOW E&M-EST. PATIENT-LVL III: CPT | Mod: PBBFAC,,, | Performed by: RADIOLOGY

## 2018-05-29 PROCEDURE — 99203 OFFICE O/P NEW LOW 30 MIN: CPT | Mod: S$GLB,,, | Performed by: RADIOLOGY

## 2018-05-29 RX ORDER — BICALUTAMIDE 50 MG/1
50 TABLET, FILM COATED ORAL DAILY
Qty: 60 TABLET | Refills: 0 | Status: ON HOLD | OUTPATIENT
Start: 2018-05-29 | End: 2020-05-19 | Stop reason: HOSPADM

## 2018-05-29 NOTE — PROGRESS NOTES
HISTORY OF PRESENT ILLNESS:   This patient presents for discussion of adjuvant radiotherapy for a recently diagnosed adenocarcinoma of the prostate.       Mr. Garrido was initially referred to Dr. Gee for evaluation after presented to the ED in March of 2017 with scrotal swelling.  The patient was diagnosed with an infected sebaceous cyst of the hemiscrotum.  A PSA was obtain for routine screening.  This returned at 21.8 ng/ml.  The patient had no previous PSAs.  YAYA revealed an enlarged prostate without palpable nodules or induration. The patient denied any LUTS.   A repeat PSA was obtained on  6/15/17 and returned at 21.1 ng/ml.  Following resolution of his sebaceus cyst which required surgical intervention, the patient underwent TRUS and biopsy of the prostate on  11/10/17.  11 of 12 core biopsies returned positive for adenocarcinoma.  His highest Inocente score was 7 (4+3 ) at the Lt. lateral base.  Lacrosse 7 (3+4) disease was noted at the Rt. middle base.  The remaining cores revealed Inocente 6 disease. Work up with whole body bone scan (12/18/17), and CT of the pelvis (12/21/17) were negative.  The patient was referred to Dr. Pederson and on 2/8/18 underwent RALP with bilateral pelvic node dissection.  Pathology revealed a 6.7 x 6 x 5.2 cm prostate weighing 75 g.  There was Lacrosse 7 (3+4) adenocarcinoma involving 20% of the prostate.  The percentage of inocente grade 4 was 40%.  There was also a tertiary pattern 5 in 5% of the specimen.  There was perineural invasion with extraprostatic extension at the Rt. apex and Lt. and Rt. posterior mid gland to base.  There was involvement of the Lt. seminal vesicle.  There was no lymphovascular invasion.  There was involvement of the Lt. posterior lower mid gland. Three Rt. and one Lt. pelvic node was negative for tumor involvement.  The patient is recovering well from surgery.  He notes he still has incontinence, using 5 pads per day.  Denies dysuria or hematuria.   States he is doing Kegel exercises but sometimes forgets.  His initial postoperative PSA on 3/9/18 returned at 0.31 ng/ml.  Repeat PSA on 5/11/18 was 0.14 ng/ml.    REVIEW OF SYSTEMS:   Review of Systems   Constitutional: Negative for chills, diaphoresis, fever and weight loss.   Eyes: Positive for blurred vision. Negative for pain and discharge.   Respiratory: Negative for cough and hemoptysis.    Cardiovascular: Negative for chest pain and palpitations.   Gastrointestinal: Negative for abdominal pain, blood in stool, constipation and diarrhea.   Genitourinary: Negative for dysuria and urgency.         PAST MEDICAL HISTORY:  Past Medical History:   Diagnosis Date    Difficult intubation     Elevated PSA 11/10/2017    Glaucoma     Sebaceous cyst of scrotum        PAST SURGICAL HISTORY:  Past Surgical History:   Procedure Laterality Date    INCISION AND DRAINAGE OF WOUND Left 2011, 2017    scrotum; multiple I&D       ALLERGIES:   Review of patient's allergies indicates:  No Known Allergies    MEDICATIONS:  Current Outpatient Prescriptions   Medication Sig    brimonidine 0.2% (ALPHAGAN) 0.2 % Drop Place 1 drop into both eyes 2 (two) times daily.    docusate sodium (COLACE) 100 MG capsule Take 1 capsule (100 mg total) by mouth 2 (two) times daily.    latanoprost 0.005 % ophthalmic solution INTALL 1 DROP TO BOTH EYES CONNOR NIGHT    MULTIVIT-MINERALS/FA/LYCOPENE (ONE-A-DAY MEN'S ORAL) Take by mouth every morning.    sildenafil, antihypertensive, (REVATIO) 20 mg Tab Take 1 tablet (20 mg total) by mouth Daily.    sildenafil, antihypertensive, (REVATIO) 20 mg Tab Take 1 tablet daily    timolol maleate 0.5% (TIMOPTIC) 0.5 % Drop Place 1 drop into both eyes 2 (two) times daily.    polyethylene glycol (GLYCOLAX) 17 gram PwPk Take 17 g by mouth once daily.     No current facility-administered medications for this visit.        SOCIAL HISTORY:  Social History     Social History    Marital status:      Spouse  name: N/A    Number of children: N/A    Years of education: N/A     Occupational History    Not on file.     Social History Main Topics    Smoking status: Former Smoker     Quit date:     Smokeless tobacco: Never Used    Alcohol use No      Comment: rarely     Drug use: No    Sexual activity: Yes     Partners: Female     Other Topics Concern    Not on file     Social History Narrative    No narrative on file       FAMILY HISTORY:  Family History   Problem Relation Age of Onset    Prostate cancer Neg Hx     Kidney disease Neg Hx          PHYSICAL EXAMINATION:  Vitals:    18 0954   BP: 128/73   Pulse: 71   Resp: 16   Temp: 98.4 °F (36.9 °C)     Physical Exam   Constitutional: He is oriented to person, place, and time and well-developed, well-nourished, and in no distress.   Abdominal: Soft. He exhibits no distension.   Neurological: He is alert and oriented to person, place, and time.       ASSESSMENT/PLAN:  Stage IIIB (pT3b, pN0, cM0, PSA: 21, Grade Group: 2) adenocarcinoma of the prostate    ECO    I had a long discussion with the patient and his wife.  We reviewed the prognostic factors associated with prostate cancer.  Explained he is at a higher risk for biochemical failure given his stage and positive margin.  Discussed the data supporting adjuvant radiotherapy following prostatectomy.  Explained that although his PSA has decreased from March, we would still recommend a course of postoperative radiotherapy to the prostate bed.  We discussed in detail the procedures, risks and benefits of therapy. Currently the patient still has significant incontinence.  I stressed the importance of performing his Kegel exercises and suggested he may benefit from a referral to [facility] in PT.  Also explained we would likely delay any radiotherapy for the next 3 - 4 months given his incontinence.  Given the patient has high risk disease, I would recommend a course of radiotherapy to the prostate  bed along with 6 months of hormonal deprivation therapy.  Recommend beginning Casodex followed by a 6 month Eligard / Trelstar injection.  Would plan to have to the patient return in 3 months with PSA to re-evaluate his urinary status and consider when to begin radiotherapy.  Thank you for the opportunity to participate in the care of this patient.    Psychosocial Distress screening score of Distress Score: 2 noted and reviewed. No intervention indicated.    I spent approximately 45 minutes reviewing the available records and evaluating the patient, out of which over 50% of the time was spent face to face with the patient in counseling and coordinating this patient's care.

## 2018-05-29 NOTE — LETTER
May 29, 2018      Marta Pederson MD  [address]  [address]  [facility] [address]           [facility] - Radiation On[facility]  [address]  [place] [address]  Phone: [phone]          Patient: Bharathi Garrido   MR Number: [id number]   YOB: 1950   Date of Visit: 5/29/2018       Dear Dr. Marta Pederson:    Thank you for referring Bharathi Garrido to me for evaluation. Attached you will find relevant portions of my assessment and plan of care.    If you have questions, please do not hesitate to call me. I look forward to following Bharathi Garrido along with you.    Sincerely,    Robert Juan Jr., MD    Enclosure  CC:  No Recipients    If you would like to receive this communication electronically, please contact [email or web address] or [phone] to request more information on [employer] Link access.    For providers and/or their staff who would like to refer a patient to [facility], please contact us through our one-stop-shop provider referral line, [facility], at [phone].    If you feel you have received this communication in error or would no longer like to receive these types of communications, please e-mail [email or web address]

## 2018-05-30 DIAGNOSIS — C61 PROSTATE CANCER: Primary | ICD-10-CM

## 2018-05-30 DIAGNOSIS — C61 CANCER OF PROSTATE: Primary | ICD-10-CM

## 2018-06-08 ENCOUNTER — CLINICAL SUPPORT (OUTPATIENT)
Dept: UROLOGY | Facility: CLINIC | Age: 68
End: 2018-06-08
Payer: COMMERCIAL

## 2018-06-08 VITALS
HEART RATE: 66 BPM | HEIGHT: 69 IN | DIASTOLIC BLOOD PRESSURE: 79 MMHG | WEIGHT: 245 LBS | BODY MASS INDEX: 36.29 KG/M2 | SYSTOLIC BLOOD PRESSURE: 119 MMHG

## 2018-06-08 PROCEDURE — 99999 PR PBB SHADOW E&M-EST. PATIENT-LVL III: CPT | Mod: PBBFAC,,,

## 2018-06-15 ENCOUNTER — CLINICAL SUPPORT (OUTPATIENT)
Dept: UROLOGY | Facility: CLINIC | Age: 68
End: 2018-06-15
Payer: COMMERCIAL

## 2018-06-15 VITALS
HEART RATE: 69 BPM | HEIGHT: 69 IN | TEMPERATURE: 99 F | DIASTOLIC BLOOD PRESSURE: 73 MMHG | SYSTOLIC BLOOD PRESSURE: 115 MMHG

## 2018-06-15 PROCEDURE — 96402 CHEMO HORMON ANTINEOPL SQ/IM: CPT | Mod: S$GLB,,, | Performed by: STUDENT IN AN ORGANIZED HEALTH CARE EDUCATION/TRAINING PROGRAM

## 2018-06-15 PROCEDURE — 99999 PR PBB SHADOW E&M-EST. PATIENT-LVL III: CPT | Mod: PBBFAC,,,

## 2018-06-15 NOTE — PROGRESS NOTES
Trelstar 22.5 mg IM injection given to right dorsal gluteal site.  Tolerated well.  Will schedule for next injection in 6 months.

## 2018-07-10 ENCOUNTER — CLINICAL SUPPORT (OUTPATIENT)
Dept: REHABILITATION | Facility: HOSPITAL | Age: 68
End: 2018-07-10
Attending: RADIOLOGY
Payer: COMMERCIAL

## 2018-07-10 DIAGNOSIS — M62.9 DISORDER OF MUSCLE: ICD-10-CM

## 2018-07-10 PROCEDURE — 97162 PT EVAL MOD COMPLEX 30 MIN: CPT

## 2018-07-10 PROCEDURE — G8990 OTHER PT/OT CURRENT STATUS: HCPCS | Mod: CK

## 2018-07-10 PROCEDURE — 97110 THERAPEUTIC EXERCISES: CPT

## 2018-07-10 PROCEDURE — G8991 OTHER PT/OT GOAL STATUS: HCPCS | Mod: CJ

## 2018-07-10 NOTE — PLAN OF CARE
OUTPATIENT PHYSICAL THERAPY EVALUATION        Patient: Bharathi Garrido   [facility] #:  [id number]    Date of treatment: 07/10/2018   Time in: 11:00  Time out: 12:00  Billable time: 55 minutes  POC expiration: 10/10/2018      HISTORY      Bharathi is a 68 y.o. male evaluated on 07/10/2018    Physician:  Robert Juan Jr.,*   Diagnosis:   Encounter Diagnosis   Name Primary?    Disorder of muscle       Treatment ordered: Physical Therapy  Medical History:   Past Medical History:   Diagnosis Date    Difficult intubation     Elevated PSA 11/10/2017    Glaucoma     Sebaceous cyst of scrotum       Surgical History:   Past Surgical History:   Procedure Laterality Date    INCISION AND DRAINAGE OF WOUND Left 2011, 2017    scrotum; multiple I&D      Medications:   Current Outpatient Prescriptions   Medication Sig    bicalutamide (CASODEX) 50 MG Tab Take 1 tablet (50 mg total) by mouth once daily.    brimonidine 0.2% (ALPHAGAN) 0.2 % Drop Place 1 drop into both eyes 2 (two) times daily.    docusate sodium (COLACE) 100 MG capsule Take 1 capsule (100 mg total) by mouth 2 (two) times daily.    latanoprost 0.005 % ophthalmic solution INTALL 1 DROP TO BOTH EYES CONNOR NIGHT    MULTIVIT-MINERALS/FA/LYCOPENE (ONE-A-DAY MEN'S ORAL) Take by mouth every morning.    polyethylene glycol (GLYCOLAX) 17 gram PwPk Take 17 g by mouth once daily.    sildenafil, antihypertensive, (REVATIO) 20 mg Tab Take 1 tablet (20 mg total) by mouth Daily.    sildenafil, antihypertensive, (REVATIO) 20 mg Tab Take 1 tablet daily    timolol maleate 0.5% (TIMOPTIC) 0.5 % Drop Place 1 drop into both eyes 2 (two) times daily.     No current facility-administered medications for this visit.        Allergies: Review of patient's allergies indicates:  No Known Allergies     Precautions: universal    Bladder/Bowel History: some nocturia prior to surgery        SUBJECTIVE     Date of onset: Feb 2018  History of current complaint: pt reports that he is still  leaking after cath removal.  Pt reports that he wakes up wet. He goes through about 7 pullups in a day, 1-2 at night.  Nocturia x 2 (reports lots of fluid intake).  He reports leakage with cough/sneeze, and standing after prolonged sitting.    Patient's goals for therapy: to be able to perform ADL's without urinary leakage  Pain: Patient reports 0/10, with 0 being the lowest and 10 being the highest.    Activities that cause symptoms: changing positions (ex. sit to stand) and with cough/sneeze/laugh/yell    Previous treatment included none    Sexually active? No    Frequency of urination:   Daytime: only a couple of times- leaks too much to feel very much urge.             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)    OBJECTIVE     ORTHO SCREEN  Posture: WNL  Pelvic alignment: no sign of deviations noted in supine    ABDOMINALS  Scarring: portal scars healed with no signs of infection  Diastasis: over 2 fingers above umbilicus  Abdominal strength: Rectus: 3/5 (leaked with this test)     TA: poorly isolated but palpable contraction (did not leak with TA set)  Chaperone: declined  Consent signed: Yes      RECTAL PELVIC FLOOR EXAM  EXTERNAL ASSESSMENT  Anus: WNL  Skin condition: WNL   Scarring: none  Sensation: WNL   Pain:  none  Voluntary contraction: visible lift  Voluntary relaxation: visible drop  Involuntary contraction: visible lift  Bearing down: reflex  tightening  Anal Murdock: intact  Discharge: none       INTERNAL ASSESSMENT  EAS tone: hypertonic   Impaction: none   Pain: none  Sensation: able to localize pressure appropriately   Muscle Bulk: hypertonus   Muscle Power: 2/5  Muscle Endurance: 5 sec      Quality of contraction: decreased hold and incomplete relaxation   Specificity: patient contracts: gluts  Coordination: tends to hold breath during PFM contraction     Functional Limitations Reports - G Codes  Category: other  Tool:  LARS-6 Survey  Score: 42% Limitation   Current/ : CK = at least 40% but < 60% impaired, limited or restricted  Goal/ : CJ = at least 20% but < 40% impaired, limited or restricted       Modifier  Impairment Limitation Restriction    CH  0 % impaired, limited or restricted    CI  @ least 1% but less than 20% impaired, limited or restricted    CJ  @ least 20%<40% impaired, limited or restricted    CK  @ least 40%<60% impaired, limited or restricted    CL  @ least 60% <80% impaired, limited or restricted    CM  @ least 80%<100% impaired limited or restricted    CN  100% impaired, limited or restricted         TREATMENT      Therapeutic Exercise to develop  coordination for 10 minutes including: diaphragmatic breathing with Kegel    Education: instructed on general anatomy/physiology of urinary/bowel system; discussed plan of care with patient; instructed in benefits/risks of treatment; instructed in alternative methods of treatment; instructed in risks of refusing treatment; patient agreed to treatment plan.     Also educated in: bladder irritants, anatomy/physiology of pelvic floor and posture/body mechanices    ASSESSMENT      This is a 68 y.o. male referred to outpatient physical therapy and presents with a medical diagnosis of cancer of prostate. Patient will benefit from skilled physical therapy to maximize his pelvic muscle coordination and strength.      Educational/Spiritual/Cultural needs: none  Abuse/Neglect: no  signs  Nutritional Status: WDWN   Fall Risk: none    Pt's spiritual, cultural and educational needs considered and pt agreeable to plan of care and goals as stated below:     Medical necessity is demonstrated by the following IMPAIRMENTS/PROBLEMS     History  Co-morbidities and personal factors that may impact the plan of care Examination  Body Structures and Functions, activity limitations and participation restrictions that may impact the plan of care Clinical Presentation   Decision Making/ Complexity Score   Co-morbidities:     High BMI; prostate Ca s/p prostatectomy            Personal Factors:    Body Regions/Systems/Functions:    poor knowledge of body mechanics and posture, decreased pelvic muscle strength, decreased endurance of the pelvic muscles, increased tension of the pelvic muscles, poor quality of pelvic muscle contraction and poor coordination of pelvic floor muscles during ADL's leading to urinary or fecal leakage           Activity limitations:   Barriers to Learning: none  Environmental Barriers: none noted    Participation Restrictions:   Pt cannot perform ADL's and leisure tasks without wearing pullups to catch leakage       evolving moderate           PLAN    Frequency: once per 2 weeks  Duration: 12 weeks    Long Term Goals: 12 weeks   Pt will report improved ability to perform ADLs (ie. dressing, bathing, functional transfers) with little (drops) to no urinary leakage 7/7 days per week.  Pt will report improved ability to perform iADLs (ie. driving, home chores, grocery shopping) with little (drops) to no urinary leakage 7/7 days per week.   Pt will report improved ability to cough/sneeze/laugh/yell with little (drops) to no urinary leakage 7/7 days per week.   Pt will report a decrease in pad use to 1-2 pads per day.  Pt/family will be independent with HEP for continued self-management of symptoms.    Physical therapy will include: therapeutic exercises, neuromuscular re-education, manual  therapy, modalities PRN and patient/family education      Therapist: Beth Jovel, PT, BCB-PMD  7/10/2018

## 2018-07-10 NOTE — PATIENT INSTRUCTIONS
Home Program: 7/10/2018    Kegels in sitting or lyin. Lie down on your side with knees toward your chest, or sit  comfortably with legs and buttocks relaxed.  2. Squeeze and LIFT the muscles that stop the flow of urine and passage of gas.  Keep legs and buttock muscles quiet.  3. Hold lift for 5 seconds without holding breath.  4. Release and DROP for 10 seconds.  During the 10 seconds, take 2 belly breaths (breathe in and belly expands, breathe out and it snaps back)  5. Repeat 10 times, 2 sets, 2 times per day.      No Kegels while urinating!

## 2018-08-02 ENCOUNTER — TELEPHONE (OUTPATIENT)
Dept: RADIATION ONCOLOGY | Facility: CLINIC | Age: 68
End: 2018-08-02

## 2018-08-02 NOTE — TELEPHONE ENCOUNTER
Called to schedule follow up appointments; no answer, left voicemail. Appointments scheduled and placed in the mail.

## 2018-08-14 ENCOUNTER — CLINICAL SUPPORT (OUTPATIENT)
Dept: REHABILITATION | Facility: HOSPITAL | Age: 68
End: 2018-08-14
Attending: RADIOLOGY
Payer: COMMERCIAL

## 2018-08-14 DIAGNOSIS — M62.9 DISORDER OF MUSCLE: ICD-10-CM

## 2018-08-14 PROCEDURE — 97110 THERAPEUTIC EXERCISES: CPT

## 2018-08-14 NOTE — PATIENT INSTRUCTIONS
Home Program: 8/14/2018    Kegels in standing against the wall:    1. stand comfortably with legs and buttocks relaxed- lean with your back against the wall to help your pelvic floor muscles relax.  2. Squeeze and LIFT the muscles that stop the flow of urine and passage of gas.  Keep legs and buttock muscles quiet.  3. Hold lift for 10 seconds without holding breath.  4. Release and DROP for 10 seconds.  Take a belly breath here to help relax your muscles completely between each squeeze.  5. Repeat 10 times, 2 sets, 2 times per day.      Abdominal sets:   1. In same position, draw in your lower belly as if zipping tight pants.  2. Hold for 5 sec without holding breath.  3. Release 10 sec- take a belly breath here too.  4. Repeat 10 times, 1 set, twice per day.      No Kegels while urinating!

## 2018-08-14 NOTE — PROGRESS NOTES
OUTPATIENT PHYSICAL THERAPY DAILY NOTE       Patient: Bharathi Garrido   [facility] #:  [id number]    Diagnosis:   Encounter Diagnosis   Name Primary?    Disorder of muscle       Date of treatment: 08/14/2018     Time in: 1:55  Time out: 2:45  Billable time: 45 minutes  Visit #: 2  POC expiration: 10/10/2018    SUBJECTIVE   Pt reports: compliance with his HEP.  He does not note any change or improvement in his leakage.    Pain: 0/10 on VAS scale  Pain location: NA    OBJECTIVE     Therapeutic Exercise to develop  strength and endurance for 45 minutes including:Kegels with assist of SEMG and abdominal sets.  We worked in supine, sitting, and supported standing with external lead wires.  He demonstrated normal baseline resting, good WR rise, and good holding in 10 sec Kegels in both supine and sitting.  In TA sets his WR rise and holding were good as well.  Minimal verbal cues needed to refrain from breath holding.  In standing his baseline resting increased to 7uV- this was minimized by having him standing while leaning on the treatment table.  Please refer to pt. Instructions for revised home program.     Education: Discussed progression of plan of care with patient; educated pt in activity modification; reviewed HEP with pt. Pt demonstrated and verbalized understanding of all instruction and was provided with a handout of HEP (see Patient Instructions).      ASSESSMENT      Pt tolerated entire treatment well with no visible adverse effects. Will hopefully be able to cultivate improved use of muscle length, and then increased muscle strength.    Will the patient continue to benefit from skilled PT intervention? Yes  Medical necessity demonstrated by: skilled PT supervision required for HEP and treatment progression.    Progress towards goals:  satisfactory  New/Revised goals: none      PLAN     Continue with established Plan of Care, working toward established PT goals.

## 2018-08-25 DIAGNOSIS — C61 PROSTATE CANCER: ICD-10-CM

## 2018-08-27 RX ORDER — BICALUTAMIDE 50 MG/1
TABLET, FILM COATED ORAL
Qty: 60 TABLET | Refills: 0 | OUTPATIENT
Start: 2018-08-27

## 2018-08-31 ENCOUNTER — LAB VISIT (OUTPATIENT)
Dept: LAB | Facility: HOSPITAL | Age: 68
End: 2018-08-31
Attending: STUDENT IN AN ORGANIZED HEALTH CARE EDUCATION/TRAINING PROGRAM
Payer: COMMERCIAL

## 2018-08-31 DIAGNOSIS — C61 PROSTATE CANCER: ICD-10-CM

## 2018-08-31 DIAGNOSIS — N52.31 ERECTILE DYSFUNCTION FOLLOWING RADICAL PROSTATECTOMY: ICD-10-CM

## 2018-08-31 LAB — COMPLEXED PSA SERPL-MCNC: <0.01 NG/ML

## 2018-08-31 PROCEDURE — 36415 COLL VENOUS BLD VENIPUNCTURE: CPT

## 2018-08-31 PROCEDURE — 84153 ASSAY OF PSA TOTAL: CPT

## 2018-09-04 ENCOUNTER — CLINICAL SUPPORT (OUTPATIENT)
Dept: REHABILITATION | Facility: HOSPITAL | Age: 68
End: 2018-09-04
Attending: RADIOLOGY
Payer: COMMERCIAL

## 2018-09-04 DIAGNOSIS — M62.9 DISORDER OF MUSCLE: ICD-10-CM

## 2018-09-04 PROCEDURE — 97110 THERAPEUTIC EXERCISES: CPT

## 2018-09-04 NOTE — PROGRESS NOTES
OUTPATIENT PHYSICAL THERAPY DAILY NOTE       Patient: Bharathi Garrido   [facility] #:  [id number]    Diagnosis:   Encounter Diagnosis   Name Primary?    Disorder of muscle       Date of treatment: 09/04/2018     Time in: 1:50  Time out: 2:41  Billable time: 45 minutes  Visit #: 3  POC expiration: 10/10/2018    SUBJECTIVE   Pt reports: pt reports nocturia x 5- unclear if he is waking with the urge to go.  He went through 5-6 pullups yesterday.   He reports compliance with his HEP.    Pain: 0/10 on VAS scale  Pain location: NA    OBJECTIVE     Therapeutic Exercise to develop  strength and endurance for 45 minutes including:Kegels with assist of SEMG and abdominal sets.  We worked in supine and supported and unsupported standing with external lead wires.  He demonstrated normal baseline resting, good WR rise, and good holding in 10 sec Kegels in supine. In standing his baseline resting increased to 7uV.  He struggled to completely release his pelvic floor muscles.  Performing a diaphragmatic breath during the rest phase helped with this.  We then performed a set of 10 Kegels in squat, and quick flicks were introduced.    Please refer to pt. Instructions for revised home program.     Education: Discussed progression of plan of care with patient; educated pt in activity modification; reviewed HEP with pt. Pt demonstrated and verbalized understanding of all instruction and was provided with a handout of HEP (see Patient Instructions).      ASSESSMENT      Pt tolerated entire treatment well with no visible adverse effects. WR rise was improved in standing Kegels but not in supine Kegels.  Still concerned about use of muscle length.  Will the patient continue to benefit from skilled PT intervention? Yes  Medical necessity demonstrated by: skilled PT supervision required for HEP and treatment progression.    Progress towards goals:  satisfactory  New/Revised goals: none      PLAN     Continue with established Plan of Care, working  toward established PT goals.

## 2018-09-04 NOTE — PATIENT INSTRUCTIONS
"Home Program: 2018    Kegels in regular standin. stand comfortably with legs and buttocks relaxed.  2. Squeeze and LIFT the muscles that stop the flow of urine and passage of gas.  Keep legs and buttock muscles quiet.  3. Hold lift for 10 seconds without holding breath.  4. Release and DROP for 10 seconds.  Take a belly breath here to help relax your muscles completely between each squeeze.  5. Repeat 10 times, 2 sets, 2 times per day.    *make one of these sets of 10 a "squat set".  Squeeze pelvic floor, hold while you squat about long term, then raise up back to standing and let go of your pelvic floor.  Do the 10 sec rest with belly breath before your next squat.    Abdominal sets:   1. In same position, draw in your lower belly as if zipping tight pants.  2. Hold for 5 sec without holding breath.  3. Release 10 sec- take a belly breath here too.  4. Repeat 10 times, 1 set, twice per day.      "QUICK FLICKS"- 10 one second contractions in a row with complete release in between.  Do several sets of 10 per day.    No Kegels while urinating!    "

## 2018-09-04 NOTE — Clinical Note
Hi Dr. Juan-Pt's wife asked me to message you re: her FMLA paperwork- states she has called in the last week but I see no record in Epic.  They see you this Friday.  Thanks for this referral!   LMW

## 2018-09-05 ENCOUNTER — TELEPHONE (OUTPATIENT)
Dept: RADIATION ONCOLOGY | Facility: CLINIC | Age: 68
End: 2018-09-05

## 2018-09-06 ENCOUNTER — PATIENT MESSAGE (OUTPATIENT)
Dept: RADIATION ONCOLOGY | Facility: CLINIC | Age: 68
End: 2018-09-06

## 2018-09-07 ENCOUNTER — OFFICE VISIT (OUTPATIENT)
Dept: RADIATION ONCOLOGY | Facility: CLINIC | Age: 68
End: 2018-09-07
Payer: COMMERCIAL

## 2018-09-07 VITALS
HEART RATE: 72 BPM | WEIGHT: 252 LBS | TEMPERATURE: 99 F | SYSTOLIC BLOOD PRESSURE: 138 MMHG | HEIGHT: 70 IN | BODY MASS INDEX: 36.08 KG/M2 | RESPIRATION RATE: 20 BRPM | DIASTOLIC BLOOD PRESSURE: 78 MMHG

## 2018-09-07 DIAGNOSIS — C61 PROSTATE CANCER: Primary | ICD-10-CM

## 2018-09-07 PROCEDURE — 99999 PR PBB SHADOW E&M-EST. PATIENT-LVL III: CPT | Mod: PBBFAC,,, | Performed by: RADIOLOGY

## 2018-09-07 PROCEDURE — 99212 OFFICE O/P EST SF 10 MIN: CPT | Mod: S$PBB,,, | Performed by: RADIOLOGY

## 2018-09-07 PROCEDURE — 99213 OFFICE O/P EST LOW 20 MIN: CPT | Mod: PBBFAC | Performed by: RADIOLOGY

## 2018-09-07 NOTE — PROGRESS NOTES
Subjective:       Patient ID: Bharathi Garrido is a 68 y.o. male.    Chief Complaint: Prostate Cancer (f/u)    This patient returns for follow up and discussion of further management.     Mr. Garrido was recently diagnosed with a pathologic Stage IIIB adenocarcinoma of the prostate.  He initially presented 21.8 ng/ml.  YAYA revealed an enlarged prostate without palpable nodules or induration. TRUS and biopsy of the prostate on  11/10/17.  11 of 12 core biopsies returned positive for adenocarcinoma.  His highest [place] score was 7 (4+3 ) at the Lt. lateral base.  Inocente 7 (3+4) disease was noted at the Rt. middle base.  The remaining cores revealed Inocente 6 disease. Work up with whole body bone scan (12/18/17), and CT of the pelvis (12/21/17) were negative.  The patient was referred to Dr. Pederson and on 2/8/18 underwent RALP with bilateral pelvic node dissection.  Pathology revealed a 6.7 x 6 x 5.2 cm prostate weighing 75 g.  There was [place] 7 (3+4) adenocarcinoma involving 20% of the prostate.  The percentage of inocente grade 4 was 40%.  There was also a tertiary pattern 5 in 5% of the specimen.  There was perineural invasion with extraprostatic extension at the Rt. apex and Lt. and Rt. posterior mid gland to base.  There was involvement of the Lt. seminal vesicle.  There was no lymphovascular invasion.  There was involvement of the Lt. posterior lower mid gland. Three Rt. and one Lt. pelvic node was negative for tumor involvement.  T  His initial postoperative PSA on 3/9/18 returned at 0.31 ng/ml.  Repeat PSA on 5/11/18 was 0.14 ng/ml. The patient was referred to our department and we discussed adjuvant radiotherapy.  He was still incontinent of urine and we elected to begin hormonal therapy to allow time for his incontinence to improve.  He was given a Trelstar injection on 6/15/18.  Today, the patient states he feels well.  Notes hot flashes.  He is currently seeing physical therapy but continues with incontinence at  4 - 5 pads per day.        Review of Systems   Constitutional: Negative for activity change, appetite change, chills and fatigue.   Gastrointestinal: Negative for abdominal pain, blood in stool, constipation and diarrhea.   Genitourinary: Negative for difficulty urinating, dysuria, frequency and urgency.       Objective:      Physical Exam   Constitutional: He is oriented to person, place, and time. He appears well-developed and well-nourished. No distress.   Neurological: He is alert and oriented to person, place, and time.         PSA - < 0.01 ng/ml  Assessment:         Stage IIIB (pT3b, pN0, cM0, PSA: 21, Grade Group: 2) adenocarcinoma of the prostate  Plan:       responding well to hormonal therapy.  Still with urinary incontinence.  Explained we will allow for further time to see if this improves.  Will plan follow up in 3 months with PSA..

## 2018-10-09 ENCOUNTER — CLINICAL SUPPORT (OUTPATIENT)
Dept: REHABILITATION | Facility: HOSPITAL | Age: 68
End: 2018-10-09
Attending: RADIOLOGY
Payer: COMMERCIAL

## 2018-10-09 DIAGNOSIS — M62.9 DISORDER OF MUSCLE: ICD-10-CM

## 2018-10-09 PROCEDURE — 97110 THERAPEUTIC EXERCISES: CPT

## 2018-10-09 NOTE — PROGRESS NOTES
OUTPATIENT PHYSICAL THERAPY DAILY NOTE       Patient: Bharathi Garrido   [facility] #:  [id number]    Diagnosis:   Encounter Diagnosis   Name Primary?    Disorder of muscle       Date of treatment: 10/09/2018     Time in: 2:01  Time out: 2:40  Billable time:  39 minutes  Visit #: 4  POC expiration: 10/10/2018    SUBJECTIVE   Pt reports: pt reports nocturia x 3-4.  He confesses to drinking a lot at night.    He went through 5-6 pullups yesterday.   He reports compliance with his HEP.    Pain: 0/10 on VAS scale  Pain location: NA    OBJECTIVE     Therapeutic Exercise to develop  strength and endurance for 39 minutes including:Kegels with assist of SEMG and abdominal sets.  We worked in supine and unsupported standing with external lead wires.  He demonstrated normal baseline resting, good WR rise, and good holding in 10 sec Kegels in supine. With his verbal consent, and internal rectal muscle assessment was performed.  He demonstrated a nearly 3/5 PFM contraction with slow but complete drop.  Standing Kegels including QF were performed with low WR rise (after exam).  We discussed his using the treadmill daily for at least 20 minutes.      Please refer to pt. Instructions for revised home program.     Education: Discussed progression of plan of care with patient; educated pt in activity modification; reviewed HEP with pt. Pt demonstrated and verbalized understanding of all instruction and was provided with a handout of HEP (see Patient Instructions).      ASSESSMENT      Pt tolerated entire treatment well with no visible adverse effects. Pt is lifting and dropping well but may be fatiguing.  Will reassess in a month after he increases his cardio and walking.    Will the patient continue to benefit from skilled PT intervention? Yes  Medical necessity demonstrated by: skilled PT supervision required for HEP and treatment progression.    Progress towards goals:  satisfactory  New/Revised goals: none      PLAN     Continue with  established Plan of Care, working toward established PT goals.

## 2018-10-09 NOTE — PATIENT INSTRUCTIONS
"Home Program: 2018    Kegels in regular standin. stand comfortably with legs and buttocks relaxed.  2. Squeeze and LIFT the muscles that stop the flow of urine and passage of gas.  Keep legs and buttock muscles quiet.  3. Hold lift for 10 seconds without holding breath.  4. Release and DROP for 10 seconds.  Take a belly breath here to help relax your muscles completely between each squeeze.  5. Repeat 10 times, 2 sets, 2 times per day.    *make one of these sets of 10 a "squat set".  Squeeze pelvic floor, hold while you squat about [facility], then raise up back to standing and let go of your pelvic floor.  Do the 10 sec rest with belly breath before your next squat.    Abdominal sets:   1. In same position, draw in your lower belly as if zipping tight pants.  2. Hold for 5 sec without holding breath.  3. Release 10 sec- take a belly breath here too.  4. Repeat 10 times, 1 set, twice per day.      "QUICK FLICKS"- 10 one second contractions in a row with complete release in between.  Do several sets of 10 per day.    No Kegels while urinating!    "

## 2018-11-20 ENCOUNTER — CLINICAL SUPPORT (OUTPATIENT)
Dept: REHABILITATION | Facility: HOSPITAL | Age: 68
End: 2018-11-20
Attending: RADIOLOGY
Payer: COMMERCIAL

## 2018-11-20 DIAGNOSIS — M62.9 DISORDER OF MUSCLE: ICD-10-CM

## 2018-11-20 PROCEDURE — G8992 OTHER PT/OT  D/C STATUS: HCPCS | Mod: CL

## 2018-11-20 PROCEDURE — 97110 THERAPEUTIC EXERCISES: CPT

## 2018-11-20 PROCEDURE — G8991 OTHER PT/OT GOAL STATUS: HCPCS | Mod: CJ

## 2018-11-20 NOTE — PROGRESS NOTES
OUTPATIENT PHYSICAL THERAPY DAILY NOTE       Patient: Bharathi Garrido   [facility] #:  [id number]    Diagnosis:   Encounter Diagnosis   Name Primary?    Disorder of muscle       Date of treatment: 11/20/2018     Time in: 2:02  Time out: 2:45  Billable time:  40 minutes  Visit #: 5  POC expiration: 10/10/2018    SUBJECTIVE   Pt reports: pt reports nocturia x 4-5.  He reports drinking 3 glasses of cranberry juice prior to going to bed in the evening.      He went through 4 pullups yesterday.   Would have been more if he hadn't gone to the eye doctor.  They are full when he changes them.  Only gets the urge to void in the morning or after prolonged sitting.  He reports compliance with his HEP.  Still leaking with cough/sneeze, sit-stand.    Pain: 0/10 on VAS scale  Pain location: NA    OBJECTIVE     Therapeutic Exercise to develop strength and endurance for 39 minutes including:Kegels with assist of SEMG, and QF.    We worked in supine and unsupported standing with external lead wires.  He demonstrated normal baseline resting, good WR rise, and good holding in 10 sec Kegels in supine.   Standing Kegels including QF were performed with fair WR rise.     He was instructed to continue his current HEP, and was advised to consult with Dr.'s Pederson and Drake to ascertain the best treatment for his persistent urinary leakage, given his cancer treatment.  Pt verbalized understanding of all instruction.      Education: Discussed progression of plan of care with patient; educated pt in activity modification; reviewed HEP with pt. Pt demonstrated and verbalized understanding of all instruction and was provided with a handout of HEP (see Patient Instructions).      ASSESSMENT      Pt with some improvement in muscle strength, but no significant improvement in amount of leakage.  Leaks constantly during standing exercises.  May need workup for medical management of incontinence.     Goals: Pt will report improved ability to perform ADLs  (ie. dressing, bathing, functional transfers) with little (drops) to no urinary leakage 7/7 days per week.NOT MET  Pt will report improved ability to perform iADLs (ie. driving, home chores, grocery shopping) with little (drops) to no urinary leakage 7/7 days per week. NOT MET  Pt will report improved ability to cough/sneeze/laugh/yell with little (drops) to no urinary leakage 7/7 days per week. NOT MET  Pt will report a decrease in pad use to 1-2 pads per day.NOT MET  Pt/family will be independent with HEP for continued self-management of symptoms.MET    Patient reports that he/she is still leaking fairly constantly throughout the day().  The current impairment level is 67 percent impaired (CL) based on the (LARS-6) score of 16/24.  Patients therapy goal has been not  achieved and is 67 percent impaired () at discharge.      PLAN     D/c PT

## 2018-11-20 NOTE — Clinical Note
Mr Hema continues to leak fairly constantly despite pelvic muscle strengthening.  I advised him to follow up with you and with Dr. Pederson- he was unsure about when you wanted to see him again.  LMW

## 2019-01-02 ENCOUNTER — LAB VISIT (OUTPATIENT)
Dept: LAB | Facility: HOSPITAL | Age: 69
End: 2019-01-02
Attending: RADIOLOGY
Payer: COMMERCIAL

## 2019-01-02 DIAGNOSIS — C61 PROSTATE CANCER: ICD-10-CM

## 2019-01-02 LAB — COMPLEXED PSA SERPL-MCNC: 0.01 NG/ML

## 2019-01-02 PROCEDURE — 84153 ASSAY OF PSA TOTAL: CPT

## 2019-01-02 PROCEDURE — 36415 COLL VENOUS BLD VENIPUNCTURE: CPT

## 2019-01-08 ENCOUNTER — OFFICE VISIT (OUTPATIENT)
Dept: RADIATION ONCOLOGY | Facility: CLINIC | Age: 69
End: 2019-01-08
Payer: COMMERCIAL

## 2019-01-08 VITALS
HEART RATE: 70 BPM | SYSTOLIC BLOOD PRESSURE: 135 MMHG | RESPIRATION RATE: 16 BRPM | WEIGHT: 263.63 LBS | HEIGHT: 71 IN | BODY MASS INDEX: 36.91 KG/M2 | DIASTOLIC BLOOD PRESSURE: 71 MMHG

## 2019-01-08 DIAGNOSIS — C61 PROSTATE CANCER: Primary | ICD-10-CM

## 2019-01-08 PROCEDURE — 99999 PR PBB SHADOW E&M-EST. PATIENT-LVL III: CPT | Mod: PBBFAC,,, | Performed by: RADIOLOGY

## 2019-01-08 PROCEDURE — 99999 PR PBB SHADOW E&M-EST. PATIENT-LVL III: ICD-10-PCS | Mod: PBBFAC,,, | Performed by: RADIOLOGY

## 2019-01-08 PROCEDURE — 99212 OFFICE O/P EST SF 10 MIN: CPT | Mod: S$GLB,,, | Performed by: RADIOLOGY

## 2019-01-08 PROCEDURE — 99212 PR OFFICE/OUTPT VISIT, EST, LEVL II, 10-19 MIN: ICD-10-PCS | Mod: S$GLB,,, | Performed by: RADIOLOGY

## 2019-01-08 NOTE — PROGRESS NOTES
Subjective:       Patient ID: Bharathi Garrido is a 68 y.o. male.    Chief Complaint: Prostate Cancer (3mo f/u;psa)    This patient returns for follow up visit and discussion of further management.    Mr. Garrido has a history of pathologic Stage IIIB (pT3b, pN0, cM0, PSA: 21, Grade Group: 2) adenocarcinoma of the prostate.  He initially presented 21.8 ng/ml.  YAYA revealed an enlarged prostate without palpable nodules or induration. TRUS and biopsy of the prostate on  11/10/17.  11 of 12 core biopsies returned positive for adenocarcinoma.  His highest M[place]isco score was 7 (4+3 ) at the Lt. lateral base.  [place] 7 (3+4) disease was noted at the Rt. middle base.  The remaining cores revealed M[place]isco 6 disease. Work up with whole body bone scan (12/18/17), and CT of the pelvis (12/21/17) were negative.  The patient was referred to Dr. Pederson and on 2/8/18 underwent RALP with bilateral pelvic node dissection.  Pathology revealed a 6.7 x 6 x 5.2 cm prostate weighing 75 g.  There was Inocente 7 (3+4) adenocarcinoma involving 20% of the prostate.  The percentage of inocente grade 4 was 40%.  There was also a tertiary pattern 5 in 5% of the specimen.  There was perineural invasion with extraprostatic extension at the Rt. apex and Lt. and Rt. posterior mid gland to base.  There was involvement of the Lt. seminal vesicle.  There was no lymphovascular invasion.  There was involvement of the Lt. posterior lower mid gland. Three Rt. and one Lt. pelvic node was negative for tumor involvement.   His initial postoperative PSA on 3/9/18 returned at 0.31 ng/ml.  Repeat PSA on 5/11/18 was 0.14 ng/ml. The patient was referred to our department and we discussed adjuvant radiotherapy.  He was still incontinent of urine and we elected to begin hormonal therapy to allow time for his incontinence to improve.  He was given a Trelstar injection on 6/15/18.  Today, the patient states he feels well.  In the past 6 months he has not noticed improvement in  his urinary function.  Still with incontinence using 4 - 5 pads/ day.       Review of Systems   Constitutional: Negative for activity change, appetite change and fatigue.   Gastrointestinal: Negative for abdominal pain, constipation and diarrhea.   Genitourinary: Negative for difficulty urinating, dysuria, flank pain, hematuria and urgency.       Objective:      Physical Exam   Constitutional: He appears well-developed and well-nourished. No distress.   Abdominal: Soft. He exhibits no distension.       PSA - 0.01 ng/ml  Assessment:       1. Prostate cancer        Plan:       Long discussion with the patient and his wife.  We reviewed the indications for adjuvant radiotherapy in this setting.  Discussed the procedures, risks and benefits of therapy.  Explained that his urinary function will likely not improve after radiotherapy and at some point he would be referred for an artificial sphincter.  Will plan to proceed with radiotherapy to the prostate bed along with one more injection of Eligard.  Plan simulation.

## 2019-01-15 ENCOUNTER — TELEPHONE (OUTPATIENT)
Dept: RADIATION ONCOLOGY | Facility: CLINIC | Age: 69
End: 2019-01-15

## 2019-01-15 NOTE — TELEPHONE ENCOUNTER
Called to confirm simulation appointment; no answer, left detailed message confirming appointment 1/18 at 11:00 then stated,  pt needs to use 2 Fleet enemas two hours prior to appointment and to come in with a full bladder.

## 2019-01-22 DIAGNOSIS — C61 PROSTATE CANCER: Primary | ICD-10-CM

## 2019-03-02 ENCOUNTER — PATIENT MESSAGE (OUTPATIENT)
Dept: UROLOGY | Facility: CLINIC | Age: 69
End: 2019-03-02

## 2019-03-07 ENCOUNTER — PATIENT MESSAGE (OUTPATIENT)
Dept: UROLOGY | Facility: CLINIC | Age: 69
End: 2019-03-07

## 2019-07-10 DIAGNOSIS — N39.3 STRESS INCONTINENCE: ICD-10-CM

## 2019-07-10 DIAGNOSIS — Z90.79 S/P PROSTATECTOMY: Primary | ICD-10-CM

## 2019-07-10 DIAGNOSIS — C61 PROSTATE CANCER: ICD-10-CM

## 2019-07-10 NOTE — PROGRESS NOTES
"Subjective:       Patient ID: Bharathi Garrido is a 69 y.o. male.    Chief Complaint: No chief complaint on file.   ***  This is a 69 y.o.  male patient that is {Blank single:70137::"new to me.","new to me but not new to the system.","an established patient of mine."}  The patient is {Blank single:61225::"self referred","referred to me by"} *** for ***.         LAST PSA  Lab Results   Component Value Date    PSA 21.8 (H) 2017    PSADIAG 0.01 2019    PSADIAG <0.01 2018    PSADIAG 0.14 2018    PSADIAG 0.31 2018    PSADIAG 21.1 (H) 06/15/2017       Lab Results   Component Value Date    CREATININE 1.2 02/10/2018       I personally reviewed the images: ***  No results found in the last 24 hours.      ---  Past Medical History:   Diagnosis Date    Difficult intubation     Elevated PSA 11/10/2017    Glaucoma     Sebaceous cyst of scrotum        Past Surgical History:   Procedure Laterality Date    BIOPSY-PROSTATE Left 11/10/2017    Performed by [facility] at [facility] OR    DISSECTION-LYMPH NODE-PELVIC/ poss bilateral nerve sparing Bilateral 2018    [facility] 2ND FLR    INCISION AND DRAINAGE (I&D), SCROTAL Left 2017    Per[facility] by [facility] at [facility] OR    INCISION AND DRAINAGE (I&D), SCROTAL N/A 2017    Per[facility] by [facility] at [facility] OR    INCISION AND DRAINAGE OF WOUND Left 2017    scrotum; multiple I&D    ROBOTIC ASSISTED LAPAROSCOPIC PROSTATECTOMY N/A 2018    Performed [facility] at [facility] OR 2ND FLR    TRANSRECTAL ULTRASOUND GUIDED PROSTATE BIOPSY  11/10/2017    Performed by [facility]ee [facility] at [facility] OR       Family History   Problem Relation Age of Onset    Prostate cancer Neg Hx     Kidney disease Neg Hx        Social History     Tobacco Use    Smoking status: Former Smoker     Last attempt to quit: 2000     Years since quittin.5    Smokeless tobacco: Never Used "   Substance Use Topics    Alcohol use: No     Comment: rarely     Drug use: No       Current Outpatient Medications on File Prior to Visit   Medication Sig Dispense Refill    bicalutamide (CASODEX) 50 MG Tab Take 1 tablet (50 mg total) by mouth once daily. 60 tablet 0    brimonidine 0.2% (ALPHAGAN) 0.2 % Drop Place 1 drop into both eyes 2 (two) times daily.  6    latanoprost 0.005 % ophthalmic solution INTALL 1 DROP TO BOTH EYES CONNOR NIGHT  6    MULTIVIT-MINERALS/FA/LYCOPENE (ONE-A-DAY MEN'S ORAL) Take by mouth every morning.      polyethylene glycol (GLYCOLAX) 17 gram PwPk Take 17 g by mouth once daily. 30 packet 0    sildenafil, antihypertensive, (REVATIO) 20 mg Tab Take 1 tablet (20 mg total) by mouth Daily. 90 tablet 3    timolol maleate 0.5% (TIMOPTIC) 0.5 % Drop Place 1 drop into both eyes 2 (two) times daily.  7     Current Facility-Administered Medications on File Prior to Visit   Medication Dose Route Frequency Provider Last Rate Last Dose    triptorelin pamoate SusR 22.5 mg  22.5 mg Intramuscular Q6 Months Marta Pederson MD           Review of patient's allergies indicates:  No Known Allergies    Review of Systems    Objective:      Physical Exam    Assessment:       1. S/P prostatectomy    2. Stress incontinence    3. Prostate cancer        Plan:               S/P prostatectomy    Stress incontinence    Prostate cancer

## 2019-07-10 NOTE — PROGRESS NOTES
Referral placed to Dr. Ruiz for evaluation/consideration of artificial urinary sphincter placement.

## 2020-03-31 PROBLEM — J18.9 PNEUMONIA OF BOTH LUNGS DUE TO INFECTIOUS ORGANISM: Status: ACTIVE | Noted: 2020-03-31

## 2020-04-01 PROBLEM — R97.20 ELEVATED PSA: Status: RESOLVED | Noted: 2017-11-10 | Resolved: 2020-04-01

## 2020-04-01 PROBLEM — R50.9 LOW GRADE FEVER: Status: RESOLVED | Noted: 2018-02-01 | Resolved: 2020-04-01

## 2020-04-01 PROBLEM — H40.9 GLAUCOMA: Status: RESOLVED | Noted: 2017-09-01 | Resolved: 2020-04-01

## 2020-04-01 PROBLEM — R09.02 HYPOXIA: Status: ACTIVE | Noted: 2020-04-01

## 2020-04-01 PROBLEM — M62.9 DISORDER OF MUSCLE: Status: RESOLVED | Noted: 2018-07-10 | Resolved: 2020-04-01

## 2020-04-01 PROBLEM — Z71.89 ADVANCE CARE PLANNING: Status: ACTIVE | Noted: 2020-04-01

## 2020-04-01 PROBLEM — Z85.46 HISTORY OF PROSTATE CANCER: Status: ACTIVE | Noted: 2018-02-01

## 2020-04-02 PROBLEM — U07.1 COVID-19 VIRUS INFECTION: Status: ACTIVE | Noted: 2020-04-02

## 2020-04-02 PROBLEM — E63.9 INADEQUATE DIETARY ENERGY INTAKE: Status: ACTIVE | Noted: 2020-04-02

## 2020-04-02 PROBLEM — J96.01 ACUTE RESPIRATORY FAILURE WITH HYPOXIA: Status: ACTIVE | Noted: 2020-04-01

## 2020-04-03 PROBLEM — N28.9 ACUTE RENAL INSUFFICIENCY: Status: ACTIVE | Noted: 2020-04-03

## 2020-04-07 PROBLEM — D72.829 LEUCOCYTOSIS: Status: ACTIVE | Noted: 2020-04-07

## 2020-04-11 PROBLEM — E87.0 HYPERNATREMIA: Status: ACTIVE | Noted: 2020-04-11

## 2020-04-21 PROBLEM — E87.6 HYPOKALEMIA: Status: ACTIVE | Noted: 2020-04-21

## 2020-04-21 PROBLEM — E87.0 HYPERNATREMIA: Status: RESOLVED | Noted: 2020-04-11 | Resolved: 2020-04-21

## 2020-04-21 PROBLEM — K56.7 ILEUS: Status: ACTIVE | Noted: 2020-04-21

## 2020-04-22 PROBLEM — E87.1 HYPONATREMIA: Status: ACTIVE | Noted: 2020-04-22

## 2020-04-23 PROBLEM — E87.1 HYPONATREMIA: Status: RESOLVED | Noted: 2020-04-22 | Resolved: 2020-04-23

## 2020-04-27 PROBLEM — J96.90 RESPIRATORY FAILURE: Status: ACTIVE | Noted: 2020-04-27

## 2020-04-27 PROBLEM — D64.9 ANEMIA: Status: ACTIVE | Noted: 2020-04-27

## 2020-04-28 ENCOUNTER — HOSPITAL ENCOUNTER (INPATIENT)
Facility: HOSPITAL | Age: 70
LOS: 22 days | Discharge: NURSING, REHAB OR LONG-TERM CARE | DRG: 207 | End: 2020-05-20
Attending: INTERNAL MEDICINE | Admitting: INTERNAL MEDICINE
Payer: COMMERCIAL

## 2020-04-28 DIAGNOSIS — U07.1 PNEUMONIA DUE TO COVID-19 VIRUS: ICD-10-CM

## 2020-04-28 DIAGNOSIS — R73.9 HYPERGLYCEMIA: Primary | ICD-10-CM

## 2020-04-28 DIAGNOSIS — J96.01 ACUTE RESPIRATORY FAILURE WITH HYPOXIA: ICD-10-CM

## 2020-04-28 DIAGNOSIS — J96.90 RESPIRATORY FAILURE: ICD-10-CM

## 2020-04-28 DIAGNOSIS — U07.1 COVID-19 VIRUS INFECTION: ICD-10-CM

## 2020-04-28 DIAGNOSIS — Z85.46 HISTORY OF PROSTATE CANCER: ICD-10-CM

## 2020-04-28 DIAGNOSIS — J12.82 PNEUMONIA DUE TO COVID-19 VIRUS: ICD-10-CM

## 2020-04-28 DIAGNOSIS — R00.0 TACHYCARDIA: ICD-10-CM

## 2020-04-28 DIAGNOSIS — U07.1 ACUTE RESPIRATORY DISTRESS SYNDROME (ARDS) DUE TO 2019 NOVEL CORONAVIRUS: ICD-10-CM

## 2020-04-28 DIAGNOSIS — J80 ACUTE RESPIRATORY DISTRESS SYNDROME (ARDS) DUE TO 2019 NOVEL CORONAVIRUS: ICD-10-CM

## 2020-04-28 DIAGNOSIS — D64.9 ANEMIA, UNSPECIFIED TYPE: ICD-10-CM

## 2020-04-28 DIAGNOSIS — I49.3 FREQUENT PVCS: ICD-10-CM

## 2020-04-28 PROBLEM — N17.9 AKI (ACUTE KIDNEY INJURY): Status: ACTIVE | Noted: 2020-04-03

## 2020-04-28 PROBLEM — E87.6 HYPOKALEMIA: Status: RESOLVED | Noted: 2020-04-21 | Resolved: 2020-04-28

## 2020-04-28 LAB
ABO + RH BLD: NORMAL
ALLENS TEST: ABNORMAL
AMORPH CRY UR QL COMP ASSIST: ABNORMAL
BACTERIA #/AREA URNS AUTO: ABNORMAL /HPF
BILIRUB UR QL STRIP: NEGATIVE
BLD GP AB SCN CELLS X3 SERPL QL: NORMAL
BNP SERPL-MCNC: 383 PG/ML (ref 0–99)
CLARITY UR REFRACT.AUTO: ABNORMAL
COLOR UR AUTO: YELLOW
D DIMER PPP IA.FEU-MCNC: 14.05 MG/L FEU
DELSYS: ABNORMAL
ERYTHROCYTE [SEDIMENTATION RATE] IN BLOOD BY WESTERGREN METHOD: 18 MM/H
ERYTHROCYTE [SEDIMENTATION RATE] IN BLOOD BY WESTERGREN METHOD: >120 MM/HR (ref 0–23)
FERRITIN SERPL-MCNC: 4082 NG/ML (ref 20–300)
FIO2: 75
GLUCOSE UR QL STRIP: NEGATIVE
HCO3 UR-SCNC: 33.7 MMOL/L (ref 24–28)
HGB UR QL STRIP: ABNORMAL
HYALINE CASTS UR QL AUTO: 1 /LPF
INR PPP: 1.4 (ref 0.8–1.2)
KETONES UR QL STRIP: NEGATIVE
LDH SERPL L TO P-CCNC: 477 U/L (ref 110–260)
LEUKOCYTE ESTERASE UR QL STRIP: NEGATIVE
MICROSCOPIC COMMENT: ABNORMAL
MIN VOL: 12.5
MODE: ABNORMAL
NITRITE UR QL STRIP: NEGATIVE
PCO2 BLDA: 60.1 MMHG (ref 35–45)
PEEP: 18
PH SMN: 7.36 [PH] (ref 7.35–7.45)
PH UR STRIP: 5 [PH] (ref 5–8)
PO2 BLDA: 101 MMHG (ref 80–100)
POC BE: 8 MMOL/L
POC SATURATED O2: 97 % (ref 95–100)
POC TCO2: 35 MMOL/L (ref 23–27)
POCT GLUCOSE: 122 MG/DL (ref 70–110)
POCT GLUCOSE: 131 MG/DL (ref 70–110)
PROCALCITONIN SERPL IA-MCNC: 2.72 NG/ML
PROT UR QL STRIP: NEGATIVE
PROTHROMBIN TIME: 13.8 SEC (ref 9–12.5)
RBC #/AREA URNS AUTO: 2 /HPF (ref 0–4)
SAMPLE: ABNORMAL
SITE: ABNORMAL
SP GR UR STRIP: 1.01 (ref 1–1.03)
SP02: 95
SQUAMOUS #/AREA URNS AUTO: 0 /HPF
TRIGL SERPL-MCNC: 330 MG/DL (ref 30–150)
TROPONIN I SERPL DL<=0.01 NG/ML-MCNC: 0.02 NG/ML (ref 0–0.03)
URN SPEC COLLECT METH UR: ABNORMAL
VT: 450
WBC #/AREA URNS AUTO: 8 /HPF (ref 0–5)

## 2020-04-28 PROCEDURE — 87040 BLOOD CULTURE FOR BACTERIA: CPT

## 2020-04-28 PROCEDURE — 76937 US GUIDE VASCULAR ACCESS: CPT | Mod: 26,,, | Performed by: ANESTHESIOLOGY

## 2020-04-28 PROCEDURE — S0028 INJECTION, FAMOTIDINE, 20 MG: HCPCS | Performed by: STUDENT IN AN ORGANIZED HEALTH CARE EDUCATION/TRAINING PROGRAM

## 2020-04-28 PROCEDURE — 99900026 HC AIRWAY MAINTENANCE (STAT)

## 2020-04-28 PROCEDURE — 76937 US GUIDE VASCULAR ACCESS: CPT | Performed by: ANESTHESIOLOGY

## 2020-04-28 PROCEDURE — 84484 ASSAY OF TROPONIN QUANT: CPT

## 2020-04-28 PROCEDURE — 94002 VENT MGMT INPAT INIT DAY: CPT

## 2020-04-28 PROCEDURE — 25000003 PHARM REV CODE 250: Performed by: STUDENT IN AN ORGANIZED HEALTH CARE EDUCATION/TRAINING PROGRAM

## 2020-04-28 PROCEDURE — C9399 UNCLASSIFIED DRUGS OR BIOLOG: HCPCS | Performed by: INTERNAL MEDICINE

## 2020-04-28 PROCEDURE — 27200188 HC TRANSDUCER, ART ADULT/PEDS

## 2020-04-28 PROCEDURE — 36556 INSERT NON-TUNNEL CV CATH: CPT | Mod: ,,, | Performed by: ANESTHESIOLOGY

## 2020-04-28 PROCEDURE — 94761 N-INVAS EAR/PLS OXIMETRY MLT: CPT

## 2020-04-28 PROCEDURE — C1751 CATH, INF, PER/CENT/MIDLINE: HCPCS

## 2020-04-28 PROCEDURE — 83615 LACTATE (LD) (LDH) ENZYME: CPT

## 2020-04-28 PROCEDURE — 27000221 HC OXYGEN, UP TO 24 HOURS

## 2020-04-28 PROCEDURE — 87205 SMEAR GRAM STAIN: CPT

## 2020-04-28 PROCEDURE — 76937 CENTRAL LINE: ICD-10-PCS | Mod: 26,,, | Performed by: ANESTHESIOLOGY

## 2020-04-28 PROCEDURE — 25000003 PHARM REV CODE 250: Performed by: INTERNAL MEDICINE

## 2020-04-28 PROCEDURE — 99291 CRITICAL CARE FIRST HOUR: CPT | Mod: ,,, | Performed by: INTERNAL MEDICINE

## 2020-04-28 PROCEDURE — 99900035 HC TECH TIME PER 15 MIN (STAT)

## 2020-04-28 PROCEDURE — 63600175 PHARM REV CODE 636 W HCPCS: Performed by: INTERNAL MEDICINE

## 2020-04-28 PROCEDURE — 85652 RBC SED RATE AUTOMATED: CPT

## 2020-04-28 PROCEDURE — 82728 ASSAY OF FERRITIN: CPT

## 2020-04-28 PROCEDURE — 99291 PR CRITICAL CARE, E/M 30-74 MINUTES: ICD-10-PCS | Mod: ,,, | Performed by: INTERNAL MEDICINE

## 2020-04-28 PROCEDURE — 87449 NOS EACH ORGANISM AG IA: CPT

## 2020-04-28 PROCEDURE — 85610 PROTHROMBIN TIME: CPT

## 2020-04-28 PROCEDURE — 36556 CENTRAL LINE: ICD-10-PCS | Mod: ,,, | Performed by: ANESTHESIOLOGY

## 2020-04-28 PROCEDURE — 36556 INSERT NON-TUNNEL CV CATH: CPT

## 2020-04-28 PROCEDURE — 20000000 HC ICU ROOM

## 2020-04-28 PROCEDURE — 85379 FIBRIN DEGRADATION QUANT: CPT

## 2020-04-28 PROCEDURE — 81001 URINALYSIS AUTO W/SCOPE: CPT

## 2020-04-28 PROCEDURE — 83880 ASSAY OF NATRIURETIC PEPTIDE: CPT

## 2020-04-28 PROCEDURE — 82803 BLOOD GASES ANY COMBINATION: CPT

## 2020-04-28 PROCEDURE — 36600 WITHDRAWAL OF ARTERIAL BLOOD: CPT

## 2020-04-28 PROCEDURE — 86901 BLOOD TYPING SEROLOGIC RH(D): CPT

## 2020-04-28 PROCEDURE — 36620 INSERTION CATHETER ARTERY: CPT

## 2020-04-28 PROCEDURE — 36620 INSERTION CATHETER ARTERY: CPT | Mod: ,,, | Performed by: ANESTHESIOLOGY

## 2020-04-28 PROCEDURE — 87070 CULTURE OTHR SPECIMN AEROBIC: CPT

## 2020-04-28 PROCEDURE — 87102 FUNGUS ISOLATION CULTURE: CPT

## 2020-04-28 PROCEDURE — 84145 PROCALCITONIN (PCT): CPT

## 2020-04-28 PROCEDURE — 99292 CRITICAL CARE ADDL 30 MIN: CPT | Mod: ,,, | Performed by: INTERNAL MEDICINE

## 2020-04-28 PROCEDURE — 84478 ASSAY OF TRIGLYCERIDES: CPT

## 2020-04-28 PROCEDURE — 36620 ARTERIAL: ICD-10-PCS | Mod: ,,, | Performed by: ANESTHESIOLOGY

## 2020-04-28 PROCEDURE — 99292 PR CRITICAL CARE, ADDL 30 MIN: ICD-10-PCS | Mod: ,,, | Performed by: INTERNAL MEDICINE

## 2020-04-28 PROCEDURE — 87106 FUNGI IDENTIFICATION YEAST: CPT

## 2020-04-28 PROCEDURE — 63600175 PHARM REV CODE 636 W HCPCS: Performed by: NURSE PRACTITIONER

## 2020-04-28 RX ORDER — HYDROCODONE BITARTRATE AND ACETAMINOPHEN 500; 5 MG/1; MG/1
TABLET ORAL
Status: DISCONTINUED | OUTPATIENT
Start: 2020-04-28 | End: 2020-05-09

## 2020-04-28 RX ORDER — NOREPINEPHRINE BITARTRATE/D5W 4MG/250ML
PLASTIC BAG, INJECTION (ML) INTRAVENOUS
Status: COMPLETED
Start: 2020-04-28 | End: 2020-04-29

## 2020-04-28 RX ORDER — ACETAMINOPHEN 650 MG/20.3ML
650 LIQUID ORAL EVERY 6 HOURS PRN
Status: DISCONTINUED | OUTPATIENT
Start: 2020-04-28 | End: 2020-05-20 | Stop reason: HOSPADM

## 2020-04-28 RX ORDER — LATANOPROST 50 UG/ML
1 SOLUTION/ DROPS OPHTHALMIC NIGHTLY
Status: DISCONTINUED | OUTPATIENT
Start: 2020-04-28 | End: 2020-05-20 | Stop reason: HOSPADM

## 2020-04-28 RX ORDER — LANOLIN ALCOHOL/MO/W.PET/CERES
800 CREAM (GRAM) TOPICAL
Status: DISCONTINUED | OUTPATIENT
Start: 2020-04-28 | End: 2020-05-09

## 2020-04-28 RX ORDER — DEXMEDETOMIDINE HYDROCHLORIDE 4 UG/ML
0.2 INJECTION, SOLUTION INTRAVENOUS CONTINUOUS
Status: DISCONTINUED | OUTPATIENT
Start: 2020-04-28 | End: 2020-04-30

## 2020-04-28 RX ORDER — BRIMONIDINE TARTRATE 2 MG/ML
1 SOLUTION/ DROPS OPHTHALMIC 2 TIMES DAILY
Status: DISCONTINUED | OUTPATIENT
Start: 2020-04-28 | End: 2020-05-20 | Stop reason: HOSPADM

## 2020-04-28 RX ORDER — LORAZEPAM 2 MG/ML
2 INJECTION INTRAMUSCULAR EVERY 4 HOURS PRN
Status: DISCONTINUED | OUTPATIENT
Start: 2020-04-28 | End: 2020-04-28

## 2020-04-28 RX ORDER — PROPOFOL 10 MG/ML
5 INJECTION, EMULSION INTRAVENOUS CONTINUOUS
Status: DISCONTINUED | OUTPATIENT
Start: 2020-04-28 | End: 2020-04-30

## 2020-04-28 RX ORDER — SODIUM CHLORIDE 0.9 % (FLUSH) 0.9 %
10 SYRINGE (ML) INJECTION
Status: DISCONTINUED | OUTPATIENT
Start: 2020-04-28 | End: 2020-04-28

## 2020-04-28 RX ORDER — ONDANSETRON 2 MG/ML
4 INJECTION INTRAMUSCULAR; INTRAVENOUS EVERY 8 HOURS PRN
Status: DISCONTINUED | OUTPATIENT
Start: 2020-04-28 | End: 2020-04-28

## 2020-04-28 RX ORDER — FLUCONAZOLE 100 MG/1
100 TABLET ORAL DAILY
Status: DISCONTINUED | OUTPATIENT
Start: 2020-04-29 | End: 2020-04-28

## 2020-04-28 RX ORDER — MIDAZOLAM HYDROCHLORIDE 5 MG/ML
4 INJECTION INTRAMUSCULAR; INTRAVENOUS EVERY 4 HOURS PRN
Status: DISCONTINUED | OUTPATIENT
Start: 2020-04-28 | End: 2020-04-28

## 2020-04-28 RX ORDER — PROPOFOL 10 MG/ML
20 VIAL (ML) INTRAVENOUS ONCE
Status: DISCONTINUED | OUTPATIENT
Start: 2020-04-28 | End: 2020-04-30

## 2020-04-28 RX ORDER — FENTANYL CITRATE-0.9 % NACL/PF 10 MCG/ML
PLASTIC BAG, INJECTION (ML) INTRAVENOUS CONTINUOUS
Status: DISCONTINUED | OUTPATIENT
Start: 2020-04-28 | End: 2020-04-29

## 2020-04-28 RX ORDER — ALBUTEROL SULFATE 90 UG/1
2 AEROSOL, METERED RESPIRATORY (INHALATION) EVERY 6 HOURS PRN
Status: DISCONTINUED | OUTPATIENT
Start: 2020-04-28 | End: 2020-05-20 | Stop reason: HOSPADM

## 2020-04-28 RX ORDER — SODIUM,POTASSIUM PHOSPHATES 280-250MG
2 POWDER IN PACKET (EA) ORAL
Status: DISCONTINUED | OUTPATIENT
Start: 2020-04-28 | End: 2020-05-09

## 2020-04-28 RX ORDER — FUROSEMIDE 10 MG/ML
60 INJECTION INTRAMUSCULAR; INTRAVENOUS ONCE
Status: COMPLETED | OUTPATIENT
Start: 2020-04-28 | End: 2020-04-28

## 2020-04-28 RX ORDER — POTASSIUM CHLORIDE 20 MEQ/15ML
40 SOLUTION ORAL
Status: DISCONTINUED | OUTPATIENT
Start: 2020-04-28 | End: 2020-05-09

## 2020-04-28 RX ORDER — FAMOTIDINE 10 MG/ML
20 INJECTION INTRAVENOUS EVERY 12 HOURS
Status: DISCONTINUED | OUTPATIENT
Start: 2020-04-28 | End: 2020-05-08

## 2020-04-28 RX ORDER — DOCUSATE SODIUM 50 MG/5ML
100 LIQUID ORAL 2 TIMES DAILY
Status: DISCONTINUED | OUTPATIENT
Start: 2020-04-28 | End: 2020-05-11

## 2020-04-28 RX ORDER — ENOXAPARIN SODIUM 100 MG/ML
40 INJECTION SUBCUTANEOUS EVERY 24 HOURS
Status: DISCONTINUED | OUTPATIENT
Start: 2020-04-28 | End: 2020-05-20 | Stop reason: HOSPADM

## 2020-04-28 RX ORDER — SODIUM CHLORIDE 0.9 % (FLUSH) 0.9 %
10 SYRINGE (ML) INJECTION
Status: DISCONTINUED | OUTPATIENT
Start: 2020-04-28 | End: 2020-05-20 | Stop reason: HOSPADM

## 2020-04-28 RX ORDER — FAMOTIDINE 20 MG/1
20 TABLET, FILM COATED ORAL DAILY
Status: DISCONTINUED | OUTPATIENT
Start: 2020-04-29 | End: 2020-04-28

## 2020-04-28 RX ORDER — VANCOMYCIN HCL IN 5 % DEXTROSE 1G/250ML
1000 PLASTIC BAG, INJECTION (ML) INTRAVENOUS
Status: DISCONTINUED | OUTPATIENT
Start: 2020-04-28 | End: 2020-04-30

## 2020-04-28 RX ORDER — CHLORHEXIDINE GLUCONATE ORAL RINSE 1.2 MG/ML
15 SOLUTION DENTAL 2 TIMES DAILY
Status: DISCONTINUED | OUTPATIENT
Start: 2020-04-28 | End: 2020-05-11

## 2020-04-28 RX ORDER — ONDANSETRON 2 MG/ML
4 INJECTION INTRAMUSCULAR; INTRAVENOUS EVERY 8 HOURS PRN
Status: DISCONTINUED | OUTPATIENT
Start: 2020-04-28 | End: 2020-05-09

## 2020-04-28 RX ORDER — LACTULOSE 10 G/15ML
20 SOLUTION ORAL DAILY PRN
Status: DISCONTINUED | OUTPATIENT
Start: 2020-04-28 | End: 2020-05-09

## 2020-04-28 RX ORDER — GLUCAGON 1 MG
1 KIT INJECTION
Status: DISCONTINUED | OUTPATIENT
Start: 2020-04-28 | End: 2020-04-28

## 2020-04-28 RX ORDER — ACETAMINOPHEN 325 MG/1
650 TABLET ORAL EVERY 4 HOURS PRN
Status: DISCONTINUED | OUTPATIENT
Start: 2020-04-28 | End: 2020-04-28

## 2020-04-28 RX ORDER — FUROSEMIDE 10 MG/ML
40 INJECTION INTRAMUSCULAR; INTRAVENOUS DAILY
Status: DISCONTINUED | OUTPATIENT
Start: 2020-04-29 | End: 2020-04-28

## 2020-04-28 RX ORDER — TALC
6 POWDER (GRAM) TOPICAL NIGHTLY PRN
Status: DISCONTINUED | OUTPATIENT
Start: 2020-04-28 | End: 2020-05-09

## 2020-04-28 RX ORDER — POLYETHYLENE GLYCOL 3350 17 G/17G
17 POWDER, FOR SOLUTION ORAL DAILY
Status: DISCONTINUED | OUTPATIENT
Start: 2020-04-29 | End: 2020-05-09

## 2020-04-28 RX ORDER — PHENYLEPHRINE HCL IN 0.9% NACL 1 MG/10 ML
SYRINGE (ML) INTRAVENOUS
Status: DISCONTINUED
Start: 2020-04-28 | End: 2020-04-29 | Stop reason: WASHOUT

## 2020-04-28 RX ORDER — NOREPINEPHRINE BITARTRATE/D5W 4MG/250ML
0.02 PLASTIC BAG, INJECTION (ML) INTRAVENOUS CONTINUOUS
Status: DISCONTINUED | OUTPATIENT
Start: 2020-04-28 | End: 2020-04-28

## 2020-04-28 RX ORDER — LIDOCAINE HYDROCHLORIDE AND EPINEPHRINE 10; 10 MG/ML; UG/ML
3 INJECTION, SOLUTION INFILTRATION; PERINEURAL ONCE
Status: DISCONTINUED | OUTPATIENT
Start: 2020-04-28 | End: 2020-04-30

## 2020-04-28 RX ORDER — NOREPINEPHRINE BITARTRATE/D5W 4MG/250ML
0.02 PLASTIC BAG, INJECTION (ML) INTRAVENOUS CONTINUOUS
Status: DISCONTINUED | OUTPATIENT
Start: 2020-04-29 | End: 2020-04-29

## 2020-04-28 RX ORDER — HEPARIN SODIUM 5000 [USP'U]/ML
5000 INJECTION, SOLUTION INTRAVENOUS; SUBCUTANEOUS EVERY 8 HOURS
Status: DISCONTINUED | OUTPATIENT
Start: 2020-04-28 | End: 2020-04-28

## 2020-04-28 RX ORDER — TIMOLOL MALEATE 5 MG/ML
1 SOLUTION/ DROPS OPHTHALMIC 2 TIMES DAILY
Status: DISCONTINUED | OUTPATIENT
Start: 2020-04-28 | End: 2020-05-20 | Stop reason: HOSPADM

## 2020-04-28 RX ORDER — INSULIN ASPART 100 [IU]/ML
0-5 INJECTION, SOLUTION INTRAVENOUS; SUBCUTANEOUS EVERY 6 HOURS PRN
Status: DISCONTINUED | OUTPATIENT
Start: 2020-04-28 | End: 2020-04-28

## 2020-04-28 RX ADMIN — LATANOPROST 1 DROP: 50 SOLUTION OPHTHALMIC at 09:04

## 2020-04-28 RX ADMIN — PROPOFOL 50 MCG/KG/MIN: 10 INJECTION, EMULSION INTRAVENOUS at 08:04

## 2020-04-28 RX ADMIN — DEXMEDETOMIDINE HYDROCHLORIDE 0.2 MCG/KG/HR: 4 INJECTION, SOLUTION INTRAVENOUS at 04:04

## 2020-04-28 RX ADMIN — MINERAL OIL AND WHITE PETROLATUM: 150; 830 OINTMENT OPHTHALMIC at 09:04

## 2020-04-28 RX ADMIN — PIPERACILLIN SODIUM,TAZOBACTAM SODIUM 4.5 G: 4; .5 INJECTION, POWDER, FOR SOLUTION INTRAVENOUS at 05:04

## 2020-04-28 RX ADMIN — HYDROMORPHONE HYDROCHLORIDE 1 MG/HR: 2 INJECTION INTRAMUSCULAR; INTRAVENOUS; SUBCUTANEOUS at 10:04

## 2020-04-28 RX ADMIN — ENOXAPARIN SODIUM 40 MG: 100 INJECTION SUBCUTANEOUS at 09:04

## 2020-04-28 RX ADMIN — DEXMEDETOMIDINE HYDROCHLORIDE 1.4 MCG/KG/HR: 4 INJECTION, SOLUTION INTRAVENOUS at 08:04

## 2020-04-28 RX ADMIN — VANCOMYCIN HYDROCHLORIDE 1000 MG: 1 INJECTION, POWDER, LYOPHILIZED, FOR SOLUTION INTRAVENOUS at 09:04

## 2020-04-28 RX ADMIN — TIMOLOL MALEATE 1 DROP: 5 SOLUTION/ DROPS OPHTHALMIC at 09:04

## 2020-04-28 RX ADMIN — INSULIN DETEMIR 20 UNITS: 100 INJECTION, SOLUTION SUBCUTANEOUS at 09:04

## 2020-04-28 RX ADMIN — FUROSEMIDE 60 MG: 10 INJECTION, SOLUTION INTRAVENOUS at 05:04

## 2020-04-28 RX ADMIN — BRIMONIDINE TARTRATE 1 DROP: 2 SOLUTION OPHTHALMIC at 09:04

## 2020-04-28 RX ADMIN — FAMOTIDINE 20 MG: 10 INJECTION INTRAVENOUS at 09:04

## 2020-04-28 RX ADMIN — CHLORHEXIDINE GLUCONATE 0.12% ORAL RINSE 15 ML: 1.2 LIQUID ORAL at 09:04

## 2020-04-28 RX ADMIN — PROPOFOL 50 MCG/KG/MIN: 10 INJECTION, EMULSION INTRAVENOUS at 10:04

## 2020-04-28 NOTE — PROGRESS NOTES
Pharmacist Renal Dose Adjustment Note    Bharathi Garrido is a 70 y.o. male being treated with the medication enoxaparin    Patient Data:    Vital Signs (Most Recent):  Temp: 98.3 °F (36.8 °C) (04/28/20 1500)  Pulse: 73 (04/28/20 1530)  Resp: (!) 27 (04/28/20 1530)  BP: 118/65 (04/28/20 1530)  SpO2: (!) 92 % (04/28/20 1530)   Vital Signs (72h Range):  Temp:  [97.7 °F (36.5 °C)-102.4 °F (39.1 °C)]   Pulse:  []   Resp:  [26-45]   BP: ()/(50-86)   SpO2:  [89 %-100 %]      Recent Labs   Lab 04/26/20  0505 04/27/20  0547 04/28/20  0536   CREATININE 1.42* 1.55* 1.53*     Serum creatinine: 1.53 mg/dL (H) 04/28/20 0536  Estimated creatinine clearance: 57.1 mL/min (A)    Enoxaparin 30mg will be increased to 40mg daily    Pharmacist's Name: Rain Gloria  Pharmacist's Extension: [id number]

## 2020-04-28 NOTE — PROVIDER TRANSFER
Confirmed COVID 19 Patient     (Physician in Lead of Transfers)/Regional Referral Center Note    Patients name/MRN: Bharathi Garrido/MRN [id number]    Referring Physician or Mid-Level provider giving report:  Dr. Pallavi Sunkara (Hospital Medicine) and Dr. Ehsan High (Hospital Medicine)  Contact number: [phone]    Referral Facility: [facility] in [place], [place]    Date/Time of Acceptance: 4/28/2020 10:40 AM    :  Dr. Jennifer Vega (Hospital Medicine); Case discussed with Dr. Praveena Villafana who is accepting patient to CICU at [facility]     Accepting facility [facility] ICU    Consulting Physicians from [facility] involved in case:  Dr. Miguel Ángel Zambrano ([facility]- Pulmonary/Critical care)    Reason for transfer request: Needs higher level of ICU care as patient requiring significant oxygen support on ventilator     Report from Physician/Mid-Level Provider/HPI: 69 y/o male with obesity and history of prostate cancer (1/2018 s/p prostatectomy and radiation) was admitted to [facility] on 3/31 with pneumonia due to COVID 19 and acute hypoxic respiratory failure. Patients respiratory failure quickly declined requiring intubation and mechanical ventilation on 4/2. While in hospital patient did develop an WILBUR but improved with gentle hydration and no dialysis needed. Patient completed a 5-day course of IV Rocephin and Azithromycin and Plaquenil at [place]. Patient had worsening leukocytosis while in ICU and completed a 10-day course of IV Levaquin (completed 4/22) for suspected bacterial pneumonia. Multiple attempts made to wean patient off ventilator with some success and yesterday sedation stopped and placed on CPAP vent and was awake and following commands but starting yesterday afternoon patient had increased oxygen requirements and vent support and spiking fevers to 101 F. patient had to be placed on 100% FIO2 overnight and this am. Patient has  been receiving intermittent IV Lasix to help with diuresis in ICU with good urine output and net negative so not felt relate to pulmonary edema as cause of increased oxygen requirements. Patient had blood and respiratory cultures drawn on 4/25 and both so far are no growth. Patient started on empiric Vancomycin and Zosyn and Diflucan on 4/27 and CXR this am showed ET in good position and no determinantal change in CXR so unclear as to why change in need for more oxygen support. Patient back on AC and sedation overnight. Patient also developed ileus and intolerance to TF while in ICU and TF stopped and had to be placed on PPN and tolerating and ileus now resolved and patient having BM. Patient has had anemia for past 1 week but no signs of active bleeding. Patient this am noted to have Hgb 6.9 and transfused 1 unit of PRBCs. Last COVID test on 4/21 remains positive. Last ABG showed 7.320/57.2/130/29.5/99% on FIO2 100% so FIO2 decreased to 80%. Last .2 on 4/26 and increasing. Patient currently sedated on Fentanyl and Precedex. Patient on no pressor support. Dr. Hand has been speaking with wife and aware of changes and she wants to continue aggressive care. Trach and PEG have been discussed with wife and she is not opposed. Patients adult son being discharged from [facility] and he was intubated also in hospital at same time as patient but now has recovered and going home so wife hopeful her  will also recover. I discussed case with Dr. Zambrano on 4/27 and patient not a candidate for proning as that was initial reason for why [facility] wanted to transfer patient as he is too far out from intubation so transfer would be mainly to assist in ventilator weaning and management and transfer delayed monitoring patients stability. I spoke with Dr. High from [facility] on 4/28 and FIO2 remains on 75% for past 12 hours and no fevers for 2 days. I discussed case with Dr. Praveena Villafana who is on  "Pulmonary/Critical care today who again expressed patient is not candidate for proning but willing to accept to assist in ventilator management/weaning on patient.     VS: /78   Pulse 73   Temp 98.4 °F (36.9 °C) (Axillary)   Resp (!) 33   Ht 5' 11" (1.803 m)   Wt 104.7 kg (230 lb 13.2 oz)   SpO2 95%   BMI 32.19 kg/m²    Current Vent Settings:  Vent Mode: A/C  Oxygen Concentration (%):  [] 75 %  Resp Rate Total:  [26 br/min-41 br/min] 32 br/min  Vt Set:  [460 mL] 460 mL  PEEP/CPAP:  [8 ftR98-88 cmH20] 10 cmH20  Mean Airway Pressure:  [12 fhN39-35 cmH20] 21 cmH20    Lines/Tubes:  PICC line ?  Location: Right Basilic  Perez ?  OG/NG Tube ?    Past Medical/Surgical History: See EPIC     Infusion Meds:    Amino acid 4.25% - dextrose 10% (CLINIMIX-E) solution with additives (1L provides 42.5 gm AA, 100 gm CHO (340 kcal/L dextrose), Na 35, K 30, Mg 5, Ca 4.5, Acetate 70, Cl 39, Phos 15) 75 mL/hr at 04/26/20 1158    Amino acid 4.25% - dextrose 10% (CLINIMIX-E) solution with additives (1L provides 42.5 gm AA, 100 gm CHO (340 kcal/L dextrose), Na 35, K 30, Mg 5, Ca 4.5, Acetate 70, Cl 39, Phos 15)      dexmedetomidine (PRECEDEX) infusion 1.5 mcg/kg/hr (04/27/20 0826)    fentanyl 300 mcg/hr (04/27/20 0128)     Labs: See EPIC     Imaging: See EPIC    To Do List upon arrival:     Admit to ICU    Continue empiric antibiotics for now and follow-up cultures drawn on 4/25 as patient with fever and increased WBC on 4/25 and that is when oxygen requirements increased   Special COVID isolation- Airborne, Droplet and Contact   Monitor H/H     Jennifer Vega MD  Senior Staff Physician  Department of Hospital Medicine  [facility]/   [phone]  "

## 2020-04-28 NOTE — RESPIRATORY THERAPY
Pt arrived intubated with 7.5 ETT @25 LIP, Pt placed on a mechanical ventilation with documented settings. Will continue to monitor

## 2020-04-28 NOTE — H&P
[facility]  COVID-19 or Persons Under Investigation ICU H&P:    Patient Name: Bharathi Garrido  MRN: [id number]  Admission Date: 4/28/2020   Code Status: Full  Attending: Praveena Villafana MD     HPI:  Mr. Bharathi Garrido is a 69 yo male with obesity, history of prostate cancer (1/2018 s/p prostatectomy and radiation Dr. Pederson), history of difficult intubation for surgery and glaucoma presents with feeling unwell, fever, decreased appetite and shortness of breath. Patient transferred from [facility] to [facility] 4/28/20 for higher level of care. Admitted to outside hospital on 3/31/20 for SOB and fever and tested positive for COVID-19.  Upon admission, CXR showed bilateral multifocal opacities. Procalcitonin, LDH, ferritin, CRP and D-dimer elevated. Renal function normal. Mild increase in LFTs. He was treated with Plaquinel, Rocephin and Azithromycin. His respiratory function declined and has been intubated since 4/2/20. Rest of outside hospital course as follows:     On 4/6, renal function started to decline and was started on IV NS. Patient completed Plaquenil, azithromycin, and ceftriaxone therapy. Respiratory status stable but unable to be weaned off vent. On 4/8, had  respiratory distress requiring high FiO2 of 70% and PEEP of 12 to maintain SaO2.  Receiving IV Lasix PRN. Pt had increasing respiratory secretions and  mucous plug which was suctioned.  On 4/10 switched from Propofol for sedation to Fentanyl due to high triglycerides. On 4/11 : Had to increase FiO2 as having copious respiratory secretions. Lasix given with improvement in respiratory pattern. On 4/13 Leucocytosis worsening and so started on Levaquin for possible secondary infection. Respiratory status unchanged and WILBUR persisted. On 4/15, developed hypernatremia and started on gentle IV hydration. On 4/16, hypernatremia was worsening, IV fluids changed to D5.  Discussions were had with wife regarding poor prognosis. 4/17: Able  to wean FiO2 to 65%.  WILBUR continues to improve, good urinary output.  Hypernatremia stable. Over next week, vent settings successfully weaned down,  hypernatremia improved, and renal function was stable. Patient had good neuro function off sedation and was working well with PT/OT. On 4/20, FiO2 was down to 50% and he was continuing to improve.     On 4/21, dx with ileus, in  spite of having large liquid stools for prior 2 days. Attempted to wean off of Fentanyl drip sec to ileus and started on Ativan prn. D/w spouse about possible need for Trach and PEG soon. Repeated COVID PCR done and still positive. On 4/22, was able to wean to SIMV during the day. WILBUR resolved. Cont to receive PRN Lasix. Dextrose drip d/priscila as having hyponatremia now and started on TPN. 4/23 was on SIMV all day and the following day, was tolerating CPAP with SIMV at night.     On 4/24, started to decline. He was having high grade fevers and no longer able to tolerate CPAP. Diflucan started. One blood Cx sent. 4/26: Cont to have intermittent fever and worsening leucocytosis. Therefore started on Vancomycin and Zosyn. Sputum Cx sent. Blood Cx from 4/25 -ve so far. Patient changed from CPAP to SIMV for  worsening FiO2. Also having bradycardia with increase in Fentanyl and this improved with decrease in the rate of it. Requiring high FiO2 and PEEP since this evening to maintain SaO2.     4/27 : Cont to have fever, but sputum and blood Cx -ve so far. On broad spectrum I.V ABx and Diflucan. Creatinine stable since yesterday. Had worsening SaO2 last night and so changed to AC and was on high FiO2 this a.m. Was able to wean down on FiO2 to 75% by evening. Received 1 unit PRBC as Hb < 7. Given worsening O2 requirement, transferred to [facility] for higher level of care. But transfer held as still requiring high FiO2 and need to be stabilized further. Spouse came to visit patient. He is still a full code.     Hospital course:   4/28/20: Patient transferred from  [facility] for higher level of care as patient still intubated with high oxygen requirements. Sedated on Fentanyl and Precedex, but responding appropriately and following commands per nursing. Intubated on A/C, FiO2 75%, PEEP 10, , rate 18 with sat of 92%. CXR showed b/l patchy opacities unchanged from yesterday. MAPs . Not requiring pressors. Afebrile. On Vanc and Zosyn. Diflucan discontinued. Repeat cxs sent. WBC slightly improved. Hgb/Hct improved s/p transfusion yesterday. WILBUR continues to worsen. NG set to intermittent suction. Receiving TPN via PICC line.     Disclaimer: Limited review of systems and physical exam per team attending and/or nursing staff due to patient being in special isolation.     Review of Systems:  Unable to obtain due to patient being intubated and sedated.    Vitals:   Vitals:    04/28/20 1445 04/28/20 1500 04/28/20 1515 04/28/20 1530   BP: 134/69 120/67 133/73 118/65   BP Location:       Patient Position:       Pulse: 80 81 82 73   Resp: (!) 26 (!) 27 (!) 27 (!) 27   Temp:  98.3 °F (36.8 °C)     TempSrc:  Axillary     SpO2: (!) 92% (!) 93% (!) 94% (!) 92%       Vent Mode: A/C  Oxygen Concentration (%):  [75] 75  Resp Rate Total:  [24 br/min-34 br/min] 27 br/min  Vt Set:  [450 mL-460 mL] 450 mL  PEEP/CPAP:  [10 cmH20] 10 cmH20  Mean Airway Pressure:  [19 icR04-07 cmH20] 19 cmH20     Date 04/28/20 0700 - 04/29/20 0659   Shift 9527-1389 4311-5948 0059-0063 24 Hour Total   INTAKE   I.V. 5   5   Shift Total 5   5   OUTPUT   Shift Total       Weight (kg)          Physical Exam:  General appearance: Intubated. Sedated.   Head: MMM  Neck: +JVD  Lungs: CTAB  Heart: rrr, no m/r/g  Abdomen: SNTND: hypoactive bowel sounds  Extremities: no cyanosis or edema, or clubbing, warm  Pulses: 2+ BL DP/PT  Skin: No rashes, no erythema, wound on dorsum of left hand   Neurologic: drowsy. Nods or shakes head appropriately to yes or no questions.     Labs: All labs within the last 24 hours were  reviewed.     Recent Labs   Lab 04/28/20  0536   CALCIUM 8.4*   PROT 7.3   *   K 3.9   CO2 34*   CL 90*   BUN 51*   CREATININE 1.53*   ALKPHOS 127*   ALT 29   AST 41   BILITOT 3.9*     Recent Labs   Lab 04/28/20  0536   WBC 18.71*   RBC 3.03*   HGB 8.2*   HCT 25.3*      MCV 84   MCH 27.1   MCHC 32.4     Recent Labs     04/28/20  1605   PH 7.356   PCO2 60.1*   PO2 101*   HCO3 33.7*   POCSATURATED 97   BE 8     Imaging: All imaging within the last 24 hours was reviewed.     Chest x-ray 4/28/20:  FINDINGS:  Study was obtained with the patient in left posterior oblique rotation; the patient's chin obscures detail of the left pulmonary apex.    Endotracheal tube and other indwelling devices remain in good location.  Extrinsic EKG leads overlie the image obscuring some detail.    Multifocal patchy subsegmental opacities are present throughout both lungs similar to yesterday's study of 02/30 hours, compatible with pneumonia including COVID-19 pneumonia, aspiration or pulmonary edema.  I consider pulmonary hemorrhage less likely but not excluded.    I detect no pleural fluid, pneumothorax, pneumomediastinum, pneumoperitoneum or significant osseous abnormality.    ASSESSMENT & PLAN:   This is a 70 y.o. admitted on 4/28/2020 for respiratory failure secondary to confirmed/suspected COVID-19 requiring mechanical ventilation for respiratory support.     Neuro:   -Intubated and sedated. Pt able to respond appropriately and follow commands.  -Sedation order set utilized.   -Cont Precedex gtt 0.2 mcg/kg/hr. D/c Fentanyl once current bag done    Cardiac/Circulatory:   -Needs ECHO, however will hold off to minimize exposure since stronger suspicion for ARDS.   -Maintain MAP's >65 with vasopressor support if necessary.   -Consideration for stress dose steroids if multiple vasopressors required.  -Not requiring pressors. Hypertensive.    -On DVT prophylaxis with Lovenox.     Pulmonary:   -Continue mechanical ventilation  with ARDS targeted strategies. Goal plateau pressure <30. Current settings: FiO2 75%, PEEP 10, TV  450, rate 18  -Continue closed ventilator circuit and avoid aerosol generating procedures.   -Wean ventilator settings per ARDSnet protocol.  -Wean sedation with coordinated SAT and SBT daily.    -Monitor daily triglycerides if using PROPOFOL. Pt previously had elevated TG on Propofol and switched to Fentanyl.   -Repeat COVID-19 on 4/21 positive.   -Completed course of Plaquenil, Azithromycin, and Rocephin   -CRP, d-dimer, ferritin, and CK-MB to trend inflammatory response.   -Strict I/O's and goal net neutral status to help optimize vent mechanics.   -Special isolation and aspiration precautions ordered.   -Update family daily.   -CXR today shows b/l patchy opacities unchanged from yesterday.     Renal:   -Monitoring urine output with consideration for dialysis initiation if necessary.   -Trialysis line placement if dialysis initiated.   -Trending renal function daily. Worsening. BUN 51 and Creatinine 1.53.   -Strict I/O's.   -+JVD on exam. Lasix 60 mg IV x1 today.   -Still hyponatremic. Na 132.     Gastrointestinal:   -Trending liver function daily.   -Diagnosed with ileus on 4/21/20.  NG tube set to intermittent suction.   -Famotidine for GI prophylaxis    -On bowel regimen with Miralax and docusate. Last BM 4/25/20.   -KUB ordered.    Endocrine:   -Goal glucose 140-180. At goal.   -Cont sliding scale insulin    ID:   -Follow-up blood, urine, and sputum cultures.   -Blood cx 4/25 NGTD. Only one sample sent. New cultures sent.   -Respiratory culture 4/26 with candida. Repeat ordered.  -Last fever 4/26. White count elevated but improved. Cont Vanc and Zosyn (started on 4/26). Diflucan d/c'd today.  -PICC line placed 4/2/20. Will pull PICC line and get central line.     Hematology/Oncology:   -Trending CBC daily and monitor for signs of bleeding.   -Transfuse for Hgb <7.  Received 1 unit PRBCs 4/27 for Hgb <7.      Critical Care Time: 45  Critical care was time spent personally by me on the following activities: evaluating this patient's organ dysfunction, development of treatment plan, discussing treatment plan with patient or surrogate and bedside caregivers, discussions with consultants, evaluation of patient's response to treatment, examination of patient, ordering and performing treatments and interventions, ordering and review of laboratory studies, ordering and review of radiographic studies, re-evaluation of patient's condition. This critical care time did not overlap with that of any other provider or involve time for any procedures.      This patient was seen and discussed with Dr. Modi

## 2020-04-28 NOTE — HPI
Mr. Bharathi Garrido is a 69 yo male with obesity, history of prostate cancer (1/2018 s/p prostatectomy and radiation Dr. Pederson), history of difficult intubation for surgery and glaucoma presents with feeling unwell, fever, decreased appetite and shortness of breath. Patient [facility] [place] [facility] [place] for higher level of care. Admitted to outside hospital on 3/31/20 for SOB and fever and tested positive for COVID-19.  Upon admission, CXR showed bilateral multifocal opacities. Procalcitonin, LDH, ferritin, CRP and D-dimer elevated. Renal function normal. Mild increase in LFTs. He was treated with Plaquinel, Rocephin and Azithromycin. His respiratory function declined and has been intubated since 4/2/20. Rest of outside hospital course as follows:     On 4/6, renal function started to decline and was started on IV NS. Patient completed Plaquenil, azithromycin, and ceftriaxone therapy. Respiratory status stable but unable to be weaned off vent. On 4/8, had  respiratory distress requiring high FiO2 of 70% and PEEP of 12 to maintain SaO2.  Receiving IV Lasix PRN. Pt had increasing respiratory secretions and  mucous plug which was suctioned.  On 4/10 switched from Propofol for sedation to Fentanyl due to high triglycerides. On 4/11 : Had to increase FiO2 as having copious respiratory secretions. Lasix given with improvement in respiratory pattern. On 4/13 Leucocytosis worsening and so started on Levaquin for possible secondary infection. Respiratory status unchanged and WILBUR persisted. On 4/15, developed hypernatremia and started on gentle IV hydration. On 4/16, hypernatremia was worsening, IV fluids changed to D5.  Discussions were had with wife regarding poor prognosis. 4/17: Able to wean FiO2 to 65%.  WILBUR continues to improve, good urinary output.  Hypernatremia stable. Over next week, vent settings successfully weaned down,  hypernatremia improved, and renal function was stable. Patient had good  neuro function off sedation and was working well with PT/OT. On 4/20, FiO2 was down to 50% and he was continuing to improve.     On 4/21, dx with ileus, in  spite of having large liquid stools for prior 2 days. Attempted to wean off of Fentanyl drip sec to ileus and started on Ativan prn. D/w spouse about possible need for Trach and PEG soon. Repeated COVID PCR done and still positive. On 4/22, was able to wean to SIMV during the day. WILBUR resolved. Cont to receive PRN Lasix. Dextrose drip d/priscila as having hyponatremia now and started on TPN. 4/23 was on SIMV all day and the following day, was tolerating CPAP with SIMV at night.     On 4/24, started to decline. He was having high grade fevers and no longer able to tolerate CPAP. Diflucan started. One blood Cx sent. 4/26: Cont to have intermittent fever and worsening leucocytosis. Therefore started on Vancomycin and Zosyn. Sputum Cx sent. Blood Cx from 4/25 -ve so far. Patient changed from CPAP to SIMV for  worsening FiO2. Also having bradycardia with increase in Fentanyl and this improved with decrease in the rate of it. Requiring high FiO2 and PEEP since this evening to maintain SaO2.     4/27 : Cont to have fever, but sputum and blood Cx -ve so far. On broad spectrum I.V ABx and Diflucan. Creatinine stable since yesterday. Had worsening SaO2 last night and so changed to AC and was on high FiO2 this a.m. Was able to wean down on FiO2 to 75% by evening. Received 1 unit PRBC as Hb < 7. Given worsening O2 requirement, [place] [facility] [place]. But transfer held as still requiring high FiO2 and need to be stabilized further. Spouse came to visit patient. He is still a full code.

## 2020-04-29 LAB
ALBUMIN SERPL BCP-MCNC: 1.1 G/DL (ref 3.5–5.2)
ALP SERPL-CCNC: 107 U/L (ref 55–135)
ALT SERPL W/O P-5'-P-CCNC: 24 U/L (ref 10–44)
ANION GAP SERPL CALC-SCNC: 11 MMOL/L (ref 8–16)
AST SERPL-CCNC: 34 U/L (ref 10–40)
BASOPHILS # BLD AUTO: 0.05 K/UL (ref 0–0.2)
BASOPHILS NFR BLD: 0.3 % (ref 0–1.9)
BILIRUB SERPL-MCNC: 3.9 MG/DL (ref 0.1–1)
BUN SERPL-MCNC: 48 MG/DL (ref 8–23)
CALCIUM SERPL-MCNC: 8.8 MG/DL (ref 8.7–10.5)
CHLORIDE SERPL-SCNC: 90 MMOL/L (ref 95–110)
CO2 SERPL-SCNC: 34 MMOL/L (ref 23–29)
CREAT SERPL-MCNC: 1.5 MG/DL (ref 0.5–1.4)
DIFFERENTIAL METHOD: ABNORMAL
EOSINOPHIL # BLD AUTO: 0.1 K/UL (ref 0–0.5)
EOSINOPHIL NFR BLD: 0.4 % (ref 0–8)
ERYTHROCYTE [DISTWIDTH] IN BLOOD BY AUTOMATED COUNT: 15.5 % (ref 11.5–14.5)
EST. GFR  (AFRICAN AMERICAN): 54.5 ML/MIN/1.73 M^2
EST. GFR  (NON AFRICAN AMERICAN): 47.2 ML/MIN/1.73 M^2
GLUCOSE SERPL-MCNC: 117 MG/DL (ref 70–110)
HCT VFR BLD AUTO: 24.8 % (ref 40–54)
HGB BLD-MCNC: 7.9 G/DL (ref 14–18)
IMM GRANULOCYTES # BLD AUTO: 0.16 K/UL (ref 0–0.04)
IMM GRANULOCYTES NFR BLD AUTO: 1 % (ref 0–0.5)
LYMPHOCYTES # BLD AUTO: 1 K/UL (ref 1–4.8)
LYMPHOCYTES NFR BLD: 6.1 % (ref 18–48)
MAGNESIUM SERPL-MCNC: 1.8 MG/DL (ref 1.6–2.6)
MCH RBC QN AUTO: 27 PG (ref 27–31)
MCHC RBC AUTO-ENTMCNC: 31.9 G/DL (ref 32–36)
MCV RBC AUTO: 85 FL (ref 82–98)
MONOCYTES # BLD AUTO: 2 K/UL (ref 0.3–1)
MONOCYTES NFR BLD: 12.4 % (ref 4–15)
NEUTROPHILS # BLD AUTO: 12.7 K/UL (ref 1.8–7.7)
NEUTROPHILS NFR BLD: 79.8 % (ref 38–73)
NRBC BLD-RTO: 0 /100 WBC
PHOSPHATE SERPL-MCNC: 3.9 MG/DL (ref 2.7–4.5)
PLATELET # BLD AUTO: 330 K/UL (ref 150–350)
PMV BLD AUTO: 11.5 FL (ref 9.2–12.9)
POCT GLUCOSE: 108 MG/DL (ref 70–110)
POTASSIUM SERPL-SCNC: 3.6 MMOL/L (ref 3.5–5.1)
PROT SERPL-MCNC: 7.2 G/DL (ref 6–8.4)
RBC # BLD AUTO: 2.93 M/UL (ref 4.6–6.2)
SODIUM SERPL-SCNC: 135 MMOL/L (ref 136–145)
WBC # BLD AUTO: 15.86 K/UL (ref 3.9–12.7)

## 2020-04-29 PROCEDURE — 99291 CRITICAL CARE FIRST HOUR: CPT | Mod: ,,, | Performed by: INTERNAL MEDICINE

## 2020-04-29 PROCEDURE — 94003 VENT MGMT INPAT SUBQ DAY: CPT

## 2020-04-29 PROCEDURE — 85025 COMPLETE CBC W/AUTO DIFF WBC: CPT

## 2020-04-29 PROCEDURE — 63600175 PHARM REV CODE 636 W HCPCS: Performed by: INTERNAL MEDICINE

## 2020-04-29 PROCEDURE — 25000242 PHARM REV CODE 250 ALT 637 W/ HCPCS: Performed by: PHYSICIAN ASSISTANT

## 2020-04-29 PROCEDURE — 25000003 PHARM REV CODE 250: Performed by: INTERNAL MEDICINE

## 2020-04-29 PROCEDURE — 94761 N-INVAS EAR/PLS OXIMETRY MLT: CPT

## 2020-04-29 PROCEDURE — 25000003 PHARM REV CODE 250: Performed by: STUDENT IN AN ORGANIZED HEALTH CARE EDUCATION/TRAINING PROGRAM

## 2020-04-29 PROCEDURE — 20000000 HC ICU ROOM

## 2020-04-29 PROCEDURE — 27000221 HC OXYGEN, UP TO 24 HOURS

## 2020-04-29 PROCEDURE — 99900026 HC AIRWAY MAINTENANCE (STAT)

## 2020-04-29 PROCEDURE — 99900035 HC TECH TIME PER 15 MIN (STAT)

## 2020-04-29 PROCEDURE — 84100 ASSAY OF PHOSPHORUS: CPT

## 2020-04-29 PROCEDURE — S0028 INJECTION, FAMOTIDINE, 20 MG: HCPCS | Performed by: STUDENT IN AN ORGANIZED HEALTH CARE EDUCATION/TRAINING PROGRAM

## 2020-04-29 PROCEDURE — 25000003 PHARM REV CODE 250

## 2020-04-29 PROCEDURE — 99292 CRITICAL CARE ADDL 30 MIN: CPT | Mod: ,,, | Performed by: INTERNAL MEDICINE

## 2020-04-29 PROCEDURE — 99292 PR CRITICAL CARE, ADDL 30 MIN: ICD-10-PCS | Mod: ,,, | Performed by: INTERNAL MEDICINE

## 2020-04-29 PROCEDURE — 94668 MNPJ CHEST WALL SBSQ: CPT

## 2020-04-29 PROCEDURE — 25000003 PHARM REV CODE 250: Performed by: PHYSICIAN ASSISTANT

## 2020-04-29 PROCEDURE — 63700000 PHARM REV CODE 250 ALT 637 W/O HCPCS: Performed by: STUDENT IN AN ORGANIZED HEALTH CARE EDUCATION/TRAINING PROGRAM

## 2020-04-29 PROCEDURE — 99291 PR CRITICAL CARE, E/M 30-74 MINUTES: ICD-10-PCS | Mod: ,,, | Performed by: INTERNAL MEDICINE

## 2020-04-29 PROCEDURE — 80053 COMPREHEN METABOLIC PANEL: CPT

## 2020-04-29 PROCEDURE — 83735 ASSAY OF MAGNESIUM: CPT

## 2020-04-29 RX ORDER — NICARDIPINE HYDROCHLORIDE 0.2 MG/ML
2.5 INJECTION INTRAVENOUS CONTINUOUS
Status: DISCONTINUED | OUTPATIENT
Start: 2020-04-29 | End: 2020-04-30

## 2020-04-29 RX ORDER — OXYCODONE HCL 5 MG/5 ML
5 SOLUTION, ORAL ORAL
Status: DISCONTINUED | OUTPATIENT
Start: 2020-04-29 | End: 2020-05-02

## 2020-04-29 RX ORDER — FUROSEMIDE 10 MG/ML
60 INJECTION INTRAMUSCULAR; INTRAVENOUS ONCE
Status: COMPLETED | OUTPATIENT
Start: 2020-04-29 | End: 2020-04-29

## 2020-04-29 RX ADMIN — INSULIN DETEMIR 20 UNITS: 100 INJECTION, SOLUTION SUBCUTANEOUS at 10:04

## 2020-04-29 RX ADMIN — PROPOFOL 50 MCG/KG/MIN: 10 INJECTION, EMULSION INTRAVENOUS at 06:04

## 2020-04-29 RX ADMIN — Medication 800 MG: at 10:04

## 2020-04-29 RX ADMIN — NICARDIPINE HYDROCHLORIDE 2.5 MG/HR: 0.2 INJECTION, SOLUTION INTRAVENOUS at 11:04

## 2020-04-29 RX ADMIN — CHLORHEXIDINE GLUCONATE 0.12% ORAL RINSE 15 ML: 1.2 LIQUID ORAL at 09:04

## 2020-04-29 RX ADMIN — VANCOMYCIN HYDROCHLORIDE 1000 MG: 1 INJECTION, POWDER, LYOPHILIZED, FOR SOLUTION INTRAVENOUS at 09:04

## 2020-04-29 RX ADMIN — DOCUSATE SODIUM 100 MG: 50 LIQUID ORAL at 09:04

## 2020-04-29 RX ADMIN — OXYCODONE HYDROCHLORIDE 5 MG: 5 SOLUTION ORAL at 12:04

## 2020-04-29 RX ADMIN — DEXMEDETOMIDINE HYDROCHLORIDE 0.2 MCG/KG/HR: 4 INJECTION, SOLUTION INTRAVENOUS at 01:04

## 2020-04-29 RX ADMIN — PIPERACILLIN SODIUM,TAZOBACTAM SODIUM 4.5 G: 4; .5 INJECTION, POWDER, FOR SOLUTION INTRAVENOUS at 09:04

## 2020-04-29 RX ADMIN — PIPERACILLIN SODIUM,TAZOBACTAM SODIUM 4.5 G: 4; .5 INJECTION, POWDER, FOR SOLUTION INTRAVENOUS at 01:04

## 2020-04-29 RX ADMIN — POTASSIUM CHLORIDE 40 MEQ: 20 SOLUTION ORAL at 05:04

## 2020-04-29 RX ADMIN — Medication 0.02 MCG/KG/MIN: at 12:04

## 2020-04-29 RX ADMIN — FUROSEMIDE 60 MG: 10 INJECTION, SOLUTION INTRAMUSCULAR; INTRAVENOUS at 12:04

## 2020-04-29 RX ADMIN — FAMOTIDINE 20 MG: 10 INJECTION INTRAVENOUS at 09:04

## 2020-04-29 RX ADMIN — PROPOFOL 40 MCG/KG/MIN: 10 INJECTION, EMULSION INTRAVENOUS at 12:04

## 2020-04-29 RX ADMIN — BRIMONIDINE TARTRATE 1 DROP: 2 SOLUTION OPHTHALMIC at 09:04

## 2020-04-29 RX ADMIN — PROPOFOL 40 MCG/KG/MIN: 10 INJECTION, EMULSION INTRAVENOUS at 09:04

## 2020-04-29 RX ADMIN — DEXMEDETOMIDINE HYDROCHLORIDE 0.2 MCG/KG/HR: 4 INJECTION, SOLUTION INTRAVENOUS at 12:04

## 2020-04-29 RX ADMIN — Medication 0.1 MCG/KG/MIN: at 12:04

## 2020-04-29 RX ADMIN — LATANOPROST 1 DROP: 50 SOLUTION OPHTHALMIC at 09:04

## 2020-04-29 RX ADMIN — MINERAL OIL AND WHITE PETROLATUM: 150; 830 OINTMENT OPHTHALMIC at 09:04

## 2020-04-29 RX ADMIN — PROPOFOL 20 MCG/KG/MIN: 10 INJECTION, EMULSION INTRAVENOUS at 04:04

## 2020-04-29 RX ADMIN — TIMOLOL MALEATE 1 DROP: 5 SOLUTION/ DROPS OPHTHALMIC at 09:04

## 2020-04-29 RX ADMIN — ENOXAPARIN SODIUM 40 MG: 100 INJECTION SUBCUTANEOUS at 09:04

## 2020-04-29 RX ADMIN — Medication 800 MG: at 05:04

## 2020-04-29 RX ADMIN — PROPOFOL 40 MCG/KG/MIN: 10 INJECTION, EMULSION INTRAVENOUS at 02:04

## 2020-04-29 RX ADMIN — Medication 0.02 MCG/KG/MIN: at 05:04

## 2020-04-29 NOTE — PLAN OF CARE
Recommendations    1. Recommend TF of Peptamen Intense VHP advancing as tolerated to goal rate of 55 mL/hr to provide 1320 kcal, 121 gm protein, 1109 mL free fluid.     2. If able to extubate, recommend regular diet with texture per SLP.    3. RD following.     Goals: receive nutrition by RD follow-up  Nutrition Goal Status: new

## 2020-04-29 NOTE — PROCEDURES
Bharathi Garrido is a 70 y.o. male patient.    Temp: 98.2 °F (36.8 °C) (04/29/20 0000)  Pulse: 71 (04/29/20 0230)  Resp: (!) 24 (04/29/20 0230)  BP: (!) 94/55 (04/29/20 0200)  SpO2: (!) 90 % (04/29/20 0230)  Weight: 105.4 kg (232 lb 5.8 oz) (04/28/20 1708)       Arterial    Diagnosis: respiratory distress, covid +    Patient location during procedure: ICU  Procedure start time: 4/28/2020 7:33 PM  Timeout: 4/28/2020 7:33 PM  Procedure end time: 4/28/2020 7:53 PM    Staffing  Authorizing Provider: Miroslava Patrick MD  Performing Provider: Miroslava Patrick MD    Anesthesiologist was present at the time of the procedure.    Preanesthetic Checklist  Completed: patient identified, site marked, timeout performed, IV checked, risks and benefits discussed and monitors and equipment checkedArterial  Skin Prep: chlorhexidine gluconate  Local Infiltration: none  Orientation: left  Location: radial  Catheter Size: 18 G  Catheter placement by Ultrasound guidance. Heme positive aspiration all ports.  Vessel Caliber: medium, patent, compressibility normal  Vascular Doppler:  not done  Needle advanced into vessel with real time Ultrasound guidance.  Guidewire confirmed in vessel.  Sterile sheath used.Insertion Attempts: 1  Assessment  Dressing: secured with tape and tegaderm and sutured in place and taped  Patient: Tolerated well            Miroslava Patrick  4/29/2020

## 2020-04-29 NOTE — PROGRESS NOTES
[facility]  COVID-19 or Persons Under Investigation ICU DAILY PROGRESS NOTE    Patient Name: Bharathi Garrido  MRN: [id number]  Admission Date: 4/28/2020   Code Status:   Attending: Praveena Villafana MD     SUBJECTIVE:  HPI:  Mr. Bharathi Garrido is a 69 yo male with obesity, history of prostate cancer (1/2018 s/p prostatectomy and radiation Dr. Pederson), history of difficult intubation for surgery and glaucoma presents with feeling unwell, fever, decreased appetite and shortness of breath. Patient transferred from [facility] to [facility] 4/28/20 for higher level of care. Admitted to outside hospital on 3/31/20 for SOB and fever and tested positive for COVID-19.  Upon admission, CXR showed bilateral multifocal opacities. Procalcitonin, LDH, ferritin, CRP and D-dimer elevated. Renal function normal. Mild increase in LFTs. He was treated with Plaquinel, Rocephin and Azithromycin. His respiratory function declined and has been intubated since 4/2/20. Rest of outside hospital course as follows:      On 4/6, renal function started to decline and was started on IV NS. Patient completed Plaquenil, azithromycin, and ceftriaxone therapy. Respiratory status stable but unable to be weaned off vent. On 4/8, had  respiratory distress requiring high FiO2 of 70% and PEEP of 12 to maintain SaO2.  Receiving IV Lasix PRN. Pt had increasing respiratory secretions and  mucous plug which was suctioned.  On 4/10 switched from Propofol for sedation to Fentanyl due to high triglycerides. On 4/11 : Had to increase FiO2 as having copious respiratory secretions. Lasix given with improvement in respiratory pattern. On 4/13 Leucocytosis worsening and so started on Levaquin for possible secondary infection. Respiratory status unchanged and WILBUR persisted. On 4/15, developed hypernatremia and started on gentle IV hydration. On 4/16, hypernatremia was worsening, IV fluids changed to D5.  Discussions were had with wife regarding  poor prognosis. 4/17: Able to wean FiO2 to 65%.  WILBUR continues to improve, good urinary output.  Hypernatremia stable. Over next week, vent settings successfully weaned down,  hypernatremia improved, and renal function was stable. Patient had good neuro function off sedation and was working well with PT/OT. On 4/20, FiO2 was down to 50% and he was continuing to improve.      On 4/21, dx with ileus, in  spite of having large liquid stools for prior 2 days. Attempted to wean off of Fentanyl drip sec to ileus and started on Ativan prn. D/w spouse about possible need for Trach and PEG soon. Repeated COVID PCR done and still positive. On 4/22, was able to wean to SIMV during the day. WILBUR resolved. Cont to receive PRN Lasix. Dextrose drip d/priscila as having hyponatremia now and started on TPN. 4/23 was on SIMV all day and the following day, was tolerating CPAP with SIMV at night.      On 4/24, started to decline. He was having high grade fevers and no longer able to tolerate CPAP. Diflucan started. One blood Cx sent. 4/26: Cont to have intermittent fever and worsening leucocytosis. Therefore started on Vancomycin and Zosyn. Sputum Cx sent. Blood Cx from 4/25 -ve so far. Patient changed from CPAP to SIMV for  worsening FiO2. Also having bradycardia with increase in Fentanyl and this improved with decrease in the rate of it. Requiring high FiO2 and PEEP since this evening to maintain SaO2.      4/27 : Cont to have fever, but sputum and blood Cx -ve so far. On broad spectrum I.V ABx and Diflucan. Creatinine stable since yesterday. Had worsening SaO2 last night and so changed to AC and was on high FiO2 this a.m. Was able to wean down on FiO2 to 75% by evening. Received 1 unit PRBC as Hb < 7. Given worsening O2 requirement, transferred to [facility] for higher level of care. But transfer held as still requiring high FiO2 and need to be stabilized further. Spouse came to visit patient. He is still a full code.      Hospital course:    4/28/20: Patient transferred from [facility] for higher level of care as patient still intubated with high oxygen requirements. Sedated on Fentanyl and Precedex, but responding appropriately and following commands per nursing. Intubated on A/C, FiO2 75%, PEEP 10, , rate 18 with sat of 92%. CXR consistent with multilobar pneumonia. On empiric Vanc and Zosyn for recent white count with leukocytosis. PICC from 4/2 removed and central line and a-line placed. Repeat cxs sent. Hgb/Hct stable s/p 1 unit PRBCs on 4/27. NG set to intermittent suction. Abd x-ray showed gaseous distention.     Overnight events/Interval History: Hospital day #1. Started on Propofol in addition to Precedex and Dilaudid for sedation overnight. Also started on Levophed. Vent settings unchanged: FiO2 75% and PEEP 10. New cultures with no growth.     OBJECTIVE: Limited review of systems and physical exam per team attending and/or nursing staff due to patient being in special isolation.     Vitals:   Vitals:    04/29/20 1327 04/29/20 1330 04/29/20 1345 04/29/20 1400   BP:    (!) 123/56   BP Location:    Left arm   Patient Position:    Lying   Pulse: (!) 124 94 98 97   Resp: (!) 29 20 (!) 24 19   Temp:       TempSrc:       SpO2: (!) 85% (!) 92% (!) 89% (!) 89%   Weight:           Vent Mode: A/C  Oxygen Concentration (%):  [60-75] 75  Resp Rate Total:  [19 br/min-40 br/min] 24 br/min  Vt Set:  [450 mL] 450 mL  PEEP/CPAP:  [10 cmH20] 10 cmH20  Mean Airway Pressure:  [13 phI90-72 cmH20] 17 cmH20     Date 04/29/20 0700 - 04/30/20 0659   Shift 5540-7876 1395-6865 6624-3856 24 Hour Total   INTAKE   I.V.(mL/kg) 368.4(3.5)   368.4(3.5)   IV Piggyback 700   700   Shift Total(mL/kg) 1068.4(10.1)   1068.4(10.1)   OUTPUT   Urine(mL/kg/hr) 700   700   Shift Total(mL/kg) 700(6.6)   700(6.6)   Weight (kg) 105.4 105.4 105.4 105.4        Labs: All labs within the last 24 hours were reviewed.     Imaging: All imaging within the last 24 hours was reviewed.      Physical Exam:  General appearance: Intubated. Sedated.   Head: MMM  Neck: +JVD  Lungs: CTAB  Heart: rrr, no m/r/g  Abdomen: SNTND: hypoactive bowel sounds  Extremities: no cyanosis or edema, or clubbing, warm  Skin: No rashes, no erythema, wound on dorsum of left hand   Neurologic: drowsy. Nods or shakes head appropriately to yes or no questions.     ASSESSMENT & PLAN:   This is a 70 y.o. admitted on 4/28/2020 for respiratory failure secondary to confirmed/suspected COVID-19 requiring mechanical ventilation for respiratory support.     Neuro:   -Intubated and sedated. Pt able to respond appropriately and follow commands.  - Sedated on Propofol, Dilaudid gtt, and Precedex. Attempted to wean off Propofol, and patient became hypertensive with . Increased Dilaudid and gave PO Oxycodone. Started Cardene gtt which was titrated up to 10 mg/hr with no improvement in blood pressure. Started on back on Propofol gtt and blood pressure came down. Cont to try to wean off Propofol as able given prior hypertriglyceridemia.      Cardiac/Circulatory:   -Needs ECHO, however will hold off to minimize exposure since stronger suspicion for ARDS.   -Maintain MAP's >65 with vasopressor support if necessary. Pressors d/c'd. Pt now hypertensive and on Cardene gtt.   - CVP 5 today. . Troponin 0.02   -On DVT prophylaxis with Lovenox.      Pulmonary:   -Continue mechanical ventilation with ARDS targeted strategies. Goal plateau pressure <30. Current settings: FiO2 75%, PEEP 10, TV  450, rate 18  -Continue closed ventilator circuit and avoid aerosol generating procedures.   -Wean ventilator settings per ARDSnet protocol.  -Wean sedation with coordinated SAT and SBT daily.    -Strict I/O's and goal net neutral status to help optimize vent mechanics.   -Special isolation and aspiration precautions ordered.   -Update family daily.     -Repeat COVID-19 on 4/21 positive.   -Completed course of Plaquenil, Azithromycin, and Rocephin    -CRP, d-dimer, ferritin, LD, and procal elevated.   -CXR today shows b/l patchy opacities consistent with multilobar pneumonia.      Renal:   -Monitoring urine output with consideration for dialysis initiation if necessary.   -Trialysis line placement if dialysis initiated.   -Renal function stable. UOP 3.3L. Net -2.5L.   -Will give IV Lasix 60 mg again today   -+JVD on exam.  CVP 5. Hypertensive.   -Hyponatremia resolved. Potassium 3.6 - replenished. Magnesium 1.8-replenished. Phosphorous 3.9.     Gastrointestinal:   -Trending liver function daily.   -Diagnosed with ileus on 4/21/20.  NG tube set to intermittent suction.   -Famotidine for GI prophylaxis    -On bowel regimen with Miralax and docusate. Last BM 4/28/20.  -KUB showed gaseous distention; nonobstructive pattern.   -Started TFs 4/29.     Endocrine:   -Goal glucose 140-180. At goal.   -Cont sliding scale insulin     ID:   -Repeat blood, fungal, and sputum cultures NGTD.   -Respiratory culture 4/26 with candida.  -Last fever 4/26. White count elevated but downtrending. Cont Vanc and Zosyn (started on 4/26).  -PICC line pulled yesterday and had central line and A-line placed.      Hematology/Oncology:   -Trending CBC daily and monitor for signs of bleeding.   -Transfuse for Hgb <7.  Received 1 unit PRBCs 4/27 for Hgb <7. Stable.      Critical Care Time: 45  Critical care was time spent personally by me on the following activities: evaluating this patient's organ dysfunction, development of treatment plan, discussing treatment plan with patient or surrogate and bedside caregivers, discussions with consultants, evaluation of patient's response to treatment, examination of patient, ordering and performing treatments and interventions, ordering and review of laboratory studies, ordering and review of radiographic studies, re-evaluation of patient's condition. This critical care time did not overlap with that of any other provider or involve time for any  procedures.       This patient was seen and discussed with Dr. Modi

## 2020-04-29 NOTE — SIGNIFICANT EVENT
I called to speak to patient's wife, Mrs. Justine Garrido, to give update on patient's care. Left a voicemail, will hopefully be able to connect with her later today.     Leslie Modi MD  [facility] Staff

## 2020-04-29 NOTE — PLAN OF CARE
04/29/20 1039   Discharge Assessment   Assessment Type Discharge Planning Assessment   Assessment information obtained from? Medical Record   Expected Length of Stay (days) 7   Prior to hospitilization cognitive status: Alert/Oriented   Prior to hospitalization functional status: Independent   Current cognitive status: Unable to Assess   Current Functional Status: Completely Dependent   Lives With spouse   Able to Return to Prior Arrangements no   Is patient able to care for self after discharge? No   Who are your caregiver(s) and their phone number(s)? Dinora Garrido Spouse [phone]    Patient's perception of discharge disposition long-term acute care facility (LTAC)   Readmission Within the Last 30 Days no previous admission in last 30 days  (transfer from [facility])   Patient currently being followed by outpatient case management? No   Patient currently receives any other outside agency services? No   Equipment Currently Used at Home none   Do you have any problems affording any of your prescribed medications? TBD   Is the patient taking medications as prescribed? yes   Does the patient have transportation home? Yes   Transportation Anticipated family or friend will provide   Does the patient receive services at the [facility]? No   Discharge Plan A Long-term acute care facility (LTAC)   Discharge Plan B Skilled Nursing Facility   DME Needed Upon Discharge  none   Transferred from [facility] for COVID 19, higher level of care. Pt lives with his spouse, who also has COVID. Was previously independent in his ADLs. Pt has been in hospital for almost 4 weeks. Will need post acute services TBD. CM will continue to follow.

## 2020-04-29 NOTE — PT/OT/SLP PROGRESS
Physical Therapy      Patient Name:  Bharathi Garrido   MRN:  [id number]    Patient not seen today secondary to pt not appropriate for initiation of therapy services at this time 2/2 current respiratory status. Pt is a potential candidate for early mobility intervention. PT will monitor and follow up as pt is appropriate.   Maura Pastor, PT

## 2020-04-29 NOTE — CONSULTS
[facility]  Adult Nutrition  Consult Note    SUMMARY     Recommendations    1. Recommend TF of Peptamen Intense VHP advancing as tolerated to goal rate of 55 mL/hr to provide 1320 kcal, 121 gm protein, 1109 mL free fluid.     2. If able to extubate, recommend regular diet with texture per SLP.    3. RD following.     Goals: receive nutrition by RD follow-up  Nutrition Goal Status: new  Communication of RD Recs: (POC)    Reason for Assessment    Reason For Assessment: consult  Diagnosis: (Pneumonia due to COVID-19 virus)  Relevant Medical History: obesity, prostate cancer 1/2018   Interdisciplinary Rounds: did not attend  General Information Comments: Remote access. Pt transferred from [facility] for higher level of care. Intubated, sedated. Team would like to start trickle TF ASAP. Unable to determine appetite or UBW PTA. Due to recent hospital wide restrictions to limit the transfer of COVID-19, we are not performing any physical exams at this time. NFPE to be performed at a future date. Unable to assess for malnutrition criteria at this time.  Nutrition Discharge Planning: unable to determine at this time    Nutrition/Diet History    Factors Affecting Nutritional Intake: NPO, on mechanical ventilation    Anthropometrics    Temp: 97 °F (36.1 °C)  Weight Method: Bed Scale  Weight: 105.4 kg (232 lb 5.8 oz)  Weight (lb): 232.37 lb  BMI (Calculated): 32.4  BMI Grade: 30 - 34.9- obesity - grade I    Lab/Procedures/Meds    Pertinent Labs Reviewed: reviewed  Pertinent Labs Comments: Na 135, BUN 48, Cr 1.5, GFR 54.5, glucose 117, T bili 3.9  Pertinent Medications Reviewed: reviewed  Pertinent Medications Comments: lasix, insulin, propofol    Estimated/Assessed Needs    Weight Used For Calorie Calculations: 105.4 kg (232 lb 5.8 oz)  Energy Calorie Requirements (kcal): 2940-0572 kcal/day  Energy Need Method: Kcal/kg(11-14)  Protein Requirements: 118-157 gm/day(1.5-2.0 gm/kg)  Weight Used For Protein  Calculations: 78.2 kg (172 lb 6.4 oz)(IBW)  Fluid Requirements (mL): 1 mL/kcal or per MD     RDA Method (mL): 1159     Nutrition Prescription Ordered    Current Diet Order: NPO    Evaluation of Received Nutrient/Fluid Intake    Other Calories (kcal): 166(propofol)  I/O: -2.473L since admit  Comments: LBM 4/25  % Intake of Estimated Energy Needs: 0 - 25 %  % Meal Intake: NPO    Nutrition Risk    Level of Risk/Frequency of Follow-up: (f/u 2 x wk)     Assessment and Plan    Nutrition Problem  Inadequate Energy Intake    Related to (etiology):   Inability to consume sufficient energy     Signs and Symptoms (as evidenced by):   Intubated, NPO with no alternative means of nutrition      Interventions (treatment strategy):  Collaboration of nutrition care with other providers    Nutrition Diagnosis Status:   New     Monitor and Evaluation    Food and Nutrient Intake: energy intake, enteral nutrition intake  Food and Nutrient Adminstration: enteral and parenteral nutrition administration  Anthropometric Measurements: weight, weight change, body mass index  Biochemical Data, Medical Tests and Procedures: electrolyte and renal panel, gastrointestinal profile, glucose/endocrine profile, inflammatory profile, lipid profile  Nutrition-Focused Physical Findings: overall appearance     Nutrition Follow-Up    RD Follow-up?: Yes

## 2020-04-29 NOTE — NURSING
"Arrived per EMS on stretcher report was called ahead from KATHIA Younger.TPN and Fentanyl and Precedex. asked "let TPN and Fentanyl complete then d/c.Family aware of transfer here to [facility]   "

## 2020-04-29 NOTE — PROCEDURES
Bharathi Garrido is a 70 y.o. male patient.    Temp: 98.2 °F (36.8 °C) (04/29/20 0000)  Pulse: 71 (04/29/20 0230)  Resp: (!) 24 (04/29/20 0230)  BP: (!) 94/55 (04/29/20 0200)  SpO2: (!) 90 % (04/29/20 0230)  Weight: 105.4 kg (232 lb 5.8 oz) (04/28/20 1708)       Central Line    Diagnosis: respiratory distress, coid +  Patient location during procedure: ICU  Procedure start time: 4/28/2020 7:55 PM  Timeout: 4/28/2020 7:56 PM  Procedure end time: 4/28/2020 8:16 PM    Staffing  Authorizing Provider: Miroslava Patrick MD  Performing Provider: Miroslava Patrick MD    Staffing  Anesthesiologist: Miroslava Patrick MD  Performed: anesthesiologist   Anesthesiologist was present at the time of the procedure.  Preanesthetic Checklist  Completed: patient identified, site marked, timeout performed, IV checked, risks and benefits discussed and monitors and equipment checked  Indication   Indication: hemodynamic monitoring, vascular access, med administration     Anesthesia   general anesthesia and local infiltration    Central Line   Skin Prep: skin prepped with ChloraPrep, skin prep agent completely dried prior to procedure  maximum sterile barriers used during central venous catheter insertion  hand hygiene performed prior to central venous catheter insertion  Location: left, internal jugular.   Catheter type: triple lumen  Catheter Size: 9 Fr  Inserted Catheter Length: 16 cm  Ultrasound: vascular probe with ultrasound  Vessel Caliber: medium, patent, compressibility normal  Needle advanced into vessel with real time Ultrasound guidance.  Guidewire confirmed in vessel.  Image recorded and saved.   Manometry: Venous cannualation confirmed by visual estimation of blood vessel pressure using manometry.  Insertion Attempts: 1    Post-Procedure   X-Ray: no pneumothorax on x-ray, placement verified by x-ray and successful placement  Adverse Events:none    Guidewire Guidewire removed intact. Guidewire removed intact, verified with  nurse.            Miroslava Patrick  4/29/2020

## 2020-04-30 LAB
1,3 BETA GLUCAN SER-MCNC: <31 PG/ML
ALBUMIN SERPL BCP-MCNC: 1.3 G/DL (ref 3.5–5.2)
ALP SERPL-CCNC: 124 U/L (ref 55–135)
ALT SERPL W/O P-5'-P-CCNC: 26 U/L (ref 10–44)
ANION GAP SERPL CALC-SCNC: 11 MMOL/L (ref 8–16)
AST SERPL-CCNC: 35 U/L (ref 10–40)
BACTERIA SPEC AEROBE CULT: NORMAL
BACTERIA SPEC AEROBE CULT: NORMAL
BASOPHILS # BLD AUTO: 0.08 K/UL (ref 0–0.2)
BASOPHILS NFR BLD: 0.5 % (ref 0–1.9)
BILIRUB SERPL-MCNC: 4.4 MG/DL (ref 0.1–1)
BUN SERPL-MCNC: 43 MG/DL (ref 8–23)
CALCIUM SERPL-MCNC: 8.9 MG/DL (ref 8.7–10.5)
CHLORIDE SERPL-SCNC: 90 MMOL/L (ref 95–110)
CO2 SERPL-SCNC: 38 MMOL/L (ref 23–29)
CREAT SERPL-MCNC: 1.6 MG/DL (ref 0.5–1.4)
DIFFERENTIAL METHOD: ABNORMAL
EOSINOPHIL # BLD AUTO: 0.1 K/UL (ref 0–0.5)
EOSINOPHIL NFR BLD: 0.4 % (ref 0–8)
ERYTHROCYTE [DISTWIDTH] IN BLOOD BY AUTOMATED COUNT: 15.9 % (ref 11.5–14.5)
EST. GFR  (AFRICAN AMERICAN): 50.4 ML/MIN/1.73 M^2
EST. GFR  (NON AFRICAN AMERICAN): 43.6 ML/MIN/1.73 M^2
FUNGITELL COMMENTS: NEGATIVE
GLUCOSE SERPL-MCNC: 117 MG/DL (ref 70–110)
GRAM STN SPEC: NORMAL
HCT VFR BLD AUTO: 27 % (ref 40–54)
HGB BLD-MCNC: 8.3 G/DL (ref 14–18)
IMM GRANULOCYTES # BLD AUTO: 0.15 K/UL (ref 0–0.04)
IMM GRANULOCYTES NFR BLD AUTO: 1 % (ref 0–0.5)
LYMPHOCYTES # BLD AUTO: 0.9 K/UL (ref 1–4.8)
LYMPHOCYTES NFR BLD: 5.8 % (ref 18–48)
MAGNESIUM SERPL-MCNC: 2.1 MG/DL (ref 1.6–2.6)
MCH RBC QN AUTO: 26.3 PG (ref 27–31)
MCHC RBC AUTO-ENTMCNC: 30.7 G/DL (ref 32–36)
MCV RBC AUTO: 86 FL (ref 82–98)
MONOCYTES # BLD AUTO: 1.7 K/UL (ref 0.3–1)
MONOCYTES NFR BLD: 10.6 % (ref 4–15)
NEUTROPHILS # BLD AUTO: 12.9 K/UL (ref 1.8–7.7)
NEUTROPHILS NFR BLD: 81.7 % (ref 38–73)
NRBC BLD-RTO: 0 /100 WBC
PHOSPHATE SERPL-MCNC: 3.4 MG/DL (ref 2.7–4.5)
PLATELET # BLD AUTO: 387 K/UL (ref 150–350)
PMV BLD AUTO: 11.9 FL (ref 9.2–12.9)
POCT GLUCOSE: 166 MG/DL (ref 70–110)
POCT GLUCOSE: 188 MG/DL (ref 70–110)
POCT GLUCOSE: 195 MG/DL (ref 70–110)
POTASSIUM SERPL-SCNC: 4 MMOL/L (ref 3.5–5.1)
PROT SERPL-MCNC: 7.7 G/DL (ref 6–8.4)
RBC # BLD AUTO: 3.15 M/UL (ref 4.6–6.2)
SODIUM SERPL-SCNC: 139 MMOL/L (ref 136–145)
TRIGL SERPL-MCNC: 448 MG/DL (ref 30–150)
VANCOMYCIN TROUGH SERPL-MCNC: 34.4 UG/ML (ref 10–22)
VANCOMYCIN TROUGH SERPL-MCNC: 36.8 UG/ML (ref 10–22)
WBC # BLD AUTO: 15.78 K/UL (ref 3.9–12.7)

## 2020-04-30 PROCEDURE — 99900026 HC AIRWAY MAINTENANCE (STAT)

## 2020-04-30 PROCEDURE — 80053 COMPREHEN METABOLIC PANEL: CPT

## 2020-04-30 PROCEDURE — 25000003 PHARM REV CODE 250: Performed by: INTERNAL MEDICINE

## 2020-04-30 PROCEDURE — 63600175 PHARM REV CODE 636 W HCPCS: Performed by: INTERNAL MEDICINE

## 2020-04-30 PROCEDURE — 80202 ASSAY OF VANCOMYCIN: CPT | Mod: 91

## 2020-04-30 PROCEDURE — 84100 ASSAY OF PHOSPHORUS: CPT

## 2020-04-30 PROCEDURE — 25000003 PHARM REV CODE 250: Performed by: STUDENT IN AN ORGANIZED HEALTH CARE EDUCATION/TRAINING PROGRAM

## 2020-04-30 PROCEDURE — 80202 ASSAY OF VANCOMYCIN: CPT

## 2020-04-30 PROCEDURE — 85025 COMPLETE CBC W/AUTO DIFF WBC: CPT

## 2020-04-30 PROCEDURE — 84478 ASSAY OF TRIGLYCERIDES: CPT

## 2020-04-30 PROCEDURE — 99291 PR CRITICAL CARE, E/M 30-74 MINUTES: ICD-10-PCS | Mod: ,,, | Performed by: INTERNAL MEDICINE

## 2020-04-30 PROCEDURE — 83735 ASSAY OF MAGNESIUM: CPT

## 2020-04-30 PROCEDURE — 99292 PR CRITICAL CARE, ADDL 30 MIN: ICD-10-PCS | Mod: ,,, | Performed by: INTERNAL MEDICINE

## 2020-04-30 PROCEDURE — 27000221 HC OXYGEN, UP TO 24 HOURS

## 2020-04-30 PROCEDURE — S0028 INJECTION, FAMOTIDINE, 20 MG: HCPCS | Performed by: STUDENT IN AN ORGANIZED HEALTH CARE EDUCATION/TRAINING PROGRAM

## 2020-04-30 PROCEDURE — 99900035 HC TECH TIME PER 15 MIN (STAT)

## 2020-04-30 PROCEDURE — 20000000 HC ICU ROOM

## 2020-04-30 PROCEDURE — 99292 CRITICAL CARE ADDL 30 MIN: CPT | Mod: ,,, | Performed by: INTERNAL MEDICINE

## 2020-04-30 PROCEDURE — 94003 VENT MGMT INPAT SUBQ DAY: CPT

## 2020-04-30 PROCEDURE — 94668 MNPJ CHEST WALL SBSQ: CPT

## 2020-04-30 PROCEDURE — 94761 N-INVAS EAR/PLS OXIMETRY MLT: CPT

## 2020-04-30 PROCEDURE — 99291 CRITICAL CARE FIRST HOUR: CPT | Mod: ,,, | Performed by: INTERNAL MEDICINE

## 2020-04-30 PROCEDURE — 25000242 PHARM REV CODE 250 ALT 637 W/ HCPCS: Performed by: PHYSICIAN ASSISTANT

## 2020-04-30 RX ORDER — FUROSEMIDE 10 MG/ML
60 INJECTION INTRAMUSCULAR; INTRAVENOUS DAILY
Status: DISCONTINUED | OUTPATIENT
Start: 2020-04-30 | End: 2020-05-01

## 2020-04-30 RX ADMIN — MINERAL OIL AND WHITE PETROLATUM: 150; 830 OINTMENT OPHTHALMIC at 08:04

## 2020-04-30 RX ADMIN — INSULIN DETEMIR 20 UNITS: 100 INJECTION, SOLUTION SUBCUTANEOUS at 08:04

## 2020-04-30 RX ADMIN — PIPERACILLIN SODIUM,TAZOBACTAM SODIUM 4.5 G: 4; .5 INJECTION, POWDER, FOR SOLUTION INTRAVENOUS at 04:04

## 2020-04-30 RX ADMIN — LATANOPROST 1 DROP: 50 SOLUTION OPHTHALMIC at 08:04

## 2020-04-30 RX ADMIN — DOCUSATE SODIUM 100 MG: 50 LIQUID ORAL at 08:04

## 2020-04-30 RX ADMIN — TIMOLOL MALEATE 1 DROP: 5 SOLUTION/ DROPS OPHTHALMIC at 08:04

## 2020-04-30 RX ADMIN — ENOXAPARIN SODIUM 40 MG: 100 INJECTION SUBCUTANEOUS at 08:04

## 2020-04-30 RX ADMIN — CHLORHEXIDINE GLUCONATE 0.12% ORAL RINSE 15 ML: 1.2 LIQUID ORAL at 08:04

## 2020-04-30 RX ADMIN — HYDROMORPHONE HYDROCHLORIDE 1 MG/HR: 2 INJECTION INTRAMUSCULAR; INTRAVENOUS; SUBCUTANEOUS at 12:04

## 2020-04-30 RX ADMIN — BRIMONIDINE TARTRATE 1 DROP: 2 SOLUTION OPHTHALMIC at 08:04

## 2020-04-30 RX ADMIN — FUROSEMIDE 60 MG: 10 INJECTION, SOLUTION INTRAMUSCULAR; INTRAVENOUS at 11:04

## 2020-04-30 RX ADMIN — POLYETHYLENE GLYCOL 3350 17 G: 17 POWDER, FOR SOLUTION ORAL at 08:04

## 2020-04-30 RX ADMIN — PIPERACILLIN SODIUM,TAZOBACTAM SODIUM 4.5 G: 4; .5 INJECTION, POWDER, FOR SOLUTION INTRAVENOUS at 11:04

## 2020-04-30 RX ADMIN — DEXMEDETOMIDINE HYDROCHLORIDE 0.2 MCG/KG/HR: 4 INJECTION, SOLUTION INTRAVENOUS at 02:04

## 2020-04-30 RX ADMIN — VANCOMYCIN HYDROCHLORIDE 1000 MG: 1 INJECTION, POWDER, LYOPHILIZED, FOR SOLUTION INTRAVENOUS at 09:04

## 2020-04-30 RX ADMIN — OXYCODONE HYDROCHLORIDE 5 MG: 5 SOLUTION ORAL at 01:04

## 2020-04-30 RX ADMIN — PIPERACILLIN SODIUM,TAZOBACTAM SODIUM 4.5 G: 4; .5 INJECTION, POWDER, FOR SOLUTION INTRAVENOUS at 02:04

## 2020-04-30 RX ADMIN — FAMOTIDINE 20 MG: 10 INJECTION INTRAVENOUS at 08:04

## 2020-04-30 RX ADMIN — OXYCODONE HYDROCHLORIDE 5 MG: 5 SOLUTION ORAL at 11:04

## 2020-04-30 RX ADMIN — OXYCODONE HYDROCHLORIDE 5 MG: 5 SOLUTION ORAL at 06:04

## 2020-04-30 RX ADMIN — ACETAMINOPHEN 650 MG: 160 SOLUTION ORAL at 08:04

## 2020-04-30 NOTE — PROGRESS NOTES
[facility]  COVID-19 or Persons Under Investigation ICU DAILY PROGRESS NOTE    Patient Name: Bharathi Garrido  MRN: [id number]  Admission Date: 4/28/2020   Code Status:   Attending: Praveena Villafana MD     SUBJECTIVE:  HPI  Mr. Bharathi Garrido is a 69 yo male with obesity, history of prostate cancer (1/2018 s/p prostatectomy and radiation Dr. Pederson), history of difficult intubation for surgery and glaucoma presents with feeling unwell, fever, decreased appetite and shortness of breath. Patient transferred from [facility] to [facility] [place] for higher level of care. Admitted to outside hospital on 3/31/20 for SOB and fever and tested positive for COVID-19.  Upon admission, CXR showed bilateral multifocal opacities. Procalcitonin, LDH, ferritin, CRP and D-dimer elevated. Renal function normal. Mild increase in LFTs. He was treated with Plaquinel, Rocephin and Azithromycin. His respiratory function declined and has been intubated since 4/2/20. Rest of outside hospital course as follows:      On 4/6, renal function started to decline and was started on IV NS. Patient completed Plaquenil, azithromycin, and ceftriaxone therapy. Respiratory status stable but unable to be weaned off vent. On 4/8, had  respiratory distress requiring high FiO2 of 70% and PEEP of 12 to maintain SaO2.  Receiving IV Lasix PRN. Pt had increasing respiratory secretions and  mucous plug which was suctioned.  On 4/10 switched from Propofol for sedation to Fentanyl due to high triglycerides. On 4/11 : Had to increase FiO2 as having copious respiratory secretions. Lasix given with improvement in respiratory pattern. On 4/13 Leucocytosis worsening and so started on Levaquin for possible secondary infection. Respiratory status unchanged and WILBUR persisted. On 4/15, developed hypernatremia and started on gentle IV hydration. On 4/16, hypernatremia was worsening, IV fluids changed to D5.  Discussions were had with wife regarding  poor prognosis. 4/17: Able to wean FiO2 to 65%.  WILBUR continues to improve, good urinary output.  Hypernatremia stable. Over next week, vent settings successfully weaned down,  hypernatremia improved, and renal function was stable. Patient had good neuro function off sedation and was working well with PT/OT. On 4/20, FiO2 was down to 50% and he was continuing to improve.      On 4/21, dx with ileus, in  spite of having large liquid stools for prior 2 days. Attempted to wean off of Fentanyl drip sec to ileus and started on Ativan prn. D/w spouse about possible need for Trach and PEG soon. Repeated COVID PCR done and still positive. On 4/22, was able to wean to SIMV during the day. WILBUR resolved. Cont to receive PRN Lasix. Dextrose drip d/priscila as having hyponatremia now and started on TPN. 4/23 was on SIMV all day and the following day, was tolerating CPAP with SIMV at night.      On 4/24, started to decline. He was having high grade fevers and no longer able to tolerate CPAP. Diflucan started. One blood Cx sent. 4/26: Cont to have intermittent fever and worsening leucocytosis. Therefore started on Vancomycin and Zosyn. Sputum Cx sent. Blood Cx from 4/25 -ve so far. Patient changed from CPAP to SIMV for  worsening FiO2. Also having bradycardia with increase in Fentanyl and this improved with decrease in the rate of it. Requiring high FiO2 and PEEP since this evening to maintain SaO2.      4/27 : Cont to have fever, but sputum and blood Cx -ve so far. On broad spectrum I.V ABx and Diflucan. Creatinine stable since yesterday. Had worsening SaO2 last night and so changed to AC and was on high FiO2 this a.m. Was able to wean down on FiO2 to 75% by evening. Received 1 unit PRBC as Hb < 7. Given worsening O2 requirement, transferred to [facility] [place] for higher level of care. But transfer held as still requiring high FiO2 and need to be stabilized further. Spouse came to visit patient. He is still a full code.     Hospital Course: Bharathi  Hema was admitted to ICU for management of respiratory failure secondary to suspected and/or confirmed COVID-19 requiring mechanical ventilation for respiratory support.     4/28/20: Patient transferred from [facility] for higher level of care as patient still intubated with high oxygen requirements. Sedated on Fentanyl and Precedex, but responding appropriately and following commands per nursing. Intubated on A/C, FiO2 75%, PEEP 10, , rate 18 with sat of 92%. CXR consistent with multilobar pneumonia. On empiric Vanc and Zosyn for recent white count with leukocytosis. PICC from 4/2 removed and central line and a-line placed. Repeat cxs sent. Hgb/Hct stable s/p 1 unit PRBCs on 4/27. NG set to intermittent suction. Abd x-ray showed gaseous distention.      4/29/20: Sedated on Propofol, Precedex, and Dilaudid. Initially on Levophed but weaned off and became hypertensive. Briefly on Cardene gtt. Vent settings unchanged: FiO2 75% and PEEP 10. New cultures with no growth, cont on empiric abx. Good UOP with IV Lasix and stable renal function. Having BMs.     Overnight events/Interval History: Hospital day #2. Weaned off Propofol and Precedex successfully. Cont on IV Dilaudid 1 mg/hr and scheduled Oxy. SpO2 improving. Vent settings weaned to FiO of 65% PEEP 10  Rate 18. Blood pressure stable off pressors/antihypertensives. Having BMs. Started TFs. Good UOP and net negative. Lytes WNR.     OBJECTIVE: Limited review of systems and physical exam per team attending and/or nursing staff due to patient being in special isolation.     Vitals:   Vitals:    04/30/20 1000 04/30/20 1100 04/30/20 1200 04/30/20 1349   BP:       BP Location:       Patient Position:       Pulse: 104 103 105 (!) 140   Resp: (!) 33 (!) 45 (!) 44 20   Temp:  98.7 °F (37.1 °C)     TempSrc:  Axillary     SpO2: (!) 92% (!) 92% (!) 92% 95%   Weight:       Height:           Vent Mode: A/C  Oxygen Concentration (%):  [65-75] 65  Resp Rate Total:  [19  br/min-28 br/min] 19 br/min  Vt Set:  [450 mL] 450 mL  PEEP/CPAP:  [10 cmH20] 10 cmH20  Mean Airway Pressure:  [16 tgI52-37 cmH20] 16 cmH20     Date 04/30/20 0700 - 05/01/20 0659   Shift 4945-9190 6970-1821 4252-5403 24 Hour Total   INTAKE   I.V.(mL/kg) 40.8(0.4)   40.8(0.4)   NG/   340   IV Piggyback 350   350   Shift Total(mL/kg) 730.8(6.9)   730.8(6.9)   OUTPUT   Urine(mL/kg/hr) 850(1)   850   Shift Total(mL/kg) 850(8.1)   850(8.1)   Weight (kg) 105.4 105.4 105.4 105.4     Physical Exam:  General appearance: Intubated. Sedated.   Head: MMM  Neck: +JVD  Lungs: CTAB  Heart: rrr, no m/r/g  Abdomen: SNTND: hypoactive bowel sounds  Extremities: no cyanosis or edema, or clubbing, warm  Skin: No rashes, no erythema, wound on dorsum of left hand   Neurologic: drowsy.     Labs: All labs within the last 24 hours were reviewed.     Imaging: All imaging within the last 24 hours was reviewed.     ASSESSMENT & PLAN:   This is a 70 y.o. admitted on 4/28/2020 for respiratory failure secondary to confirmed/suspected COVID-19 requiring mechanical ventilation for respiratory support.      Neuro:   -Intubated and sedated. Pt able to respond appropriately and follow commands.  -Cont Dilaudid gtt and attempt to wean to PO Oxycodone.   -May need to consult surgery for tach/peg if unable to extubate. Has been intubated since 4/2/20.     Cardiac/Circulatory:   -Needs ECHO, however will hold off to minimize exposure since stronger suspicion for ARDS.   -Maintain MAP's >65 with vasopressor support if necessary.   -Pressors d/c'd. BP mildly hypertensive and MAPs in 80s.   -Sinus tach with HR 90s-120  -CVP 4 today.   -On DVT prophylaxis with Lovenox.      Pulmonary:   -Continue mechanical ventilation with ARDS targeted strategies. Goal plateau pressure <30. Current settings: FiO2 65%, PEEP 10, TV  450, rate 18. SpO2 overall improved from yesterday now staying >90%  -Repeat COVID-19 on 4/21 positive.   -Completed course of Plaquenil,  Azithromycin, and Rocephin   -Wean ventilator settings per ARDSnet protocol.  -Wean sedation with coordinated SAT and SBT daily.    -Strict I/O's and goal net neutral status to help optimize vent mechanics.   -Special isolation and aspiration precautions ordered.   -Update family daily.     Renal:   -Monitoring urine output with consideration for dialysis initiation if necessary.   -Trialysis line placement if dialysis initiated.   -Renal function stable. UOP 2.7L yesterday. Net -4.2L since admission  -Cont IV Lasix 60 mg daily  -Sodium 139. Potassium 4. Magnesium 2.1. Phosphorous 3.4.     Gastrointestinal:   -Trending liver function daily.   -Diagnosed with ileus on 4/21/20.  Resolved.  -Famotidine for GI prophylaxis    -On bowel regimen with Miralax and Docusate. Last BM 4/29/20  -Cont TFs per dietician recommendation: TF of Peptamen Intense VHP advancing as tolerated to goal rate of 55 mL/hr to provide 1320 kcal, 121 gm protein, 1109 mL free fluid.      Endocrine:   -No history of DM. Hgb A1C 6.0  -Goal BG <200. At goal.  -Continue insulin detemir 20 units qhs    ID:   -Repeat blood and sputum cultures NGTD. Fungitell still pending.   -Respiratory culture 4/26 with candida.  -Last fever 4/26. White count elevated but downtrending. Cont Vanc and Zosyn (started on 4/26).  -Vanc trough elevated. Received AM dose after trough was drawn but before results came in (nurse waited 2 hours for labs to result). Discussed with pharm. Hold PM dose and recheck level. Will adjust dose in AM pending level.   -Central line and Art line placed 4/29/20      Hematology/Oncology:   -Trending CBC daily and monitor for signs of bleeding.   -Transfuse for Hgb <7.  Received 1 unit PRBCs 4/27. Stable.     ICU DAILY CHECKLIST:   Awake with RASS goal of 0? Yes   Spontaneous breathing trial performed? No   SAT and SBT coordinated? No   Head of bed >30 degrees? Yes   Tube feeding initiated? Yes   Glucose at goal? Yes   Having bowel movements  daily?  Yes   Stress ulcer prophylaxis ordered? Yes   DVT prophylaxis ordered? Yes   Can indwelling lines be discontinued? No   Can antimicrobials be discontinued? No   Family updated? Yes         Critical Care Time: 45  Critical care was time spent personally by me on the following activities: evaluating this patient's organ dysfunction, development of treatment plan, discussing treatment plan with patient or surrogate and bedside caregivers, discussions with consultants, evaluation of patient's response to treatment, examination of patient, ordering and performing treatments and interventions, ordering and review of laboratory studies, ordering and review of radiographic studies, re-evaluation of patient's condition. This critical care time did not overlap with that of any other provider or involve time for any procedures.    This patient was seen and discussed with Dr. Modi

## 2020-04-30 NOTE — NURSING
Vancomycin trough drawn prior to am dose. Dose given due to long result wait time. Result of 34.4 returned after dose completed. MD notified.

## 2020-04-30 NOTE — PROGRESS NOTES
Pharmacokinetic Assessment Follow Up: IV Vancomycin    Vancomycin serum concentration assessment(s):    The trough level was drawn incorrectly and cannot be used to guide therapy at this time.    Vancomycin Regimen Plan:    Continue regimen to Vancomycin 1000 mg IV every 12 hours with next serum trough concentration measured at 2030 prior to the next dose on 4/30    Drug levels (last 3 results):  Recent Labs   Lab Result Units 04/27/20  2145 04/30/20  0842   Vancomycin-Trough ug/mL 18.8 34.4*       Pharmacy will continue to follow and monitor vancomycin.    Please contact pharmacy at extension [phone] for questions regarding this assessment.    Thank you for the consult,   Gabino Girard       Patient brief summary:  Bharathi Garrido is a 70 y.o. male initiated on antimicrobial therapy with IV Vancomycin for treatment of lower respiratory infection    The patient's current regimen is 1000mg q 12h    Drug Allergies:   Review of patient's allergies indicates:  No Known Allergies    Actual Body Weight:   105.4kg    Renal Function:   Estimated Creatinine Clearance: 54.6 mL/min (A) (based on SCr of 1.6 mg/dL (H)).,     Dialysis Method (if applicable):  N/A    CBC (last 72 hours):  Recent Labs   Lab Result Units 04/28/20  0536 04/29/20  0336 04/30/20  0233   WBC K/uL 18.71* 15.86* 15.78*   Hemoglobin g/dL 8.2* 7.9* 8.3*   Hematocrit % 25.3* 24.8* 27.0*   Platelets K/uL 301 330 387*   Gran% % 84.8* 79.8* 81.7*   Lymph% % 4.0* 6.1* 5.8*   Mono% % 9.2 12.4 10.6   Eosinophil% % 0.9 0.4 0.4   Basophil% % 0.3 0.3 0.5   Differential Method  Automated Automated Automated       Metabolic Panel (last 72 hours):  Recent Labs   Lab Result Units 04/28/20  0536 04/28/20  1558 04/29/20  0336 04/30/20  0233   Sodium mmol/L 132*  --  135* 139   Potassium mmol/L 3.9  --  3.6 4.0   Chloride mmol/L 90*  --  90* 90*   CO2 mmol/L 34*  --  34* 38*   Glucose mg/dL 175*  --  117* 117*   Glucose, UA   --  Negative  --   --    BUN, Bld mg/dL 51*  --  48*  43*   Creatinine mg/dL 1.53*  --  1.5* 1.6*   Albumin g/dL 2.8*  --  1.1* 1.3*   Total Bilirubin mg/dL 3.9*  --  3.9* 4.4*   Alkaline Phosphatase U/L 127*  --  107 124   AST U/L 41  --  34 35   ALT U/L 29  --  24 26   Magnesium mg/dL  --   --  1.8 2.1   Phosphorus mg/dL  --   --  3.9 3.4       Vancomycin Administrations:  vancomycin given in the last 96 hours                   vancomycin in dextrose 5 % 1 gram/250 mL IVPB 1,000 mg (mg) 1,000 mg New Bag 04/30/20 0818     1,000 mg New Bag 04/29/20 2154     1,000 mg New Bag  0933     1,000 mg New Bag 04/28/20 2153    vancomycin in dextrose 5 % 1 gram/250 mL IVPB 1,000 mg (mg) 1,000 mg New Bag 04/28/20 0918     1,000 mg New Bag 04/27/20 2256     1,000 mg New Bag  0932     1,000 mg New Bag 04/26/20 2142                Microbiologic Results:  Microbiology Results (last 7 days)     Procedure Component Value Units Date/Time    Culture, Respiratory with Gram Stain [[id number]] Collected:  04/28/20 2030    Order Status:  Completed Specimen:  Respiratory from Sputum Updated:  04/30/20 0638     Respiratory Culture Normal respiratory lashawn      No S aureus or Pseudomonas isolated.     Gram Stain (Respiratory) <10 epithelial cells per low power field.     Gram Stain (Respiratory) Rare WBC's     Gram Stain (Respiratory) No organisms seen    Blood culture (site 2) [[id number]] Collected:  04/28/20 2130    Order Status:  Completed Specimen:  Blood from Peripheral, Hand, Left Updated:  04/29/20 2222     Blood Culture, Routine No Growth to date      No Growth to date    Narrative:       Site # 2, aerobic only    Blood culture (site 1) [[id number]] Collected:  04/28/20 1800    Order Status:  Completed Specimen:  Blood from Peripheral, Antecubital, Left Updated:  04/29/20 2012     Blood Culture, Routine No Growth to date      No Growth to date    Narrative:       Site # 1, aerobic and anaerobic    Fungus culture [[id number]] Collected:  04/28/20 2038    Order Status:  Completed Specimen:   Sputum Updated:  04/29/20 1318     Fungus (Mycology) Culture Culture in progress

## 2020-04-30 NOTE — SIGNIFICANT EVENT
Attempted again to call patient's wife, using the numbers in the chart. Left a voicemail however no response. Yesterday did the same thing however did not hear back from any family members.   Will pass on to the team tomorrow to try to get a hold of her again.

## 2020-04-30 NOTE — PROGRESS NOTES
Pharmacokinetic Assessment Follow Up: IV Vancomycin    Vancomycin serum concentration assessment(s):    The trough level was drawn correctly and can be used to guide therapy at this time. The measurement is within the desired definitive target range of 15 to 20 mcg/mL.    Vancomycin Regimen Plan:    Continue regimen to Vancomycin 1000 mg IV every 12 hours with next serum trough concentration measured at 0300 prior to 4th dose on 05/01    Drug levels (last 3 results):  Recent Labs   Lab Result Units 04/27/20  2145   Vancomycin-Trough ug/mL 18.8       Pharmacy will continue to follow and monitor vancomycin.    Please contact pharmacy at extension [phone] for questions regarding this assessment.    Thank you for the consult,   Rickey Chavarria       Patient brief summary:  Bharathi Garrido is a 70 y.o. male initiated on antimicrobial therapy with IV Vancomycin for treatment of lower respiratory infection        Drug Allergies:   Review of patient's allergies indicates:  No Known Allergies    Actual Body Weight:   105.4 kg    Renal Function:   Estimated Creatinine Clearance: 58.2 mL/min (A) (based on SCr of 1.5 mg/dL (H)).,     Dialysis Method (if applicable):  N/A    CBC (last 72 hours):  Recent Labs   Lab Result Units 04/27/20  0240 04/27/20  0547 04/28/20  0536 04/29/20  0336   WBC K/uL  --  21.43* 18.71* 15.86*   Hemoglobin g/dL 6.9* 6.9* 8.2* 7.9*   Hematocrit %  --  21.9* 25.3* 24.8*   Platelets K/uL  --  262 301 330   Gran% %  --  85.0* 84.8* 79.8*   Lymph% %  --  4.1* 4.0* 6.1*   Mono% %  --  8.5 9.2 12.4   Eosinophil% %  --  1.2 0.9 0.4   Basophil% %  --  0.2 0.3 0.3   Differential Method   --  Automated Automated Automated       Metabolic Panel (last 72 hours):  Recent Labs   Lab Result Units 04/27/20  0547 04/28/20  0536 04/28/20  1558 04/29/20  0336   Sodium mmol/L 128* 132*  --  135*   Potassium mmol/L 3.8 3.9  --  3.6   Chloride mmol/L 90* 90*  --  90*   CO2 mmol/L 29 34*  --  34*   Glucose mg/dL 320* 175*  --   117*   Glucose, UA   --   --  Negative  --    BUN, Bld mg/dL 49* 51*  --  48*   Creatinine mg/dL 1.55* 1.53*  --  1.5*   Albumin g/dL 2.5* 2.8*  --  1.1*   Total Bilirubin mg/dL 3.7* 3.9*  --  3.9*   Alkaline Phosphatase U/L 109 127*  --  107   AST U/L 39 41  --  34   ALT U/L 25 29  --  24   Magnesium mg/dL  --   --   --  1.8   Phosphorus mg/dL  --   --   --  3.9       Vancomycin Administrations:  vancomycin given in the last 96 hours                   vancomycin in dextrose 5 % 1 gram/250 mL IVPB 1,000 mg (mg) 1,000 mg New Bag 04/29/20 0933     1,000 mg New Bag 04/28/20 2153    vancomycin in dextrose 5 % 1 gram/250 mL IVPB 1,000 mg (mg) 1,000 mg New Bag 04/28/20 0918     1,000 mg New Bag 04/27/20 2256     1,000 mg New Bag  0932     1,000 mg New Bag 04/26/20 2142                Microbiologic Results:  Microbiology Results (last 7 days)     Procedure Component Value Units Date/Time    Blood culture (site 1) [[id number]] Collected:  04/28/20 1800    Order Status:  Completed Specimen:  Blood from Peripheral, Antecubital, Left Updated:  04/29/20 2012     Blood Culture, Routine No Growth to date      No Growth to date    Narrative:       Site # 1, aerobic and anaerobic    Fungus culture [[id number]] Collected:  04/28/20 2038    Order Status:  Completed Specimen:  Sputum Updated:  04/29/20 1318     Fungus (Mycology) Culture Culture in progress    Culture, Respiratory with Gram Stain [[id number]] Collected:  04/28/20 2030    Order Status:  Completed Specimen:  Respiratory from Sputum Updated:  04/29/20 1012     Respiratory Culture No Growth     Gram Stain (Respiratory) <10 epithelial cells per low power field.     Gram Stain (Respiratory) Rare WBC's     Gram Stain (Respiratory) No organisms seen    Blood culture (site 2) [[id number]] Collected:  04/28/20 2130    Order Status:  Completed Specimen:  Blood from Peripheral, Hand, Left Updated:  04/29/20 0515     Blood Culture, Routine No Growth to date    Narrative:       Site #  2, aerobic only

## 2020-05-01 LAB
ALBUMIN SERPL BCP-MCNC: 1.4 G/DL (ref 3.5–5.2)
ALBUMIN SERPL BCP-MCNC: 1.5 G/DL (ref 3.5–5.2)
ALLENS TEST: ABNORMAL
ALLENS TEST: ABNORMAL
ALP SERPL-CCNC: 117 U/L (ref 55–135)
ALT SERPL W/O P-5'-P-CCNC: 28 U/L (ref 10–44)
ANION GAP SERPL CALC-SCNC: 10 MMOL/L (ref 8–16)
ANION GAP SERPL CALC-SCNC: 10 MMOL/L (ref 8–16)
ANION GAP SERPL CALC-SCNC: 13 MMOL/L (ref 8–16)
AST SERPL-CCNC: 35 U/L (ref 10–40)
BACTERIA #/AREA URNS AUTO: ABNORMAL /HPF
BASOPHILS # BLD AUTO: 0.05 K/UL (ref 0–0.2)
BASOPHILS NFR BLD: 0.3 % (ref 0–1.9)
BILIRUB SERPL-MCNC: 3.4 MG/DL (ref 0.1–1)
BILIRUB UR QL STRIP: NEGATIVE
BUN SERPL-MCNC: 51 MG/DL (ref 8–23)
BUN SERPL-MCNC: 52 MG/DL (ref 8–23)
BUN SERPL-MCNC: 57 MG/DL (ref 8–23)
CALCIUM SERPL-MCNC: 9 MG/DL (ref 8.7–10.5)
CALCIUM SERPL-MCNC: 9.2 MG/DL (ref 8.7–10.5)
CALCIUM SERPL-MCNC: 9.2 MG/DL (ref 8.7–10.5)
CHLORIDE SERPL-SCNC: 92 MMOL/L (ref 95–110)
CHLORIDE SERPL-SCNC: 94 MMOL/L (ref 95–110)
CHLORIDE SERPL-SCNC: 95 MMOL/L (ref 95–110)
CLARITY UR REFRACT.AUTO: ABNORMAL
CO2 SERPL-SCNC: 38 MMOL/L (ref 23–29)
CO2 SERPL-SCNC: 40 MMOL/L (ref 23–29)
CO2 SERPL-SCNC: 41 MMOL/L (ref 23–29)
COLOR UR AUTO: ABNORMAL
CREAT SERPL-MCNC: 1.6 MG/DL (ref 0.5–1.4)
CREAT SERPL-MCNC: 1.6 MG/DL (ref 0.5–1.4)
CREAT SERPL-MCNC: 1.7 MG/DL (ref 0.5–1.4)
DELSYS: ABNORMAL
DELSYS: ABNORMAL
DIFFERENTIAL METHOD: ABNORMAL
EOSINOPHIL # BLD AUTO: 0 K/UL (ref 0–0.5)
EOSINOPHIL NFR BLD: 0 % (ref 0–8)
ERYTHROCYTE [DISTWIDTH] IN BLOOD BY AUTOMATED COUNT: 15.5 % (ref 11.5–14.5)
ERYTHROCYTE [SEDIMENTATION RATE] IN BLOOD BY WESTERGREN METHOD: 18 MM/H
ERYTHROCYTE [SEDIMENTATION RATE] IN BLOOD BY WESTERGREN METHOD: 18 MM/H
EST. GFR  (AFRICAN AMERICAN): 46.9 ML/MIN/1.73 M^2
EST. GFR  (AFRICAN AMERICAN): 50.4 ML/MIN/1.73 M^2
EST. GFR  (AFRICAN AMERICAN): 50.4 ML/MIN/1.73 M^2
EST. GFR  (NON AFRICAN AMERICAN): 40.5 ML/MIN/1.73 M^2
EST. GFR  (NON AFRICAN AMERICAN): 43.6 ML/MIN/1.73 M^2
EST. GFR  (NON AFRICAN AMERICAN): 43.6 ML/MIN/1.73 M^2
FIO2: 40
FIO2: 50
GLUCOSE SERPL-MCNC: 147 MG/DL (ref 70–110)
GLUCOSE SERPL-MCNC: 162 MG/DL (ref 70–110)
GLUCOSE SERPL-MCNC: 194 MG/DL (ref 70–110)
GLUCOSE UR QL STRIP: ABNORMAL
HCO3 UR-SCNC: 43.9 MMOL/L (ref 24–28)
HCO3 UR-SCNC: 44.2 MMOL/L (ref 24–28)
HCT VFR BLD AUTO: 25.9 % (ref 40–54)
HGB BLD-MCNC: 7.8 G/DL (ref 14–18)
HGB UR QL STRIP: ABNORMAL
HYALINE CASTS UR QL AUTO: 0 /LPF
IMM GRANULOCYTES # BLD AUTO: 0.15 K/UL (ref 0–0.04)
IMM GRANULOCYTES NFR BLD AUTO: 0.9 % (ref 0–0.5)
KETONES UR QL STRIP: NEGATIVE
LEUKOCYTE ESTERASE UR QL STRIP: ABNORMAL
LYMPHOCYTES # BLD AUTO: 0.9 K/UL (ref 1–4.8)
LYMPHOCYTES NFR BLD: 5 % (ref 18–48)
MAGNESIUM SERPL-MCNC: 2.5 MG/DL (ref 1.6–2.6)
MCH RBC QN AUTO: 26.2 PG (ref 27–31)
MCHC RBC AUTO-ENTMCNC: 30.1 G/DL (ref 32–36)
MCV RBC AUTO: 87 FL (ref 82–98)
MICROSCOPIC COMMENT: ABNORMAL
MIN VOL: 13.8
MODE: ABNORMAL
MODE: ABNORMAL
MONOCYTES # BLD AUTO: 1.7 K/UL (ref 0.3–1)
MONOCYTES NFR BLD: 9.7 % (ref 4–15)
NEUTROPHILS # BLD AUTO: 14.8 K/UL (ref 1.8–7.7)
NEUTROPHILS NFR BLD: 84.1 % (ref 38–73)
NITRITE UR QL STRIP: NEGATIVE
NRBC BLD-RTO: 0 /100 WBC
PCO2 BLDA: 53.3 MMHG (ref 35–45)
PCO2 BLDA: 55 MMHG (ref 35–45)
PEEP: 8
PEEP: 8
PH SMN: 7.51 [PH] (ref 7.35–7.45)
PH SMN: 7.53 [PH] (ref 7.35–7.45)
PH UR STRIP: 5 [PH] (ref 5–8)
PHOSPHATE SERPL-MCNC: 1.6 MG/DL (ref 2.7–4.5)
PHOSPHATE SERPL-MCNC: 3.4 MG/DL (ref 2.7–4.5)
PIP: 37
PLATELET # BLD AUTO: 392 K/UL (ref 150–350)
PMV BLD AUTO: 11.5 FL (ref 9.2–12.9)
PO2 BLDA: 63 MMHG (ref 80–100)
PO2 BLDA: 66 MMHG (ref 80–100)
POC BE: 21 MMOL/L
POC BE: 21 MMOL/L
POC SATURATED O2: 93 % (ref 95–100)
POC SATURATED O2: 94 % (ref 95–100)
POC TCO2: 46 MMOL/L (ref 23–27)
POC TCO2: 46 MMOL/L (ref 23–27)
POCT GLUCOSE: 132 MG/DL (ref 70–110)
POCT GLUCOSE: 158 MG/DL (ref 70–110)
POCT GLUCOSE: 176 MG/DL (ref 70–110)
POCT GLUCOSE: 180 MG/DL (ref 70–110)
POCT GLUCOSE: 198 MG/DL (ref 70–110)
POCT GLUCOSE: 236 MG/DL (ref 70–110)
POTASSIUM SERPL-SCNC: 3.2 MMOL/L (ref 3.5–5.1)
POTASSIUM SERPL-SCNC: 3.4 MMOL/L (ref 3.5–5.1)
POTASSIUM SERPL-SCNC: 3.8 MMOL/L (ref 3.5–5.1)
PROT SERPL-MCNC: 7.6 G/DL (ref 6–8.4)
PROT UR QL STRIP: ABNORMAL
RBC # BLD AUTO: 2.98 M/UL (ref 4.6–6.2)
RBC #/AREA URNS AUTO: >100 /HPF (ref 0–4)
SAMPLE: ABNORMAL
SAMPLE: ABNORMAL
SITE: ABNORMAL
SITE: ABNORMAL
SODIUM SERPL-SCNC: 143 MMOL/L (ref 136–145)
SODIUM SERPL-SCNC: 145 MMOL/L (ref 136–145)
SODIUM SERPL-SCNC: 145 MMOL/L (ref 136–145)
SP GR UR STRIP: 1.02 (ref 1–1.03)
SP02: 92
SP02: 92
SQUAMOUS #/AREA URNS AUTO: 9 /HPF
URN SPEC COLLECT METH UR: ABNORMAL
VANCOMYCIN SERPL-MCNC: 24.1 UG/ML
VT: 450
VT: 450
WBC # BLD AUTO: 17.58 K/UL (ref 3.9–12.7)
WBC #/AREA URNS AUTO: 8 /HPF (ref 0–5)

## 2020-05-01 PROCEDURE — 37799 UNLISTED PX VASCULAR SURGERY: CPT

## 2020-05-01 PROCEDURE — S0028 INJECTION, FAMOTIDINE, 20 MG: HCPCS | Performed by: STUDENT IN AN ORGANIZED HEALTH CARE EDUCATION/TRAINING PROGRAM

## 2020-05-01 PROCEDURE — 81001 URINALYSIS AUTO W/SCOPE: CPT

## 2020-05-01 PROCEDURE — 63600175 PHARM REV CODE 636 W HCPCS: Performed by: INTERNAL MEDICINE

## 2020-05-01 PROCEDURE — 25000003 PHARM REV CODE 250: Performed by: STUDENT IN AN ORGANIZED HEALTH CARE EDUCATION/TRAINING PROGRAM

## 2020-05-01 PROCEDURE — 94761 N-INVAS EAR/PLS OXIMETRY MLT: CPT

## 2020-05-01 PROCEDURE — 87040 BLOOD CULTURE FOR BACTERIA: CPT | Mod: 59

## 2020-05-01 PROCEDURE — 80069 RENAL FUNCTION PANEL: CPT

## 2020-05-01 PROCEDURE — 27000221 HC OXYGEN, UP TO 24 HOURS

## 2020-05-01 PROCEDURE — 80053 COMPREHEN METABOLIC PANEL: CPT

## 2020-05-01 PROCEDURE — 87205 SMEAR GRAM STAIN: CPT

## 2020-05-01 PROCEDURE — 83735 ASSAY OF MAGNESIUM: CPT

## 2020-05-01 PROCEDURE — 99900026 HC AIRWAY MAINTENANCE (STAT)

## 2020-05-01 PROCEDURE — 63600175 PHARM REV CODE 636 W HCPCS: Performed by: HOSPITALIST

## 2020-05-01 PROCEDURE — 94668 MNPJ CHEST WALL SBSQ: CPT

## 2020-05-01 PROCEDURE — 80048 BASIC METABOLIC PNL TOTAL CA: CPT

## 2020-05-01 PROCEDURE — 99900035 HC TECH TIME PER 15 MIN (STAT)

## 2020-05-01 PROCEDURE — 80202 ASSAY OF VANCOMYCIN: CPT

## 2020-05-01 PROCEDURE — 63600175 PHARM REV CODE 636 W HCPCS: Performed by: PHARMACIST

## 2020-05-01 PROCEDURE — 25000003 PHARM REV CODE 250: Performed by: INTERNAL MEDICINE

## 2020-05-01 PROCEDURE — 25000242 PHARM REV CODE 250 ALT 637 W/ HCPCS: Performed by: PHYSICIAN ASSISTANT

## 2020-05-01 PROCEDURE — 99291 PR CRITICAL CARE, E/M 30-74 MINUTES: ICD-10-PCS | Mod: GC,,, | Performed by: INTERNAL MEDICINE

## 2020-05-01 PROCEDURE — 87070 CULTURE OTHR SPECIMN AEROBIC: CPT

## 2020-05-01 PROCEDURE — 20000000 HC ICU ROOM

## 2020-05-01 PROCEDURE — 63700000 PHARM REV CODE 250 ALT 637 W/O HCPCS: Performed by: STUDENT IN AN ORGANIZED HEALTH CARE EDUCATION/TRAINING PROGRAM

## 2020-05-01 PROCEDURE — 94003 VENT MGMT INPAT SUBQ DAY: CPT

## 2020-05-01 PROCEDURE — 82803 BLOOD GASES ANY COMBINATION: CPT

## 2020-05-01 PROCEDURE — 99291 CRITICAL CARE FIRST HOUR: CPT | Mod: GC,,, | Performed by: INTERNAL MEDICINE

## 2020-05-01 PROCEDURE — 84100 ASSAY OF PHOSPHORUS: CPT

## 2020-05-01 PROCEDURE — 85025 COMPLETE CBC W/AUTO DIFF WBC: CPT

## 2020-05-01 RX ORDER — INSULIN ASPART 100 [IU]/ML
0-5 INJECTION, SOLUTION INTRAVENOUS; SUBCUTANEOUS EVERY 4 HOURS PRN
Status: DISCONTINUED | OUTPATIENT
Start: 2020-05-01 | End: 2020-05-09

## 2020-05-01 RX ORDER — DEXTROSE MONOHYDRATE 100 MG/ML
INJECTION, SOLUTION INTRAVENOUS CONTINUOUS PRN
Status: DISCONTINUED | OUTPATIENT
Start: 2020-05-01 | End: 2020-05-09

## 2020-05-01 RX ORDER — GLUCAGON 1 MG
1 KIT INJECTION
Status: DISCONTINUED | OUTPATIENT
Start: 2020-05-01 | End: 2020-05-09

## 2020-05-01 RX ORDER — FUROSEMIDE 10 MG/ML
40 INJECTION INTRAMUSCULAR; INTRAVENOUS DAILY
Status: DISCONTINUED | OUTPATIENT
Start: 2020-05-02 | End: 2020-05-01

## 2020-05-01 RX ORDER — HYDRALAZINE HYDROCHLORIDE 20 MG/ML
10 INJECTION INTRAMUSCULAR; INTRAVENOUS ONCE
Status: COMPLETED | OUTPATIENT
Start: 2020-05-01 | End: 2020-05-01

## 2020-05-01 RX ORDER — VANCOMYCIN HCL IN 5 % DEXTROSE 1G/250ML
1000 PLASTIC BAG, INJECTION (ML) INTRAVENOUS
Status: DISCONTINUED | OUTPATIENT
Start: 2020-05-01 | End: 2020-05-02

## 2020-05-01 RX ADMIN — VANCOMYCIN HYDROCHLORIDE 1000 MG: 1 INJECTION, POWDER, LYOPHILIZED, FOR SOLUTION INTRAVENOUS at 05:05

## 2020-05-01 RX ADMIN — POTASSIUM CHLORIDE 40 MEQ: 20 SOLUTION ORAL at 10:05

## 2020-05-01 RX ADMIN — OXYCODONE HYDROCHLORIDE 5 MG: 5 SOLUTION ORAL at 12:05

## 2020-05-01 RX ADMIN — HYDROMORPHONE HYDROCHLORIDE 1 MG/HR: 2 INJECTION INTRAMUSCULAR; INTRAVENOUS; SUBCUTANEOUS at 07:05

## 2020-05-01 RX ADMIN — POTASSIUM CHLORIDE 40 MEQ: 20 SOLUTION ORAL at 04:05

## 2020-05-01 RX ADMIN — FUROSEMIDE 60 MG: 10 INJECTION, SOLUTION INTRAMUSCULAR; INTRAVENOUS at 08:05

## 2020-05-01 RX ADMIN — ENOXAPARIN SODIUM 40 MG: 100 INJECTION SUBCUTANEOUS at 08:05

## 2020-05-01 RX ADMIN — BRIMONIDINE TARTRATE 1 DROP: 2 SOLUTION OPHTHALMIC at 09:05

## 2020-05-01 RX ADMIN — OXYCODONE HYDROCHLORIDE 5 MG: 5 SOLUTION ORAL at 11:05

## 2020-05-01 RX ADMIN — PIPERACILLIN SODIUM,TAZOBACTAM SODIUM 4.5 G: 4; .5 INJECTION, POWDER, FOR SOLUTION INTRAVENOUS at 01:05

## 2020-05-01 RX ADMIN — HYDRALAZINE HYDROCHLORIDE 10 MG: 20 INJECTION INTRAMUSCULAR; INTRAVENOUS at 05:05

## 2020-05-01 RX ADMIN — CHLORHEXIDINE GLUCONATE 0.12% ORAL RINSE 15 ML: 1.2 LIQUID ORAL at 08:05

## 2020-05-01 RX ADMIN — LATANOPROST 1 DROP: 50 SOLUTION OPHTHALMIC at 08:05

## 2020-05-01 RX ADMIN — POLYETHYLENE GLYCOL 3350 17 G: 17 POWDER, FOR SOLUTION ORAL at 08:05

## 2020-05-01 RX ADMIN — TIMOLOL MALEATE 1 DROP: 5 SOLUTION/ DROPS OPHTHALMIC at 08:05

## 2020-05-01 RX ADMIN — BRIMONIDINE TARTRATE 1 DROP: 2 SOLUTION OPHTHALMIC at 08:05

## 2020-05-01 RX ADMIN — MINERAL OIL AND WHITE PETROLATUM: 150; 830 OINTMENT OPHTHALMIC at 08:05

## 2020-05-01 RX ADMIN — TIMOLOL MALEATE 1 DROP: 5 SOLUTION/ DROPS OPHTHALMIC at 09:05

## 2020-05-01 RX ADMIN — POTASSIUM CHLORIDE 40 MEQ: 20 SOLUTION ORAL at 08:05

## 2020-05-01 RX ADMIN — OXYCODONE HYDROCHLORIDE 5 MG: 5 SOLUTION ORAL at 05:05

## 2020-05-01 RX ADMIN — PIPERACILLIN SODIUM,TAZOBACTAM SODIUM 4.5 G: 4; .5 INJECTION, POWDER, FOR SOLUTION INTRAVENOUS at 06:05

## 2020-05-01 RX ADMIN — MINERAL OIL AND WHITE PETROLATUM: 150; 830 OINTMENT OPHTHALMIC at 09:05

## 2020-05-01 RX ADMIN — FAMOTIDINE 20 MG: 10 INJECTION INTRAVENOUS at 08:05

## 2020-05-01 RX ADMIN — INSULIN DETEMIR 20 UNITS: 100 INJECTION, SOLUTION SUBCUTANEOUS at 09:05

## 2020-05-01 RX ADMIN — INSULIN ASPART 2 UNITS: 100 INJECTION, SOLUTION INTRAVENOUS; SUBCUTANEOUS at 06:05

## 2020-05-01 RX ADMIN — DOCUSATE SODIUM 100 MG: 50 LIQUID ORAL at 08:05

## 2020-05-01 RX ADMIN — PIPERACILLIN SODIUM,TAZOBACTAM SODIUM 4.5 G: 4; .5 INJECTION, POWDER, FOR SOLUTION INTRAVENOUS at 08:05

## 2020-05-01 NOTE — PROGRESS NOTES
[facility]  Cardiology  Progress Note    Patient Name: Bharathi Garrido  MRN: [id number]  Admission Date: 4/28/2020  Hospital Length of Stay: 3 days  Code Status: Full Code   Attending Physician: Praveena Villafana MD   Primary Care Physician: Melvin Gee MD (Inactive)  Expected Discharge Date: 5/8/2020  Principal Problem:Pneumonia due to COVID-19 virus    Subjective:     HPI  Mr. Bharathi Garrido is a 69 yo male with obesity, history of prostate cancer (1/2018 s/p prostatectomy and radiation Dr. Pederson), history of difficult intubation for surgery and glaucoma presents with feeling unwell, fever, decreased appetite and shortness of breath. Patient [facility] [place] [facility] [place] [facility] [place] higher level of care. Admitted to outside hospital on 3/31/20 for SOB and fever and tested positive for COVID-19.  Upon admission, CXR showed bilateral multifocal opacities. Procalcitonin, LDH, ferritin, CRP and D-dimer elevated. Renal function normal. Mild increase in LFTs. He was treated with Plaquinel, Rocephin and Azithromycin. His respiratory function declined and has been intubated since 4/2/20.    Hospital Course: Bharathi Garirdo was admitted to ICU for management of respiratory failure secondary to suspected and/or confirmed COVID-19 requiring mechanical ventilation for respiratory support.      4/28/20: Patient transferred from [facility] for higher level of care as patient still intubated with high oxygen requirements. Sedated on Fentanyl and Precedex, but responding appropriately and following commands per nursing. Intubated on A/C, FiO2 75%, PEEP 10, , rate 18 with sat of 92%. CXR consistent with multilobar pneumonia. On empiric Vanc and Zosyn for recent white count with leukocytosis. PICC from 4/2 removed and central line and a-line placed. Repeat cxs sent. Hgb/Hct stable s/p 1 unit PRBCs on 4/27. NG set to intermittent suction. Abd x-ray showed gaseous distention.      4/29/20: Sedated  on Propofol, Precedex, and Dilaudid. Initially on Levophed but weaned off and became hypertensive. Briefly on Cardene gtt. Vent settings unchanged: FiO2 75% and PEEP 10. New cultures with no growth, cont on empiric abx. Good UOP with IV Lasix and stable renal function. Having BMs.     4/30/20: Weaned off Propofol and Precedex successfully. Cont on IV Dilaudid 1 mg/hr and scheduled Oxy. SpO2 improving. Vent settings weaned to FiO of 65% PEEP 10  Rate 18. Blood pressure stable off pressors/antihypertensives. Having BMs. Started TFs. Good UOP and net negative. Lytes WNR.      Interval History: Hospital day 3. Patient became febrile overnight (101.8) and was pancultured. WBC increased from 15.7 to 17.5. Was already on broad spectrum abx regimen (vanc/zosyn). Will consult ID for additional recommendations. Patient responds to commands appropriately. Will stop dilaudid gtt. Continue scheduled Oxy. Vent settings weaned to FiO2 of 65% PEEP 8  Rate 18. SpO2 94%. SBT today. Patient became tachypnic after 2 hrs and was placed back on rate. Blood pressure WNL. TF infusing at 35mL/hr. Last BM 4/28. Continue bowel regimen. Good UOP and net negative. On daily lasix. Will reduce tomorrow's lasix dose given increasing Cr. KCL replaced this AM with repeat labs pending.    ROS  Objective:     Vital Signs (Most Recent):  Temp: 99 °F (37.2 °C) (05/01/20 0800)  Pulse: 104 (05/01/20 1100)  Resp: (!) 21 (05/01/20 1100)  BP: (!) 123/57 (04/29/20 2000)  SpO2: (!) 94 % (05/01/20 1100) Vital Signs (24h Range):  Temp:  [97.6 °F (36.4 °C)-102 °F (38.9 °C)] 99 °F (37.2 °C)  Pulse:  [] 104  Resp:  [19-46] 21  SpO2:  [93 %-100 %] 94 %  Arterial Line BP: (115-182)/(58-79) 115/58     Weight: 105.4 kg (232 lb 5.8 oz)  Body mass index is 32.41 kg/m².     SpO2: (!) 94 %  O2 Device (Oxygen Therapy): ventilator      Intake/Output Summary (Last 24 hours) at 5/1/2020 1207  Last data filed at 5/1/2020 1100  Gross per 24 hour   Intake 1255  ml   Output 2350 ml   Net -1095 ml       Lines/Drains/Airways     Central Venous Catheter Line            Percutaneous Central Line Insertion/Assessment - Triple Lumen  04/28/20 2100 left internal jugular 2 days          Drain                 NG/OG Tube 04/01/20 1938 orogastric 16 Fr. Center mouth 29 days         Urethral Catheter 04/27/20 4 days          Airway                 Airway - Non-Surgical 04/17/20 0830 Endotracheal Tube 14 days          Arterial Line                 Arterial Line 04/28/20 2017 Right Radial 2 days                Physical Exam    Limited review of systems and physical exam per team attending and/or nursing staff due to patient being in special isolation.     Significant Labs: All pertinent lab results from the last 24 hours have been reviewed.    Significant Imaging: N/A    Assessment and Plan:   ASSESSMENT & PLAN:   This is a 70 y.o. admitted on 4/28/2020 for respiratory failure secondary to confirmed/suspected COVID-19 requiring mechanical ventilation for respiratory support.      Neuro:   -Intubated. Pt able to respond appropriately and follow commands.  -Will stop dilaudid gtt. Continue PO oxy.  -May consider trach/peg if unable to extubate.   -PT/OT ordered     Cardiac/Circulatory:   -Needs ECHO, however will hold off to minimize exposure since stronger suspicion for ARDS.   -Maintain MAP's >65 with vasopressor support if necessary.   -Pressors d/c'd. BP mildly hypertensive and MAPs in 80s.   -Sinus tach with HR 90s-100  -CVP 1 today.   -On DVT prophylaxis with Lovenox.      Pulmonary:   -Continue mechanical ventilation with ARDS targeted strategies. Goal plateau pressure <30. AM settings: FiO2 50%, PEEP 8, TV  450, rate 18. SpO2 94%  -Repeat COVID-19 on 4/21 positive.   -Completed course of Plaquenil, Azithromycin, and Rocephin   -Wean ventilator settings per ARDSnet protocol.  -Wean sedation with coordinated SAT and SBT daily.    -Strict I/O's and goal net neutral status to help  optimize vent mechanics.   -Special isolation and aspiration precautions ordered.   -Update family daily.   - SBT today. Patient became tachypnic after 2 hrs and was placed back on rate.     Renal:   -Monitoring urine output with consideration for dialysis initiation if necessary.   -Trialysis line placement if dialysis initiated.   -UOP 2.6L yesterday. Net -5.3L since admission  -Creatinine increased to 1.7. Will decrease lasix dose from 60 to 40mg.   -Sodium 139. Potassium 3.2 (replaced with 80Meq KCL). Magnesium 2.5. Phosphorous 3.4.     Gastrointestinal:   -Trending liver function daily. Transaminases WNL.  -Diagnosed with ileus on 4/21/20.  Resolved.  -Famotidine for GI prophylaxis    -On bowel regimen with Miralax and Docusate. Last BM 4/28/20  -Cont TFs per dietician recommendation: TF of Peptamen Intense VHP advancing as tolerated to goal rate of 55 mL/hr to provide 1320 kcal, 121 gm protein, 1109 mL free fluid.      Endocrine:   -No history of DM. Hgb A1C 6.0  -Goal BG <200. At goal.  -Continue insulin detemir 20 units qhs     ID:   -Repeat blood and sputum cultures drawn this AM. Previous BC NGTD. Mycology pending.   -Respiratory culture 4/26 with candida.  -Febrile overnight Tmax 101.8.WBC increased from 15.78 to 17.58. Cont Vanc and Zosyn (started on 4/26) and consult ID for recs.  -Central line and Art line placed 4/29/20      Hematology/Oncology:   -Trending CBC daily and monitor for signs of bleeding.   -H&H 7.8/25.9.  -Transfuse for Hgb <7.      VTE Risk Mitigation (From admission, onward)         Ordered     enoxaparin injection 40 mg  Daily      04/28/20 1535     IP VTE HIGH RISK PATIENT  Once      04/28/20 1535     Place sequential compression device  Until discontinued      04/28/20 1400                Aiyana Nath NP  Cardiology  [facility]  STAFF MD: Dony Allison MD

## 2020-05-01 NOTE — HPI
HPI  Mr. Bharathi Garrido is a 69 yo male with obesity, history of prostate cancer (1/2018 s/p prostatectomy and radiation Dr. Pederson), history of difficult intubation for surgery and glaucoma presents with feeling unwell, fever, decreased appetite and shortness of breath. Patient transferred from [facility] to [facility] 4/28/20 for higher level of care. Admitted to outside hospital on 3/31/20 for SOB and fever and tested positive for COVID-19.  Upon admission, CXR showed bilateral multifocal opacities. Procalcitonin, LDH, ferritin, CRP and D-dimer elevated. Renal function normal. Mild increase in LFTs. He was treated with Plaquinel, Rocephin and Azithromycin. His respiratory function declined and has been intubated since 4/2/20. Rest of outside hospital course as follows:      On 4/6, renal function started to decline and was started on IV NS. Patient completed Plaquenil, azithromycin, and ceftriaxone therapy. Respiratory status stable but unable to be weaned off vent. On 4/8, had  respiratory distress requiring high FiO2 of 70% and PEEP of 12 to maintain SaO2.  Receiving IV Lasix PRN. Pt had increasing respiratory secretions and  mucous plug which was suctioned.  On 4/10 switched from Propofol for sedation to Fentanyl due to high triglycerides. On 4/11 : Had to increase FiO2 as having copious respiratory secretions. Lasix given with improvement in respiratory pattern. On 4/13 Leucocytosis worsening and so started on Levaquin for possible secondary infection. Respiratory status unchanged and WILBUR persisted. On 4/15, developed hypernatremia and started on gentle IV hydration. On 4/16, hypernatremia was worsening, IV fluids changed to D5.  Discussions were had with wife regarding poor prognosis. 4/17: Able to wean FiO2 to 65%.  WILBUR continues to improve, good urinary output.  Hypernatremia stable. Over next week, vent settings successfully weaned down,  hypernatremia improved, and renal function was stable. Patient had  good neuro function off sedation and was working well with PT/OT. On 4/20, FiO2 was down to 50% and he was continuing to improve.      On 4/21, dx with ileus, in  spite of having large liquid stools for prior 2 days. Attempted to wean off of Fentanyl drip sec to ileus and started on Ativan prn. D/w spouse about possible need for Trach and PEG soon. Repeated COVID PCR done and still positive. On 4/22, was able to wean to SIMV during the day. WILBUR resolved. Cont to receive PRN Lasix. Dextrose drip d/priscila as having hyponatremia now and started on TPN. 4/23 was on SIMV all day and the following day, was tolerating CPAP with SIMV at night.      On 4/24, started to decline. He was having high grade fevers and no longer able to tolerate CPAP. Diflucan started. One blood Cx sent. 4/26: Cont to have intermittent fever and worsening leucocytosis. Therefore started on Vancomycin and Zosyn. Sputum Cx sent. Blood Cx from 4/25 -ve so far. Patient changed from CPAP to SIMV for  worsening FiO2. Also having bradycardia with increase in Fentanyl and this improved with decrease in the rate of it. Requiring high FiO2 and PEEP since this evening to maintain SaO2.      4/27 : Cont to have fever, but sputum and blood Cx -ve so far. On broad spectrum I.V ABx and Diflucan. Creatinine stable since yesterday. Had worsening SaO2 last night and so changed to AC and was on high FiO2 this a.m. Was able to wean down on FiO2 to 75% by evening. Received 1 unit PRBC as Hb < 7. Given worsening O2 requirement, transferred to [facility] for higher level of care. But transfer held as still requiring high FiO2 and need to be stabilized further. Spouse came to visit patient. He is still a full code.

## 2020-05-01 NOTE — PROGRESS NOTES
Called pt's wife (Justine) and left a voicemail message.  No response.  Will try again tomorrow.

## 2020-05-01 NOTE — NURSING
2300- Md notified of pt temp of 101.8 (tmax 102.2) despite tylenol and ice packs. Cooling blanket applied with rectal probe.    0115- Pt now 100.4 per rectal probe. SBP reaching 160s-170s. No new orders per MD. wctm

## 2020-05-01 NOTE — HOSPITAL COURSE
Hospital Course: Bharathi Garrido was admitted to ICU for management of respiratory failure secondary to suspected and/or confirmed COVID-19 requiring mechanical ventilation for respiratory support.      4/28/20: Patient transferred from [facility] for higher level of care as patient still intubated with high oxygen requirements. Sedated on Fentanyl and Precedex, but responding appropriately and following commands per nursing. Intubated on A/C, FiO2 75%, PEEP 10, , rate 18 with sat of 92%. CXR consistent with multilobar pneumonia. On empiric Vanc and Zosyn for recent white count with leukocytosis. PICC from 4/2 removed and central line and a-line placed. Repeat cxs sent. Hgb/Hct stable s/p 1 unit PRBCs on 4/27. NG set to intermittent suction. Abd x-ray showed gaseous distention.      4/29/20: Sedated on Propofol, Precedex, and Dilaudid. Initially on Levophed but weaned off and became hypertensive. Briefly on Cardene gtt. Vent settings unchanged: FiO2 75% and PEEP 10. New cultures with no growth, cont on empiric abx. Good UOP with IV Lasix and stable renal function. Having BMs.     4/30/20: Weaned off Propofol and Precedex successfully. Cont on IV Dilaudid 1 mg/hr and scheduled Oxy. SpO2 improving. Vent settings weaned to FiO of 65% PEEP 10  Rate 18. Blood pressure stable off pressors/antihypertensives. Having BMs. Started TFs. Good UOP and net negative. Lytes WNR.     5/1/2020: Weaned off dilaudid gtt. SBT today. Put back on rate after 2 hours when pt became tired and tachypnic. Stopped lasix due to elevated creatinine. Consulted ID due to increased WBC and fever despite broad spectrum abx regimen.     5/2/2020: Patient now off dilaudid gtt and tolerating well. Still drowsy. Patient failed SBT today.  Will decrease PO oxy from Q6H to BID. WBC continues to increase, now 22.28, Tmax 100. BC NGTD. Remains on vanc/zosyn - awaiting ID recs. Possible hemoconcentration. Net negative 1.3L. Stop lasix and start free  water 200 Q4H. On TFs. Last BM 4/28. On bowel regimen.

## 2020-05-01 NOTE — PROGRESS NOTES
Pharmacokinetic Assessment Follow Up: IV Vancomycin    Vancomycin serum concentration assessment(s):    The trough level was drawn correctly and can be used to guide therapy at this time. The measurement is above the desired definitive target range of 15 to 20 mcg/mL.    Vancomycin Regimen Plan:    Discontinue the scheduled vancomycin regimen and re-dose when the random level is less than 20 mcg/mL, next level to be drawn at 0300 on 5/1.    Drug levels (last 3 results):  Recent Labs   Lab Result Units 04/30/20  0842 04/30/20 2015   Vancomycin-Trough ug/mL 34.4* 36.8*       Pharmacy will continue to follow and monitor vancomycin.    Please contact pharmacy at extension [phone] for questions regarding this assessment.    Thank you for the consult,   Ryley Benito       Patient brief summary:  Bharathi Garrido is a 70 y.o. male initiated on antimicrobial therapy with IV Vancomycin for treatment of lower respiratory infection    The patient's current regimen is 1000mg q12h    Drug Allergies:   Review of patient's allergies indicates:  No Known Allergies    Actual Body Weight:   105.4 kg    Renal Function:   Estimated Creatinine Clearance: 54.6 mL/min (A) (based on SCr of 1.6 mg/dL (H)).,     CBC (last 72 hours):  Recent Labs   Lab Result Units 04/28/20  0536 04/29/20  0336 04/30/20  0233   WBC K/uL 18.71* 15.86* 15.78*   Hemoglobin g/dL 8.2* 7.9* 8.3*   Hematocrit % 25.3* 24.8* 27.0*   Platelets K/uL 301 330 387*   Gran% % 84.8* 79.8* 81.7*   Lymph% % 4.0* 6.1* 5.8*   Mono% % 9.2 12.4 10.6   Eosinophil% % 0.9 0.4 0.4   Basophil% % 0.3 0.3 0.5   Differential Method  Automated Automated Automated       Metabolic Panel (last 72 hours):  Recent Labs   Lab Result Units 04/28/20  0536 04/28/20  1558 04/29/20  0336 04/30/20  0233   Sodium mmol/L 132*  --  135* 139   Potassium mmol/L 3.9  --  3.6 4.0   Chloride mmol/L 90*  --  90* 90*   CO2 mmol/L 34*  --  34* 38*   Glucose mg/dL 175*  --  117* 117*   Glucose, UA   --  Negative   --   --    BUN, Bld mg/dL 51*  --  48* 43*   Creatinine mg/dL 1.53*  --  1.5* 1.6*   Albumin g/dL 2.8*  --  1.1* 1.3*   Total Bilirubin mg/dL 3.9*  --  3.9* 4.4*   Alkaline Phosphatase U/L 127*  --  107 124   AST U/L 41  --  34 35   ALT U/L 29  --  24 26   Magnesium mg/dL  --   --  1.8 2.1   Phosphorus mg/dL  --   --  3.9 3.4       Vancomycin Administrations:  vancomycin given in the last 96 hours                   vancomycin in dextrose 5 % 1 gram/250 mL IVPB 1,000 mg (mg) 1,000 mg New Bag 04/30/20 0930     1,000 mg New Bag 04/29/20 2154     1,000 mg New Bag  0933     1,000 mg New Bag 04/28/20 2153                Microbiologic Results:  Microbiology Results (last 7 days)     Procedure Component Value Units Date/Time    Blood culture (site 1) [[id number]] Collected:  04/28/20 1800    Order Status:  Completed Specimen:  Blood from Peripheral, Antecubital, Left Updated:  04/30/20 2012     Blood Culture, Routine No Growth to date      No Growth to date      No Growth to date    Narrative:       Site # 1, aerobic and anaerobic    Culture, Respiratory with Gram Stain [[id number]] Collected:  04/28/20 2030    Order Status:  Completed Specimen:  Respiratory from Sputum Updated:  04/30/20 0638     Respiratory Culture Normal respiratory lashawn      No S aureus or Pseudomonas isolated.     Gram Stain (Respiratory) <10 epithelial cells per low power field.     Gram Stain (Respiratory) Rare WBC's     Gram Stain (Respiratory) No organisms seen    Blood culture (site 2) [[id number]] Collected:  04/28/20 2130    Order Status:  Completed Specimen:  Blood from Peripheral, Hand, Left Updated:  04/29/20 2222     Blood Culture, Routine No Growth to date      No Growth to date    Narrative:       Site # 2, aerobic only    Fungus culture [[id number]] Collected:  04/28/20 2038    Order Status:  Completed Specimen:  Sputum Updated:  04/29/20 1318     Fungus (Mycology) Culture Culture in progress

## 2020-05-01 NOTE — PROGRESS NOTES
Pharmacokinetic Assessment Follow Up: IV Vancomycin    Vancomycin serum concentration assessment(s):    The random level was drawn correctly and can be used to guide therapy at this time. The measurement is above the desired definitive target range of 15 to 20 mcg/mL.    Vancomycin Regimen Plan:    Change regimen to Vancomycin 1000 mg IV every 24 hours with next serum trough concentration measured at 1630 prior to 3rd dose on 5/4    Drug levels (last 3 results):  Recent Labs   Lab Result Units 04/30/20  0842 04/30/20 2015 05/01/20  0353   Vancomycin, Random ug/mL  --   --  24.1   Vancomycin-Trough ug/mL 34.4* 36.8*  --        Pharmacy will continue to follow and monitor vancomycin.    Please contact pharmacy at extension [phone] for questions regarding this assessment.    Thank you for the consult,   Gabino Girard       Patient brief summary:  Bharathi Garrido is a 70 y.o. male initiated on antimicrobial therapy with IV Vancomycin for treatment of lower respiratory infection    The patient's current regimen is 1000mg q 12 h.    Drug Allergies:   Review of patient's allergies indicates:  No Known Allergies    Actual Body Weight:   105.4kg    Renal Function:   Estimated Creatinine Clearance: 51.4 mL/min (A) (based on SCr of 1.7 mg/dL (H)).,     Dialysis Method (if applicable):  N/A    CBC (last 72 hours):  Recent Labs   Lab Result Units 04/29/20  0336 04/30/20 0233 05/01/20  0353   WBC K/uL 15.86* 15.78* 17.58*   Hemoglobin g/dL 7.9* 8.3* 7.8*   Hematocrit % 24.8* 27.0* 25.9*   Platelets K/uL 330 387* 392*   Gran% % 79.8* 81.7* 84.1*   Lymph% % 6.1* 5.8* 5.0*   Mono% % 12.4 10.6 9.7   Eosinophil% % 0.4 0.4 0.0   Basophil% % 0.3 0.5 0.3   Differential Method  Automated Automated Automated       Metabolic Panel (last 72 hours):  Recent Labs   Lab Result Units 04/28/20  1558 04/29/20  0336 04/30/20 0233 05/01/20 0353 05/01/20  0633   Sodium mmol/L  --  135* 139 143  --    Potassium mmol/L  --  3.6 4.0 3.2*  --    Chloride  mmol/L  --  90* 90* 92*  --    CO2 mmol/L  --  34* 38* 38*  --    Glucose mg/dL  --  117* 117* 194*  --    Glucose, UA  Negative  --   --   --  1+*   BUN, Bld mg/dL  --  48* 43* 51*  --    Creatinine mg/dL  --  1.5* 1.6* 1.7*  --    Albumin g/dL  --  1.1* 1.3* 1.4*  --    Total Bilirubin mg/dL  --  3.9* 4.4* 3.4*  --    Alkaline Phosphatase U/L  --  107 124 117  --    AST U/L  --  34 35 35  --    ALT U/L  --  24 26 28  --    Magnesium mg/dL  --  1.8 2.1 2.5  --    Phosphorus mg/dL  --  3.9 3.4 3.4  --        Vancomycin Administrations:  vancomycin given in the last 96 hours                   vancomycin in dextrose 5 % 1 gram/250 mL IVPB 1,000 mg (mg) 1,000 mg New Bag 04/30/20 0930     1,000 mg New Bag 04/29/20 2154     1,000 mg New Bag  0933     1,000 mg New Bag 04/28/20 2153                Microbiologic Results:  Microbiology Results (last 7 days)     Procedure Component Value Units Date/Time    Blood culture [[id number]] Collected:  05/01/20 0920    Order Status:  Sent Specimen:  Blood from Peripheral, Antecubital, Right Updated:  05/01/20 1002    Blood culture [[id number]] Collected:  05/01/20 0905    Order Status:  Sent Specimen:  Blood from Peripheral, Antecubital, Left Updated:  05/01/20 1001    Culture, Respiratory with Gram Stain [[id number]]     Order Status:  No result Specimen:  Respiratory     Blood culture (site 2) [[id number]] Collected:  04/28/20 2130    Order Status:  Completed Specimen:  Blood from Peripheral, Hand, Left Updated:  04/30/20 2222     Blood Culture, Routine No Growth to date      No Growth to date      No Growth to date    Narrative:       Site # 2, aerobic only    Blood culture (site 1) [[id number]] Collected:  04/28/20 1800    Order Status:  Completed Specimen:  Blood from Peripheral, Antecubital, Left Updated:  04/30/20 2012     Blood Culture, Routine No Growth to date      No Growth to date      No Growth to date    Narrative:       Site # 1, aerobic and anaerobic    Culture,  Respiratory with Gram Stain [[id number]] Collected:  04/28/20 2030    Order Status:  Completed Specimen:  Respiratory from Sputum Updated:  04/30/20 0638     Respiratory Culture Normal respiratory lashawn      No S aureus or Pseudomonas isolated.     Gram Stain (Respiratory) <10 epithelial cells per low power field.     Gram Stain (Respiratory) Rare WBC's     Gram Stain (Respiratory) No organisms seen    Fungus culture [[id number]] Collected:  04/28/20 2038    Order Status:  Completed Specimen:  Sputum Updated:  04/29/20 1318     Fungus (Mycology) Culture Culture in progress

## 2020-05-01 NOTE — SUBJECTIVE & OBJECTIVE
Interval History: Hospital day 3. Patient became febrile overnight (101.8) and was pan cultured. Was already on broad spectrum abx regimen (vanc/zosyn). Will consult ID for additional recommendations. Patient responds to commands appropriately. Will stop dilaudid gtt. Continue scheduled Oxy. Vent settings weaned to FiO of 65% PEEP 8  Rate 18. SpO2 94%. Blood pressure WNL. TF infusing at 35mL/hr. Last BM 4/28. Good UOP and net negative. On daily lasix.     ROS  Objective:     Vital Signs (Most Recent):  Temp: 99 °F (37.2 °C) (05/01/20 0800)  Pulse: 104 (05/01/20 1100)  Resp: (!) 21 (05/01/20 1100)  BP: (!) 123/57 (04/29/20 2000)  SpO2: (!) 94 % (05/01/20 1100) Vital Signs (24h Range):  Temp:  [97.6 °F (36.4 °C)-102 °F (38.9 °C)] 99 °F (37.2 °C)  Pulse:  [] 104  Resp:  [19-46] 21  SpO2:  [93 %-100 %] 94 %  Arterial Line BP: (115-182)/(58-79) 115/58     Weight: 105.4 kg (232 lb 5.8 oz)  Body mass index is 32.41 kg/m².     SpO2: (!) 94 %  O2 Device (Oxygen Therapy): ventilator      Intake/Output Summary (Last 24 hours) at 5/1/2020 1207  Last data filed at 5/1/2020 1100  Gross per 24 hour   Intake 1255 ml   Output 2350 ml   Net -1095 ml       Lines/Drains/Airways     Central Venous Catheter Line            Percutaneous Central Line Insertion/Assessment - Triple Lumen  04/28/20 2100 left internal jugular 2 days          Drain                 NG/OG Tube 04/01/20 1938 orogastric 16 Fr. Center mouth 29 days         Urethral Catheter 04/27/20 4 days          Airway                 Airway - Non-Surgical 04/17/20 0830 Endotracheal Tube 14 days          Arterial Line                 Arterial Line 04/28/20 2017 Right Radial 2 days                Physical Exam    Limited review of systems and physical exam per team attending and/or nursing staff due to patient being in special isolation.     Significant Labs: All pertinent lab results from the last 24 hours have been reviewed.    Significant Imaging: N/A

## 2020-05-02 PROBLEM — R65.10 SIRS (SYSTEMIC INFLAMMATORY RESPONSE SYNDROME): Status: ACTIVE | Noted: 2020-05-02

## 2020-05-02 LAB
ALBUMIN SERPL BCP-MCNC: 1.6 G/DL (ref 3.5–5.2)
ALP SERPL-CCNC: 116 U/L (ref 55–135)
ALT SERPL W/O P-5'-P-CCNC: 38 U/L (ref 10–44)
ANION GAP SERPL CALC-SCNC: 11 MMOL/L (ref 8–16)
ANION GAP SERPL CALC-SCNC: 9 MMOL/L (ref 8–16)
ANISOCYTOSIS BLD QL SMEAR: SLIGHT
AST SERPL-CCNC: 53 U/L (ref 10–40)
BACTERIA BLD CULT: NORMAL
BACTERIA BLD CULT: NORMAL
BASOPHILS # BLD AUTO: 0.06 K/UL (ref 0–0.2)
BASOPHILS NFR BLD: 0.3 % (ref 0–1.9)
BILIRUB SERPL-MCNC: 3 MG/DL (ref 0.1–1)
BUN SERPL-MCNC: 54 MG/DL (ref 8–23)
BUN SERPL-MCNC: 56 MG/DL (ref 8–23)
C DIFF GDH STL QL: POSITIVE
C DIFF TOX A+B STL QL IA: NEGATIVE
CALCIUM SERPL-MCNC: 9.2 MG/DL (ref 8.7–10.5)
CALCIUM SERPL-MCNC: 9.3 MG/DL (ref 8.7–10.5)
CHLORIDE SERPL-SCNC: 101 MMOL/L (ref 95–110)
CHLORIDE SERPL-SCNC: 98 MMOL/L (ref 95–110)
CO2 SERPL-SCNC: 38 MMOL/L (ref 23–29)
CO2 SERPL-SCNC: 39 MMOL/L (ref 23–29)
CREAT SERPL-MCNC: 1.6 MG/DL (ref 0.5–1.4)
CREAT SERPL-MCNC: 1.7 MG/DL (ref 0.5–1.4)
CREAT UR-MCNC: 58 MG/DL (ref 23–375)
DIFFERENTIAL METHOD: ABNORMAL
EOSINOPHIL # BLD AUTO: 0 K/UL (ref 0–0.5)
EOSINOPHIL NFR BLD: 0.2 % (ref 0–8)
ERYTHROCYTE [DISTWIDTH] IN BLOOD BY AUTOMATED COUNT: 15.7 % (ref 11.5–14.5)
EST. GFR  (AFRICAN AMERICAN): 46.9 ML/MIN/1.73 M^2
EST. GFR  (AFRICAN AMERICAN): 50.4 ML/MIN/1.73 M^2
EST. GFR  (NON AFRICAN AMERICAN): 40.5 ML/MIN/1.73 M^2
EST. GFR  (NON AFRICAN AMERICAN): 43.6 ML/MIN/1.73 M^2
GLUCOSE SERPL-MCNC: 122 MG/DL (ref 70–110)
GLUCOSE SERPL-MCNC: 157 MG/DL (ref 70–110)
HCT VFR BLD AUTO: 26.1 % (ref 40–54)
HGB BLD-MCNC: 8.1 G/DL (ref 14–18)
HYPOCHROMIA BLD QL SMEAR: ABNORMAL
IMM GRANULOCYTES # BLD AUTO: 0.29 K/UL (ref 0–0.04)
IMM GRANULOCYTES NFR BLD AUTO: 1.3 % (ref 0–0.5)
LYMPHOCYTES # BLD AUTO: 1.4 K/UL (ref 1–4.8)
LYMPHOCYTES NFR BLD: 6.4 % (ref 18–48)
MCH RBC QN AUTO: 27.5 PG (ref 27–31)
MCHC RBC AUTO-ENTMCNC: 31 G/DL (ref 32–36)
MCV RBC AUTO: 89 FL (ref 82–98)
MONOCYTES # BLD AUTO: 2.2 K/UL (ref 0.3–1)
MONOCYTES NFR BLD: 10.1 % (ref 4–15)
NEUTROPHILS # BLD AUTO: 18.2 K/UL (ref 1.8–7.7)
NEUTROPHILS NFR BLD: 81.7 % (ref 38–73)
NRBC BLD-RTO: 1 /100 WBC
PLATELET # BLD AUTO: 413 K/UL (ref 150–350)
PLATELET BLD QL SMEAR: ABNORMAL
PMV BLD AUTO: 11.8 FL (ref 9.2–12.9)
POCT GLUCOSE: 122 MG/DL (ref 70–110)
POCT GLUCOSE: 125 MG/DL (ref 70–110)
POCT GLUCOSE: 156 MG/DL (ref 70–110)
POCT GLUCOSE: 157 MG/DL (ref 70–110)
POIKILOCYTOSIS BLD QL SMEAR: SLIGHT
POLYCHROMASIA BLD QL SMEAR: ABNORMAL
POTASSIUM SERPL-SCNC: 3.6 MMOL/L (ref 3.5–5.1)
POTASSIUM SERPL-SCNC: 4.1 MMOL/L (ref 3.5–5.1)
PROT SERPL-MCNC: 7.6 G/DL (ref 6–8.4)
RBC # BLD AUTO: 2.95 M/UL (ref 4.6–6.2)
SODIUM SERPL-SCNC: 147 MMOL/L (ref 136–145)
SODIUM SERPL-SCNC: 149 MMOL/L (ref 136–145)
SODIUM UR-SCNC: 31 MMOL/L (ref 20–250)
TARGETS BLD QL SMEAR: ABNORMAL
VANCOMYCIN SERPL-MCNC: 19.6 UG/ML
WBC # BLD AUTO: 22.28 K/UL (ref 3.9–12.7)

## 2020-05-02 PROCEDURE — 87449 NOS EACH ORGANISM AG IA: CPT

## 2020-05-02 PROCEDURE — 99900026 HC AIRWAY MAINTENANCE (STAT)

## 2020-05-02 PROCEDURE — 20000000 HC ICU ROOM

## 2020-05-02 PROCEDURE — 80048 BASIC METABOLIC PNL TOTAL CA: CPT

## 2020-05-02 PROCEDURE — 27200966 HC CLOSED SUCTION SYSTEM

## 2020-05-02 PROCEDURE — 63600175 PHARM REV CODE 636 W HCPCS: Performed by: INTERNAL MEDICINE

## 2020-05-02 PROCEDURE — 63700000 PHARM REV CODE 250 ALT 637 W/O HCPCS: Performed by: STUDENT IN AN ORGANIZED HEALTH CARE EDUCATION/TRAINING PROGRAM

## 2020-05-02 PROCEDURE — 25000003 PHARM REV CODE 250: Performed by: INTERNAL MEDICINE

## 2020-05-02 PROCEDURE — 84300 ASSAY OF URINE SODIUM: CPT

## 2020-05-02 PROCEDURE — 99291 CRITICAL CARE FIRST HOUR: CPT | Mod: GC,,, | Performed by: INTERNAL MEDICINE

## 2020-05-02 PROCEDURE — 80053 COMPREHEN METABOLIC PANEL: CPT

## 2020-05-02 PROCEDURE — 25000242 PHARM REV CODE 250 ALT 637 W/ HCPCS: Performed by: PHYSICIAN ASSISTANT

## 2020-05-02 PROCEDURE — 85025 COMPLETE CBC W/AUTO DIFF WBC: CPT

## 2020-05-02 PROCEDURE — 82570 ASSAY OF URINE CREATININE: CPT

## 2020-05-02 PROCEDURE — 94003 VENT MGMT INPAT SUBQ DAY: CPT

## 2020-05-02 PROCEDURE — 27000221 HC OXYGEN, UP TO 24 HOURS

## 2020-05-02 PROCEDURE — 87324 CLOSTRIDIUM AG IA: CPT

## 2020-05-02 PROCEDURE — 25000003 PHARM REV CODE 250: Performed by: STUDENT IN AN ORGANIZED HEALTH CARE EDUCATION/TRAINING PROGRAM

## 2020-05-02 PROCEDURE — 25000242 PHARM REV CODE 250 ALT 637 W/ HCPCS: Performed by: INTERNAL MEDICINE

## 2020-05-02 PROCEDURE — 99223 PR INITIAL HOSPITAL CARE,LEVL III: ICD-10-PCS | Mod: ,,, | Performed by: INTERNAL MEDICINE

## 2020-05-02 PROCEDURE — 94761 N-INVAS EAR/PLS OXIMETRY MLT: CPT

## 2020-05-02 PROCEDURE — 99291 PR CRITICAL CARE, E/M 30-74 MINUTES: ICD-10-PCS | Mod: GC,,, | Performed by: INTERNAL MEDICINE

## 2020-05-02 PROCEDURE — 99900035 HC TECH TIME PER 15 MIN (STAT)

## 2020-05-02 PROCEDURE — 99223 1ST HOSP IP/OBS HIGH 75: CPT | Mod: ,,, | Performed by: INTERNAL MEDICINE

## 2020-05-02 PROCEDURE — 94668 MNPJ CHEST WALL SBSQ: CPT

## 2020-05-02 PROCEDURE — 87493 C DIFF AMPLIFIED PROBE: CPT

## 2020-05-02 PROCEDURE — 80202 ASSAY OF VANCOMYCIN: CPT

## 2020-05-02 PROCEDURE — S0028 INJECTION, FAMOTIDINE, 20 MG: HCPCS | Performed by: STUDENT IN AN ORGANIZED HEALTH CARE EDUCATION/TRAINING PROGRAM

## 2020-05-02 RX ORDER — VANCOMYCIN HCL IN 5 % DEXTROSE 1G/250ML
1000 PLASTIC BAG, INJECTION (ML) INTRAVENOUS ONCE
Status: COMPLETED | OUTPATIENT
Start: 2020-05-02 | End: 2020-05-02

## 2020-05-02 RX ORDER — OXYCODONE HCL 5 MG/5 ML
5 SOLUTION, ORAL ORAL 2 TIMES DAILY
Status: DISCONTINUED | OUTPATIENT
Start: 2020-05-02 | End: 2020-05-05

## 2020-05-02 RX ORDER — DEXMEDETOMIDINE HYDROCHLORIDE 4 UG/ML
0.2 INJECTION, SOLUTION INTRAVENOUS CONTINUOUS
Status: DISCONTINUED | OUTPATIENT
Start: 2020-05-02 | End: 2020-05-06

## 2020-05-02 RX ORDER — MORPHINE SULFATE 2 MG/ML
2 INJECTION, SOLUTION INTRAMUSCULAR; INTRAVENOUS ONCE
Status: COMPLETED | OUTPATIENT
Start: 2020-05-02 | End: 2020-05-02

## 2020-05-02 RX ADMIN — DEXMEDETOMIDINE HYDROCHLORIDE IN 0.9% SODIUM CHLORIDE 0.2 MCG/KG/HR: 400 INJECTION INTRAVENOUS at 11:05

## 2020-05-02 RX ADMIN — BRIMONIDINE TARTRATE 1 DROP: 2 SOLUTION OPHTHALMIC at 09:05

## 2020-05-02 RX ADMIN — DEXMEDETOMIDINE HYDROCHLORIDE IN 0.9% SODIUM CHLORIDE 0.8 MCG/KG/HR: 400 INJECTION INTRAVENOUS at 04:05

## 2020-05-02 RX ADMIN — OXYCODONE HYDROCHLORIDE 5 MG: 5 SOLUTION ORAL at 09:05

## 2020-05-02 RX ADMIN — VANCOMYCIN HYDROCHLORIDE 1000 MG: 1 INJECTION, POWDER, LYOPHILIZED, FOR SOLUTION INTRAVENOUS at 09:05

## 2020-05-02 RX ADMIN — LATANOPROST 1 DROP: 50 SOLUTION OPHTHALMIC at 09:05

## 2020-05-02 RX ADMIN — POLYETHYLENE GLYCOL 3350 17 G: 17 POWDER, FOR SOLUTION ORAL at 09:05

## 2020-05-02 RX ADMIN — PIPERACILLIN SODIUM,TAZOBACTAM SODIUM 4.5 G: 4; .5 INJECTION, POWDER, FOR SOLUTION INTRAVENOUS at 11:05

## 2020-05-02 RX ADMIN — MINERAL OIL AND WHITE PETROLATUM: 150; 830 OINTMENT OPHTHALMIC at 09:05

## 2020-05-02 RX ADMIN — DEXMEDETOMIDINE HYDROCHLORIDE IN 0.9% SODIUM CHLORIDE 1.2 MCG/KG/HR: 400 INJECTION INTRAVENOUS at 06:05

## 2020-05-02 RX ADMIN — FAMOTIDINE 20 MG: 10 INJECTION INTRAVENOUS at 09:05

## 2020-05-02 RX ADMIN — DEXMEDETOMIDINE HYDROCHLORIDE IN 0.9% SODIUM CHLORIDE 0.5 MCG/KG/HR: 400 INJECTION INTRAVENOUS at 11:05

## 2020-05-02 RX ADMIN — TIMOLOL MALEATE 1 DROP: 5 SOLUTION/ DROPS OPHTHALMIC at 09:05

## 2020-05-02 RX ADMIN — PIPERACILLIN SODIUM,TAZOBACTAM SODIUM 4.5 G: 4; .5 INJECTION, POWDER, FOR SOLUTION INTRAVENOUS at 09:05

## 2020-05-02 RX ADMIN — INSULIN DETEMIR 20 UNITS: 100 INJECTION, SOLUTION SUBCUTANEOUS at 09:05

## 2020-05-02 RX ADMIN — CHLORHEXIDINE GLUCONATE 0.12% ORAL RINSE 15 ML: 1.2 LIQUID ORAL at 09:05

## 2020-05-02 RX ADMIN — OXYCODONE HYDROCHLORIDE 5 MG: 5 SOLUTION ORAL at 12:05

## 2020-05-02 RX ADMIN — MORPHINE SULFATE 2 MG: 2 INJECTION, SOLUTION INTRAMUSCULAR; INTRAVENOUS at 04:05

## 2020-05-02 RX ADMIN — OXYCODONE HYDROCHLORIDE 5 MG: 5 SOLUTION ORAL at 05:05

## 2020-05-02 RX ADMIN — DOCUSATE SODIUM 100 MG: 50 LIQUID ORAL at 09:05

## 2020-05-02 RX ADMIN — PIPERACILLIN SODIUM,TAZOBACTAM SODIUM 4.5 G: 4; .5 INJECTION, POWDER, FOR SOLUTION INTRAVENOUS at 12:05

## 2020-05-02 RX ADMIN — ENOXAPARIN SODIUM 40 MG: 100 INJECTION SUBCUTANEOUS at 09:05

## 2020-05-02 RX ADMIN — POTASSIUM CHLORIDE 40 MEQ: 20 SOLUTION ORAL at 05:05

## 2020-05-02 NOTE — PROGRESS NOTES
[facility]  Cardiology  Progress Note    Patient Name: Bharathi Garrido  MRN: [id number]  Admission Date: 4/28/2020  Hospital Length of Stay: 4 days  Code Status: Full Code   Attending Physician: Praveena Villafana MD   Primary Care Physician: Melvin Gee MD (Inactive)  Expected Discharge Date: 5/8/2020  Principal Problem:Pneumonia due to COVID-19 virus    Subjective:     HPI:  Mr. Bharathi Garrido is a 71 yo male with obesity, history of prostate cancer (1/2018 s/p prostatectomy and radiation Dr. Pederson), history of difficult intubation for surgery and glaucoma presents with feeling unwell, fever, decreased appetite and shortness of breath. Patient transferred from [facility] [place] for higher level of care. Admitted to outside hospital on 3/31/20 for SOB and fever and tested positive for COVID-19.  Upon admission, CXR showed bilateral multifocal opacities. Procalcitonin, LDH, ferritin, CRP and D-dimer elevated. Renal function normal. Mild increase in LFTs. He was treated with Plaquinel, Rocephin and Azithromycin. His respiratory function declined and has been intubated since 4/2/20.     Hospital Course: Bharathi Garrido was admitted to ICU for management of respiratory failure secondary to suspected and/or confirmed COVID-19 requiring mechanical ventilation for respiratory support.      4/28/20: Patient transferred from [facility] for higher level of care as patient still intubated with high oxygen requirements. Sedated on Fentanyl and Precedex, but responding appropriately and following commands per nursing. Intubated on A/C, FiO2 75%, PEEP 10, , rate 18 with sat of 92%. CXR consistent with multilobar pneumonia. On empiric Vanc and Zosyn for recent white count with leukocytosis. PICC from 4/2 removed and central line and a-line placed. Repeat cxs sent. Hgb/Hct stable s/p 1 unit PRBCs on 4/27. NG set to intermittent suction. Abd x-ray showed gaseous distention.      4/29/20:  Sedated on Propofol, Precedex, and Dilaudid. Initially on Levophed but weaned off and became hypertensive. Briefly on Cardene gtt. Vent settings unchanged: FiO2 75% and PEEP 10. New cultures with no growth, cont on empiric abx. Good UOP with IV Lasix and stable renal function. Having BMs.     4/30/20: Weaned off Propofol and Precedex successfully. Cont on IV Dilaudid 1 mg/hr and scheduled Oxy. SpO2 improving. Vent settings weaned to FiO of 65% PEEP 10  Rate 18. Blood pressure stable off pressors/antihypertensives. Having BMs. Started TFs. Good UOP and net negative. Lytes WNR.     5/1/2020: Weaned off dilaudid gtt. SBT today. Put back on rate after 2 hours when pt became tired and tachypnic. Stopped lasix due to elevated creatinine. Consulted ID due to increased WBC and fever despite broad spectrum abx regimen.     Interval History: Hospital day 4 (total hospital duration 33). Patient now off dilaudid gtt and tolerating well. Still drowsy. Will decrease PO oxy from Q6H to BID. WBC continues to increase, now 22.28, Tmax 100. BC NGTD. Remains on vanc/zosyn - awaiting ID recs. Possible due to hemoconcentration. ABG last night (5/1/2020) 7.5/55/66/43.9/94%. Will stop lasix today and add 200 Q4H free water. Net negative 1.3L. On TFs. Last BM 4/28. On bowel regimen. Current vent settings FiO2 40% PEEP 5. Plan on SBT today.    ROS  Objective:     Vital Signs (Most Recent):  Temp: 98.5 °F (36.9 °C) (05/02/20 0344)  Pulse: 93 (05/02/20 0600)  Resp: (!) 45 (05/02/20 0600)  BP: (!) 142/73 (05/02/20 0600)  SpO2: (!) 94 % (05/02/20 0600) Vital Signs (24h Range):  Temp:  [98.5 °F (36.9 °C)-100 °F (37.8 °C)] 98.5 °F (36.9 °C)  Pulse:  [] 93  Resp:  [20-45] 45  SpO2:  [91 %-99 %] 94 %  BP: (118-142)/(65-77) 142/73  Arterial Line BP: (115-163)/(56-76) 159/61     Weight: 105.4 kg (232 lb 5.8 oz)  Body mass index is 32.41 kg/m².     SpO2: (!) 94 %  O2 Device (Oxygen Therapy): ventilator      Intake/Output Summary (Last 24  hours) at 5/2/2020 0835  Last data filed at 5/2/2020 0600  Gross per 24 hour   Intake 1285 ml   Output 2885 ml   Net -1600 ml       Lines/Drains/Airways     Central Venous Catheter Line            Percutaneous Central Line Insertion/Assessment - Triple Lumen  04/28/20 2100 left internal jugular 3 days          Drain                 NG/OG Tube 04/01/20 1938 orogastric 16 Fr. Center mouth 30 days         Urethral Catheter 04/27/20 5 days          Airway                 Airway - Non-Surgical 04/17/20 0830 Endotracheal Tube 15 days          Arterial Line                 Arterial Line 04/28/20 2017 Right Radial 3 days                Physical Exam  Limited review of systems and physical exam per team attending and/or nursing staff due to patient being in special isolation.     Significant Labs: All pertinent lab results from the last 24 hours have been reviewed.    Significant Imaging: N/A    Assessment and Plan:     ASSESSMENT & PLAN:   This is a 70 y.o. admitted on 4/28/2020 for respiratory failure secondary to confirmed/suspected COVID-19 requiring mechanical ventilation for respiratory support.      Neuro:   -Intubated. Pt able to respond appropriately and follow commands.  -Dilaudid gtt OFF. Patient remains drowsy. Will decrease PO oxy to BID.  -PT/OT consulted     Cardiac/Circulatory:   -Needs ECHO, however will hold off to minimize exposure since stronger suspicion for ARDS.   -Maintain MAP's >65 with vasopressor support if necessary.   -Pressors d/c'd. BP mildly hypertensive and MAPs in 80s.   -HR 80s-100.  -On DVT prophylaxis with Lovenox.   -CVP 2. Hold lasix.     Pulmonary:   -Continue mechanical ventilation with ARDS targeted strategies. Goal plateau pressure <30. AM settings: FiO2 40%, PEEP 5. SpO2 96%  -Repeat COVID-19 on 4/21 positive.   -Completed course of Plaquenil, Azithromycin, and Rocephin   -Wean ventilator settings per ARDSnet protocol.  -Wean sedation with coordinated SAT and SBT daily.    -Strict  I/O's and goal net neutral status to help optimize vent mechanics.   -Special isolation and aspiration precautions ordered.   -Update family daily.   - SBT today with possible extubation if tolerated.     Renal:   -Monitoring urine output with consideration for dialysis initiation if necessary.   -Trialysis line placement if dialysis initiated.   -UOP 2.8L yesterday. Net -6.6L since admission  -Creatinine 1.7. Hold lasix.  -Sodium 147. Potassium 3.6 (replaced with 40 Meq KCL).      Gastrointestinal:   -Trending liver function daily. Transaminases WNL.  -Diagnosed with ileus on 4/21/20.  Resolved.  -Famotidine for GI prophylaxis    -On bowel regimen with Miralax and Docusate. Last BM 4/28/20  -Cont TFs per dietician recommendation: TF of Peptamen Intense VHP advancing as tolerated to goal rate of 55 mL/hr to provide 1320 kcal, 121 gm protein, 1109 mL free fluid.      Endocrine:   -No history of DM. Hgb A1C 6.0  -Goal BG <200. At goal.  -Continue insulin detemir 20 units qhs     ID:   -Repeat blood and sputum cultures drawn 5/1/2020. NGTD.  -Respiratory culture 4/26 with candida.  -BC continues to increase, now 22.28, Tmax 100. Remains on vanc/zosyn - awaiting ID recs.  -Central line and Art line placed 4/29/20      Hematology/Oncology:   -Trending CBC daily and monitor for signs of bleeding.   -H&H 8.1/26.1  -Transfuse for Hgb <7.      VTE Risk Mitigation (From admission, onward)         Ordered     enoxaparin injection 40 mg  Daily      04/28/20 1535     IP VTE HIGH RISK PATIENT  Once      04/28/20 1535     Place sequential compression device  Until discontinued      04/28/20 1400                Aiyana Nath NP  Cardiology  [facility]  STAFF MD: Steven Allison MD

## 2020-05-02 NOTE — ASSESSMENT & PLAN NOTE
This is a 71 y/o male with a history of obesity, prostate cancer s/p prostatectomy and radiation therapy who was admitted at [facility] with symptoms of fever, SOB, and decreased appetite on march 31, 2020.  Chest imaging showed multifocal, bilateral opacities.  Testing for COVID-19 was positive.  His hospital course at [facility] was complicated by acute kidney injury, respiratory failure requiring intubation and ventilation.  He completed a course of plaquenil, ceftriaxone and azithromycin.  He later completed a 10 day course of levofloxacin on April 22, 2020.  He has been febrile intermittently during his hospital stay but started having more consistent fevers around 4/24/20.  On 4/25/20, he was started on a 4 day course of fluconazole.  On 4/26/20, vancomycin and zosyn were added and he remains on this now.  He continues to be febrile.  He was transferred to [facility] on April 28, 2020 for a higher level of care.  The only positive cultures he has had throughout his hospitalization has been a positive respiratory culture on April 26, 2020 for candida orthopsilosis.      I am consulted to assist with evaluation of his recent fevers and worsening leukocytosis.  Patient appears comfortable on low vent settings.  VAP appears less likely.  Line infection is possible.  Other possibilities would include C diff (verbal report that patient had liquid stool last pm).  He is currently on day 7 of vancomycin and zosyn.  Recommendations are as follows;    1.  If he has another liquid stool, send for C diff.    2.  Follow up blood and respiratory cultures.    3.  Consider discontinuing vanc and zosyn.  This combination of antimicrobials has been strongly associated with acute kidney injury.  As he appears somewhat stable, okay to hold off on empiric antimicrobials while we work up the cause of his leukocytosis.

## 2020-05-02 NOTE — SUBJECTIVE & OBJECTIVE
Past Medical History:   Diagnosis Date    Class 1 obesity with body mass index (BMI) of 33.0 to 33.9 in adult 2/1/2018    Difficult intubation     Glaucoma     Prostate cancer 2/1/2018    Sebaceous cyst of scrotum        Past Surgical History:   Procedure Laterality Date    INCISION AND DRAINAGE OF WOUND Left 2011, 2017    scrotum; multiple I&D       Review of patient's allergies indicates:  No Known Allergies    Medications:  Medications Prior to Admission   Medication Sig    bicalutamide (CASODEX) 50 MG Tab Take 1 tablet (50 mg total) by mouth once daily.    brimonidine 0.2% (ALPHAGAN) 0.2 % Drop Place 1 drop into both eyes 2 (two) times daily.    latanoprost 0.005 % ophthalmic solution INTALL 1 DROP TO BOTH EYES CONNOR NIGHT    sildenafil, antihypertensive, (REVATIO) 20 mg Tab Take 1 tablet (20 mg total) by mouth Daily.    timolol maleate 0.5% (TIMOPTIC) 0.5 % Drop Place 1 drop into both eyes 2 (two) times daily.     Antibiotics (From admission, onward)    Start     Stop Route Frequency Ordered    05/01/20 1700  vancomycin in dextrose 5 % 1 gram/250 mL IVPB 1,000 mg      -- IV Every 24 hours (non-standard times) 05/01/20 1205    04/28/20 1730  piperacillin-tazobactam 4.5 g in sodium chloride 0.9% 100 mL IVPB (ready to mix system)      -- IV Every 8 hours (non-standard times) 04/28/20 1535        Antifungals (From admission, onward)    None        Antivirals (From admission, onward)    None             There is no immunization history on file for this patient.    Family History     Problem Relation (Age of Onset)    Cancer Father    No Known Problems Mother        Social History     Socioeconomic History    Marital status:      Spouse name: Not on file    Number of children: Not on file    Years of education: Not on file    Highest education level: Not on file   Occupational History    Not on file   Social Needs    Financial resource strain: Not on file    Food insecurity:     Worry: Not on  file     Inability: Not on file    Transportation needs:     Medical: Not on file     Non-medical: Not on file   Tobacco Use    Smoking status: Former Smoker     Types: Cigarettes     Last attempt to quit: 2000     Years since quittin.3    Smokeless tobacco: Never Used   Substance and Sexual Activity    Alcohol use: No     Comment: rarely     Drug use: No    Sexual activity: Yes     Partners: Female   Lifestyle    Physical activity:     Days per week: Not on file     Minutes per session: Not on file    Stress: Not on file   Relationships    Social connections:     Talks on phone: Not on file     Gets together: Not on file     Attends Gnosticist service: Not on file     Active member of club or organization: Not on file     Attends meetings of clubs or organizations: Not on file     Relationship status: Not on file   Other Topics Concern    Not on file   Social History Narrative    Not on file     Review of Systems   Unable to perform ROS: Intubated     Objective:     Vital Signs (Most Recent):  Temp: 98.5 °F (36.9 °C) (20 0344)  Pulse: 93 (20 06)  Resp: (!) 45 (20 06)  BP: (!) 142/73 (20)  SpO2: (!) 94 % (20) Vital Signs (24h Range):  Temp:  [98.5 °F (36.9 °C)-100 °F (37.8 °C)] 98.5 °F (36.9 °C)  Pulse:  [] 93  Resp:  [20-45] 45  SpO2:  [91 %-99 %] 94 %  BP: (118-142)/(65-77) 142/73  Arterial Line BP: (115-163)/(56-76) 159/61     Weight: 105.4 kg (232 lb 5.8 oz)  Body mass index is 32.41 kg/m².    Estimated Creatinine Clearance: 51.4 mL/min (A) (based on SCr of 1.7 mg/dL (H)).    Physical Exam   Constitutional: He appears well-developed and well-nourished.   HENT:   Intubated.  OG tube in place.   Eyes: Right eye exhibits no discharge. Left eye exhibits no discharge.   Neck: No tracheal deviation present.   Cardiovascular: Normal rate, regular rhythm, normal heart sounds and intact distal pulses.   No murmur heard.  Pulmonary/Chest:   On mechanical  ventilation.   Abdominal: Soft. Bowel sounds are normal. He exhibits no distension.   Genitourinary:   Genitourinary Comments: Perez catheter in place   Musculoskeletal: He exhibits no edema or tenderness.   Neurological:   Awake, alert and responding to simple questions.   Skin: Skin is warm.   Dry scabbed wound to dorsal aspect of left wrist.   Nursing note and vitals reviewed.      Significant Labs:   Microbiology Results (last 7 days)     Procedure Component Value Units Date/Time    Culture, Respiratory with Gram Stain [[id number]] Collected:  05/01/20 1642    Order Status:  Completed Specimen:  Respiratory from Endotracheal Aspirate Updated:  05/02/20 0907     Respiratory Culture No Growth     Gram Stain (Respiratory) Rare WBC's     Gram Stain (Respiratory) No organisms seen    Blood culture (site 2) [[id number]] Collected:  04/28/20 2130    Order Status:  Completed Specimen:  Blood from Peripheral, Hand, Left Updated:  05/01/20 2222     Blood Culture, Routine No Growth to date      No Growth to date      No Growth to date      No Growth to date    Narrative:       Site # 2, aerobic only    Blood culture (site 1) [[id number]] Collected:  04/28/20 1800    Order Status:  Completed Specimen:  Blood from Peripheral, Antecubital, Left Updated:  05/01/20 2012     Blood Culture, Routine No Growth to date      No Growth to date      No Growth to date      No Growth to date    Narrative:       Site # 1, aerobic and anaerobic    Blood culture [[id number]] Collected:  05/01/20 0920    Order Status:  Completed Specimen:  Blood from Peripheral, Antecubital, Right Updated:  05/01/20 1715     Blood Culture, Routine No Growth to date    Blood culture [[id number]] Collected:  05/01/20 0905    Order Status:  Completed Specimen:  Blood from Peripheral, Antecubital, Left Updated:  05/01/20 1715     Blood Culture, Routine No Growth to date    Culture, Respiratory with Gram Stain [[id number]] Collected:  04/28/20 2030    Order Status:   Completed Specimen:  Respiratory from Sputum Updated:  04/30/20 0638     Respiratory Culture Normal respiratory lashawn      No S aureus or Pseudomonas isolated.     Gram Stain (Respiratory) <10 epithelial cells per low power field.     Gram Stain (Respiratory) Rare WBC's     Gram Stain (Respiratory) No organisms seen    Fungus culture [[id number]] Collected:  04/28/20 2038    Order Status:  Completed Specimen:  Sputum Updated:  04/29/20 1318     Fungus (Mycology) Culture Culture in progress          Significant Imaging: I have reviewed all pertinent imaging results/findings within the past 24 hours.

## 2020-05-02 NOTE — CONSULTS
[facility]  Infectious Disease  Consult Note    Patient Name: Bharathi Garrido  MRN: [id number]  Admission Date: 4/28/2020  Hospital Length of Stay: 4 days  Attending Physician: Praveena Villafana MD  Primary Care Provider: Melvin Gee MD (Inactive)     Isolation Status: Airborne and Contact and Droplet    Patient information was obtained from past medical records and ER records.      Inpatient consult to Infectious Diseases  Consult performed by: Zaheer Hodge MD  Consult ordered by: Aiyana Nath NP        Assessment/Plan:     SIRS (systemic inflammatory response syndrome)  This is a 71 y/o male with a history of obesity, prostate cancer s/p prostatectomy and radiation therapy who was admitted at [facility] with symptoms of fever, SOB, and decreased appetite on march 31, 2020.  Chest imaging showed multifocal, bilateral opacities.  Testing for COVID-19 was positive.  His hospital course at [facility] was complicated by acute kidney injury, respiratory failure requiring intubation and ventilation.  He completed a course of plaquenil, ceftriaxone and azithromycin.  He later completed a 10 day course of levofloxacin on April 22, 2020.  He has been febrile intermittently during his hospital stay but started having more consistent fevers around 4/24/20.  On 4/25/20, he was started on a 4 day course of fluconazole.  On 4/26/20, vancomycin and zosyn were added and he remains on this now.  He continues to be febrile.  He was transferred to [facility] on April 28, 2020 for a higher level of care.  The only positive cultures he has had throughout his hospitalization has been a positive respiratory culture on April 26, 2020 for candida orthopsilosis.      I am consulted to assist with evaluation of his recent fevers and worsening leukocytosis.  Patient appears comfortable on low vent settings.  VAP appears less likely.  Line infection is possible.  Other possibilities  would include C diff (verbal report that patient had liquid stool last pm).  He is currently on day 7 of vancomycin and zosyn.  Recommendations are as follows;    1.  If he has another liquid stool, send for C diff.    2.  Follow up blood and respiratory cultures.    3.  Consider discontinuing vanc and zosyn.  This combination of antimicrobials has been strongly associated with acute kidney injury.  As he appears somewhat stable, okay to hold off on empiric antimicrobials while we work up the cause of his leukocytosis.        Thank you for your consult. I will follow-up with patient. Please contact us if you have any additional questions.    Zaheer Hodge MD  Infectious Disease  [facility]    Subjective:     Principal Problem: Pneumonia due to COVID-19 virus    HPI: History obtained from review of the chart.      This is a 69 y/o male with a history of obesity, prostate cancer s/p prostatectomy and radiation therapy who was admitted at [facility] with symptoms of fever, SOB, and decreased appetite on march 31, 2020.  Chest imaging showed multifocal, bilateral opacities.  Testing for COVID-19 was positive.  His hospital course at [facility] was complicated by acute kidney injury, respiratory failure requiring intubation and ventilation.  He completed a course of plaquenil, ceftriaxone and azithromycin.  He later completed a 10 day course of levofloxacin on April 22, 2020.  He has been febrile intermittently during his hospital stay but started having more consistent fevers around 4/24/20.  On 4/25/20, he was started on a 4 day course of fluconazole.  On 4/26/20, vancomycin and zosyn were added and he remains on this now.  He continues to be febrile.  He was transferred to [facility] on April 28, 2020 for a higher level of care.  The only positive cultures he has had throughout his hospitalization has been a positive respiratory culture on April 26, 2020 for candida  orthopsilosis.        Past Medical History:   Diagnosis Date    Class 1 obesity with body mass index (BMI) of 33.0 to 33.9 in adult 2/1/2018    Difficult intubation     Glaucoma     Prostate cancer 2/1/2018    Sebaceous cyst of scrotum        Past Surgical History:   Procedure Laterality Date    INCISION AND DRAINAGE OF WOUND Left 2011, 2017    scrotum; multiple I&D       Review of patient's allergies indicates:  No Known Allergies    Medications:  Medications Prior to Admission   Medication Sig    bicalutamide (CASODEX) 50 MG Tab Take 1 tablet (50 mg total) by mouth once daily.    brimonidine 0.2% (ALPHAGAN) 0.2 % Drop Place 1 drop into both eyes 2 (two) times daily.    latanoprost 0.005 % ophthalmic solution INTALL 1 DROP TO BOTH EYES CONNOR NIGHT    sildenafil, antihypertensive, (REVATIO) 20 mg Tab Take 1 tablet (20 mg total) by mouth Daily.    timolol maleate 0.5% (TIMOPTIC) 0.5 % Drop Place 1 drop into both eyes 2 (two) times daily.     Antibiotics (From admission, onward)    Start     Stop Route Frequency Ordered    05/01/20 1700  vancomycin in dextrose 5 % 1 gram/250 mL IVPB 1,000 mg      -- IV Every 24 hours (non-standard times) 05/01/20 1205    04/28/20 1730  piperacillin-tazobactam 4.5 g in sodium chloride 0.9% 100 mL IVPB (ready to mix system)      -- IV Every 8 hours (non-standard times) 04/28/20 1535        Antifungals (From admission, onward)    None        Antivirals (From admission, onward)    None             There is no immunization history on file for this patient.    Family History     Problem Relation (Age of Onset)    Cancer Father    No Known Problems Mother        Social History     Socioeconomic History    Marital status:      Spouse name: Not on file    Number of children: Not on file    Years of education: Not on file    Highest education level: Not on file   Occupational History    Not on file   Social Needs    Financial resource strain: Not on file    Food  insecurity:     Worry: Not on file     Inability: Not on file    Transportation needs:     Medical: Not on file     Non-medical: Not on file   Tobacco Use    Smoking status: Former Smoker     Types: Cigarettes     Last attempt to quit: 2000     Years since quittin.3    Smokeless tobacco: Never Used   Substance and Sexual Activity    Alcohol use: No     Comment: rarely     Drug use: No    Sexual activity: Yes     Partners: Female   Lifestyle    Physical activity:     Days per week: Not on file     Minutes per session: Not on file    Stress: Not on file   Relationships    Social connections:     Talks on phone: Not on file     Gets together: Not on file     Attends Oriental orthodox service: Not on file     Active member of club or organization: Not on file     Attends meetings of clubs or organizations: Not on file     Relationship status: Not on file   Other Topics Concern    Not on file   Social History Narrative    Not on file     Review of Systems   Unable to perform ROS: Intubated     Objective:     Vital Signs (Most Recent):  Temp: 98.5 °F (36.9 °C) (20 0344)  Pulse: 93 (20 0600)  Resp: (!) 45 (20 06)  BP: (!) 142/73 (20 06)  SpO2: (!) 94 % (20 06) Vital Signs (24h Range):  Temp:  [98.5 °F (36.9 °C)-100 °F (37.8 °C)] 98.5 °F (36.9 °C)  Pulse:  [] 93  Resp:  [20-45] 45  SpO2:  [91 %-99 %] 94 %  BP: (118-142)/(65-77) 142/73  Arterial Line BP: (115-163)/(56-76) 159/61     Weight: 105.4 kg (232 lb 5.8 oz)  Body mass index is 32.41 kg/m².    Estimated Creatinine Clearance: 51.4 mL/min (A) (based on SCr of 1.7 mg/dL (H)).    Physical Exam   Constitutional: He appears well-developed and well-nourished.   HENT:   Intubated.  OG tube in place.   Eyes: Right eye exhibits no discharge. Left eye exhibits no discharge.   Neck: No tracheal deviation present.   Cardiovascular: Normal rate, regular rhythm, normal heart sounds and intact distal pulses.   No murmur  heard.  Pulmonary/Chest:   On mechanical ventilation.   Abdominal: Soft. Bowel sounds are normal. He exhibits no distension.   Genitourinary:   Genitourinary Comments: Perez catheter in place   Musculoskeletal: He exhibits no edema or tenderness.   Neurological:   Awake, alert and responding to simple questions.   Skin: Skin is warm.   Dry scabbed wound to dorsal aspect of left wrist.   Nursing note and vitals reviewed.      Significant Labs:   Microbiology Results (last 7 days)     Procedure Component Value Units Date/Time    Culture, Respiratory with Gram Stain [[id number]] Collected:  05/01/20 1642    Order Status:  Completed Specimen:  Respiratory from Endotracheal Aspirate Updated:  05/02/20 0907     Respiratory Culture No Growth     Gram Stain (Respiratory) Rare WBC's     Gram Stain (Respiratory) No organisms seen    Blood culture (site 2) [[id number]] Collected:  04/28/20 2130    Order Status:  Completed Specimen:  Blood from Peripheral, Hand, Left Updated:  05/01/20 2222     Blood Culture, Routine No Growth to date      No Growth to date      No Growth to date      No Growth to date    Narrative:       Site # 2, aerobic only    Blood culture (site 1) [[id number]] Collected:  04/28/20 1800    Order Status:  Completed Specimen:  Blood from Peripheral, Antecubital, Left Updated:  05/01/20 2012     Blood Culture, Routine No Growth to date      No Growth to date      No Growth to date      No Growth to date    Narrative:       Site # 1, aerobic and anaerobic    Blood culture [[id number]] Collected:  05/01/20 0920    Order Status:  Completed Specimen:  Blood from Peripheral, Antecubital, Right Updated:  05/01/20 1715     Blood Culture, Routine No Growth to date    Blood culture [[id number]] Collected:  05/01/20 0905    Order Status:  Completed Specimen:  Blood from Peripheral, Antecubital, Left Updated:  05/01/20 1715     Blood Culture, Routine No Growth to date    Culture, Respiratory with Gram Stain [[id number]]  Collected:  04/28/20 2030    Order Status:  Completed Specimen:  Respiratory from Sputum Updated:  04/30/20 0638     Respiratory Culture Normal respiratory lashawn      No S aureus or Pseudomonas isolated.     Gram Stain (Respiratory) <10 epithelial cells per low power field.     Gram Stain (Respiratory) Rare WBC's     Gram Stain (Respiratory) No organisms seen    Fungus culture [[id number]] Collected:  04/28/20 2038    Order Status:  Completed Specimen:  Sputum Updated:  04/29/20 1318     Fungus (Mycology) Culture Culture in progress          Significant Imaging: I have reviewed all pertinent imaging results/findings within the past 24 hours.

## 2020-05-02 NOTE — HPI
History obtained from review of the chart.      This is a 69 y/o male with a history of obesity, prostate cancer s/p prostatectomy and radiation therapy who was admitted at [facility] with symptoms of fever, SOB, and decreased appetite on march 31, 2020.  Chest imaging showed multifocal, bilateral opacities.  Testing for COVID-19 was positive.  His hospital course at [facility] was complicated by acute kidney injury, respiratory failure requiring intubation and ventilation.  He completed a course of plaquenil, ceftriaxone and azithromycin.  He later completed a 10 day course of levofloxacin on April 22, 2020.  He has been febrile intermittently during his hospital stay but started having more consistent fevers around 4/24/20.  On 4/25/20, he was started on a 4 day course of fluconazole.  On 4/26/20, vancomycin and zosyn were added and he remains on this now.  He continues to be febrile.  He was transferred to [facility] on April 28, 2020 for a higher level of care.  The only positive cultures he has had throughout his hospitalization has been a positive respiratory culture on April 26, 2020 for candida orthopsilosis.

## 2020-05-02 NOTE — SUBJECTIVE & OBJECTIVE
Interval History: Hospital day 4 (total hospital duration 33). Patient now off dilaudid gtt and tolerating well. Still drowsy. Will decrease PO oxy from Q6H to BID. WBC continues to increase, now 22.28, Tmax 100. BC NGTD. Remains on vanc/zosyn - awaiting ID recs. Possible hemoconcentration- will hold lasix today. Net negative 1.3L. On TFs. Last BM 4/28. On bowel regimen. Current vent settings FiO2 40% PEEP 5. Plan on SBT today.    ROS  Objective:     Vital Signs (Most Recent):  Temp: 98.5 °F (36.9 °C) (05/02/20 0344)  Pulse: 93 (05/02/20 0600)  Resp: (!) 45 (05/02/20 0600)  BP: (!) 142/73 (05/02/20 0600)  SpO2: (!) 94 % (05/02/20 0600) Vital Signs (24h Range):  Temp:  [98.5 °F (36.9 °C)-100 °F (37.8 °C)] 98.5 °F (36.9 °C)  Pulse:  [] 93  Resp:  [20-45] 45  SpO2:  [91 %-99 %] 94 %  BP: (118-142)/(65-77) 142/73  Arterial Line BP: (115-163)/(56-76) 159/61     Weight: 105.4 kg (232 lb 5.8 oz)  Body mass index is 32.41 kg/m².     SpO2: (!) 94 %  O2 Device (Oxygen Therapy): ventilator      Intake/Output Summary (Last 24 hours) at 5/2/2020 0835  Last data filed at 5/2/2020 0600  Gross per 24 hour   Intake 1285 ml   Output 2885 ml   Net -1600 ml       Lines/Drains/Airways     Central Venous Catheter Line            Percutaneous Central Line Insertion/Assessment - Triple Lumen  04/28/20 2100 left internal jugular 3 days          Drain                 NG/OG Tube 04/01/20 1938 orogastric 16 Fr. Center mouth 30 days         Urethral Catheter 04/27/20 5 days          Airway                 Airway - Non-Surgical 04/17/20 0830 Endotracheal Tube 15 days          Arterial Line                 Arterial Line 04/28/20 2017 Right Radial 3 days                Physical Exam  Limited review of systems and physical exam per team attending and/or nursing staff due to patient being in special isolation.     Significant Labs: All pertinent lab results from the last 24 hours have been reviewed.    Significant Imaging: N/A

## 2020-05-03 LAB
ALBUMIN SERPL BCP-MCNC: 1.7 G/DL (ref 3.5–5.2)
ALLENS TEST: ABNORMAL
ALP SERPL-CCNC: 111 U/L (ref 55–135)
ALT SERPL W/O P-5'-P-CCNC: 64 U/L (ref 10–44)
ANION GAP SERPL CALC-SCNC: 10 MMOL/L (ref 8–16)
ANION GAP SERPL CALC-SCNC: 8 MMOL/L (ref 8–16)
AST SERPL-CCNC: 77 U/L (ref 10–40)
BASOPHILS # BLD AUTO: 0.06 K/UL (ref 0–0.2)
BASOPHILS NFR BLD: 0.4 % (ref 0–1.9)
BILIRUB SERPL-MCNC: 2.1 MG/DL (ref 0.1–1)
BUN SERPL-MCNC: 52 MG/DL (ref 8–23)
BUN SERPL-MCNC: 55 MG/DL (ref 8–23)
C DIFF TOX GENS STL QL NAA+PROBE: NEGATIVE
CALCIUM SERPL-MCNC: 9 MG/DL (ref 8.7–10.5)
CALCIUM SERPL-MCNC: 9 MG/DL (ref 8.7–10.5)
CHLORIDE SERPL-SCNC: 102 MMOL/L (ref 95–110)
CHLORIDE SERPL-SCNC: 104 MMOL/L (ref 95–110)
CO2 SERPL-SCNC: 35 MMOL/L (ref 23–29)
CO2 SERPL-SCNC: 40 MMOL/L (ref 23–29)
CREAT SERPL-MCNC: 1.4 MG/DL (ref 0.5–1.4)
CREAT SERPL-MCNC: 1.6 MG/DL (ref 0.5–1.4)
DELSYS: ABNORMAL
DIFFERENTIAL METHOD: ABNORMAL
EOSINOPHIL # BLD AUTO: 0.2 K/UL (ref 0–0.5)
EOSINOPHIL NFR BLD: 1.2 % (ref 0–8)
ERYTHROCYTE [DISTWIDTH] IN BLOOD BY AUTOMATED COUNT: 15.9 % (ref 11.5–14.5)
ERYTHROCYTE [SEDIMENTATION RATE] IN BLOOD BY WESTERGREN METHOD: 16 MM/H
EST. GFR  (AFRICAN AMERICAN): 50.4 ML/MIN/1.73 M^2
EST. GFR  (AFRICAN AMERICAN): 59.3 ML/MIN/1.73 M^2
EST. GFR  (NON AFRICAN AMERICAN): 43.6 ML/MIN/1.73 M^2
EST. GFR  (NON AFRICAN AMERICAN): 51.3 ML/MIN/1.73 M^2
FIO2: 40
GLUCOSE SERPL-MCNC: 126 MG/DL (ref 70–110)
GLUCOSE SERPL-MCNC: 137 MG/DL (ref 70–110)
HCO3 UR-SCNC: 40 MMOL/L (ref 24–28)
HCT VFR BLD AUTO: 24.7 % (ref 40–54)
HGB BLD-MCNC: 7.3 G/DL (ref 14–18)
IMM GRANULOCYTES # BLD AUTO: 0.37 K/UL (ref 0–0.04)
IMM GRANULOCYTES NFR BLD AUTO: 2.2 % (ref 0–0.5)
LYMPHOCYTES # BLD AUTO: 1.2 K/UL (ref 1–4.8)
LYMPHOCYTES NFR BLD: 7 % (ref 18–48)
MCH RBC QN AUTO: 26.9 PG (ref 27–31)
MCHC RBC AUTO-ENTMCNC: 29.6 G/DL (ref 32–36)
MCV RBC AUTO: 91 FL (ref 82–98)
MODE: ABNORMAL
MONOCYTES # BLD AUTO: 1.8 K/UL (ref 0.3–1)
MONOCYTES NFR BLD: 10.5 % (ref 4–15)
NEUTROPHILS # BLD AUTO: 13.3 K/UL (ref 1.8–7.7)
NEUTROPHILS NFR BLD: 78.7 % (ref 38–73)
NRBC BLD-RTO: 3 /100 WBC
PCO2 BLDA: 54.1 MMHG (ref 35–45)
PEEP: 5
PH SMN: 7.48 [PH] (ref 7.35–7.45)
PIP: 23
PLATELET # BLD AUTO: 383 K/UL (ref 150–350)
PMV BLD AUTO: 11.3 FL (ref 9.2–12.9)
PO2 BLDA: 83 MMHG (ref 80–100)
POC BE: 16 MMOL/L
POC SATURATED O2: 97 % (ref 95–100)
POC TCO2: 42 MMOL/L (ref 23–27)
POCT GLUCOSE: 135 MG/DL (ref 70–110)
POCT GLUCOSE: 158 MG/DL (ref 70–110)
POCT GLUCOSE: 186 MG/DL (ref 70–110)
POTASSIUM SERPL-SCNC: 3.8 MMOL/L (ref 3.5–5.1)
POTASSIUM SERPL-SCNC: 3.8 MMOL/L (ref 3.5–5.1)
PROT SERPL-MCNC: 7.1 G/DL (ref 6–8.4)
RBC # BLD AUTO: 2.71 M/UL (ref 4.6–6.2)
SAMPLE: ABNORMAL
SITE: ABNORMAL
SODIUM SERPL-SCNC: 149 MMOL/L (ref 136–145)
SODIUM SERPL-SCNC: 150 MMOL/L (ref 136–145)
SP02: 95
WBC # BLD AUTO: 16.91 K/UL (ref 3.9–12.7)

## 2020-05-03 PROCEDURE — 27000221 HC OXYGEN, UP TO 24 HOURS

## 2020-05-03 PROCEDURE — 80053 COMPREHEN METABOLIC PANEL: CPT

## 2020-05-03 PROCEDURE — S0028 INJECTION, FAMOTIDINE, 20 MG: HCPCS | Performed by: STUDENT IN AN ORGANIZED HEALTH CARE EDUCATION/TRAINING PROGRAM

## 2020-05-03 PROCEDURE — 63600175 PHARM REV CODE 636 W HCPCS: Performed by: INTERNAL MEDICINE

## 2020-05-03 PROCEDURE — 99233 PR SUBSEQUENT HOSPITAL CARE,LEVL III: ICD-10-PCS | Mod: ,,, | Performed by: INTERNAL MEDICINE

## 2020-05-03 PROCEDURE — 25000242 PHARM REV CODE 250 ALT 637 W/ HCPCS: Performed by: INTERNAL MEDICINE

## 2020-05-03 PROCEDURE — 27200966 HC CLOSED SUCTION SYSTEM

## 2020-05-03 PROCEDURE — 25000003 PHARM REV CODE 250: Performed by: INTERNAL MEDICINE

## 2020-05-03 PROCEDURE — 25000003 PHARM REV CODE 250: Performed by: STUDENT IN AN ORGANIZED HEALTH CARE EDUCATION/TRAINING PROGRAM

## 2020-05-03 PROCEDURE — 94668 MNPJ CHEST WALL SBSQ: CPT

## 2020-05-03 PROCEDURE — 94761 N-INVAS EAR/PLS OXIMETRY MLT: CPT

## 2020-05-03 PROCEDURE — 85025 COMPLETE CBC W/AUTO DIFF WBC: CPT

## 2020-05-03 PROCEDURE — 99233 SBSQ HOSP IP/OBS HIGH 50: CPT | Mod: ,,, | Performed by: INTERNAL MEDICINE

## 2020-05-03 PROCEDURE — 99900035 HC TECH TIME PER 15 MIN (STAT)

## 2020-05-03 PROCEDURE — 94640 AIRWAY INHALATION TREATMENT: CPT

## 2020-05-03 PROCEDURE — 99900026 HC AIRWAY MAINTENANCE (STAT)

## 2020-05-03 PROCEDURE — 36415 COLL VENOUS BLD VENIPUNCTURE: CPT

## 2020-05-03 PROCEDURE — 82803 BLOOD GASES ANY COMBINATION: CPT

## 2020-05-03 PROCEDURE — 37799 UNLISTED PX VASCULAR SURGERY: CPT

## 2020-05-03 PROCEDURE — 63700000 PHARM REV CODE 250 ALT 637 W/O HCPCS: Performed by: STUDENT IN AN ORGANIZED HEALTH CARE EDUCATION/TRAINING PROGRAM

## 2020-05-03 PROCEDURE — 20000000 HC ICU ROOM

## 2020-05-03 PROCEDURE — 80048 BASIC METABOLIC PNL TOTAL CA: CPT

## 2020-05-03 PROCEDURE — 94003 VENT MGMT INPAT SUBQ DAY: CPT

## 2020-05-03 RX ORDER — SODIUM CHLORIDE FOR INHALATION 3 %
4 VIAL, NEBULIZER (ML) INHALATION EVERY 12 HOURS
Status: DISCONTINUED | OUTPATIENT
Start: 2020-05-03 | End: 2020-05-14

## 2020-05-03 RX ORDER — ALBUTEROL SULFATE 2.5 MG/.5ML
2.5 SOLUTION RESPIRATORY (INHALATION) EVERY 12 HOURS
Status: DISCONTINUED | OUTPATIENT
Start: 2020-05-03 | End: 2020-05-14

## 2020-05-03 RX ADMIN — MINERAL OIL AND WHITE PETROLATUM: 150; 830 OINTMENT OPHTHALMIC at 08:05

## 2020-05-03 RX ADMIN — ALBUTEROL SULFATE 2.5 MG: 2.5 SOLUTION RESPIRATORY (INHALATION) at 07:05

## 2020-05-03 RX ADMIN — SODIUM CHLORIDE SOLN NEBU 3% 4 ML: 3 NEBU SOLN at 07:05

## 2020-05-03 RX ADMIN — TIMOLOL MALEATE 1 DROP: 5 SOLUTION/ DROPS OPHTHALMIC at 08:05

## 2020-05-03 RX ADMIN — PIPERACILLIN SODIUM,TAZOBACTAM SODIUM 4.5 G: 4; .5 INJECTION, POWDER, FOR SOLUTION INTRAVENOUS at 03:05

## 2020-05-03 RX ADMIN — DEXMEDETOMIDINE HYDROCHLORIDE IN 0.9% SODIUM CHLORIDE 0.9 MCG/KG/HR: 400 INJECTION INTRAVENOUS at 04:05

## 2020-05-03 RX ADMIN — ENOXAPARIN SODIUM 40 MG: 100 INJECTION SUBCUTANEOUS at 08:05

## 2020-05-03 RX ADMIN — DOCUSATE SODIUM 100 MG: 50 LIQUID ORAL at 09:05

## 2020-05-03 RX ADMIN — DOCUSATE SODIUM 100 MG: 50 LIQUID ORAL at 10:05

## 2020-05-03 RX ADMIN — DEXMEDETOMIDINE HYDROCHLORIDE IN 0.9% SODIUM CHLORIDE 0.7 MCG/KG/HR: 400 INJECTION INTRAVENOUS at 08:05

## 2020-05-03 RX ADMIN — INSULIN DETEMIR 20 UNITS: 100 INJECTION, SOLUTION SUBCUTANEOUS at 08:05

## 2020-05-03 RX ADMIN — LATANOPROST 1 DROP: 50 SOLUTION OPHTHALMIC at 08:05

## 2020-05-03 RX ADMIN — FAMOTIDINE 20 MG: 10 INJECTION INTRAVENOUS at 09:05

## 2020-05-03 RX ADMIN — OXYCODONE HYDROCHLORIDE 5 MG: 5 SOLUTION ORAL at 08:05

## 2020-05-03 RX ADMIN — BRIMONIDINE TARTRATE 1 DROP: 2 SOLUTION OPHTHALMIC at 09:05

## 2020-05-03 RX ADMIN — FAMOTIDINE 20 MG: 10 INJECTION INTRAVENOUS at 08:05

## 2020-05-03 RX ADMIN — BRIMONIDINE TARTRATE 1 DROP: 2 SOLUTION OPHTHALMIC at 08:05

## 2020-05-03 RX ADMIN — POLYETHYLENE GLYCOL 3350 17 G: 17 POWDER, FOR SOLUTION ORAL at 09:05

## 2020-05-03 RX ADMIN — DEXMEDETOMIDINE HYDROCHLORIDE IN 0.9% SODIUM CHLORIDE 0.5 MCG/KG/HR: 400 INJECTION INTRAVENOUS at 04:05

## 2020-05-03 RX ADMIN — CHLORHEXIDINE GLUCONATE 0.12% ORAL RINSE 15 ML: 1.2 LIQUID ORAL at 09:05

## 2020-05-03 RX ADMIN — TIMOLOL MALEATE 1 DROP: 5 SOLUTION/ DROPS OPHTHALMIC at 09:05

## 2020-05-03 RX ADMIN — CHLORHEXIDINE GLUCONATE 0.12% ORAL RINSE 15 ML: 1.2 LIQUID ORAL at 08:05

## 2020-05-03 RX ADMIN — POTASSIUM CHLORIDE 40 MEQ: 20 SOLUTION ORAL at 05:05

## 2020-05-03 RX ADMIN — MINERAL OIL AND WHITE PETROLATUM: 150; 830 OINTMENT OPHTHALMIC at 09:05

## 2020-05-03 NOTE — PROGRESS NOTES
Pharmacokinetic Assessment Follow Up: IV Vancomycin    Vancomycin serum concentration assessment(s):    The random level was drawn correctly and can be used to guide therapy at this time. The measurement is within the desired definitive target range of 15 to 20 mcg/mL.    Vancomycin Regimen Plan:     Pharmacy to dose consult in again to treat pneumonia. Pt was on therapy recently; already received LD and subsequent MD's.   Ordered random 5/2@1942 = 19.6 (therapeutic)   Will dose 5/2@2130 w/ 1000mg once. Random ordered 5/3@2100 w/ MAR reminder set to help guide therapy decision. If trough within goal of 15-20 please continue 1000mg q24h. If random trough out of range please adjust accordingly.    Drug levels (last 3 results):  Recent Labs   Lab Result Units 04/30/20  0842 04/30/20 2015 05/01/20 0353 05/02/20 1942   Vancomycin, Random ug/mL  --   --  24.1 19.6   Vancomycin-Trough ug/mL 34.4* 36.8*  --   --        Pharmacy will continue to follow and monitor vancomycin.    Please contact pharmacy at extension [phone] for questions regarding this assessment.    Thank you for the consult,   Ryley Benito       Patient brief summary:  Bharathi Garrido is a 70 y.o. male initiated on antimicrobial therapy with IV Vancomycin for treatment of Pneumonia    The patient's current regimen is 1000 mg once    Drug Allergies:   Review of patient's allergies indicates:  No Known Allergies    Actual Body Weight:   105.4 kg    Renal Function:   Estimated Creatinine Clearance: 54.6 mL/min (A) (based on SCr of 1.6 mg/dL (H)).,         CBC (last 72 hours):  Recent Labs   Lab Result Units 04/30/20  0233 05/01/20  0353 05/02/20  0321   WBC K/uL 15.78* 17.58* 22.28*   Hemoglobin g/dL 8.3* 7.8* 8.1*   Hematocrit % 27.0* 25.9* 26.1*   Platelets K/uL 387* 392* 413*   Gran% % 81.7* 84.1* 81.7*   Lymph% % 5.8* 5.0* 6.4*   Mono% % 10.6 9.7 10.1   Eosinophil% % 0.4 0.0 0.2   Basophil% % 0.5 0.3 0.3   Differential Method  Automated Automated  Automated       Metabolic Panel (last 72 hours):  Recent Labs   Lab Result Units 04/30/20  0233 05/01/20  0353 05/01/20  0633 05/01/20  1409 05/01/20  2108 05/02/20  0321 05/02/20  1554 05/02/20  1911   Sodium mmol/L 139 143  --  145 145 147* 149*  --    Sodium Urine Random mmol/L  --   --   --   --   --   --   --  31   Potassium mmol/L 4.0 3.2*  --  3.8 3.4* 3.6 4.1  --    Chloride mmol/L 90* 92*  --  94* 95 98 101  --    CO2 mmol/L 38* 38*  --  41* 40* 38* 39*  --    Glucose mg/dL 117* 194*  --  162* 147* 122* 157*  --    Glucose, UA   --   --  1+*  --   --   --   --   --    BUN, Bld mg/dL 43* 51*  --  52* 57* 54* 56*  --    Creatinine mg/dL 1.6* 1.7*  --  1.6* 1.6* 1.7* 1.6*  --    Creatinine, Random Ur mg/dL  --   --   --   --   --   --   --  58.0   Albumin g/dL 1.3* 1.4*  --  1.5*  --  1.6*  --   --    Total Bilirubin mg/dL 4.4* 3.4*  --   --   --  3.0*  --   --    Alkaline Phosphatase U/L 124 117  --   --   --  116  --   --    AST U/L 35 35  --   --   --  53*  --   --    ALT U/L 26 28  --   --   --  38  --   --    Magnesium mg/dL 2.1 2.5  --   --   --   --   --   --    Phosphorus mg/dL 3.4 3.4  --  1.6*  --   --   --   --        Vancomycin Administrations:  vancomycin given in the last 96 hours                   vancomycin in dextrose 5 % 1 gram/250 mL IVPB 1,000 mg (mg) 1,000 mg New Bag 05/01/20 1285    vancomycin in dextrose 5 % 1 gram/250 mL IVPB 1,000 mg (mg) 1,000 mg New Bag 04/30/20 0930     1,000 mg New Bag 04/29/20 2154     1,000 mg New Bag  0933     1,000 mg New Bag 04/28/20 2153                Microbiologic Results:  Microbiology Results (last 7 days)     Procedure Component Value Units Date/Time    Blood culture (site 1) [[id number]] Collected:  04/28/20 1800    Order Status:  Completed Specimen:  Blood from Peripheral, Antecubital, Left Updated:  05/02/20 2012     Blood Culture, Routine No Growth after 4 days.     Narrative:       Site # 1, aerobic and anaerobic    Clostridium difficile EIA  [[id number]] Collected:  05/02/20 1843    Order Status:  Sent Specimen:  Stool Updated:  05/02/20 1848    Blood culture [[id number]] Collected:  05/01/20 0920    Order Status:  Completed Specimen:  Blood from Peripheral, Antecubital, Right Updated:  05/02/20 1212     Blood Culture, Routine No Growth to date      No Growth to date    Blood culture [[id number]] Collected:  05/01/20 0905    Order Status:  Completed Specimen:  Blood from Peripheral, Antecubital, Left Updated:  05/02/20 1212     Blood Culture, Routine No Growth to date      No Growth to date    Culture, Respiratory with Gram Stain [[id number]] Collected:  05/01/20 1642    Order Status:  Completed Specimen:  Respiratory from Endotracheal Aspirate Updated:  05/02/20 0907     Respiratory Culture No Growth     Gram Stain (Respiratory) Rare WBC's     Gram Stain (Respiratory) No organisms seen    Blood culture (site 2) [[id number]] Collected:  04/28/20 2130    Order Status:  Completed Specimen:  Blood from Peripheral, Hand, Left Updated:  05/01/20 2222     Blood Culture, Routine No Growth to date      No Growth to date      No Growth to date      No Growth to date    Narrative:       Site # 2, aerobic only    Culture, Respiratory with Gram Stain [[id number]] Collected:  04/28/20 2030    Order Status:  Completed Specimen:  Respiratory from Sputum Updated:  04/30/20 0638     Respiratory Culture Normal respiratory lashawn      No S aureus or Pseudomonas isolated.     Gram Stain (Respiratory) <10 epithelial cells per low power field.     Gram Stain (Respiratory) Rare WBC's     Gram Stain (Respiratory) No organisms seen    Fungus culture [[id number]] Collected:  04/28/20 2038    Order Status:  Completed Specimen:  Sputum Updated:  04/29/20 1318     Fungus (Mycology) Culture Culture in progress

## 2020-05-03 NOTE — ASSESSMENT & PLAN NOTE
This is a 71 y/o male with a history of obesity, prostate cancer s/p prostatectomy and radiation therapy who was admitted at [facility] with symptoms of fever, SOB, and decreased appetite on march 31, 2020.  Chest imaging showed multifocal, bilateral opacities.  Testing for COVID-19 was positive.  His hospital course at [facility] was complicated by acute kidney injury, respiratory failure requiring intubation and ventilation.  He completed a course of plaquenil, ceftriaxone and azithromycin.  He later completed a 10 day course of levofloxacin on April 22, 2020.  He has been febrile intermittently during his hospital stay but started having more consistent fevers around 4/24/20.  On 4/25/20, he was started on a 4 day course of fluconazole.  On 4/26/20, vancomycin and zosyn were added and he remains on this now.  He continues to be febrile.  He was transferred to [facility] on April 28, 2020 for a higher level of care.  The only positive cultures he has had throughout his hospitalization has been a positive respiratory culture on April 26, 2020 for candida orthopsilosis.      I am consulted to assist with evaluation of his recent fevers and worsening leukocytosis.  Patient appears comfortable on low vent settings.  WBC count is now trending downwards.  Cultures and c diff testing are negative.  Hold off on antibiotics.  ID will sign off.

## 2020-05-03 NOTE — PROGRESS NOTES
[facility]  Cardiology  Progress Note    Patient Name: Bharathi Garrido  MRN: [id number]  Admission Date: 4/28/2020  Hospital Length of Stay: 5 days  Code Status: Full Code   Attending Physician: Praveena Villafana MD   Primary Care Physician: Melvin Gee MD (Inactive)  Expected Discharge Date: 5/8/2020  Principal Problem:Pneumonia due to COVID-19 virus    Subjective:     HPI:   Mr. Bharathi Garrido is a 71 yo male with obesity, history of prostate cancer (1/2018 s/p prostatectomy and radiation Dr. Pederson), history of difficult intubation for surgery and glaucoma presents with feeling unwell, fever, decreased appetite and shortness of breath. Patient transferred [facility] [place] [facility] [place] for higher level of care. Admitted to outside hospital on 3/31/20 for SOB and fever and tested positive for COVID-19.  Upon admission, CXR showed bilateral multifocal opacities. Procalcitonin, LDH, ferritin, CRP and D-dimer elevated. Renal function normal. Mild increase in LFTs. He was treated with Plaquinel, Rocephin and Azithromycin. His respiratory function declined and has been intubated since 4/2/20.    Hospital Course: Bharathi Garrido was admitted to ICU for management of respiratory failure secondary to suspected and/or confirmed COVID-19 requiring mechanical ventilation for respiratory support.      4/28/20: Patient transferred from [facility] for higher level of care as patient still intubated with high oxygen requirements. Sedated on Fentanyl and Precedex, but responding appropriately and following commands per nursing. Intubated on A/C, FiO2 75%, PEEP 10, , rate 18 with sat of 92%. CXR consistent with multilobar pneumonia. On empiric Vanc and Zosyn for recent white count with leukocytosis. PICC from 4/2 removed and central line and a-line placed. Repeat cxs sent. Hgb/Hct stable s/p 1 unit PRBCs on 4/27. NG set to intermittent suction. Abd x-ray showed gaseous distention.      4/29/20:  Sedated on Propofol, Precedex, and Dilaudid. Initially on Levophed but weaned off and became hypertensive. Briefly on Cardene gtt. Vent settings unchanged: FiO2 75% and PEEP 10. New cultures with no growth, cont on empiric abx. Good UOP with IV Lasix and stable renal function. Having BMs.     4/30/20: Weaned off Propofol and Precedex successfully. Cont on IV Dilaudid 1 mg/hr and scheduled Oxy. SpO2 improving. Vent settings weaned to FiO of 65% PEEP 10  Rate 18. Blood pressure stable off pressors/antihypertensives. Having BMs. Started TFs. Good UOP and net negative. Lytes WNR.     5/1/2020: Weaned off dilaudid gtt. SBT today. Put back on rate after 2 hours when pt became tired and tachypnic. Stopped lasix due to elevated creatinine. Consulted ID due to increased WBC and fever despite broad spectrum abx regimen.     5/2/2020: Patient now off dilaudid gtt and tolerating well. Still drowsy. Patient failed SBT today.  Will decrease PO oxy from Q6H to BID. WBC continues to increase, now 22.28, Tmax 100. BC NGTD. Remains on vanc/zosyn - awaiting ID recs. Possible hemoconcentration. Net negative 1.3L. Stop lasix and start free water 200 Q4H. On TFs. Last BM 4/28. On bowel regimen.     Interval History: Hospital day 5 (total hospital duration 34). Patient placed on precedex gtt yesterday PM for agitation. Also received morphine dose. Vent settings this AM: 40% FiO2 and PEEP 5. Oxygen sats 94%. Failed SBT today. Consult surgery for trach/PEG. Did have 4 min bradycardia/hypertension during SBT which resolved with oxygen and suctioning. 12 lead EKG applied. Hypernatremic at 150. Free water increased to 300mL Q4H. Potassium replaced. TFs infusing. Net pos 1.9L. WBC decreased from 22 to 16. No significant changes on CXR. Stopped vanc/zosyn per ID recs. C diff antigen positive but toxins and PCR negative. NGTD on resp/blood cultures.     ROS  Objective:     Vital Signs (Most Recent):  Temp: 99.4 °F (37.4 °C) (05/03/20  0300)  Pulse: 81 (05/03/20 0827)  Resp: (!) 35 (05/03/20 0827)  BP: 115/66 (05/02/20 2200)  SpO2: (!) 94 % (05/03/20 0827) Vital Signs (24h Range):  Temp:  [98.4 °F (36.9 °C)-99.4 °F (37.4 °C)] 99.4 °F (37.4 °C)  Pulse:  [] 81  Resp:  [29-82] 35  SpO2:  [86 %-95 %] 94 %  BP: ()/(54-76) 115/66  Arterial Line BP: (100-155)/(47-62) 108/53     Weight: 105.4 kg (232 lb 5.8 oz)  Body mass index is 32.41 kg/m².     SpO2: (!) 94 %  O2 Device (Oxygen Therapy): ventilator      Intake/Output Summary (Last 24 hours) at 5/3/2020 0936  Last data filed at 5/3/2020 0800  Gross per 24 hour   Intake 3767.48 ml   Output 2015 ml   Net 1752.48 ml       Lines/Drains/Airways     Central Venous Catheter Line            Percutaneous Central Line Insertion/Assessment - Triple Lumen  04/28/20 2100 left internal jugular 4 days          Drain                 NG/OG Tube 04/01/20 1938 orogastric 16 Fr. Center mouth 31 days         Urethral Catheter 04/27/20 6 days         Rectal Tube 05/02/20 1400 rectal tube w/ balloon (indicate number of mLs) less than 1 day          Airway                 Airway - Non-Surgical 04/17/20 0830 Endotracheal Tube 16 days          Arterial Line                 Arterial Line 04/28/20 2017 Right Radial 4 days                Physical Exam  Limited review of systems and physical exam per team attending and/or nursing staff due to patient being in special isolation.     Significant Labs: All pertinent lab results from the last 24 hours have been reviewed.    Significant Imaging: X-Ray: CXR: X-Ray Chest 1 View (CXR):   Results for orders placed or performed during the hospital encounter of 04/28/20   X-Ray Chest 1 View    Narrative    EXAMINATION:  XR CHEST 1 VIEW    CLINICAL HISTORY:  increasing white count;    TECHNIQUE:  Single frontal view of the chest was performed.    COMPARISON:  04/28/2020    FINDINGS:  Medical support devices project in unchanged changed radiographic position the cardiomediastinal  silhouette is stable.  No significant interval detrimental change in lung aeration with patchy bilateral airspace opacities, similar in distribution and extent compared to most recent exam.  No evidence of pneumothorax or significant volume of pleural fluid.      Impression    No significant interval detrimental change in cardiopulmonary status from prior chest radiograph 04/28/2020      Electronically signed by: Mami Chapman MD  Date:    05/02/2020  Time:    23:44     Assessment and Plan:     ASSESSMENT & PLAN:   This is a 70 y.o. admitted on 4/28/2020 for respiratory failure secondary to confirmed/suspected COVID-19 requiring mechanical ventilation for respiratory support.      Neuro:   -Intubated. Pt able to respond appropriately and follow commands.  -Dilaudid gtt OFF. Precedex gtt started for agitation 5/2/2020.  -PT/OT consulted     Cardiac/Circulatory:   -Needs ECHO, however will hold off to minimize exposure since stronger suspicion for ARDS.    -Maintain MAP's >65 with vasopressor support if necessary.   -Pressors d/c'd. BP mildly hypertensive and MAPs in 80s.   -HR 80s-100.  -On DVT prophylaxis with Lovenox.   -Failed SBT today. Did have 4 min bradycardia/hypertension during SBT which resolved with oxygen and suctioning. 12 lead EKG applied.      Pulmonary:   -Continue mechanical ventilation with ARDS targeted strategies. Goal plateau pressure <30. AM settings: FiO2 40%, PEEP 5. SpO2 94%  -Repeat COVID-19 on 4/21 positive.   -Completed course of Plaquenil, Azithromycin, and Rocephin   -Wean ventilator settings per ARDSnet protocol.  -Wean sedation with coordinated SAT and SBT daily.    -Strict I/O's and goal net neutral status to help optimize vent mechanics.   -Special isolation and aspiration precautions ordered.   -Update family daily.   - Failed SBT today. Consult surgery for trach/PEG.  - ABG pending.     Renal:   -Monitoring urine output with consideration for dialysis initiation if necessary.    -Trialysis line placement if dialysis initiated.   -UOP 2.1L yesterday. Net -4.8L since admission  -Creatinine 1.6.   -Sodium 150. Potassium 3.8 (replaced with 40 Meq KCL).   -Free water flushes increased from 200 to 300mL Q4H.     Gastrointestinal:   -Trending liver function daily. Transaminases WNL.  -Diagnosed with ileus on 4/21/20.  Resolved.  -Famotidine for GI prophylaxis    -On bowel regimen with Miralax and Docusate. BM per rectal tube today.  -Cont TFs per dietician recommendation: TF of Peptamen Intense VHP advancing as tolerated to goal rate of 55 mL/hr to provide 1320 kcal, 121 gm protein, 1109 mL free fluid.   -Free water flushes 300 Q4H     Endocrine:   -No history of DM. Hgb A1C 6.0  -Goal BG <200. At goal.  -Continue insulin detemir 20 units qhs     ID:   -Repeat blood and sputum cultures drawn 5/1/2020. NGTD.  -C diff antigen pos but toxin and PCR negative.  -Respiratory culture 4/26 with candida.  -WBC decreased from 22  To 16, Tmax 99.4. CXR 5/2/2020 with no significant changes. Discontinued vanc/zosyn per ID recs.  -Central line and Art line placed 4/29/20      Hematology/Oncology:   -Trending CBC daily and monitor for signs of bleeding.   -H&H 7.3/24.7  -Transfuse for Hgb <7.      VTE Risk Mitigation (From admission, onward)         Ordered     enoxaparin injection 40 mg  Daily      04/28/20 1535     IP VTE HIGH RISK PATIENT  Once      04/28/20 1535     Place sequential compression device  Until discontinued      04/28/20 1400                Aiyana Nath NP  Cardiology  [facility]  STAFF MD: Kevin Cloud MD

## 2020-05-03 NOTE — SUBJECTIVE & OBJECTIVE
Interval History: Hospital day 5 (total hospital duration 34). Patient placed on precedex gtt yesterday PM for agitation. Also received morphine dose. Drowsy. May defer SBT today. On 40% FiO2 and PEEP 5. Oxygen sats 94%. HR and BP stable. Hypernatremic at 150. Free water increased to 300mL Q4H. Potassium replaced. TFs infusing. Net pos 1.9L. WBC decreased from 22 to 16. No significant changes on CXR. Stopped vanc/zosyn per ID recs. C diff antigen positive but toxins and PCR negative. NGTD on resp/blood cultures.     ROS  Objective:     Vital Signs (Most Recent):  Temp: 99.4 °F (37.4 °C) (05/03/20 0300)  Pulse: 81 (05/03/20 0827)  Resp: (!) 35 (05/03/20 0827)  BP: 115/66 (05/02/20 2200)  SpO2: (!) 94 % (05/03/20 0827) Vital Signs (24h Range):  Temp:  [98.4 °F (36.9 °C)-99.4 °F (37.4 °C)] 99.4 °F (37.4 °C)  Pulse:  [] 81  Resp:  [29-82] 35  SpO2:  [86 %-95 %] 94 %  BP: ()/(54-76) 115/66  Arterial Line BP: (100-155)/(47-62) 108/53     Weight: 105.4 kg (232 lb 5.8 oz)  Body mass index is 32.41 kg/m².     SpO2: (!) 94 %  O2 Device (Oxygen Therapy): ventilator      Intake/Output Summary (Last 24 hours) at 5/3/2020 0936  Last data filed at 5/3/2020 0800  Gross per 24 hour   Intake 3767.48 ml   Output 2015 ml   Net 1752.48 ml       Lines/Drains/Airways     Central Venous Catheter Line            Percutaneous Central Line Insertion/Assessment - Triple Lumen  04/28/20 2100 left internal jugular 4 days          Drain                 NG/OG Tube 04/01/20 1938 orogastric 16 Fr. Center mouth 31 days         Urethral Catheter 04/27/20 6 days         Rectal Tube 05/02/20 1400 rectal tube w/ balloon (indicate number of mLs) less than 1 day          Airway                 Airway - Non-Surgical 04/17/20 0830 Endotracheal Tube 16 days          Arterial Line                 Arterial Line 04/28/20 2017 Right Radial 4 days                Physical Exam  Limited review of systems and physical exam per team attending and/or  nursing staff due to patient being in special isolation.     Significant Labs: All pertinent lab results from the last 24 hours have been reviewed.    Significant Imaging: X-Ray: CXR: X-Ray Chest 1 View (CXR):   Results for orders placed or performed during the hospital encounter of 04/28/20   X-Ray Chest 1 View    Narrative    EXAMINATION:  XR CHEST 1 VIEW    CLINICAL HISTORY:  increasing white count;    TECHNIQUE:  Single frontal view of the chest was performed.    COMPARISON:  04/28/2020    FINDINGS:  Medical support devices project in unchanged changed radiographic position the cardiomediastinal silhouette is stable.  No significant interval detrimental change in lung aeration with patchy bilateral airspace opacities, similar in distribution and extent compared to most recent exam.  No evidence of pneumothorax or significant volume of pleural fluid.      Impression    No significant interval detrimental change in cardiopulmonary status from prior chest radiograph 04/28/2020      Electronically signed by: Mami Chapmna MD  Date:    05/02/2020  Time:    23:44

## 2020-05-03 NOTE — NURSING
PT bradycardic 37-44 with BP 200s/70s for about 2 mins. desat to 85% - pt suctioned and lavaged. Sat now 97% on 15/5 PS, with BP 170s/60s and HR 90-100s. Pt with no change in neuro status. Reported this incident to Aiyana Nath NP who verbalizes understanding states will round on pt soon. No new orders at this time.

## 2020-05-03 NOTE — PROGRESS NOTES
All antibiotics discontinued by primary team per ID recommendations. Pharmacist vancomycin dosing consult and related labs discontinued.

## 2020-05-03 NOTE — NURSING
Spoke with CCS, patient map 55 while sleeping. Ok for map 55 due to wide pulse pressure. POCT Blood glucose may be shifted to q6.

## 2020-05-03 NOTE — PROGRESS NOTES
Therapy with Vancomycin complete and/or consult discontinued by provider.  Pharmacy will sign off, please re-consult as needed.    Thank you,  Mary Santizo, PharmD  PGY-1 Resident  [phone]

## 2020-05-03 NOTE — SUBJECTIVE & OBJECTIVE
Interval History:     WBC count is lower    Review of Systems   Unable to perform ROS: Intubated     Objective:     Vital Signs (Most Recent):  Temp: 98.7 °F (37.1 °C) (05/03/20 1100)  Pulse: 99 (05/03/20 1625)  Resp: (!) 42 (05/03/20 1625)  BP: 132/71 (05/03/20 1600)  SpO2: 95 % (05/03/20 1625) Vital Signs (24h Range):  Temp:  [98.7 °F (37.1 °C)-99.4 °F (37.4 °C)] 98.7 °F (37.1 °C)  Pulse:  [] 99  Resp:  [33-82] 42  SpO2:  [92 %-100 %] 95 %  BP: ()/(55-74) 132/71  Arterial Line BP: (100-178)/(47-74) 178/68     Weight: 105.4 kg (232 lb 5.8 oz)  Body mass index is 32.41 kg/m².    Estimated Creatinine Clearance: 62.4 mL/min (based on SCr of 1.4 mg/dL).    Physical Exam   Constitutional: He appears well-developed and well-nourished.   HENT:   Intubated.  OG tube in place.   Eyes: Right eye exhibits no discharge. Left eye exhibits no discharge.   Neck: No tracheal deviation present.   Cardiovascular: Normal rate, regular rhythm, normal heart sounds and intact distal pulses.   No murmur heard.  Pulmonary/Chest:   On mechanical ventilation.   Abdominal: Soft. Bowel sounds are normal. He exhibits no distension.   Genitourinary:   Genitourinary Comments: Perez catheter in place   Musculoskeletal: He exhibits no edema or tenderness.   Neurological:   Awake, alert and responding to simple questions.   Skin: Skin is warm.   Dry scabbed wound to dorsal aspect of left wrist.   Nursing note and vitals reviewed.      Significant Labs:   Microbiology Results (last 7 days)     Procedure Component Value Units Date/Time    Blood culture [[id number]] Collected:  05/01/20 0920    Order Status:  Completed Specimen:  Blood from Peripheral, Antecubital, Right Updated:  05/03/20 1212     Blood Culture, Routine No Growth to date      No Growth to date      No Growth to date    Blood culture [[id number]] Collected:  05/01/20 0905    Order Status:  Completed Specimen:  Blood from Peripheral, Antecubital, Left Updated:  05/03/20 1210      Blood Culture, Routine No Growth to date      No Growth to date      No Growth to date    C Diff Toxin by PCR [[id number]] Collected:  05/02/20 1843    Order Status:  Completed Updated:  05/03/20 0044     C. diff PCR Negative    Clostridium difficile EIA [[id number]]  (Abnormal) Collected:  05/02/20 1843    Order Status:  Completed Specimen:  Stool Updated:  05/02/20 2353     C. diff Antigen Positive     C difficile Toxins A+B, EIA Negative     Comment: Testing not recommended for children <24 months old.       Blood culture (site 2) [[id number]] Collected:  04/28/20 2130    Order Status:  Completed Specimen:  Blood from Peripheral, Hand, Left Updated:  05/02/20 2222     Blood Culture, Routine No Growth after 4 days.     Narrative:       Site # 2, aerobic only    Blood culture (site 1) [[id number]] Collected:  04/28/20 1800    Order Status:  Completed Specimen:  Blood from Peripheral, Antecubital, Left Updated:  05/02/20 2012     Blood Culture, Routine No Growth after 4 days.     Narrative:       Site # 1, aerobic and anaerobic    Culture, Respiratory with Gram Stain [[id number]] Collected:  05/01/20 1642    Order Status:  Completed Specimen:  Respiratory from Endotracheal Aspirate Updated:  05/02/20 0907     Respiratory Culture No Growth     Gram Stain (Respiratory) Rare WBC's     Gram Stain (Respiratory) No organisms seen    Culture, Respiratory with Gram Stain [[id number]] Collected:  04/28/20 2030    Order Status:  Completed Specimen:  Respiratory from Sputum Updated:  04/30/20 0638     Respiratory Culture Normal respiratory lashawn      No S aureus or Pseudomonas isolated.     Gram Stain (Respiratory) <10 epithelial cells per low power field.     Gram Stain (Respiratory) Rare WBC's     Gram Stain (Respiratory) No organisms seen    Fungus culture [[id number]] Collected:  04/28/20 2038    Order Status:  Completed Specimen:  Sputum Updated:  04/29/20 1318     Fungus (Mycology) Culture Culture in progress           Significant Imaging: I have reviewed all pertinent imaging results/findings within the past 24 hours.

## 2020-05-03 NOTE — PROGRESS NOTES
[facility]  Infectious Disease  Progress Note    Patient Name: Bharathi Garrido  MRN: [id number]  Admission Date: 4/28/2020  Length of Stay: 5 days  Attending Physician: Praveena Villafana MD  Primary Care Provider: Melvin Gee MD (Inactive)    Isolation Status: Airborne and Contact and Droplet, Special Contact  Assessment/Plan:      SIRS (systemic inflammatory response syndrome)  This is a 69 y/o male with a history of obesity, prostate cancer s/p prostatectomy and radiation therapy who was admitted at [facility] with symptoms of fever, SOB, and decreased appetite on march 31, 2020.  Chest imaging showed multifocal, bilateral opacities.  Testing for COVID-19 was positive.  His hospital course at [facility] was complicated by acute kidney injury, respiratory failure requiring intubation and ventilation.  He completed a course of plaquenil, ceftriaxone and azithromycin.  He later completed a 10 day course of levofloxacin on April 22, 2020.  He has been febrile intermittently during his hospital stay but started having more consistent fevers around 4/24/20.  On 4/25/20, he was started on a 4 day course of fluconazole.  On 4/26/20, vancomycin and zosyn were added and he remains on this now.  He continues to be febrile.  He was transferred to [facility] on April 28, 2020 for a higher level of care.  The only positive cultures he has had throughout his hospitalization has been a positive respiratory culture on April 26, 2020 for candida orthopsilosis.      I am consulted to assist with evaluation of his recent fevers and worsening leukocytosis.  Patient appears comfortable on low vent settings.  WBC count is now trending downwards.  Cultures and c diff testing are negative.  Hold off on antibiotics.  ID will sign off.        Anticipated Disposition: TBD    Thank you for your consult. I will sign off. Please contact us if you have any additional questions.    Zaheer Hodge,  MD  Infectious Disease  [facility]    Subjective:     Principal Problem:Pneumonia due to COVID-19 virus    HPI: History obtained from review of the chart.      This is a 69 y/o male with a history of obesity, prostate cancer s/p prostatectomy and radiation therapy who was admitted at [facility] with symptoms of fever, SOB, and decreased appetite on march 31, 2020.  Chest imaging showed multifocal, bilateral opacities.  Testing for COVID-19 was positive.  His hospital course at [facility] was complicated by acute kidney injury, respiratory failure requiring intubation and ventilation.  He completed a course of plaquenil, ceftriaxone and azithromycin.  He later completed a 10 day course of levofloxacin on April 22, 2020.  He has been febrile intermittently during his hospital stay but started having more consistent fevers around 4/24/20.  On 4/25/20, he was started on a 4 day course of fluconazole.  On 4/26/20, vancomycin and zosyn were added and he remains on this now.  He continues to be febrile.  He was transferred to [facility] on April 28, 2020 for a higher level of care.  The only positive cultures he has had throughout his hospitalization has been a positive respiratory culture on April 26, 2020 for candida orthopsilosis.      Interval History:     WBC count is lower    Review of Systems   Unable to perform ROS: Intubated     Objective:     Vital Signs (Most Recent):  Temp: 98.7 °F (37.1 °C) (05/03/20 1100)  Pulse: 99 (05/03/20 1625)  Resp: (!) 42 (05/03/20 1625)  BP: 132/71 (05/03/20 1600)  SpO2: 95 % (05/03/20 1625) Vital Signs (24h Range):  Temp:  [98.7 °F (37.1 °C)-99.4 °F (37.4 °C)] 98.7 °F (37.1 °C)  Pulse:  [] 99  Resp:  [33-82] 42  SpO2:  [92 %-100 %] 95 %  BP: ()/(55-74) 132/71  Arterial Line BP: (100-178)/(47-74) 178/68     Weight: 105.4 kg (232 lb 5.8 oz)  Body mass index is 32.41 kg/m².    Estimated Creatinine Clearance: 62.4 mL/min (based on  SCr of 1.4 mg/dL).    Physical Exam   Constitutional: He appears well-developed and well-nourished.   HENT:   Intubated.  OG tube in place.   Eyes: Right eye exhibits no discharge. Left eye exhibits no discharge.   Neck: No tracheal deviation present.   Cardiovascular: Normal rate, regular rhythm, normal heart sounds and intact distal pulses.   No murmur heard.  Pulmonary/Chest:   On mechanical ventilation.   Abdominal: Soft. Bowel sounds are normal. He exhibits no distension.   Genitourinary:   Genitourinary Comments: Perez catheter in place   Musculoskeletal: He exhibits no edema or tenderness.   Neurological:   Awake, alert and responding to simple questions.   Skin: Skin is warm.   Dry scabbed wound to dorsal aspect of left wrist.   Nursing note and vitals reviewed.      Significant Labs:   Microbiology Results (last 7 days)     Procedure Component Value Units Date/Time    Blood culture [[id number]] Collected:  05/01/20 0920    Order Status:  Completed Specimen:  Blood from Peripheral, Antecubital, Right Updated:  05/03/20 1212     Blood Culture, Routine No Growth to date      No Growth to date      No Growth to date    Blood culture [[id number]] Collected:  05/01/20 0905    Order Status:  Completed Specimen:  Blood from Peripheral, Antecubital, Left Updated:  05/03/20 1212     Blood Culture, Routine No Growth to date      No Growth to date      No Growth to date    C Diff Toxin by PCR [[id number]] Collected:  05/02/20 1843    Order Status:  Completed Updated:  05/03/20 0044     C. diff PCR Negative    Clostridium difficile EIA [[id number]]  (Abnormal) Collected:  05/02/20 1843    Order Status:  Completed Specimen:  Stool Updated:  05/02/20 2353     C. diff Antigen Positive     C difficile Toxins A+B, EIA Negative     Comment: Testing not recommended for children <24 months old.       Blood culture (site 2) [[id number]] Collected:  04/28/20 2130    Order Status:  Completed Specimen:  Blood from Peripheral, Hand, Left  Updated:  05/02/20 2222     Blood Culture, Routine No Growth after 4 days.     Narrative:       Site # 2, aerobic only    Blood culture (site 1) [[id number]] Collected:  04/28/20 1800    Order Status:  Completed Specimen:  Blood from Peripheral, Antecubital, Left Updated:  05/02/20 2012     Blood Culture, Routine No Growth after 4 days.     Narrative:       Site # 1, aerobic and anaerobic    Culture, Respiratory with Gram Stain [[id number]] Collected:  05/01/20 1642    Order Status:  Completed Specimen:  Respiratory from Endotracheal Aspirate Updated:  05/02/20 0907     Respiratory Culture No Growth     Gram Stain (Respiratory) Rare WBC's     Gram Stain (Respiratory) No organisms seen    Culture, Respiratory with Gram Stain [[id number]] Collected:  04/28/20 2030    Order Status:  Completed Specimen:  Respiratory from Sputum Updated:  04/30/20 0638     Respiratory Culture Normal respiratory lashawn      No S aureus or Pseudomonas isolated.     Gram Stain (Respiratory) <10 epithelial cells per low power field.     Gram Stain (Respiratory) Rare WBC's     Gram Stain (Respiratory) No organisms seen    Fungus culture [[id number]] Collected:  04/28/20 2038    Order Status:  Completed Specimen:  Sputum Updated:  04/29/20 1318     Fungus (Mycology) Culture Culture in progress          Significant Imaging: I have reviewed all pertinent imaging results/findings within the past 24 hours.

## 2020-05-04 LAB
ALBUMIN SERPL BCP-MCNC: 1.7 G/DL (ref 3.5–5.2)
ALP SERPL-CCNC: 106 U/L (ref 55–135)
ALT SERPL W/O P-5'-P-CCNC: 78 U/L (ref 10–44)
ANION GAP SERPL CALC-SCNC: 11 MMOL/L (ref 8–16)
ANION GAP SERPL CALC-SCNC: 7 MMOL/L (ref 8–16)
AST SERPL-CCNC: 75 U/L (ref 10–40)
BACTERIA SPEC AEROBE CULT: NORMAL
BACTERIA SPEC AEROBE CULT: NORMAL
BASOPHILS # BLD AUTO: 0.05 K/UL (ref 0–0.2)
BASOPHILS NFR BLD: 0.4 % (ref 0–1.9)
BILIRUB SERPL-MCNC: 2.2 MG/DL (ref 0.1–1)
BUN SERPL-MCNC: 47 MG/DL (ref 8–23)
BUN SERPL-MCNC: 50 MG/DL (ref 8–23)
CALCIUM SERPL-MCNC: 8.6 MG/DL (ref 8.7–10.5)
CALCIUM SERPL-MCNC: 8.7 MG/DL (ref 8.7–10.5)
CHLORIDE SERPL-SCNC: 104 MMOL/L (ref 95–110)
CHLORIDE SERPL-SCNC: 106 MMOL/L (ref 95–110)
CO2 SERPL-SCNC: 33 MMOL/L (ref 23–29)
CO2 SERPL-SCNC: 35 MMOL/L (ref 23–29)
CREAT SERPL-MCNC: 1.3 MG/DL (ref 0.5–1.4)
CREAT SERPL-MCNC: 1.3 MG/DL (ref 0.5–1.4)
CREAT UR-MCNC: 48 MG/DL (ref 23–375)
DIFFERENTIAL METHOD: ABNORMAL
EOSINOPHIL # BLD AUTO: 0.4 K/UL (ref 0–0.5)
EOSINOPHIL NFR BLD: 2.8 % (ref 0–8)
ERYTHROCYTE [DISTWIDTH] IN BLOOD BY AUTOMATED COUNT: 16.5 % (ref 11.5–14.5)
EST. GFR  (AFRICAN AMERICAN): >60 ML/MIN/1.73 M^2
EST. GFR  (AFRICAN AMERICAN): >60 ML/MIN/1.73 M^2
EST. GFR  (NON AFRICAN AMERICAN): 56.1 ML/MIN/1.73 M^2
EST. GFR  (NON AFRICAN AMERICAN): 56.1 ML/MIN/1.73 M^2
GLUCOSE SERPL-MCNC: 124 MG/DL (ref 70–110)
GLUCOSE SERPL-MCNC: 124 MG/DL (ref 70–110)
GRAM STN SPEC: NORMAL
GRAM STN SPEC: NORMAL
HCT VFR BLD AUTO: 23.9 % (ref 40–54)
HGB BLD-MCNC: 7.1 G/DL (ref 14–18)
IMM GRANULOCYTES # BLD AUTO: 0.17 K/UL (ref 0–0.04)
IMM GRANULOCYTES NFR BLD AUTO: 1.2 % (ref 0–0.5)
LYMPHOCYTES # BLD AUTO: 0.8 K/UL (ref 1–4.8)
LYMPHOCYTES NFR BLD: 5.4 % (ref 18–48)
MAGNESIUM SERPL-MCNC: 2.5 MG/DL (ref 1.6–2.6)
MCH RBC QN AUTO: 27.3 PG (ref 27–31)
MCHC RBC AUTO-ENTMCNC: 29.7 G/DL (ref 32–36)
MCV RBC AUTO: 92 FL (ref 82–98)
MONOCYTES # BLD AUTO: 0.9 K/UL (ref 0.3–1)
MONOCYTES NFR BLD: 6.2 % (ref 4–15)
NEUTROPHILS # BLD AUTO: 11.8 K/UL (ref 1.8–7.7)
NEUTROPHILS NFR BLD: 84 % (ref 38–73)
NRBC BLD-RTO: 1 /100 WBC
OSMOLALITY UR: 465 MOSM/KG (ref 50–1200)
PHOSPHATE SERPL-MCNC: 3.3 MG/DL (ref 2.7–4.5)
PLATELET # BLD AUTO: 370 K/UL (ref 150–350)
PMV BLD AUTO: 11.1 FL (ref 9.2–12.9)
POCT GLUCOSE: 116 MG/DL (ref 70–110)
POCT GLUCOSE: 139 MG/DL (ref 70–110)
POCT GLUCOSE: 99 MG/DL (ref 70–110)
POTASSIUM SERPL-SCNC: 3.5 MMOL/L (ref 3.5–5.1)
POTASSIUM SERPL-SCNC: 4.1 MMOL/L (ref 3.5–5.1)
POTASSIUM UR-SCNC: 28 MMOL/L (ref 15–95)
PROT SERPL-MCNC: 6.8 G/DL (ref 6–8.4)
RBC # BLD AUTO: 2.6 M/UL (ref 4.6–6.2)
SODIUM SERPL-SCNC: 148 MMOL/L (ref 136–145)
SODIUM SERPL-SCNC: 148 MMOL/L (ref 136–145)
SODIUM UR-SCNC: 54 MMOL/L (ref 20–250)
WBC # BLD AUTO: 14.01 K/UL (ref 3.9–12.7)

## 2020-05-04 PROCEDURE — 27000221 HC OXYGEN, UP TO 24 HOURS

## 2020-05-04 PROCEDURE — 25000003 PHARM REV CODE 250: Performed by: STUDENT IN AN ORGANIZED HEALTH CARE EDUCATION/TRAINING PROGRAM

## 2020-05-04 PROCEDURE — 63700000 PHARM REV CODE 250 ALT 637 W/O HCPCS: Performed by: STUDENT IN AN ORGANIZED HEALTH CARE EDUCATION/TRAINING PROGRAM

## 2020-05-04 PROCEDURE — 94640 AIRWAY INHALATION TREATMENT: CPT

## 2020-05-04 PROCEDURE — 84300 ASSAY OF URINE SODIUM: CPT

## 2020-05-04 PROCEDURE — 99900026 HC AIRWAY MAINTENANCE (STAT)

## 2020-05-04 PROCEDURE — 85025 COMPLETE CBC W/AUTO DIFF WBC: CPT

## 2020-05-04 PROCEDURE — 27200966 HC CLOSED SUCTION SYSTEM

## 2020-05-04 PROCEDURE — 83735 ASSAY OF MAGNESIUM: CPT

## 2020-05-04 PROCEDURE — 94003 VENT MGMT INPAT SUBQ DAY: CPT

## 2020-05-04 PROCEDURE — 99900035 HC TECH TIME PER 15 MIN (STAT)

## 2020-05-04 PROCEDURE — S0028 INJECTION, FAMOTIDINE, 20 MG: HCPCS | Performed by: STUDENT IN AN ORGANIZED HEALTH CARE EDUCATION/TRAINING PROGRAM

## 2020-05-04 PROCEDURE — 80048 BASIC METABOLIC PNL TOTAL CA: CPT

## 2020-05-04 PROCEDURE — 63600175 PHARM REV CODE 636 W HCPCS: Performed by: INTERNAL MEDICINE

## 2020-05-04 PROCEDURE — 82570 ASSAY OF URINE CREATININE: CPT

## 2020-05-04 PROCEDURE — 84100 ASSAY OF PHOSPHORUS: CPT

## 2020-05-04 PROCEDURE — 94761 N-INVAS EAR/PLS OXIMETRY MLT: CPT

## 2020-05-04 PROCEDURE — 83935 ASSAY OF URINE OSMOLALITY: CPT

## 2020-05-04 PROCEDURE — 84133 ASSAY OF URINE POTASSIUM: CPT

## 2020-05-04 PROCEDURE — 25000003 PHARM REV CODE 250: Performed by: INTERNAL MEDICINE

## 2020-05-04 PROCEDURE — 80053 COMPREHEN METABOLIC PANEL: CPT

## 2020-05-04 PROCEDURE — 25000242 PHARM REV CODE 250 ALT 637 W/ HCPCS: Performed by: INTERNAL MEDICINE

## 2020-05-04 PROCEDURE — 20000000 HC ICU ROOM

## 2020-05-04 PROCEDURE — 94668 MNPJ CHEST WALL SBSQ: CPT

## 2020-05-04 RX ORDER — LORAZEPAM 2 MG/ML
2 INJECTION INTRAMUSCULAR ONCE
Status: COMPLETED | OUTPATIENT
Start: 2020-05-04 | End: 2020-05-04

## 2020-05-04 RX ADMIN — SODIUM CHLORIDE SOLN NEBU 3% 4 ML: 3 NEBU SOLN at 09:05

## 2020-05-04 RX ADMIN — ALBUTEROL SULFATE 2.5 MG: 2.5 SOLUTION RESPIRATORY (INHALATION) at 07:05

## 2020-05-04 RX ADMIN — DEXMEDETOMIDINE HYDROCHLORIDE IN 0.9% SODIUM CHLORIDE 0.7 MCG/KG/HR: 400 INJECTION INTRAVENOUS at 01:05

## 2020-05-04 RX ADMIN — TIMOLOL MALEATE 1 DROP: 5 SOLUTION/ DROPS OPHTHALMIC at 10:05

## 2020-05-04 RX ADMIN — ENOXAPARIN SODIUM 40 MG: 100 INJECTION SUBCUTANEOUS at 08:05

## 2020-05-04 RX ADMIN — CHLORHEXIDINE GLUCONATE 0.12% ORAL RINSE 15 ML: 1.2 LIQUID ORAL at 08:05

## 2020-05-04 RX ADMIN — OXYCODONE HYDROCHLORIDE 5 MG: 5 SOLUTION ORAL at 08:05

## 2020-05-04 RX ADMIN — MINERAL OIL AND WHITE PETROLATUM: 150; 830 OINTMENT OPHTHALMIC at 09:05

## 2020-05-04 RX ADMIN — DEXMEDETOMIDINE HYDROCHLORIDE IN 0.9% SODIUM CHLORIDE 0.5 MCG/KG/HR: 400 INJECTION INTRAVENOUS at 01:05

## 2020-05-04 RX ADMIN — CHLORHEXIDINE GLUCONATE 0.12% ORAL RINSE 15 ML: 1.2 LIQUID ORAL at 09:05

## 2020-05-04 RX ADMIN — FAMOTIDINE 20 MG: 10 INJECTION INTRAVENOUS at 08:05

## 2020-05-04 RX ADMIN — DEXMEDETOMIDINE HYDROCHLORIDE IN 0.9% SODIUM CHLORIDE 0.5 MCG/KG/HR: 400 INJECTION INTRAVENOUS at 06:05

## 2020-05-04 RX ADMIN — MINERAL OIL AND WHITE PETROLATUM: 150; 830 OINTMENT OPHTHALMIC at 10:05

## 2020-05-04 RX ADMIN — POTASSIUM CHLORIDE 40 MEQ: 20 SOLUTION ORAL at 09:05

## 2020-05-04 RX ADMIN — INSULIN DETEMIR 20 UNITS: 100 INJECTION, SOLUTION SUBCUTANEOUS at 09:05

## 2020-05-04 RX ADMIN — BRIMONIDINE TARTRATE 1 DROP: 2 SOLUTION OPHTHALMIC at 10:05

## 2020-05-04 RX ADMIN — LORAZEPAM 2 MG: 2 INJECTION, SOLUTION INTRAMUSCULAR; INTRAVENOUS at 09:05

## 2020-05-04 RX ADMIN — DOCUSATE SODIUM 100 MG: 50 LIQUID ORAL at 08:05

## 2020-05-04 RX ADMIN — ALBUTEROL SULFATE 2.5 MG: 2.5 SOLUTION RESPIRATORY (INHALATION) at 09:05

## 2020-05-04 RX ADMIN — LATANOPROST 1 DROP: 50 SOLUTION OPHTHALMIC at 09:05

## 2020-05-04 RX ADMIN — POLYETHYLENE GLYCOL 3350 17 G: 17 POWDER, FOR SOLUTION ORAL at 08:05

## 2020-05-04 RX ADMIN — SODIUM CHLORIDE SOLN NEBU 3% 4 ML: 3 NEBU SOLN at 07:05

## 2020-05-04 RX ADMIN — FAMOTIDINE 20 MG: 10 INJECTION INTRAVENOUS at 09:05

## 2020-05-04 RX ADMIN — OXYCODONE HYDROCHLORIDE 5 MG: 5 SOLUTION ORAL at 09:05

## 2020-05-04 RX ADMIN — BRIMONIDINE TARTRATE 1 DROP: 2 SOLUTION OPHTHALMIC at 09:05

## 2020-05-04 NOTE — NURSING
As per Dr. Ren, RT attempted SBT 15/5. Total time, 8 minutes. Patient became bradycardic, tachypneic, and hypoxic. Dr. Ren notified. Ok to continue Assist Control Ventilation. Arterial line unable to obtain appropriate waveform, ok to D/C.

## 2020-05-04 NOTE — PT/OT/SLP PROGRESS
Physical Therapy      Patient Name:  Bharathi Garrido   MRN:  [id number]    Patient not seen today secondary to pt remains intubated with precedex on board. Per RN, pt not appropriate for EOB/OOB activity at this time. Will follow-up at next scheduled session as pt appropriate.    Maureen Nguyen, PT, DPT   5/4/2020  [phone]

## 2020-05-04 NOTE — PLAN OF CARE
05/04/20 1230   Discharge Reassessment   Assessment Type Discharge Planning Reassessment   Do you have any problems affording any of your prescribed medications? TBD   Discharge Plan A Long-term acute care facility (LTAC)   Discharge Plan B Skilled Nursing Facility

## 2020-05-04 NOTE — PROGRESS NOTES
"[facility]  Adult Nutrition  Progress Note    SUMMARY       Recommendations    1. Recommend TF of Peptamen Intense VHP advancing as tolerated to goal rate of 55 mL/hr to provide 1320 kcal, 121 gm protein, 1109 mL free fluid.      2. If able to extubate, recommend regular diet with texture per SLP.     3. RD following.      Goals: receive nutrition by RD follow-up  Nutrition Goal Status: goal met  Communication of RD Recs: (POC)    Reason for Assessment    Reason For Assessment: RD follow-up  Diagnosis: (Pneumonia due to COVID-19 virus)  Relevant Medical History: obesity, prostate cancer 1/2018   Interdisciplinary Rounds: did not attend  General Information Comments: Remote access, spoke with RN via phone. Pt intubated, tolerating TF at goal rate of 55 mL/hr.  Unable to determine appetite or UBW PTA. Due to recent hospital wide restrictions to limit the transfer of COVID-19, we are not performing any physical exams at this time. NFPE to be performed at a future date. Unable to assess for malnutrition criteria at this time.  Nutrition Discharge Planning: unable to determine at this time    Nutrition Risk Screen    Nutrition Risk Screen: tube feeding or parenteral nutrition, large or nonhealing wound, burn or pressure injury    Nutrition/Diet History    Factors Affecting Nutritional Intake: NPO, on mechanical ventilation    Anthropometrics    Temp: 99.1 °F (37.3 °C)  Height: 5' 11" (180.3 cm)  Height (inches): 71 in  Weight Method: Bed Scale  Weight: 105.4 kg (232 lb 5.8 oz)  Weight (lb): 232.37 lb  Ideal Body Weight (IBW), Male: 172 lb  BMI (Calculated): 32.4  BMI Grade: 30 - 34.9- obesity - grade I     Lab/Procedures/Meds    Pertinent Labs Reviewed: reviewed  Pertinent Labs Comments: Na 148, BUN 50, glucose 124, T bili 2.2, AST 75, ALT 78  Pertinent Medications Reviewed: reviewed  Pertinent Medications Comments: insulin, propofol    Estimated/Assessed Needs    Weight Used For Calorie Calculations: " 105.4 kg (232 lb 5.8 oz)  Energy Calorie Requirements (kcal): 1851-0793 kcal/day  Energy Need Method: Kcal/kg(11-14)  Protein Requirements: 118-157 gm/day(1.5-2.0 gm/kg)  Weight Used For Protein Calculations: 78.2 kg (172 lb 6.4 oz)(IBW)  Fluid Requirements (mL): 1 mL/kcal or per MD     RDA Method (mL): 1159    Nutrition Prescription Ordered    Current Diet Order: NPO  Current Nutrition Support Formula Ordered: Peptamen Intense VHP  Current Nutrition Support Rate Ordered: 55 (ml)  Current Nutrition Support Frequency Ordered: mL/hr    Evaluation of Received Nutrient/Fluid Intake    Enteral Calories (kcal): 1320  Enteral Protein (gm): 121  Enteral (Free Water) Fluid (mL): 1109  Other Calories (kcal): 166(propofol)  Total Calories (kcal): 1486  % Kcal Needs: 101  % Protein Needs: 100  I/O: -3.864L since admit  Energy Calories Required: meeting needs  Protein Required: meeting needs  Fluid Required: (per MD)  Comments: LBM 5/4  Tolerance: tolerating  % Intake of Estimated Energy Needs: 75 - 100 %  % Meal Intake: NPO    Nutrition Risk    Level of Risk/Frequency of Follow-up: (f/u 1 x wk)     Assessment and Plan    Nutrition Problem  Inadequate Energy Intake     Related to (etiology):   Inability to consume sufficient energy      Signs and Symptoms (as evidenced by):   Intubated, NPO with no alternative means of nutrition       Interventions (treatment strategy):  Collaboration of nutrition care with other providers  Enteral Nutrition      Nutrition Diagnosis Status:   Resolved      Monitor and Evaluation    Food and Nutrient Intake: energy intake, enteral nutrition intake  Food and Nutrient Adminstration: enteral and parenteral nutrition administration  Anthropometric Measurements: weight, weight change, body mass index  Biochemical Data, Medical Tests and Procedures: electrolyte and renal panel, gastrointestinal profile, glucose/endocrine profile, inflammatory profile, lipid profile  Nutrition-Focused Physical Findings:  overall appearance     Nutrition Follow-Up    RD Follow-up?: Yes

## 2020-05-04 NOTE — PROGRESS NOTES
[facility]  COVID-19 or Persons Under Investigation ICU DAILY PROGRESS NOTE    Patient Name: Bahrathi Garrido  MRN: [id number]  Admission Date: 4/28/2020   Code Status:   Attending: Venkatesh Haines*     SUBJECTIVE:    HPI:   Mr. Bharathi Garrido is a 69 yo male with obesity, history of prostate cancer (1/2018 s/p prostatectomy and radiation Dr. Pederson), history of difficult intubation for surgery and glaucoma presents with feeling unwell, fever, decreased appetite and shortness of breath. Patient transferred from [facility] to [facility] 4/28/20 for higher level of care. Admitted to outside hospital on 3/31/20 for SOB and fever and tested positive for COVID-19.  Upon admission, CXR showed bilateral multifocal opacities. Procalcitonin, LDH, ferritin, CRP and D-dimer elevated. Renal function normal. Mild increase in LFTs. He was treated with Plaquinel, Rocephin and Azithromycin. His respiratory function declined and has been intubated since 4/2/20.     Hospital Course: Bharathi Garrido was admitted to ICU for management of respiratory failure secondary to suspected and/or confirmed COVID-19 requiring mechanical ventilation for respiratory support.      4/28/20: Patient transferred from [facility] for higher level of care as patient still intubated with high oxygen requirements. Sedated on Fentanyl and Precedex, but responding appropriately and following commands per nursing. Intubated on A/C, FiO2 75%, PEEP 10, , rate 18 with sat of 92%. CXR consistent with multilobar pneumonia. On empiric Vanc and Zosyn for recent white count with leukocytosis. PICC from 4/2 removed and central line and a-line placed. Repeat cxs sent. Hgb/Hct stable s/p 1 unit PRBCs on 4/27. NG set to intermittent suction. Abd x-ray showed gaseous distention.      4/29/20: Sedated on Propofol, Precedex, and Dilaudid. Initially on Levophed but weaned off and became hypertensive. Briefly on Cardene gtt. Vent settings  unchanged: FiO2 75% and PEEP 10. New cultures with no growth, cont on empiric abx. Good UOP with IV Lasix and stable renal function. Having BMs.      4/30/20: Weaned off Propofol and Precedex successfully. Cont on IV Dilaudid 1 mg/hr and scheduled Oxy. SpO2 improving. Vent settings weaned to FiO of 65% PEEP 10  Rate 18. Blood pressure stable off pressors/antihypertensives. Having BMs. Started TFs. Good UOP and net negative. Lytes WNR.      5/1/2020: Weaned off dilaudid gtt. SBT today. Put back on rate after 2 hours when pt became tired and tachypnic. Stopped lasix due to elevated creatinine. Consulted ID due to increased WBC and fever despite broad spectrum abx regimen.      5/2/2020: Patient now off dilaudid gtt and tolerating well. Still drowsy. Patient failed SBT today.  Will decrease PO oxy from Q6H to BID. WBC continues to increase, now 22.28, Tmax 100. BC NGTD. Remains on vanc/zosyn - awaiting ID recs. Possible hemoconcentration. Net negative 1.3L. Stop lasix and start free water 200 Q4H. On TFs. Last BM 4/28. On bowel regimen.     5/3/2020: On Precedex gtt and prn Morphine for agitation.  Vent settings this AM: 40% FiO2 and PEEP 5. Oxygen sats 94%. Failed SBT today.Consult surgery for trach/PEG. Did have 4 min bradycardia/hypertension during SBT which resolved with oxygen and suctioning. Free water increased to 300mL Q4H for hypernatremia. Stopped vanc/zosyn per ID recs. NGTD on resp/blood/fungal cultures.        Interval History: Hospital day 6. NAEO. Cont on Precedex gtt and Oxycodone 5 mg bid for sedation. Cont to wean as tolerated. Vent settings unchanged: FiO2 40%, PEEP 5, spO2>95%. Failed SBT last night. On free water boluses for hypernatremia. Abx d/c'd per ID recs. Hgb 7.1 and Hct 23.9; will transfuse for Hgb<7.    OBJECTIVE: Limited review of systems and physical exam per team attending and/or nursing staff due to patient being in special isolation.     Vitals:   Vitals:    05/04/20 1000  "05/04/20 1015 05/04/20 1030 05/04/20 1045   BP: 133/73  102/63    BP Location:       Patient Position:       Pulse: 110 99 91 88   Resp: (!) 35 (!) 33 (!) 24 (!) 22   Temp:       TempSrc:       SpO2: 100% 99% 100% 100%   Weight:       Height:           Vent Mode: A/C  Oxygen Concentration (%):  [40-55] 55  Resp Rate Total:  [23 br/min-47 br/min] 23 br/min  Vt Set:  [450 mL] 450 mL  PEEP/CPAP:  [5 cmH20] 5 cmH20  Pressure Support:  [15 cmH20] 15 cmH20  Mean Airway Pressure:  [11 uwP02-73 cmH20] 14 cmH20     Physical Exam   General appearance: Intubated. Sedated.   Head: MMM  Lungs: CTAB  Heart: rrr, no m/r/g  Abdomen: SNTND: hypoactive bowel sounds  Extremities: no cyanosis or edema, or clubbing, warm  Skin: No rashes, no erythema, wound on dorsum of left hand   Neurologic: Follows commands, eye tracking, answers "yes/no" questions appropriately     Labs: All labs within the last 24 hours were reviewed.     Imaging: All imaging within the last 24 hours was reviewed.     ASSESSMENT & PLAN:   This is a 70 y.o. admitted on 4/28/2020 for respiratory failure secondary to confirmed/suspected COVID-19 requiring mechanical ventilation for respiratory support.     Neuro:   -Intubated. Awake and alert. Pt able to respond appropriately and follow commands.  -Precedex gtt started for agitation 5/2/2020. Wean as tolerated  -Oxy 5 mg bid  -PT/OT consulted     Cardiac/Circulatory:   -Bedside TTE performed today by Dr. Huddleston. Technically very difficult with limited images but LVEF at least 40%  -Maintain MAP's >65 with vasopressor support if necessary.   -Pressors d/c'd. BP stable and MAPs in 80s.   -NSR rate 80s-100.  -On DVT prophylaxis with Lovenox.      Pulmonary:   -Repeat COVID-19 on 4/21 positive.   -Completed course of Plaquenil, Azithromycin, and Rocephin   -Wean ventilator settings per ARDSnet protocol.  -Continue mechanical ventilation with ARDS targeted strategies. Goal plateau pressure <30. AM settings: FiO2 40%, " PEEP 5. SpO2 >95%  - This AM had episode of hypertension and tachypnea and desatted. RT called and suctioned out thick mucous  with improvement in vitals. Now vent settings increased to FiO2 55% and PEEP 5 with spO2>95%  -Wean sedation with coordinated SAT and SBT today.     - Failed SBT yesterday. ABG yesterday: pH 7.47, PCO2 54, PO2 83, HCO3 40.  - Consult surgery for trach/PEG if unable to be extubated.      Renal:   -Monitoring urine output with consideration for dialysis initiation if necessary.   -Off IV Lasix since 5/1/20. UOP 1.9  L yesterday. Net -3.8  L since admission  -WILBUR resolving. BUN 50 and Cr 1.3  -Sodium 148. Cont free water flushes 300 cc q4h. -Potassium 3.5 (replaced with 40 Meq KCL). Mag 2.5. Phos 3.3     Gastrointestinal:   -Trending liver function daily. Transaminases WNL.  -Diagnosed with ileus on 4/21/20.  Resolved.  -Famotidine for GI prophylaxis    -On bowel regimen with Miralax and Docusate. BM per rectal tube today.  -Cont TFs per dietician recommendation: TF of Peptamen Intense VHP advancing as tolerated to goal rate of 55 mL/hr to provide 1320 kcal, 121 gm protein, 1109 mL free fluid.  Tolerated TFs at goal rate of 55 cc/hr     Endocrine:   -No history of DM. Hgb A1C 6.0  -Goal BG <200. At goal.  -Continue insulin detemir 20 units qhs and sliding scale insulin      ID:   -Repeat blood and sputum cultures drawn 5/1/2020. NGTD.   -C diff antigen pos but toxin and PCR negative.  -Respiratory culture 4/26 with candida.  -WBC decreasing. Afebrile since 5/1/20.   -CXR 5/2/2020 with no significant changes.   -Discontinued vanc/zosyn per ID recs.  -Central line placed 4/29/20. Art-line pulled 5/3 due to poor waveform      Hematology/Oncology:   -Trending CBC daily and monitor for signs of bleeding.   -H&H 7/23.9  -Transfuse for Hgb <7.      ICU DAILY CHECKLIST:   Awake with RASS goal of 0? No   Spontaneous breathing trial performed? Yes   SAT and SBT coordinated? Yes   Head of bed >30 degrees?  Yes   Tube feeding initiated? Yes   Glucose at goal? Yes   Having bowel movements daily?  Yes   Stress ulcer prophylaxis ordered? Yes   DVT prophylaxis ordered? Yes   Can indwelling lines be discontinued? No   Can antimicrobials be discontinued? N/A   Family updated? Yes         Critical Care Time: 45  Critical care was time spent personally by me on the following activities: evaluating this patient's organ dysfunction, development of treatment plan, discussing treatment plan with patient or surrogate and bedside caregivers, discussions with consultants, evaluation of patient's response to treatment, examination of patient, ordering and performing treatments and interventions, ordering and review of laboratory studies, ordering and review of radiographic studies, re-evaluation of patient's condition. This critical care time did not overlap with that of any other provider or involve time for any procedures.

## 2020-05-04 NOTE — PT/OT/SLP PROGRESS
Occupational Therapy      Patient Name:  Bharathi Garrido   MRN:  [id number]    Patient not seen today secondary to p[t remains intubated with precedex. Per RN, pt is not appropriate for OOB/EOB activity at this time.  Will follow-up as appropriate .    Fanny Ford OT  5/4/2020

## 2020-05-05 LAB
ABO + RH BLD: NORMAL
ALBUMIN SERPL BCP-MCNC: 1.7 G/DL (ref 3.5–5.2)
ALP SERPL-CCNC: 110 U/L (ref 55–135)
ALT SERPL W/O P-5'-P-CCNC: 108 U/L (ref 10–44)
ANION GAP SERPL CALC-SCNC: 8 MMOL/L (ref 8–16)
ANION GAP SERPL CALC-SCNC: 8 MMOL/L (ref 8–16)
ANION GAP SERPL CALC-SCNC: 9 MMOL/L (ref 8–16)
AST SERPL-CCNC: 94 U/L (ref 10–40)
BACTERIA BLD CULT: NORMAL
BACTERIA BLD CULT: NORMAL
BASOPHILS # BLD AUTO: 0.04 K/UL (ref 0–0.2)
BASOPHILS NFR BLD: 0.3 % (ref 0–1.9)
BILIRUB SERPL-MCNC: 2 MG/DL (ref 0.1–1)
BLD GP AB SCN CELLS X3 SERPL QL: NORMAL
BLD PROD TYP BPU: NORMAL
BLOOD UNIT EXPIRATION DATE: NORMAL
BLOOD UNIT TYPE CODE: 6200
BLOOD UNIT TYPE: NORMAL
BUN SERPL-MCNC: 41 MG/DL (ref 8–23)
BUN SERPL-MCNC: 45 MG/DL (ref 8–23)
BUN SERPL-MCNC: 46 MG/DL (ref 8–23)
CALCIUM SERPL-MCNC: 8.7 MG/DL (ref 8.7–10.5)
CALCIUM SERPL-MCNC: 8.7 MG/DL (ref 8.7–10.5)
CALCIUM SERPL-MCNC: 8.8 MG/DL (ref 8.7–10.5)
CHLORIDE SERPL-SCNC: 106 MMOL/L (ref 95–110)
CO2 SERPL-SCNC: 30 MMOL/L (ref 23–29)
CO2 SERPL-SCNC: 33 MMOL/L (ref 23–29)
CO2 SERPL-SCNC: 33 MMOL/L (ref 23–29)
CODING SYSTEM: NORMAL
CREAT SERPL-MCNC: 1.1 MG/DL (ref 0.5–1.4)
CREAT SERPL-MCNC: 1.2 MG/DL (ref 0.5–1.4)
CREAT SERPL-MCNC: 1.3 MG/DL (ref 0.5–1.4)
DIFFERENTIAL METHOD: ABNORMAL
DISPENSE STATUS: NORMAL
EOSINOPHIL # BLD AUTO: 0.5 K/UL (ref 0–0.5)
EOSINOPHIL NFR BLD: 4.2 % (ref 0–8)
ERYTHROCYTE [DISTWIDTH] IN BLOOD BY AUTOMATED COUNT: 16.7 % (ref 11.5–14.5)
EST. GFR  (AFRICAN AMERICAN): >60 ML/MIN/1.73 M^2
EST. GFR  (NON AFRICAN AMERICAN): 56.1 ML/MIN/1.73 M^2
EST. GFR  (NON AFRICAN AMERICAN): >60 ML/MIN/1.73 M^2
EST. GFR  (NON AFRICAN AMERICAN): >60 ML/MIN/1.73 M^2
GLUCOSE SERPL-MCNC: 115 MG/DL (ref 70–110)
GLUCOSE SERPL-MCNC: 117 MG/DL (ref 70–110)
GLUCOSE SERPL-MCNC: 121 MG/DL (ref 70–110)
HCT VFR BLD AUTO: 23.4 % (ref 40–54)
HCT VFR BLD AUTO: 27.8 % (ref 40–54)
HGB BLD-MCNC: 6.8 G/DL (ref 14–18)
HGB BLD-MCNC: 8.4 G/DL (ref 14–18)
IMM GRANULOCYTES # BLD AUTO: 0.1 K/UL (ref 0–0.04)
IMM GRANULOCYTES NFR BLD AUTO: 0.9 % (ref 0–0.5)
LYMPHOCYTES # BLD AUTO: 1 K/UL (ref 1–4.8)
LYMPHOCYTES NFR BLD: 8.4 % (ref 18–48)
MAGNESIUM SERPL-MCNC: 2.5 MG/DL (ref 1.6–2.6)
MCH RBC QN AUTO: 26.9 PG (ref 27–31)
MCHC RBC AUTO-ENTMCNC: 29.1 G/DL (ref 32–36)
MCV RBC AUTO: 93 FL (ref 82–98)
MONOCYTES # BLD AUTO: 0.9 K/UL (ref 0.3–1)
MONOCYTES NFR BLD: 7.4 % (ref 4–15)
NEUTROPHILS # BLD AUTO: 9.2 K/UL (ref 1.8–7.7)
NEUTROPHILS NFR BLD: 78.8 % (ref 38–73)
NRBC BLD-RTO: 0 /100 WBC
PHOSPHATE SERPL-MCNC: 4.5 MG/DL (ref 2.7–4.5)
PLATELET # BLD AUTO: 388 K/UL (ref 150–350)
PMV BLD AUTO: 11.6 FL (ref 9.2–12.9)
POCT GLUCOSE: 114 MG/DL (ref 70–110)
POCT GLUCOSE: 119 MG/DL (ref 70–110)
POCT GLUCOSE: 128 MG/DL (ref 70–110)
POCT GLUCOSE: 132 MG/DL (ref 70–110)
POTASSIUM SERPL-SCNC: 3.9 MMOL/L (ref 3.5–5.1)
POTASSIUM SERPL-SCNC: 4 MMOL/L (ref 3.5–5.1)
POTASSIUM SERPL-SCNC: 4.1 MMOL/L (ref 3.5–5.1)
PROT SERPL-MCNC: 6.8 G/DL (ref 6–8.4)
RBC # BLD AUTO: 2.53 M/UL (ref 4.6–6.2)
SODIUM SERPL-SCNC: 145 MMOL/L (ref 136–145)
SODIUM SERPL-SCNC: 147 MMOL/L (ref 136–145)
SODIUM SERPL-SCNC: 147 MMOL/L (ref 136–145)
TRANS ERYTHROCYTES VOL PATIENT: NORMAL ML
WBC # BLD AUTO: 11.66 K/UL (ref 3.9–12.7)

## 2020-05-05 PROCEDURE — 99233 SBSQ HOSP IP/OBS HIGH 50: CPT | Mod: ,,, | Performed by: INTERNAL MEDICINE

## 2020-05-05 PROCEDURE — 25000242 PHARM REV CODE 250 ALT 637 W/ HCPCS: Performed by: NURSE PRACTITIONER

## 2020-05-05 PROCEDURE — 25000003 PHARM REV CODE 250: Performed by: INTERNAL MEDICINE

## 2020-05-05 PROCEDURE — 83735 ASSAY OF MAGNESIUM: CPT

## 2020-05-05 PROCEDURE — 99900026 HC AIRWAY MAINTENANCE (STAT)

## 2020-05-05 PROCEDURE — 84100 ASSAY OF PHOSPHORUS: CPT

## 2020-05-05 PROCEDURE — 25000242 PHARM REV CODE 250 ALT 637 W/ HCPCS: Performed by: INTERNAL MEDICINE

## 2020-05-05 PROCEDURE — 80048 BASIC METABOLIC PNL TOTAL CA: CPT | Mod: 91

## 2020-05-05 PROCEDURE — 86920 COMPATIBILITY TEST SPIN: CPT

## 2020-05-05 PROCEDURE — 25000003 PHARM REV CODE 250: Performed by: STUDENT IN AN ORGANIZED HEALTH CARE EDUCATION/TRAINING PROGRAM

## 2020-05-05 PROCEDURE — 27000221 HC OXYGEN, UP TO 24 HOURS

## 2020-05-05 PROCEDURE — 85018 HEMOGLOBIN: CPT

## 2020-05-05 PROCEDURE — 94761 N-INVAS EAR/PLS OXIMETRY MLT: CPT

## 2020-05-05 PROCEDURE — 94003 VENT MGMT INPAT SUBQ DAY: CPT

## 2020-05-05 PROCEDURE — 80053 COMPREHEN METABOLIC PANEL: CPT

## 2020-05-05 PROCEDURE — 36430 TRANSFUSION BLD/BLD COMPNT: CPT

## 2020-05-05 PROCEDURE — 85014 HEMATOCRIT: CPT

## 2020-05-05 PROCEDURE — 63600175 PHARM REV CODE 636 W HCPCS: Performed by: INTERNAL MEDICINE

## 2020-05-05 PROCEDURE — S0028 INJECTION, FAMOTIDINE, 20 MG: HCPCS | Performed by: STUDENT IN AN ORGANIZED HEALTH CARE EDUCATION/TRAINING PROGRAM

## 2020-05-05 PROCEDURE — 80048 BASIC METABOLIC PNL TOTAL CA: CPT

## 2020-05-05 PROCEDURE — 94640 AIRWAY INHALATION TREATMENT: CPT

## 2020-05-05 PROCEDURE — P9021 RED BLOOD CELLS UNIT: HCPCS

## 2020-05-05 PROCEDURE — 20000000 HC ICU ROOM

## 2020-05-05 PROCEDURE — 86850 RBC ANTIBODY SCREEN: CPT

## 2020-05-05 PROCEDURE — 99900035 HC TECH TIME PER 15 MIN (STAT)

## 2020-05-05 PROCEDURE — 27200966 HC CLOSED SUCTION SYSTEM

## 2020-05-05 PROCEDURE — 94668 MNPJ CHEST WALL SBSQ: CPT

## 2020-05-05 PROCEDURE — 27201040 HC RC 50 FILTER

## 2020-05-05 PROCEDURE — 85025 COMPLETE CBC W/AUTO DIFF WBC: CPT

## 2020-05-05 PROCEDURE — 99233 PR SUBSEQUENT HOSPITAL CARE,LEVL III: ICD-10-PCS | Mod: ,,, | Performed by: INTERNAL MEDICINE

## 2020-05-05 RX ORDER — OXYCODONE HCL 5 MG/5 ML
5 SOLUTION, ORAL ORAL 3 TIMES DAILY
Status: DISCONTINUED | OUTPATIENT
Start: 2020-05-05 | End: 2020-05-06

## 2020-05-05 RX ORDER — HYDROCODONE BITARTRATE AND ACETAMINOPHEN 500; 5 MG/1; MG/1
TABLET ORAL
Status: DISCONTINUED | OUTPATIENT
Start: 2020-05-05 | End: 2020-05-09

## 2020-05-05 RX ADMIN — TIMOLOL MALEATE 1 DROP: 5 SOLUTION/ DROPS OPHTHALMIC at 08:05

## 2020-05-05 RX ADMIN — ENOXAPARIN SODIUM 40 MG: 100 INJECTION SUBCUTANEOUS at 08:05

## 2020-05-05 RX ADMIN — DOCUSATE SODIUM 100 MG: 50 LIQUID ORAL at 08:05

## 2020-05-05 RX ADMIN — CHLORHEXIDINE GLUCONATE 0.12% ORAL RINSE 15 ML: 1.2 LIQUID ORAL at 08:05

## 2020-05-05 RX ADMIN — FAMOTIDINE 20 MG: 10 INJECTION INTRAVENOUS at 08:05

## 2020-05-05 RX ADMIN — DEXMEDETOMIDINE HYDROCHLORIDE IN 0.9% SODIUM CHLORIDE 0.4 MCG/KG/HR: 400 INJECTION INTRAVENOUS at 04:05

## 2020-05-05 RX ADMIN — LATANOPROST 1 DROP: 50 SOLUTION OPHTHALMIC at 08:05

## 2020-05-05 RX ADMIN — POLYETHYLENE GLYCOL 3350 17 G: 17 POWDER, FOR SOLUTION ORAL at 08:05

## 2020-05-05 RX ADMIN — SODIUM CHLORIDE SOLN NEBU 3% 4 ML: 3 NEBU SOLN at 07:05

## 2020-05-05 RX ADMIN — BRIMONIDINE TARTRATE 1 DROP: 2 SOLUTION OPHTHALMIC at 08:05

## 2020-05-05 RX ADMIN — MINERAL OIL AND WHITE PETROLATUM: 150; 830 OINTMENT OPHTHALMIC at 08:05

## 2020-05-05 RX ADMIN — DEXMEDETOMIDINE HYDROCHLORIDE IN 0.9% SODIUM CHLORIDE 0.3 MCG/KG/HR: 400 INJECTION INTRAVENOUS at 09:05

## 2020-05-05 RX ADMIN — DEXMEDETOMIDINE HYDROCHLORIDE IN 0.9% SODIUM CHLORIDE 0.4 MCG/KG/HR: 400 INJECTION INTRAVENOUS at 03:05

## 2020-05-05 RX ADMIN — INSULIN DETEMIR 20 UNITS: 100 INJECTION, SOLUTION SUBCUTANEOUS at 08:05

## 2020-05-05 RX ADMIN — OXYCODONE HYDROCHLORIDE 5 MG: 5 SOLUTION ORAL at 04:05

## 2020-05-05 RX ADMIN — ALBUTEROL SULFATE 2.5 MG: 2.5 SOLUTION RESPIRATORY (INHALATION) at 07:05

## 2020-05-05 RX ADMIN — OXYCODONE HYDROCHLORIDE 5 MG: 5 SOLUTION ORAL at 08:05

## 2020-05-05 NOTE — CONSULTS
Wound consult received on patient's left hand/wrist.  Dry stable eschar noted, no induration or fluctuance noted.  From LDA appears to be present prior to admit.  Possibly due to a previous IV infiltration.  Recommend painting daily with betadine and continue to monitor.  If eschar begins to lift and pending what exposed tissue is beneath eschar, may need a plastic surgery evaluation in future.  Discussed with Yessi Ledesma NP. NP approved recommendations.  Nursing to continue care. Wound care to follow prn.      Left hand/wrist approximately 12cm x 3cm

## 2020-05-05 NOTE — RESPIRATORY THERAPY
This note also relates to the following rows which could not be included:  Pressure Support - Cannot attach notes to unvalidated device data  Insp Rise Time  - Cannot attach notes to unvalidated device data  Spont Ve - Cannot attach notes to unvalidated device data  Inspired Tidal Volume (VTI) - Cannot attach notes to unvalidated device data    SBT started at 10:45

## 2020-05-05 NOTE — CARE UPDATE
No acute events throughout day, VS and assessment per flow sheet, see below for updates:    Pulmonary: vent settings weaned down today. Currently intubated 25 at lip 7.5 ETT. A/C V/C 16/340/35/5. Coarse and diminished reyna breath sounds. tachypneic 30-40. SBT performed VSS and tachypnea remained. Ordered to switch back to rate.     Cardiovascular: -110s. BP stable. Pulses palpable. Transfused 1unit PRBC. HGB HCT responded with redraw.     Neurological: Follows commands, moves all extremities. Pupils 4 and sluggish. Low dose precedex at 0.4mcg/kg/hr for anxiety. Improved.     Gastrointestinal: flexi intact. 125 liquid stool + leaked around flexi. Abd taut, BS +. OGT with TF at goal 55ml/hr with free water flush 300ml q4h.     Genitourinary: [place] intact. Concentrated urine. Voided 1100ml.     Endocrine: Accuchecks q6h    Skin/Bath: L hand seen by wound care. Orders placed. To paint with betadine daily. Preventative foams replaced and intact. R scapula dressing intact.    Date of last CHG bath given: 5/5/2020 @ 1500.     Infusions: precedex    Patient progressing towards goals as tolerated, plan of care communicated and reviewed with Bharathi Garrido and family, all concerns addressed, will continue to monitor.     Mckayla Conway RN

## 2020-05-05 NOTE — NURSING
MD & team notified of what appears to be necrosis to L hand/wrist area. Wound care notified and order placed. Wound care stated they will come see the patient.

## 2020-05-05 NOTE — PT/OT/SLP PROGRESS
Occupational Therapy      Patient Name:  Bharathi Garrido   MRN:  [id number]    Patient not seen today secondary to pt remain intubated and medically not appropriate.  Will follow-up 5/7/2020.    Fanny Ford OTR/L  Pager: [phone]  5/5/2020

## 2020-05-05 NOTE — PROGRESS NOTES
[facility]  COVID-19 or Persons Under Investigation ICU DAILY PROGRESS NOTE    Patient Name: Bharathi Garrido  MRN: [id number]  Admission Date: 4/28/2020   Code Status:   Attending: Venkatesh Haines*     SUBJECTIVE:    HPI:   Mr. Bharathi Garrido is a 69 yo male with obesity, history of prostate cancer (1/2018 s/p prostatectomy and radiation Dr. Pederson), history of difficult intubation for surgery and glaucoma presents with feeling unwell, fever, decreased appetite and shortness of breath. Patient [place] [facility] [place] [facility] [place] higher level of care. Admitted to outside hospital on 3/31/20 for SOB and fever and tested positive for COVID-19.  Upon admission, CXR showed bilateral multifocal opacities. Procalcitonin, LDH, ferritin, CRP and D-dimer elevated. Renal function normal. Mild increase in LFTs. He was treated with Plaquinel, Rocephin and Azithromycin. His respiratory function declined and has been intubated since 4/2/20.     Hospital Course: Bharathi Garrido was admitted to ICU for management of respiratory failure secondary to suspected and/or confirmed COVID-19 requiring mechanical ventilation for respiratory support.      4/28/20: Patient transferred from [facility] for higher level of care as patient still intubated with high oxygen requirements. Sedated on Fentanyl and Precedex, but responding appropriately and following commands per nursing. Intubated on A/C, FiO2 75%, PEEP 10, , rate 18 with sat of 92%. CXR consistent with multilobar pneumonia. On empiric Vanc and Zosyn for recent white count with leukocytosis. PICC from 4/2 removed and central line and a-line placed. Repeat cxs sent. Hgb/Hct stable s/p 1 unit PRBCs on 4/27. NG set to intermittent suction. Abd x-ray showed gaseous distention.      4/29/20: Sedated on Propofol, Precedex, and Dilaudid. Initially on Levophed but weaned off and became hypertensive. Briefly on Cardene gtt. Vent settings  unchanged: FiO2 75% and PEEP 10. New cultures with no growth, cont on empiric abx. Good UOP with IV Lasix and stable renal function. Having BMs.      4/30/20: Weaned off Propofol and Precedex successfully. Cont on IV Dilaudid 1 mg/hr and scheduled Oxy. SpO2 improving. Vent settings weaned to FiO of 65% PEEP 10  Rate 18. Blood pressure stable off pressors/antihypertensives. Having BMs. Started TFs. Good UOP and net negative. Lytes WNR.      5/1/2020: Weaned off dilaudid gtt. SBT today. Put back on rate after 2 hours when pt became tired and tachypnic. Stopped lasix due to elevated creatinine. Consulted ID due to increased WBC and fever despite broad spectrum abx regimen.      5/2/2020: Patient now off dilaudid gtt and tolerating well. Still drowsy. Patient failed SBT today.  Will decrease PO oxy from Q6H to BID. WBC continues to increase, now 22.28, Tmax 100. BC NGTD. Remains on vanc/zosyn - awaiting ID recs. Possible hemoconcentration. Net negative 1.3L. Stop lasix and start free water 200 Q4H. On TFs. Last BM 4/28. On bowel regimen.     5/3/2020: On Precedex gtt and prn Morphine for agitation.  Vent settings this AM: 40% FiO2 and PEEP 5. Oxygen sats 94%. Failed SBT. Consult surgery for trach/PEG. Did have 4 min bradycardia/hypertension during SBT which resolved with oxygen and suctioning. Free water increased to 300mL Q4H for hypernatremia. Stopped vanc/zosyn per ID recs. NGTD on resp/blood/fungal cultures.        Interval History: Hospital day 7. NAEO. Cont on Precedex gtt and Oxycodone 5 mg bid for sedation. Cont to wean as tolerated. Vent settings unchanged: FiO2 40%, PEEP 5, spO2>95%. Continue water boluses for hypernatremia. Hgb 6.8, transfuse 1 pRBC. Wound care consult for left hand/wrist with dry, stable eschar.     OBJECTIVE: Limited review of systems and physical exam per team attending and/or nursing staff due to patient being in special isolation.     Vitals:   Vitals:    05/05/20 0430 05/05/20  "0500 05/05/20 0530 05/05/20 0600   BP: 116/67 119/73 125/68 131/69   BP Location:    Left arm   Patient Position:    Lying   Pulse: 78 88 80 88   Resp: (!) 23 (!) 28 (!) 27 (!) 33   Temp:       TempSrc:       SpO2: 100% 100% 100% 100%   Weight:       Height:           Vent Mode: A/C  Oxygen Concentration (%):  [40-55] 50  Resp Rate Total:  [19 br/min-47 br/min] 32 br/min  Vt Set:  [340 mL] 340 mL  PEEP/CPAP:  [5 cmH20-8 cmH20] 8 cmH20  Mean Airway Pressure:  [13 itD70-23 cmH20] 13 cmH20     Physical Exam   General appearance: Intubated. Sedated. Opens eyes, tracks. MAETC.   Head: MMM  Lungs: CTAB  Heart: rrr, no m/r/g  Abdomen: SNTND: hypoactive bowel sounds  Extremities: no cyanosis or edema, or clubbing, warm  Skin: wound on dorsum of left hand, dry, stable eschar   Neurologic: Follows commands, eye tracking, answers "yes/no" questions appropriately     Labs: All labs within the last 24 hours were reviewed.     Imaging: All imaging within the last 24 hours was reviewed.     ASSESSMENT & PLAN:   This is a 70 y.o. admitted on 4/28/2020 for respiratory failure secondary to confirmed/suspected COVID-19 requiring mechanical ventilation for respiratory support.     Today's Plan:   - Trend CVP  - Transfuse 1 pRBC for Hgb 6.8. Repeat H/H this afternoon   - Wean vent settings and SBT as tolerated  - Hypernatremia improving, continue free water  - Wound care consult for wound on left hand/wrist 2/2 to IV infiltrate       Neuro:   -Intubated. Awake and alert. Pt able to respond appropriately and follow commands.  -Precedex gtt started for agitation 5/2/2020. Wean as tolerated  -Oxy 5 mg bid  -PT/OT consulted     Cardiac/Circulatory:   -Bedside TTE performed today by Dr. Huddleston. Technically very difficult with limited images but LVEF at least 40%  -Maintain MAP's >65 with vasopressor support if necessary.   -Pressors d/c'd. BP stable and MAPs in 80s.   -NSR rate 80s-100.  - Trend CVPs > 4   -On DVT prophylaxis with " Lovenox.      Pulmonary:   -Repeat COVID-19 on 4/21 positive.   -Completed course of Plaquenil, Azithromycin, and Rocephin   -Wean ventilator settings per ARDSnet protocol.  -Continue mechanical ventilation with ARDS targeted strategies. Goal plateau pressure <30. AM settings: FiO2 40%, PEEP 8. SpO2 >95%  - Wean sedation with coordinated SAT and SBT daily as tolerated.     - Failed SBT 5/3. ABG: pH 7.47, PCO2 54, PO2 83, HCO3 40.  - Consult surgery for trach/PEG if unable to be extubated.      Renal:   -Monitoring urine output with consideration for dialysis initiation if necessary.   -Off IV Lasix since 5/1/20. UOP 1.9  L yesterday. Net -3.7  L since admission  -WILBUR resolving. BUN 46 and Cr 1.3  -Sodium 147, improving. Cont free water flushes 300 cc q4h, free water deficit 3.2 L.       Gastrointestinal:   -Trending liver function daily. Transaminases WNL.  -Diagnosed with ileus on 4/21/20.  Resolved.  -Famotidine for GI prophylaxis    -On bowel regimen with Miralax and Docusate. BM per rectal tube today.  -Cont TFs per dietician recommendation: TF of Peptamen Intense VHP advancing as tolerated to goal rate of 55 mL/hr to provide 1320 kcal, 121 gm protein, 1109 mL free fluid.  Tolerated TFs at goal rate of 55 cc/hr  - Albumin 1.7     Endocrine:   -No history of DM. Hgb A1C 6.0  -Goal BG <200. At goal.  -Continue insulin detemir 20 units qhs and sliding scale insulin      ID:   -Repeat blood and sputum cultures drawn 5/1/2020. NGTD.   -C diff antigen pos but toxin and PCR negative.  -Respiratory culture 4/26 with candida.  -WBC decreasing. Afebrile since 5/1/20.   -CXR 5/2/2020 with no significant changes.   -Discontinued vanc/zosyn per ID recs.  -Central line placed 4/29/20.      Hematology/Oncology:   -Trending CBC daily and monitor for signs of bleeding.   -H&H 6.8/23, transfused 1 pRBC 5/5  -Transfuse for Hgb <7.      ICU DAILY CHECKLIST:   Awake with RASS goal of 0? No, RASS -1    Spontaneous breathing trial  performed? Yes   SAT and SBT coordinated? Yes   Head of bed >30 degrees? Yes   Tube feeding initiated? Yes   Glucose at goal? Yes   Having bowel movements daily?  Yes   Stress ulcer prophylaxis ordered? Yes   DVT prophylaxis ordered? Yes   Can indwelling lines be discontinued? No   Can antimicrobials be discontinued? N/A   Family updated? Yes         Critical Care Time: 40  Critical care was time spent personally by me on the following activities: evaluating this patient's organ dysfunction, development of treatment plan, discussing treatment plan with patient or surrogate and bedside caregivers, discussions with consultants, evaluation of patient's response to treatment, examination of patient, ordering and performing treatments and interventions, ordering and review of laboratory studies, ordering and review of radiographic studies, re-evaluation of patient's condition. This critical care time did not overlap with that of any other provider or involve time for any procedures.

## 2020-05-06 LAB
ALBUMIN SERPL BCP-MCNC: 1.7 G/DL (ref 3.5–5.2)
ALLENS TEST: ABNORMAL
ALP SERPL-CCNC: 112 U/L (ref 55–135)
ALT SERPL W/O P-5'-P-CCNC: 115 U/L (ref 10–44)
ANION GAP SERPL CALC-SCNC: 8 MMOL/L (ref 8–16)
AST SERPL-CCNC: 85 U/L (ref 10–40)
BASOPHILS # BLD AUTO: 0.07 K/UL (ref 0–0.2)
BASOPHILS NFR BLD: 0.4 % (ref 0–1.9)
BILIRUB SERPL-MCNC: 2.1 MG/DL (ref 0.1–1)
BUN SERPL-MCNC: 38 MG/DL (ref 8–23)
CALCIUM SERPL-MCNC: 8.5 MG/DL (ref 8.7–10.5)
CHLORIDE SERPL-SCNC: 107 MMOL/L (ref 95–110)
CO2 SERPL-SCNC: 31 MMOL/L (ref 23–29)
CREAT SERPL-MCNC: 1.1 MG/DL (ref 0.5–1.4)
DELSYS: ABNORMAL
DIFFERENTIAL METHOD: ABNORMAL
EOSINOPHIL # BLD AUTO: 0.6 K/UL (ref 0–0.5)
EOSINOPHIL NFR BLD: 3.8 % (ref 0–8)
ERYTHROCYTE [DISTWIDTH] IN BLOOD BY AUTOMATED COUNT: 16.6 % (ref 11.5–14.5)
EST. GFR  (AFRICAN AMERICAN): >60 ML/MIN/1.73 M^2
EST. GFR  (NON AFRICAN AMERICAN): >60 ML/MIN/1.73 M^2
GLUCOSE SERPL-MCNC: 104 MG/DL (ref 70–110)
HCO3 UR-SCNC: 33.1 MMOL/L (ref 24–28)
HCT VFR BLD AUTO: 25.9 % (ref 40–54)
HCT VFR BLD CALC: 22 %PCV (ref 36–54)
HGB BLD-MCNC: 7.6 G/DL (ref 14–18)
IMM GRANULOCYTES # BLD AUTO: 0.09 K/UL (ref 0–0.04)
IMM GRANULOCYTES NFR BLD AUTO: 0.6 % (ref 0–0.5)
LYMPHOCYTES # BLD AUTO: 0.8 K/UL (ref 1–4.8)
LYMPHOCYTES NFR BLD: 5.1 % (ref 18–48)
MAGNESIUM SERPL-MCNC: 2.2 MG/DL (ref 1.6–2.6)
MCH RBC QN AUTO: 26.8 PG (ref 27–31)
MCHC RBC AUTO-ENTMCNC: 29.3 G/DL (ref 32–36)
MCV RBC AUTO: 91 FL (ref 82–98)
MODE: ABNORMAL
MONOCYTES # BLD AUTO: 1 K/UL (ref 0.3–1)
MONOCYTES NFR BLD: 6.2 % (ref 4–15)
NEUTROPHILS # BLD AUTO: 13.6 K/UL (ref 1.8–7.7)
NEUTROPHILS NFR BLD: 83.9 % (ref 38–73)
NRBC BLD-RTO: 0 /100 WBC
PCO2 BLDA: 48.1 MMHG (ref 35–45)
PH SMN: 7.45 [PH] (ref 7.35–7.45)
PHOSPHATE SERPL-MCNC: 2.8 MG/DL (ref 2.7–4.5)
PLATELET # BLD AUTO: 380 K/UL (ref 150–350)
PMV BLD AUTO: 11.5 FL (ref 9.2–12.9)
PO2 BLDA: 35 MMHG (ref 80–100)
POC BE: 9 MMOL/L
POC IONIZED CALCIUM: 1.24 MMOL/L (ref 1.06–1.42)
POC SATURATED O2: 69 % (ref 95–100)
POC TCO2: 35 MMOL/L (ref 23–27)
POCT GLUCOSE: 104 MG/DL (ref 70–110)
POCT GLUCOSE: 106 MG/DL (ref 70–110)
POCT GLUCOSE: 109 MG/DL (ref 70–110)
POCT GLUCOSE: 88 MG/DL (ref 70–110)
POTASSIUM BLD-SCNC: 3.7 MMOL/L (ref 3.5–5.1)
POTASSIUM SERPL-SCNC: 3.9 MMOL/L (ref 3.5–5.1)
PROT SERPL-MCNC: 6.7 G/DL (ref 6–8.4)
RBC # BLD AUTO: 2.84 M/UL (ref 4.6–6.2)
SAMPLE: ABNORMAL
SITE: ABNORMAL
SODIUM BLD-SCNC: 142 MMOL/L (ref 136–145)
SODIUM SERPL-SCNC: 146 MMOL/L (ref 136–145)
WBC # BLD AUTO: 16.22 K/UL (ref 3.9–12.7)

## 2020-05-06 PROCEDURE — 25000242 PHARM REV CODE 250 ALT 637 W/ HCPCS: Performed by: NURSE PRACTITIONER

## 2020-05-06 PROCEDURE — 99291 CRITICAL CARE FIRST HOUR: CPT | Mod: GC,,, | Performed by: INTERNAL MEDICINE

## 2020-05-06 PROCEDURE — 20000000 HC ICU ROOM

## 2020-05-06 PROCEDURE — 94003 VENT MGMT INPAT SUBQ DAY: CPT

## 2020-05-06 PROCEDURE — 94640 AIRWAY INHALATION TREATMENT: CPT

## 2020-05-06 PROCEDURE — 25000003 PHARM REV CODE 250: Performed by: INTERNAL MEDICINE

## 2020-05-06 PROCEDURE — 63600175 PHARM REV CODE 636 W HCPCS: Performed by: INTERNAL MEDICINE

## 2020-05-06 PROCEDURE — 99900026 HC AIRWAY MAINTENANCE (STAT)

## 2020-05-06 PROCEDURE — 99291 PR CRITICAL CARE, E/M 30-74 MINUTES: ICD-10-PCS | Mod: GC,,, | Performed by: INTERNAL MEDICINE

## 2020-05-06 PROCEDURE — 27200966 HC CLOSED SUCTION SYSTEM

## 2020-05-06 PROCEDURE — 94761 N-INVAS EAR/PLS OXIMETRY MLT: CPT

## 2020-05-06 PROCEDURE — 25000242 PHARM REV CODE 250 ALT 637 W/ HCPCS: Performed by: INTERNAL MEDICINE

## 2020-05-06 PROCEDURE — 94010 BREATHING CAPACITY TEST: CPT

## 2020-05-06 PROCEDURE — 27000221 HC OXYGEN, UP TO 24 HOURS

## 2020-05-06 PROCEDURE — 85025 COMPLETE CBC W/AUTO DIFF WBC: CPT

## 2020-05-06 PROCEDURE — 25000003 PHARM REV CODE 250: Performed by: STUDENT IN AN ORGANIZED HEALTH CARE EDUCATION/TRAINING PROGRAM

## 2020-05-06 PROCEDURE — 83735 ASSAY OF MAGNESIUM: CPT

## 2020-05-06 PROCEDURE — 99900035 HC TECH TIME PER 15 MIN (STAT)

## 2020-05-06 PROCEDURE — 84100 ASSAY OF PHOSPHORUS: CPT

## 2020-05-06 PROCEDURE — 80053 COMPREHEN METABOLIC PANEL: CPT

## 2020-05-06 PROCEDURE — 82803 BLOOD GASES ANY COMBINATION: CPT

## 2020-05-06 PROCEDURE — 99900017 HC EXTUBATION W/PARAMETERS (STAT)

## 2020-05-06 PROCEDURE — S0028 INJECTION, FAMOTIDINE, 20 MG: HCPCS | Performed by: STUDENT IN AN ORGANIZED HEALTH CARE EDUCATION/TRAINING PROGRAM

## 2020-05-06 RX ORDER — HYDROMORPHONE HYDROCHLORIDE 1 MG/ML
0.5 INJECTION, SOLUTION INTRAMUSCULAR; INTRAVENOUS; SUBCUTANEOUS EVERY 6 HOURS PRN
Status: DISCONTINUED | OUTPATIENT
Start: 2020-05-06 | End: 2020-05-09

## 2020-05-06 RX ADMIN — LATANOPROST 1 DROP: 50 SOLUTION OPHTHALMIC at 08:05

## 2020-05-06 RX ADMIN — FAMOTIDINE 20 MG: 10 INJECTION INTRAVENOUS at 08:05

## 2020-05-06 RX ADMIN — OXYCODONE HYDROCHLORIDE 5 MG: 5 SOLUTION ORAL at 08:05

## 2020-05-06 RX ADMIN — DEXMEDETOMIDINE HYDROCHLORIDE IN 0.9% SODIUM CHLORIDE 0.6 MCG/KG/HR: 400 INJECTION INTRAVENOUS at 02:05

## 2020-05-06 RX ADMIN — SODIUM CHLORIDE SOLN NEBU 3% 4 ML: 3 NEBU SOLN at 07:05

## 2020-05-06 RX ADMIN — POLYETHYLENE GLYCOL 3350 17 G: 17 POWDER, FOR SOLUTION ORAL at 08:05

## 2020-05-06 RX ADMIN — INSULIN DETEMIR 20 UNITS: 100 INJECTION, SOLUTION SUBCUTANEOUS at 08:05

## 2020-05-06 RX ADMIN — DEXMEDETOMIDINE HYDROCHLORIDE IN 0.9% SODIUM CHLORIDE 0.5 MCG/KG/HR: 400 INJECTION INTRAVENOUS at 08:05

## 2020-05-06 RX ADMIN — CHLORHEXIDINE GLUCONATE 0.12% ORAL RINSE 15 ML: 1.2 LIQUID ORAL at 08:05

## 2020-05-06 RX ADMIN — TIMOLOL MALEATE 1 DROP: 5 SOLUTION/ DROPS OPHTHALMIC at 08:05

## 2020-05-06 RX ADMIN — DOCUSATE SODIUM 100 MG: 50 LIQUID ORAL at 08:05

## 2020-05-06 RX ADMIN — MINERAL OIL AND WHITE PETROLATUM: 150; 830 OINTMENT OPHTHALMIC at 08:05

## 2020-05-06 RX ADMIN — ALBUTEROL SULFATE 2.5 MG: 2.5 SOLUTION RESPIRATORY (INHALATION) at 07:05

## 2020-05-06 RX ADMIN — BRIMONIDINE TARTRATE 1 DROP: 2 SOLUTION OPHTHALMIC at 08:05

## 2020-05-06 RX ADMIN — OXYCODONE HYDROCHLORIDE 5 MG: 5 SOLUTION ORAL at 04:05

## 2020-05-06 RX ADMIN — ENOXAPARIN SODIUM 40 MG: 100 INJECTION SUBCUTANEOUS at 08:05

## 2020-05-06 NOTE — CARE UPDATE
Per md, pt extubated to bipap. Pt appeared uncomfortable with increased rr. Placed pt on 4L NC. Per md, change bipap to qhs. Tolerating well. Will continue to monitor.

## 2020-05-06 NOTE — PROGRESS NOTES
[facility]  COVID-19 or Persons Under Investigation ICU DAILY PROGRESS NOTE    Patient Name: Bharathi Garrido  MRN: [id number]  Admission Date: 4/28/2020   Code Status:   Attending: Venkatesh Haines*     SUBJECTIVE:    HPI:   Mr. Bharathi Garrido is a 71 yo male with obesity, history of prostate cancer (1/2018 s/p prostatectomy and radiation Dr. Pederson), history of difficult intubation for surgery and glaucoma presents with feeling unwell, fever, decreased appetite and shortness of breath. Patient [place] [facility] [place] [facility] [place] higher level of care. Admitted to outside hospital on 3/31/20 for SOB and fever and tested positive for COVID-19.  Upon admission, CXR showed bilateral multifocal opacities. Procalcitonin, LDH, ferritin, CRP and D-dimer elevated. Renal function normal. Mild increase in LFTs. He was treated with Plaquinel, Rocephin and Azithromycin. His respiratory function declined and has been intubated since 4/2/20.     Hospital Course: Bharathi Garrido was admitted to ICU for management of respiratory failure secondary to suspected and/or confirmed COVID-19 requiring mechanical ventilation for respiratory support.      4/28/20: Patient transferred from [facility] for higher level of care as patient still intubated with high oxygen requirements. Sedated on Fentanyl and Precedex, but responding appropriately and following commands per nursing. Intubated on A/C, FiO2 75%, PEEP 10, , rate 18 with sat of 92%. CXR consistent with multilobar pneumonia. On empiric Vanc and Zosyn for recent white count with leukocytosis. PICC from 4/2 removed and central line and a-line placed. Repeat cxs sent. Hgb/Hct stable s/p 1 unit PRBCs on 4/27. NG set to intermittent suction. Abd x-ray showed gaseous distention.      4/29/20: Sedated on Propofol, Precedex, and Dilaudid. Initially on Levophed but weaned off and became hypertensive. Briefly on Cardene gtt. Vent settings  unchanged: FiO2 75% and PEEP 10. New cultures with no growth, cont on empiric abx. Good UOP with IV Lasix and stable renal function. Having BMs.      4/30/20: Weaned off Propofol and Precedex successfully. Cont on IV Dilaudid 1 mg/hr and scheduled Oxy. SpO2 improving. Vent settings weaned to FiO of 65% PEEP 10  Rate 18. Blood pressure stable off pressors/antihypertensives. Having BMs. Started TFs. Good UOP and net negative. Lytes WNR.      5/1/2020: Weaned off dilaudid gtt. SBT today. Put back on rate after 2 hours when pt became tired and tachypnic. Stopped lasix due to elevated creatinine. Consulted ID due to increased WBC and fever despite broad spectrum abx regimen.      5/2/2020: Patient now off dilaudid gtt and tolerating well. Still drowsy. Patient failed SBT today.  Will decrease PO oxy from Q6H to BID. WBC continues to increase, now 22.28, Tmax 100. BC NGTD. Remains on vanc/zosyn - awaiting ID recs. Possible hemoconcentration. Net negative 1.3L. Stop lasix and start free water 200 Q4H. On TFs. Last BM 4/28. On bowel regimen.     5/3/2020: On Precedex gtt and prn Morphine for agitation.  Vent settings this AM: 40% FiO2 and PEEP 5. Oxygen sats 94%. Failed SBT. Consult surgery for trach/PEG. Did have 4 min bradycardia/hypertension during SBT which resolved with oxygen and suctioning. Free water increased to 300mL Q4H for hypernatremia. Stopped vanc/zosyn per ID recs. NGTD on resp/blood/fungal cultures.       Interval History: Hospital day 8. NAEO. Cont on Precedex gtt and Oxycodone 5 mg bid for sedation. Vent settings unchanged: FiO2 40%, PEEP 5, spO2>95%. Tolerated SBT 10/5 x 3 hours yesterday.  Continue water boluses for hypernatremia. Hgb stable following transfusion x 1 pRBC on 5/5. Wound care consult for left hand/wrist with dry, stable eschar.     OBJECTIVE: Limited review of systems and physical exam per team attending and/or nursing staff due to patient being in special isolation.     Vitals:  "  Vitals:    05/06/20 0758 05/06/20 0800 05/06/20 0819 05/06/20 0823   BP: (!) 107/59      BP Location:       Patient Position:       Pulse: 85 87 90 82   Resp: (!) 35 (!) 33 (!) 34 (!) 24   Temp:       TempSrc:       SpO2: (!) 93% (!) 94% (!) 94% (!) 93%   Weight:       Height: 5' 11" (1.803 m)  5' 11" (1.803 m) 5' 11" (1.803 m)       Vent Mode: A/C  Oxygen Concentration (%):  [35-40] 40  Resp Rate Total:  [20 br/min-42 br/min] 20 br/min  Vt Set:  [340 mL-500 mL] 500 mL  PEEP/CPAP:  [5 cmH20] 5 cmH20  Pressure Support:  [10 cmH20] 10 cmH20  Mean Airway Pressure:  [8.4 cmH20-15 cmH20] 12 cmH20     Date 05/06/20 0700 - 05/07/20 0659   Shift 3342-2846 4803-6967 3411-1269 24 Hour Total   INTAKE   I.V.(mL/kg) 13.4(0.1)   13.4(0.1)   NG/GT 55   55   Shift Total(mL/kg) 68.4(0.6)   68.4(0.6)   OUTPUT   Urine(mL/kg/hr) 185   185   Shift Total(mL/kg) 185(1.8)   185(1.8)   Weight (kg) 105.4 105.4 105.4 105.4     Physical Exam   General appearance: Intubated. Light sedation. Opens eyes, tracks. MAETC.    Head: MMM  Lungs: CTAB  Heart: rrr, no m/r/g  Abdomen: SNTND: hypoactive bowel sounds  Extremities: no cyanosis or edema, or clubbing, warm  Skin: wound on dorsum of left hand, dry, stable eschar   Neurologic: Follows commands, eye tracking, answers "yes/no" questions appropriately     Labs: All labs within the last 24 hours were reviewed.     Imaging: All imaging within the last 24 hours was reviewed.     ASSESSMENT & PLAN:   This is a 70 y.o. admitted on 4/28/2020 for respiratory failure secondary to confirmed/suspected COVID-19 requiring mechanical ventilation for respiratory support.     Today's Plan:   - Wean vent settings and SBT as tolerated. Consider possible extubation.   - Hypernatremia improving, continue free water for 2.7 deficit     Neuro:   -Intubated. Awake and alert. Pt able to respond appropriately and follow commands.  -Precedex gtt started for agitation 5/2/2020. Wean as tolerated  -Oxy 5 mg " bid  -PT/OT consulted     Cardiac/Circulatory:   -Bedside TTE performed today by Dr. Huddleston. Technically very difficult with limited images but LVEF at least 40%  -Maintain MAP's >65 with vasopressor support if necessary.   -Pressors d/c'd. BP stable and MAPs in 80s.   -NSR rate 80s-100.  -On DVT prophylaxis with Lovenox.      Pulmonary:   -Repeat COVID-19 on 4/21 positive.   -Completed course of Plaquenil, Azithromycin, and Rocephin   -Wean ventilator settings per ARDSnet protocol.  -Continue mechanical ventilation with ARDS targeted strategies. Goal plateau pressure <30.   - Wean sedation with coordinated SAT and SBT daily as tolerated.     - Failed SBT 5/3. Successful SBT on 5/5 x 3hr.  - Consult surgery for trach/PEG if unable to be extubated.      Renal:   -Monitoring urine output with consideration for dialysis initiation if necessary.   -Off IV Lasix since 5/1/20. UOP 1.9  L yesterday. Net -3.7  L since admission  -WILBUR resolving. BUN 41 and Cr 1.1  -Sodium 146, improving. Cont free water flushes 300 cc q4h, free water deficit 2.7 L.      Gastrointestinal:   -Trending liver function daily. Transaminases WNL.  -Diagnosed with ileus on 4/21/20.  Resolved.  -Famotidine for GI prophylaxis    -On bowel regimen with Miralax and Docusate. BM per rectal tube today.  -Cont TFs per dietician recommendation: TF of Peptamen Intense VHP advancing as tolerated to goal rate of 55 mL/hr to provide 1320 kcal, 121 gm protein, 1109 mL free fluid.  Tolerated TFs at goal rate of 55 cc/hr  - Albumin 1.7     Endocrine:   -No history of DM. Hgb A1C 6.0  -Goal BG <200. At goal.  -Continue insulin detemir 20 units qhs and sliding scale insulin      ID:   -Repeat blood and sputum cultures drawn 5/1/2020. NGTD.   -C diff antigen pos but toxin and PCR negative.  -Respiratory culture 4/26 with candida.  -WBC decreasing. Afebrile since 5/1/20.   -CXR 5/2/2020 with no significant changes.   -Discontinued vanc/zosyn per ID recs.  -Central  line placed 4/29/20.      Hematology/Oncology:   -Trending CBC daily and monitor for signs of bleeding.   - Last transfusion on 5/5 for Hgb 6.8. H/H stable.   -Transfuse for Hgb <7.      ICU DAILY CHECKLIST:   Awake with RASS goal of 0? No, RASS -1    Spontaneous breathing trial performed? Yes   SAT and SBT coordinated? Yes   Head of bed >30 degrees? Yes   Tube feeding initiated? Yes   Glucose at goal? Yes   Having bowel movements daily?  Yes   Stress ulcer prophylaxis ordered? Yes   DVT prophylaxis ordered? Yes   Can indwelling lines be discontinued? No   Can antimicrobials be discontinued? N/A   Family updated? Yes         Critical Care Time: 40  Critical care was time spent personally by me on the following activities: evaluating this patient's organ dysfunction, development of treatment plan, discussing treatment plan with patient or surrogate and bedside caregivers, discussions with consultants, evaluation of patient's response to treatment, examination of patient, ordering and performing treatments and interventions, ordering and review of laboratory studies, ordering and review of radiographic studies, re-evaluation of patient's condition. This critical care time did not overlap with that of any other provider or involve time for any procedures.

## 2020-05-06 NOTE — PLAN OF CARE
05/06/20 1223   Discharge Reassessment   Assessment Type Discharge Planning Reassessment   Do you have any problems affording any of your prescribed medications? TBD   Discharge Plan A Long-term acute care facility (LTAC)   Discharge Plan B Skilled Nursing Facility

## 2020-05-07 LAB
ALBUMIN SERPL BCP-MCNC: 1.9 G/DL (ref 3.5–5.2)
ALP SERPL-CCNC: 121 U/L (ref 55–135)
ALT SERPL W/O P-5'-P-CCNC: 105 U/L (ref 10–44)
ANION GAP SERPL CALC-SCNC: 9 MMOL/L (ref 8–16)
AST SERPL-CCNC: 68 U/L (ref 10–40)
BASOPHILS # BLD AUTO: 0.07 K/UL (ref 0–0.2)
BASOPHILS NFR BLD: 0.5 % (ref 0–1.9)
BILIRUB SERPL-MCNC: 2.1 MG/DL (ref 0.1–1)
BUN SERPL-MCNC: 33 MG/DL (ref 8–23)
CALCIUM SERPL-MCNC: 8.8 MG/DL (ref 8.7–10.5)
CHLORIDE SERPL-SCNC: 106 MMOL/L (ref 95–110)
CO2 SERPL-SCNC: 29 MMOL/L (ref 23–29)
CREAT SERPL-MCNC: 1.2 MG/DL (ref 0.5–1.4)
DIFFERENTIAL METHOD: ABNORMAL
EOSINOPHIL # BLD AUTO: 0.4 K/UL (ref 0–0.5)
EOSINOPHIL NFR BLD: 2.9 % (ref 0–8)
ERYTHROCYTE [DISTWIDTH] IN BLOOD BY AUTOMATED COUNT: 16.9 % (ref 11.5–14.5)
EST. GFR  (AFRICAN AMERICAN): >60 ML/MIN/1.73 M^2
EST. GFR  (NON AFRICAN AMERICAN): >60 ML/MIN/1.73 M^2
GLUCOSE SERPL-MCNC: 83 MG/DL (ref 70–110)
HCT VFR BLD AUTO: 27.2 % (ref 40–54)
HGB BLD-MCNC: 8.2 G/DL (ref 14–18)
IMM GRANULOCYTES # BLD AUTO: 0.1 K/UL (ref 0–0.04)
IMM GRANULOCYTES NFR BLD AUTO: 0.7 % (ref 0–0.5)
LYMPHOCYTES # BLD AUTO: 1.1 K/UL (ref 1–4.8)
LYMPHOCYTES NFR BLD: 7.1 % (ref 18–48)
MAGNESIUM SERPL-MCNC: 2.3 MG/DL (ref 1.6–2.6)
MCH RBC QN AUTO: 27.2 PG (ref 27–31)
MCHC RBC AUTO-ENTMCNC: 30.1 G/DL (ref 32–36)
MCV RBC AUTO: 90 FL (ref 82–98)
MONOCYTES # BLD AUTO: 1.2 K/UL (ref 0.3–1)
MONOCYTES NFR BLD: 8 % (ref 4–15)
NEUTROPHILS # BLD AUTO: 12.4 K/UL (ref 1.8–7.7)
NEUTROPHILS NFR BLD: 80.8 % (ref 38–73)
NRBC BLD-RTO: 0 /100 WBC
PHOSPHATE SERPL-MCNC: 3.3 MG/DL (ref 2.7–4.5)
PLATELET # BLD AUTO: 395 K/UL (ref 150–350)
PMV BLD AUTO: 11.5 FL (ref 9.2–12.9)
POCT GLUCOSE: 105 MG/DL (ref 70–110)
POCT GLUCOSE: 60 MG/DL (ref 70–110)
POCT GLUCOSE: 71 MG/DL (ref 70–110)
POCT GLUCOSE: 76 MG/DL (ref 70–110)
POCT GLUCOSE: 93 MG/DL (ref 70–110)
POTASSIUM SERPL-SCNC: 3.8 MMOL/L (ref 3.5–5.1)
PROT SERPL-MCNC: 7.3 G/DL (ref 6–8.4)
RBC # BLD AUTO: 3.02 M/UL (ref 4.6–6.2)
SODIUM SERPL-SCNC: 144 MMOL/L (ref 136–145)
WBC # BLD AUTO: 15.32 K/UL (ref 3.9–12.7)

## 2020-05-07 PROCEDURE — 97166 OT EVAL MOD COMPLEX 45 MIN: CPT

## 2020-05-07 PROCEDURE — 25000003 PHARM REV CODE 250: Performed by: INTERNAL MEDICINE

## 2020-05-07 PROCEDURE — 27200966 HC CLOSED SUCTION SYSTEM

## 2020-05-07 PROCEDURE — 97535 SELF CARE MNGMENT TRAINING: CPT

## 2020-05-07 PROCEDURE — S0028 INJECTION, FAMOTIDINE, 20 MG: HCPCS | Performed by: STUDENT IN AN ORGANIZED HEALTH CARE EDUCATION/TRAINING PROGRAM

## 2020-05-07 PROCEDURE — 99900035 HC TECH TIME PER 15 MIN (STAT)

## 2020-05-07 PROCEDURE — 27000190 HC CPAP FULL FACE MASK W/VALVE

## 2020-05-07 PROCEDURE — 99233 PR SUBSEQUENT HOSPITAL CARE,LEVL III: ICD-10-PCS | Mod: ,,, | Performed by: INTERNAL MEDICINE

## 2020-05-07 PROCEDURE — 97530 THERAPEUTIC ACTIVITIES: CPT

## 2020-05-07 PROCEDURE — 85025 COMPLETE CBC W/AUTO DIFF WBC: CPT

## 2020-05-07 PROCEDURE — 84100 ASSAY OF PHOSPHORUS: CPT

## 2020-05-07 PROCEDURE — 27000221 HC OXYGEN, UP TO 24 HOURS

## 2020-05-07 PROCEDURE — 94761 N-INVAS EAR/PLS OXIMETRY MLT: CPT

## 2020-05-07 PROCEDURE — 80053 COMPREHEN METABOLIC PANEL: CPT

## 2020-05-07 PROCEDURE — 99233 SBSQ HOSP IP/OBS HIGH 50: CPT | Mod: ,,, | Performed by: INTERNAL MEDICINE

## 2020-05-07 PROCEDURE — 97162 PT EVAL MOD COMPLEX 30 MIN: CPT

## 2020-05-07 PROCEDURE — 63600175 PHARM REV CODE 636 W HCPCS: Performed by: INTERNAL MEDICINE

## 2020-05-07 PROCEDURE — 92610 EVALUATE SWALLOWING FUNCTION: CPT

## 2020-05-07 PROCEDURE — 25000242 PHARM REV CODE 250 ALT 637 W/ HCPCS: Performed by: INTERNAL MEDICINE

## 2020-05-07 PROCEDURE — 94640 AIRWAY INHALATION TREATMENT: CPT

## 2020-05-07 PROCEDURE — 94668 MNPJ CHEST WALL SBSQ: CPT

## 2020-05-07 PROCEDURE — 20000000 HC ICU ROOM

## 2020-05-07 PROCEDURE — 83735 ASSAY OF MAGNESIUM: CPT

## 2020-05-07 PROCEDURE — 25000003 PHARM REV CODE 250: Performed by: STUDENT IN AN ORGANIZED HEALTH CARE EDUCATION/TRAINING PROGRAM

## 2020-05-07 PROCEDURE — 94660 CPAP INITIATION&MGMT: CPT

## 2020-05-07 PROCEDURE — 97112 NEUROMUSCULAR REEDUCATION: CPT

## 2020-05-07 RX ADMIN — SODIUM CHLORIDE SOLN NEBU 3% 4 ML: 3 NEBU SOLN at 08:05

## 2020-05-07 RX ADMIN — CHLORHEXIDINE GLUCONATE 0.12% ORAL RINSE 15 ML: 1.2 LIQUID ORAL at 08:05

## 2020-05-07 RX ADMIN — DEXTROSE 125 ML: 10 SOLUTION INTRAVENOUS at 12:05

## 2020-05-07 RX ADMIN — MINERAL OIL AND WHITE PETROLATUM: 150; 830 OINTMENT OPHTHALMIC at 08:05

## 2020-05-07 RX ADMIN — BRIMONIDINE TARTRATE 1 DROP: 2 SOLUTION OPHTHALMIC at 09:05

## 2020-05-07 RX ADMIN — TIMOLOL MALEATE 1 DROP: 5 SOLUTION/ DROPS OPHTHALMIC at 08:05

## 2020-05-07 RX ADMIN — BRIMONIDINE TARTRATE 1 DROP: 2 SOLUTION OPHTHALMIC at 08:05

## 2020-05-07 RX ADMIN — LATANOPROST 1 DROP: 50 SOLUTION OPHTHALMIC at 08:05

## 2020-05-07 RX ADMIN — ALBUTEROL SULFATE 2.5 MG: 2.5 SOLUTION RESPIRATORY (INHALATION) at 08:05

## 2020-05-07 RX ADMIN — FAMOTIDINE 20 MG: 10 INJECTION INTRAVENOUS at 08:05

## 2020-05-07 RX ADMIN — FAMOTIDINE 20 MG: 10 INJECTION INTRAVENOUS at 09:05

## 2020-05-07 RX ADMIN — DEXTROSE 125 ML: 10 SOLUTION INTRAVENOUS at 06:05

## 2020-05-07 RX ADMIN — ENOXAPARIN SODIUM 40 MG: 100 INJECTION SUBCUTANEOUS at 09:05

## 2020-05-07 NOTE — NURSING
No acute events overnight    Neuro: pt AAOx3. Move all extremities spontaneously, although weak. Will benefit from PT/OT. Afebrile    CV: NSR 70s-100. BP <180. CVP 4. Pulses palpable     Resp: pt wean to 3L spo2 >93. Good productive cough, thin clear sputum.    GI/: NPO. Speech to evaluate him this morning. D10 bolus given x2. Levemir DC this am by team. UOP 1L from [place]    No drips    tlc

## 2020-05-07 NOTE — PT/OT/SLP EVAL
Speech Language Pathology Evaluation  Bedside Swallow    Patient Name:  Bharathi Garrido   MRN:  [id number]  Admitting Diagnosis: Pneumonia due to COVID-19 virus    Recommendations:                 General Recommendations:  Dysphagia therapy  Diet recommendations:  Regular, Thin   Aspiration Precautions: Feed only when awake/alert, HOB to 90 degrees, Meds whole 1 at a time, No straws and Standard aspiration precautions   General Precautions: Standard, aspiration, airborne, droplet, fall  Communication strategies:  none    History:     Past Medical History:   Diagnosis Date    Class 1 obesity with body mass index (BMI) of 33.0 to 33.9 in adult 2/1/2018    Difficult intubation     Glaucoma     Prostate cancer 2/1/2018    Sebaceous cyst of scrotum        Past Surgical History:   Procedure Laterality Date    INCISION AND DRAINAGE OF WOUND Left 2011, 2017    scrotum; multiple I&D         Prior Intubation HX:  4/1-5/6 intubated/extubated x2  *    Prior diet: Regular/thin.      Subjective     Awake/alert      Pain/Comfort:  · Pain Rating 1: 0/10  · Pain Rating Post-Intervention 1: 0/10    Objective:     Oral Musculature Evaluation  · Oral Musculature: WFL  · Dentition: scattered dentition  · Oral Labial Strength and Mobility: WFL  · Lingual Strength and Mobility: WFL  · Volitional Cough: productive  · Voice Prior to PO Intake: hoarse, adequate intensity    Bedside Swallow Eval:   Consistencies Assessed:  · Thin liquids x8 oz cup/straw  · Puree x4  · Solids x5     Oral Phase:   · WFL    Pharyngeal Phase:   · no overt clinical signs/symptoms of aspiration  · throat clearing  Post straw sips x2  · spO2 remained between % throughout trials    SLP provided education to pt on dysphagia post prolonged intubation, swallow precautions and diet recs. Pt verbalized understanding.   Assessment:     Bharathi Garrido is a 70 y.o. male with an SLP diagnosis of Dysphagia.      Goals:   Multidisciplinary Problems     SLP Goals         Problem: SLP Goal    Goal Priority Disciplines Outcome   SLP Goal     SLP Ongoing, Progressing   Description:  Speech Language Pathology Goals  Goals expected to be met by 5/14    1. Pt will tolerate in regular diet/thin liquids without overt clinical signs of aspiraiton                          Plan:     · Patient to be seen:  4 x/week   · Plan of Care reviewed with:  patient   · SLP Follow-Up:  Yes       Discharge recommendations:  rehabilitation facility       Time Tracking:     SLP Treatment Date:   05/07/20  Speech Start Time:  1014  Speech Stop Time:  1034     Speech Total Time (min):  20 min    Billable Minutes: Treatment Swallowing Dysfunction 10 and [facility]/Home Management Training 10    Ирина Hollis CCC-SLP  05/07/2020

## 2020-05-07 NOTE — PT/OT/SLP EVAL
Occupational Therapy   Evaluation    Name: Bharathi Garrido  MRN: [id number]  Admitting Diagnosis:  Pneumonia due to COVID-19 virus      Recommendations:     Discharge Recommendations: rehabilitation facility  Discharge Equipment Recommendations:  other (see comments)(TBD)  Barriers to discharge:  Other (Comment), Inaccessible home environment(Pt requires increased assistance at current functional level )    Assessment:     Bharathi Garrido is a 70 y.o. male with a medical diagnosis of Pneumonia due to COVID-19 virus.  He presents with performance deficits affecting function: weakness, impaired endurance, impaired self care skills, impaired functional mobilty, gait instability, impaired balance, decreased upper extremity function, decreased lower extremity function, decreased coordination, impaired coordination, decreased ROM, impaired fine motor, impaired cardiopulmonary response to activity, edema.  Pt would benefit from continued skilled acute OT services in order to maximize independence and safety with ADLs and functional mobility to ensure safe return to PLOF in the least restrictive environment. Pt is an appropriate candidate for IP Rehab in order to improve return to PLOF.     Rehab Prognosis: Good; patient would benefit from acute skilled OT services to address these deficits and reach maximum level of function.       Plan:     Patient to be seen 5 x/week to address the above listed problems via self-care/home management, therapeutic activities, therapeutic exercises, neuromuscular re-education  · Plan of Care Expires: 06/05/20  · Plan of Care Reviewed with: patient    Subjective     Chief Complaint: limited mobility and prolonged stay in the bed   Patient/Family Comments/goals: to get better and get stronger     Occupational Profile:  Living Environment: Pt lives with his ex wife in a [facility] with 4 JANUSZ and no HR present. Pt has a tub/shower combo with no DME. Pt is retired and was driving. Pt enjoys jogging.   Previous  "level of function: PTA, pt was (I) with ADLs and functional mobility.   Roles and Routines: home and community dweller  Equipment Used at Home:  none  Assistance upon Discharge: Pt will have assistance from family upon d/c.     Pain/Comfort:  · Pain Rating 1: 0/10  · Pain Rating Post-Intervention 1: 0/10    Patients cultural, spiritual, Restorationist conflicts given the current situation: no    Objective:     Communicated with: RN prior to session.  Patient found with bed in chair position with blood pressure cuff, bowel management system, central line, del toro catheter, pulse ox (continuous), telemetry, oxygen, arterial line upon OT entry to room.    Pt stated, " How long have I been in the bed?"    General Precautions: Standard, airborne, contact, droplet, fall   Orthopedic Precautions:N/A   Braces: N/A     Occupational Performance:    Bed Mobility:    · Patient completed Rolling/Turning to Right with maximal assistance  · Patient completed Scooting/Bridging with total assistance and 2 persons for anterior scooting towards EOB to have B feet planted and for supine scooting towards HOB   · Patient completed Supine to Sit with total assistance and 2 persons  · Patient completed Sit to Supine with total assistance and 2 persons      Activities of Daily Living:  · Grooming: maximal assistance   · Pt completed oral care while sitting EOB   · Pt required facilitation at elbow to bring toothbrush to mouth and to bring cup of water/mouthwash to mouth   · Pt with slouched posturing and decreased UE ROM requiring increased assistance for task completion.    · Lower Body Dressing: total assistance to don B  socks   · Toileting: total assistance BMS and del toro catheter in place     Cognitive/Visual Perceptual:  Cognitive/Psychosocial Skills:     -       Oriented to: Person, Place, Time and Situation   -       Follows Commands/attention:Follows multistep  commands  -       Communication: clear/fluent  -       Memory: No Deficits " noted  -       Safety awareness/insight to disability: intact   -       Mood/Affect/Coping skills/emotional control: Appropriate to situation  Visual/Perceptual:      -pt may have slight visual deficts due to difficulty finding toothbrush       Physical Exam:  Balance:    - Static sit: max A; pt tolerated sitting EOB for 15 mins - R lateral lean requiring tactile cues to bring self to midline. Slouched posturing and cervical flexion requiring facilitation to obtain neutral spine   -  Dynamic sit: max A <> total A    Postural examination/scapula alignment:    -       Rounded shoulders  -       Forward head  -       Posterior pelvic tilt  Skin integrity: wound noted L wrist   Edema:  Moderate B UEs  Sensation:    -       Intact  light/touch BUEs  Dominant hand:    -       right   Upper Extremity Range of Motion:     -       Right Upper Extremity: WFL for PROM   -       Left Upper Extremity: WFL for PROM   Upper Extremity Strength:    -       Right Upper Extremity: Deficits: 3-/5  -       Left Upper Extremity: Deficits: 3-/5     AMPAC 6 Click ADL:  AMPAC Total Score: 8    Treatment & Education:  - Pt educated on role of OT, POC, and goals for therapy.    - Pt tolerated sitting 15 mins with max A   - Pt educated on d/c recs  - Pt SpO2 dropped to 75% while sitting EOB and was increased to 7L during therapy session.   - Pt with limited FM coordination and grasp requiring increased assistance for grooming tasks and self-feeding. Increased time required for grooming task.     - Time provided for therapeutic counseling and discussion of health disposition.   - Pt completed ADLs and functional mobility for treatment session as noted above   - Pt verbalized understanding. Pt expressed no further concerns/questions.  - whiteboard updated   Education:    Patient left with bed in chair position with all lines intact, call button in reach and RN notified    GOALS:   Multidisciplinary Problems     Occupational Therapy Goals         Problem: Occupational Therapy Goal    Goal Priority Disciplines Outcome Interventions   Occupational Therapy Goal     OT, PT/OT Ongoing, Progressing    Description:  Goals to be met by: 5/21/2020     Patient will increase functional independence with ADLs by performing:    Grooming while EOB with Minimal Assistance.  Toileting from bedside commode with Moderate Assistance for hygiene and clothing management.   Sitting at edge of bed x10 minutes with Stand-by Assistance.  Stand pivot transfers with Moderate Assistance.  Toilet transfer to bedside commode with Moderate Assistance.                      History:     Past Medical History:   Diagnosis Date    Class 1 obesity with body mass index (BMI) of 33.0 to 33.9 in adult 2/1/2018    Difficult intubation     Glaucoma     Prostate cancer 2/1/2018    Sebaceous cyst of scrotum        Past Surgical History:   Procedure Laterality Date    INCISION AND DRAINAGE OF WOUND Left 2011, 2017    scrotum; multiple I&D       Time Tracking:     OT Date of Treatment: 05/07/20  OT Start Time: 0909  OT Stop Time: 0947  OT Total Time (min): 38 min    Billable Minutes:Evaluation 15  Self Care/Home Management 13  Therapeutic Activity 10    Fanny Ford OT  5/7/2020

## 2020-05-07 NOTE — PROGRESS NOTES
[facility]  COVID-19 or Persons Under Investigation ICU DAILY PROGRESS NOTE    Patient Name: Bharathi Garrido  MRN: [id number]  Admission Date: 4/28/2020   Code Status:   Attending: Venkatesh Haines*     SUBJECTIVE:    HPI:   Mr. Bharathi Garrido is a 69 yo male with obesity, history of prostate cancer (1/2018 s/p prostatectomy and radiation Dr. Pederson), history of difficult intubation for surgery and glaucoma presents with feeling unwell, fever, decreased appetite and shortness of breath. Patient transferred from [facility] to [facility] 4/28/20 for higher level of care. Admitted to outside hospital on 3/31/20 for SOB and fever and tested positive for COVID-19.  Upon admission, CXR showed bilateral multifocal opacities. Procalcitonin, LDH, ferritin, CRP and D-dimer elevated. Renal function normal. Mild increase in LFTs. He was treated with Plaquinel, Rocephin and Azithromycin. His respiratory function declined and has been intubated since 4/2/20.     Hospital Course: Bharathi Garrido was admitted to ICU for management of respiratory failure secondary to suspected and/or confirmed COVID-19 requiring mechanical ventilation for respiratory support.      4/28/20: Patient transferred from [facility] for higher level of care as patient still intubated with high oxygen requirements. Sedated on Fentanyl and Precedex, but responding appropriately and following commands per nursing. Intubated on A/C, FiO2 75%, PEEP 10, , rate 18 with sat of 92%. CXR consistent with multilobar pneumonia. On empiric Vanc and Zosyn for recent white count with leukocytosis. PICC from 4/2 removed and central line and a-line placed. Repeat cxs sent. Hgb/Hct stable s/p 1 unit PRBCs on 4/27. NG set to intermittent suction. Abd x-ray showed gaseous distention.      4/29/20: Sedated on Propofol, Precedex, and Dilaudid. Initially on Levophed but weaned off and became hypertensive. Briefly on Cardene gtt. Vent settings  unchanged: FiO2 75% and PEEP 10. New cultures with no growth, cont on empiric abx. Good UOP with IV Lasix and stable renal function. Having BMs.      4/30/20: Weaned off Propofol and Precedex successfully. Cont on IV Dilaudid 1 mg/hr and scheduled Oxy. SpO2 improving. Vent settings weaned to FiO of 65% PEEP 10  Rate 18. Blood pressure stable off pressors/antihypertensives. Having BMs. Started TFs. Good UOP and net negative. Lytes WNR.      5/1/2020: Weaned off dilaudid gtt. SBT today. Put back on rate after 2 hours when pt became tired and tachypnic. Stopped lasix due to elevated creatinine. Consulted ID due to increased WBC and fever despite broad spectrum abx regimen.      5/2/2020: Patient now off dilaudid gtt and tolerating well. Still drowsy. Patient failed SBT today.  Will decrease PO oxy from Q6H to BID. WBC continues to increase, now 22.28, Tmax 100. BC NGTD. Remains on vanc/zosyn - awaiting ID recs. Possible hemoconcentration. Net negative 1.3L. Stop lasix and start free water 200 Q4H. On TFs. Last BM 4/28. On bowel regimen.     5/3/2020: On Precedex gtt and prn Morphine for agitation.  Vent settings this AM: 40% FiO2 and PEEP 5. Oxygen sats 94%. Failed SBT. Consult surgery for trach/PEG. Did have 4 min bradycardia/hypertension during SBT which resolved with oxygen and suctioning. Free water increased to 300mL Q4H for hypernatremia. Stopped vanc/zosyn per ID recs. NGTD on resp/blood/fungal cultures.     5/5-5/6/2020: Weaned off sedation and tolerating minimal vent settings. Successful SBTs with NIF 46 and RISB 70. Extubated on 5/6 to 4 L NC. Neuro intact.       Interval History: Hospital day 9. NAEO. Extubated 5/6 to 4 L NC. Hypoglycemic overnight with glucose 60. Long-acting insulin discontinued. Hypernatremia resolved. Plan for speech/swallow evaluation today. PT/OT.  Wound care consult for left hand/wrist with dry, stable eschar. Likely appropriate for transfer to stepdown tomorrow.     OBJECTIVE:  Limited review of systems and physical exam per team attending and/or nursing staff due to patient being in special isolation.     Vitals:   Vitals:    05/07/20 0300 05/07/20 0400 05/07/20 0500 05/07/20 0600   BP: (!) 155/78 (!) 156/81 (!) 155/73 (!) 156/72   BP Location: Left arm Left arm Left arm Left arm   Patient Position: Lying Lying Lying Lying   Pulse: 97 107 87 88   Resp: (!) 35 (!) 37 (!) 29 (!) 32   Temp:       TempSrc:       SpO2: 96% 96% 99% 97%   Weight:       Height:             Physical Exam   General appearance: Intact, AAO x 3  Lungs: CTAB  Heart: rrr, no m/r/g  Abdomen: SNTND: hypoactive bowel sounds  Extremities: no cyanosis or edema, or clubbing, warm  Skin: wound on dorsum of left hand, dry, stable eschar     Labs: All labs within the last 24 hours were reviewed.     Imaging: All imaging within the last 24 hours was reviewed.     ASSESSMENT & PLAN:   This is a 70 y.o. admitted on 4/28/2020 for respiratory failure secondary to confirmed/suspected COVID-19 requiring mechanical ventilation for respiratory support.     Today's Plan:   - SLP/swallow evaluation   - Monitor BG and for signs of hypoglycemia  - PT/OT  - Establish PIV access/midline, remove central line   - Transfer to stepdown in next day  - Discuss POC with family     Neuro:   -Intubated 34 days, extubated 5/6 to 4 L NC. Awake and alert, intact.   -Dilaudid 0.5 mg q6h PRN  -PT/OT consulted     Cardiac/Circulatory:   -Bedside TTE performed today by Dr. Huddleston. Technically very difficult with limited images but LVEF at least 40%  -Pressors d/c'd. BP stable and MAPs in 80s.   -NSR rate 80s-100.  -On DVT prophylaxis with Lovenox.      Pulmonary:   -Repeat COVID-19 on 4/21 positive.   -Completed course of Plaquenil, Azithromycin, and Rocephin   -Wean ventilator settings per [employer] protocol.  -Continue mechanical ventilation with ARDS targeted strategies. Goal plateau pressure <30.   - Wean sedation with coordinated SAT and SBT daily as  tolerated.   - Extubated 5/6, intubated 34 days      Renal:   -Monitoring urine output with consideration for dialysis initiation if necessary.   -Off IV Lasix since 5/1/20.   -WILBUR resolving.   -Hypernatremia, resolved      Gastrointestinal:   -Trending liver function daily. Transaminases WNL.  -Diagnosed with ileus on 4/21/20.  Resolved.  -Famotidine for GI prophylaxis    -On bowel regimen with Miralax and Docusate. BM per rectal tube today.  -TFs discontinued 5/6 with extubation  - SLP to evaluate swallow. May need NG placement for nutrition   - Albumin 1.7     Endocrine:   -No history of DM. Hgb A1C 6.0  -Goal BG <200. At goal.  - Continue sliding scale insulin, 5/6 d/c long acting insulin 2/2 to hypoglycemia      ID:   -Repeat blood and sputum cultures drawn 5/1/2020. NGTD.   -C diff antigen pos but toxin and PCR negative.  -Respiratory culture 4/26 with candida.  -WBC decreasing. Afebrile since 5/1/20.   -CXR 5/2/2020 with no significant changes.   -Discontinued vanc/zosyn per ID recs.  -Central line placed 4/29/20.      Hematology/Oncology:   -Trending CBC daily and monitor for signs of bleeding.   - Last transfusion on 5/5 for Hgb 6.8. H/H stable.   -Transfuse for Hgb <7.        Patient's wife updated with current status and discussed plan and goals of care. Will continue to update daily.     Yessi Ledesma DNP, FNP-BC  05/07/2020  9:55 AM     Critical Care Time: 30  Critical care was time spent personally by me on the following activities: evaluating this patient's organ dysfunction, development of treatment plan, discussing treatment plan with patient or surrogate and bedside caregivers, discussions with consultants, evaluation of patient's response to treatment, examination of patient, ordering and performing treatments and interventions, ordering and review of laboratory studies, ordering and review of radiographic studies, re-evaluation of patient's condition. This critical care time did not overlap with  that of any other provider or involve time for any procedures.

## 2020-05-07 NOTE — PLAN OF CARE
Problem: SLP Goal  Goal: SLP Goal  Description  Speech Language Pathology Goals  Goals expected to be met by 5/14    1. Pt will tolerate in regular diet/thin liquids without overt clinical signs of aspiraiton         Outcome: Ongoing, Progressing   Bedside swallow study completed. Recommend regular diet/thin liquids at this time.   Ирина Hollis CCC-SLP  5/7/2020

## 2020-05-07 NOTE — PLAN OF CARE
Problem: Occupational Therapy Goal  Goal: Occupational Therapy Goal  Description  Goals to be met by: 5/21/2020     Patient will increase functional independence with ADLs by performing:    Grooming while EOB with Minimal Assistance.  Toileting from bedside commode with Moderate Assistance for hygiene and clothing management.   Sitting at edge of bed x10 minutes with Stand-by Assistance.  Stand pivot transfers with Moderate Assistance.  Toilet transfer to bedside commode with Moderate Assistance.     Outcome: Ongoing, Progressing      Initial eval completed   POC implemented and goals established     Fanny Ford, OTR/L  [phone]  5/7/2020

## 2020-05-07 NOTE — PT/OT/SLP EVAL
Physical Therapy Evaluation    Patient Name:  Bharathi Garrido   MRN:  [id number]  Admit Date: 4/28/2020  Admitting Diagnosis:  Pneumonia due to COVID-19 virus   Length of Stay: 9 days  Recent Surgery: * No surgery found *      Recommendations:     Discharge Recommendations:  rehabilitation facility   Discharge Equipment Recommendations: other (see comments)(tbd pending patient progress)   Barriers to discharge: None    Assessment:     Bharathi Garrido is a 70 y.o. male admitted with a medical diagnosis of Pneumonia due to COVID-19 virus.  He presents with the following impairments/functional limitations: weakness, impaired endurance, impaired self care skills, impaired functional mobilty, gait instability, impaired balance, impaired cardiopulmonary response to activity, impaired fine motor, decreased lower extremity function, decreased upper extremity function, decreased coordination, impaired coordination, edema. Pt tolerated initial evaluation well today. Pt presents for evaluation AxOx3 after prolonged intubation period. Pt demonstrating impaired strength and cardiopulmonary response to activity on this date, as seen by pt req'ing high levels of assist to complete bed mobility as well as EOB activity. VSS throughout time EOB.Pt's decline in strength and overall endurance due to prolonged period of bed rest due to medical comorbidities. Pt is an excellent candidate for inpatient rehabilitation, as pt has a qualifying diagnosis, displays good activity tolerance, has experienced decline from PLOF, has good family support, and is motivated to participate in therapy.     Rehab Prognosis: Good; patient would benefit from acute skilled PT services to address these deficits and reach maximum level of function.      Plan:     During this hospitalization, patient to be seen 5 x/week to address the identified rehab impairments via gait training, therapeutic exercises, therapeutic activities, neuromuscular re-education and progress  "towards the established goals.    · Plan of Care Expires:  06/07/20    Subjective     RN notified prior to session. No family/friends present upon PT entrance into room.    Chief Complaint: "I have been in bed for how long!?"  Patient/Family Comments/goals: Pt wants to get stronger  Pain/Comfort:  · Pain Rating 1: 0/10  · Pain Rating Post-Intervention 1: 0/10    Living Environment:  Patient lives with significant other in a single family home with 4 JANUSZ and no HR with tub/shower.   Prior Level of Function: Patient reports being independent with mobility & with ADLs. Patient uses DME as follows: none. DME owned (not currently used): none.  Roles/Repsonsibilities: Hand Dominance: right Work: retired. Drive: yes. Managing Medicines/Managing Home: yes. Hobbies: Pt reports he spends most of his time at home.    Patient reports they will have assistance from family upon discharge.    Objective:     Additional staff present: OT for co-eval    Patient found HOB elevated with: blood pressure cuff, pulse ox (continuous), telemetry, oxygen, central line, arterial line, del toro catheter, bowel management system     General Precautions: Standard, Cardiac airborne, contact, fall, droplet   Orthopedic Precautions:N/A   Braces: N/A   Body mass index is 32.41 kg/m².  Oxygen Device: Nasal Cannula 5L    Exams:  · Mental Status: Patient is AxOx4 and follows all multi-step verbal commands. Pt is Alert and Cooperative during session.  · Skin Integrity: Visible skin intact  · Edema: Mild bilateral hands  · Sensation: Intact  · Hearing: Intact  · Vision:  Intact  · Postural Assessment: slouched posture, rounded shoulders and forward head  · Range of Motion:  · RUE: WFL  · LUE: WFL  · RLE: WFL  · LLE: WFL  · Strength Exam:  · Lower Extremity Strength  Right LE  Left LE    Knee extension: 3-/5 Knee extension: 3-/5   Knee flexion: 3/5 Knee flexion: 3/5   Hip flexion: 2+/5 Hip flexion: 2+/5   Ankle dorsiflexion:   3/5 Ankle dorsiflexion:   3/5 "   Ankle plantarflexion: 3/5 Ankle plantarflexion: 3/5       Outcome Measures:  AM-PAC 6 CLICK MOBILITY  Turning over in bed (including adjusting bedclothes, sheets and blankets)?: 2  Sitting down on and standing up from a chair with arms (e.g., wheelchair, bedside commode, etc.): 1  Moving from lying on back to sitting on the side of the bed?: 2  Moving to and from a bed to a chair (including a wheelchair)?: 1  Need to walk in hospital room?: 1  Climbing 3-5 steps with a railing?: 1  Basic Mobility Total Score: 8     Functional Mobility:    Bed Mobility:   · Rolling/Turning to Left: total assistance, 2 persons and with side rail  · Rolling/Turning to Right: total assistance, 2 persons and with side rail  · Scooting to HOB via supine bridge: total assistance, 2 persons and with drawsheet  · Supine to Sit: total assistance and 2 persons; from Lt side of bed  · Scooting anteriorly to EOB to have both feet planted on floor: total assistance  · Sit to Supine: total assistance and 2 persons; to Lt side of bed    Sitting Balance at Edge of Bed:   Assistance Level Required: Maximum Assistance   Time: 15 minutes   Postural deviations noted: slouched posture, rounded shoulders, forward head, increased kyphosis, posterior pelvic tilt and increased cervical flexion   Comments: Pt made attempts to utilize core musculature for sitting upright posture but pt quickly fatigued and req'd assist for upright.  Neuromuscular facilitation was provided to bilateral QL, periscapular musculature, and c-sp extensors to promote activation of postural musculature. Pt req'd additional cueing along Lt oblique to prevent Rt lateral lean. Pt was also able to tolerate reaches inside JIM and accept internal perturbations with Max A with focus on dynamic balance while performing ADLs with OT.    Transfers: Deferred due to pt's performance with above listed functional mobility    Education:   Time provided for education, counseling and discussion  of health disposition in regards to patient's current status   All questions answered within PT scope of practice and to patient's satisfaction   PT role in POC to address current functional deficits   Pt educated on proper body mechanics, safety techniques, and energy conservation with PT facilitation and cueing throughout session   Whiteboard updated with pt's current mobility status documented above    Patient left with bed in chair position with all lines intact, call button in reach and RN notified.    GOALS:   Multidisciplinary Problems     Physical Therapy Goals        Problem: Physical Therapy Goal    Goal Priority Disciplines Outcome Goal Variances Interventions   Physical Therapy Goal     PT, PT/OT Ongoing, Progressing     Description:  Goals to be met by: 2020     Patient will increase functional independence with mobility by performin. Supine to sit with MInimal Assistance  2. Sit to supine with MInimal Assistance  3. Sit to stand transfer with Maximum Assistance  4. Bed to chair transfer with Maximum Assistance using LRAD  5. Gait  x 10 feet with Maximum Assistance using LRAD.   6. Sitting at edge of bed x10 minutes with Stand-by Assistance while performing UE dynamic task to prepare for functional mobility and ADLs  7. Lower extremity exercise program x20 reps per handout, with independence                      History:     Past Medical History:   Diagnosis Date    Class 1 obesity with body mass index (BMI) of 33.0 to 33.9 in adult 2018    Difficult intubation     Glaucoma     Prostate cancer 2018    Sebaceous cyst of scrotum        Past Surgical History:   Procedure Laterality Date    INCISION AND DRAINAGE OF WOUND Left ,     scrotum; multiple I&D       Time Tracking:     PT Received On: 20  PT Start Time: 909     PT Stop Time: 949  PT Total Time (min): 40 min     Billable Minutes: Evaluation 15, Therapeutic Activity 10 and Neuromuscular Re-education  15    Yessi Ramírez, PT, DPT  5/7/2020  Pager: [phone]

## 2020-05-07 NOTE — PLAN OF CARE
Discharge Recommendation: Rehab.    Eval completed today. PT goals appropriate.    Progressive Mobility Protocol: Patient is safe to perform bed level activity with nursing staff.    Yessi Ramírez, PT, DPT  2020  Pager: [phone]          Problem: Physical Therapy Goal  Goal: Physical Therapy Goal  Description  Goals to be met by: 2020     Patient will increase functional independence with mobility by performin. Supine to sit with MInimal Assistance  2. Sit to supine with MInimal Assistance  3. Sit to stand transfer with Maximum Assistance  4. Bed to chair transfer with Maximum Assistance using LRAD  5. Gait  x 10 feet with Maximum Assistance using LRAD.   6. Sitting at edge of bed x10 minutes with Stand-by Assistance while performing UE dynamic task to prepare for functional mobility and ADLs  7. Lower extremity exercise program x20 reps per handout, with independence     Outcome: Ongoing, Progressing

## 2020-05-08 PROBLEM — J96.01 ACUTE RESPIRATORY FAILURE WITH HYPOXIA: Status: RESOLVED | Noted: 2020-04-01 | Resolved: 2020-05-08

## 2020-05-08 PROBLEM — D64.9 ANEMIA: Status: RESOLVED | Noted: 2020-04-27 | Resolved: 2020-05-08

## 2020-05-08 PROBLEM — Z85.46 HISTORY OF PROSTATE CANCER: Status: RESOLVED | Noted: 2018-02-01 | Resolved: 2020-05-08

## 2020-05-08 PROBLEM — J96.90 RESPIRATORY FAILURE: Status: ACTIVE | Noted: 2020-05-08

## 2020-05-08 PROBLEM — K56.7 ILEUS: Status: RESOLVED | Noted: 2020-04-21 | Resolved: 2020-05-08

## 2020-05-08 LAB
ALBUMIN SERPL BCP-MCNC: 2 G/DL (ref 3.5–5.2)
ALP SERPL-CCNC: 142 U/L (ref 55–135)
ALT SERPL W/O P-5'-P-CCNC: 115 U/L (ref 10–44)
ANION GAP SERPL CALC-SCNC: 11 MMOL/L (ref 8–16)
AST SERPL-CCNC: 72 U/L (ref 10–40)
BASOPHILS # BLD AUTO: 0.1 K/UL (ref 0–0.2)
BASOPHILS NFR BLD: 0.6 % (ref 0–1.9)
BILIRUB SERPL-MCNC: 2 MG/DL (ref 0.1–1)
BUN SERPL-MCNC: 29 MG/DL (ref 8–23)
CALCIUM SERPL-MCNC: 8.8 MG/DL (ref 8.7–10.5)
CHLORIDE SERPL-SCNC: 107 MMOL/L (ref 95–110)
CO2 SERPL-SCNC: 29 MMOL/L (ref 23–29)
CREAT SERPL-MCNC: 1.3 MG/DL (ref 0.5–1.4)
DIFFERENTIAL METHOD: ABNORMAL
EOSINOPHIL # BLD AUTO: 0.5 K/UL (ref 0–0.5)
EOSINOPHIL NFR BLD: 3.2 % (ref 0–8)
ERYTHROCYTE [DISTWIDTH] IN BLOOD BY AUTOMATED COUNT: 16.9 % (ref 11.5–14.5)
EST. GFR  (AFRICAN AMERICAN): >60 ML/MIN/1.73 M^2
EST. GFR  (NON AFRICAN AMERICAN): 56.1 ML/MIN/1.73 M^2
GLUCOSE SERPL-MCNC: 99 MG/DL (ref 70–110)
HCT VFR BLD AUTO: 28.7 % (ref 40–54)
HGB BLD-MCNC: 8.6 G/DL (ref 14–18)
IMM GRANULOCYTES # BLD AUTO: 0.08 K/UL (ref 0–0.04)
IMM GRANULOCYTES NFR BLD AUTO: 0.5 % (ref 0–0.5)
LYMPHOCYTES # BLD AUTO: 1.3 K/UL (ref 1–4.8)
LYMPHOCYTES NFR BLD: 8.1 % (ref 18–48)
MAGNESIUM SERPL-MCNC: 2.3 MG/DL (ref 1.6–2.6)
MCH RBC QN AUTO: 27 PG (ref 27–31)
MCHC RBC AUTO-ENTMCNC: 30 G/DL (ref 32–36)
MCV RBC AUTO: 90 FL (ref 82–98)
MONOCYTES # BLD AUTO: 1.3 K/UL (ref 0.3–1)
MONOCYTES NFR BLD: 8.7 % (ref 4–15)
NEUTROPHILS # BLD AUTO: 12.2 K/UL (ref 1.8–7.7)
NEUTROPHILS NFR BLD: 78.9 % (ref 38–73)
NRBC BLD-RTO: 0 /100 WBC
PHOSPHATE SERPL-MCNC: 3.9 MG/DL (ref 2.7–4.5)
PLATELET # BLD AUTO: 416 K/UL (ref 150–350)
PMV BLD AUTO: 12.1 FL (ref 9.2–12.9)
POCT GLUCOSE: 103 MG/DL (ref 70–110)
POCT GLUCOSE: 118 MG/DL (ref 70–110)
POCT GLUCOSE: 76 MG/DL (ref 70–110)
POCT GLUCOSE: 94 MG/DL (ref 70–110)
POCT GLUCOSE: 97 MG/DL (ref 70–110)
POCT GLUCOSE: 98 MG/DL (ref 70–110)
POCT GLUCOSE: 99 MG/DL (ref 70–110)
POTASSIUM SERPL-SCNC: 3.6 MMOL/L (ref 3.5–5.1)
PROT SERPL-MCNC: 7.7 G/DL (ref 6–8.4)
RBC # BLD AUTO: 3.19 M/UL (ref 4.6–6.2)
SARS-COV-2 RNA RESP QL NAA+PROBE: DETECTED
SODIUM SERPL-SCNC: 147 MMOL/L (ref 136–145)
WBC # BLD AUTO: 15.47 K/UL (ref 3.9–12.7)

## 2020-05-08 PROCEDURE — 97530 THERAPEUTIC ACTIVITIES: CPT

## 2020-05-08 PROCEDURE — 83735 ASSAY OF MAGNESIUM: CPT

## 2020-05-08 PROCEDURE — 84100 ASSAY OF PHOSPHORUS: CPT

## 2020-05-08 PROCEDURE — 97535 SELF CARE MNGMENT TRAINING: CPT

## 2020-05-08 PROCEDURE — 25000242 PHARM REV CODE 250 ALT 637 W/ HCPCS: Performed by: INTERNAL MEDICINE

## 2020-05-08 PROCEDURE — 99900035 HC TECH TIME PER 15 MIN (STAT)

## 2020-05-08 PROCEDURE — 99233 SBSQ HOSP IP/OBS HIGH 50: CPT | Mod: GC,,, | Performed by: INTERNAL MEDICINE

## 2020-05-08 PROCEDURE — 63600175 PHARM REV CODE 636 W HCPCS: Performed by: INTERNAL MEDICINE

## 2020-05-08 PROCEDURE — 25000003 PHARM REV CODE 250: Performed by: INTERNAL MEDICINE

## 2020-05-08 PROCEDURE — 94761 N-INVAS EAR/PLS OXIMETRY MLT: CPT

## 2020-05-08 PROCEDURE — S0028 INJECTION, FAMOTIDINE, 20 MG: HCPCS | Performed by: STUDENT IN AN ORGANIZED HEALTH CARE EDUCATION/TRAINING PROGRAM

## 2020-05-08 PROCEDURE — 25000003 PHARM REV CODE 250: Performed by: STUDENT IN AN ORGANIZED HEALTH CARE EDUCATION/TRAINING PROGRAM

## 2020-05-08 PROCEDURE — 11000001 HC ACUTE MED/SURG PRIVATE ROOM

## 2020-05-08 PROCEDURE — 94640 AIRWAY INHALATION TREATMENT: CPT

## 2020-05-08 PROCEDURE — 92526 ORAL FUNCTION THERAPY: CPT

## 2020-05-08 PROCEDURE — 63700000 PHARM REV CODE 250 ALT 637 W/O HCPCS: Performed by: STUDENT IN AN ORGANIZED HEALTH CARE EDUCATION/TRAINING PROGRAM

## 2020-05-08 PROCEDURE — 99233 PR SUBSEQUENT HOSPITAL CARE,LEVL III: ICD-10-PCS | Mod: GC,,, | Performed by: INTERNAL MEDICINE

## 2020-05-08 PROCEDURE — 97112 NEUROMUSCULAR REEDUCATION: CPT

## 2020-05-08 PROCEDURE — U0002 COVID-19 LAB TEST NON-CDC: HCPCS

## 2020-05-08 PROCEDURE — 94660 CPAP INITIATION&MGMT: CPT

## 2020-05-08 PROCEDURE — 85025 COMPLETE CBC W/AUTO DIFF WBC: CPT

## 2020-05-08 PROCEDURE — 27000221 HC OXYGEN, UP TO 24 HOURS

## 2020-05-08 PROCEDURE — 80053 COMPREHEN METABOLIC PANEL: CPT

## 2020-05-08 RX ORDER — FAMOTIDINE 20 MG/1
20 TABLET, FILM COATED ORAL 2 TIMES DAILY
Status: DISCONTINUED | OUTPATIENT
Start: 2020-05-08 | End: 2020-05-11

## 2020-05-08 RX ADMIN — POTASSIUM CHLORIDE 40 MEQ: 20 SOLUTION ORAL at 05:05

## 2020-05-08 RX ADMIN — TIMOLOL MALEATE 1 DROP: 5 SOLUTION/ DROPS OPHTHALMIC at 09:05

## 2020-05-08 RX ADMIN — ENOXAPARIN SODIUM 40 MG: 100 INJECTION SUBCUTANEOUS at 09:05

## 2020-05-08 RX ADMIN — SODIUM CHLORIDE SOLN NEBU 3% 4 ML: 3 NEBU SOLN at 07:05

## 2020-05-08 RX ADMIN — BRIMONIDINE TARTRATE 1 DROP: 2 SOLUTION OPHTHALMIC at 08:05

## 2020-05-08 RX ADMIN — FAMOTIDINE 20 MG: 10 INJECTION INTRAVENOUS at 09:05

## 2020-05-08 RX ADMIN — LATANOPROST 1 DROP: 50 SOLUTION OPHTHALMIC at 09:05

## 2020-05-08 RX ADMIN — DOCUSATE SODIUM 100 MG: 50 LIQUID ORAL at 08:05

## 2020-05-08 RX ADMIN — ACETAMINOPHEN 650 MG: 160 SOLUTION ORAL at 08:05

## 2020-05-08 RX ADMIN — ALBUTEROL SULFATE 2.5 MG: 2.5 SOLUTION RESPIRATORY (INHALATION) at 07:05

## 2020-05-08 RX ADMIN — ONDANSETRON 4 MG: 2 INJECTION INTRAMUSCULAR; INTRAVENOUS at 09:05

## 2020-05-08 RX ADMIN — BRIMONIDINE TARTRATE 1 DROP: 2 SOLUTION OPHTHALMIC at 09:05

## 2020-05-08 RX ADMIN — MINERAL OIL AND WHITE PETROLATUM: 150; 830 OINTMENT OPHTHALMIC at 08:05

## 2020-05-08 RX ADMIN — TIMOLOL MALEATE 1 DROP: 5 SOLUTION/ DROPS OPHTHALMIC at 08:05

## 2020-05-08 RX ADMIN — POLYETHYLENE GLYCOL 3350 17 G: 17 POWDER, FOR SOLUTION ORAL at 09:05

## 2020-05-08 RX ADMIN — MINERAL OIL AND WHITE PETROLATUM: 150; 830 OINTMENT OPHTHALMIC at 09:05

## 2020-05-08 NOTE — NURSING
Report called to KATHIA López. Pt transferred via bed with monitor in place, 6L NC. RN received pt at elevator, eye drops delivered with patient and chart. AVSS, no c/o time of transfer.

## 2020-05-08 NOTE — PLAN OF CARE
[facility] Medicine ICU Acceptance Note    Date of Admit: 4/28/2020  Date of Transfer / Stepdown: 5/8/2020  ICU team stepping patient down: CICU (COVID-19)  ICU team member giving verbal handoff: NINA [place]  Accepting  team: KATHY    Brief History of Present Illness:   Mr. Bharathi Garrido is a 69 yo male with obesity, history of prostate cancer (1/2018 s/p prostatectomy and radiation Dr. Pederson), history of difficult intubation for surgery and glaucoma presents with feeling unwell, fever, decreased appetite and shortness of breath. Patient transferred from [facility] to [facility] 4/28/20 for higher level of care. Admitted to outside hospital on 3/31/20 for SOB and fever and tested positive for COVID-19.  Upon admission, CXR showed bilateral multifocal opacities. Procalcitonin, LDH, ferritin, CRP and D-dimer elevated. Renal function normal. Mild increase in LFTs. He was treated with Plaquinel, Rocephin and Azithromycin. His respiratory function declined and has been intubated since 4/2/20.     Hospital/ICU Course:   Bharathi Garrido was admitted to ICU for management of respiratory failure secondary to suspected and/or confirmed COVID-19 requiring mechanical ventilation for respiratory support.      4/28/20: Patient transferred from [facility] for higher level of care as patient still intubated with high oxygen requirements. Sedated on Fentanyl and Precedex, but responding appropriately and following commands per nursing. Intubated on A/C, FiO2 75%, PEEP 10, , rate 18 with sat of 92%. CXR consistent with multilobar pneumonia. On empiric Vanc and Zosyn for recent white count with leukocytosis. PICC from 4/2 removed and central line and a-line placed. Repeat cxs sent. Hgb/Hct stable s/p 1 unit PRBCs on 4/27. NG set to intermittent suction. Abd x-ray showed gaseous distention.      4/29/20: Sedated on Propofol, Precedex, and Dilaudid. Initially on Levophed but weaned off and became hypertensive. Briefly on Cardene  gtt. Vent settings unchanged: FiO2 75% and PEEP 10. New cultures with no growth, cont on empiric abx. Good UOP with IV Lasix and stable renal function. Having BMs.      4/30/20: Weaned off Propofol and Precedex successfully. Cont on IV Dilaudid 1 mg/hr and scheduled Oxy. SpO2 improving. Vent settings weaned to FiO of 65% PEEP 10  Rate 18. Blood pressure stable off pressors/antihypertensives. Having BMs. Started TFs. Good UOP and net negative. Lytes WNR.      5/1/2020: Weaned off dilaudid gtt. SBT today. Put back on rate after 2 hours when pt became tired and tachypnic. Stopped lasix due to elevated creatinine. Consulted ID due to increased WBC and fever despite broad spectrum abx regimen.      5/2/2020: Patient now off dilaudid gtt and tolerating well. Still drowsy. Patient failed SBT today.  Will decrease PO oxy from Q6H to BID. WBC continues to increase, now 22.28, Tmax 100. BC NGTD. Remains on vanc/zosyn - awaiting ID recs. Possible hemoconcentration. Net negative 1.3L. Stop lasix and start free water 200 Q4H. On TFs. Last BM 4/28. On bowel regimen.      5/3/2020: On Precedex gtt and prn Morphine for agitation.  Vent settings this AM: 40% FiO2 and PEEP 5. Oxygen sats 94%. Failed SBT. Consult surgery for trach/PEG. Did have 4 min bradycardia/hypertension during SBT which resolved with oxygen and suctioning. Free water increased to 300mL Q4H for hypernatremia. Stopped vanc/zosyn per ID recs. NGTD on resp/blood/fungal cultures.      5/5-5/6/2020: Weaned off sedation and tolerating minimal vent settings. Successful SBTs with NIF 46 and RISB 70. Extubated on 5/6 to 4 L NC. Neuro intact.     Consultants and Procedures:     Consultants:  Infectious disease 5/1/2020  Critical Care Medicine 4/28/2020    Procedures:    PICC placed 4/2  Intubated 4/2  Central line placed 4/28  Extubated 5/6    Transfer Information:     Diet:  Regular diet with thin liquids (evaluated by Speech Therapy 5/8/2020)    Physical  Activity:  Progressive Mobility Protocol; PT/OT consulted    To Do / Pending Studies / Follow ups:  - Repeat COVID screening 5/8. If NEG, repeat in 24hr. If POS, repeat in 72 hours.   -Monitor Na and for worsening hypernatremia. Encourage PO intake of fluids.     Patient has been accepted by Hospital Medicine Team G, who will assume care of the patient upon arrival to the floor from the ICU. Please contact ICU team with any concerns prior to arrival. Please contact Hospital Medicine at [phone] or [phone] (please do NOT leave a voicemail) when patient arrives to the floor.    Tangela Hughes M.D.  Department of Hospital Medicine  [facility]  [phone] (pager)  [phone] (Spectralink)

## 2020-05-08 NOTE — PROGRESS NOTES
[facility]  COVID-19 or Persons Under Investigation ICU DAILY PROGRESS NOTE    Patient Name: Bharathi Garrido  MRN: [id number]  Admission Date: 4/28/2020   Code Status:   Attending: Venkatesh Haines*     SUBJECTIVE:    HPI:   Mr. Bharathi Garrido is a 69 yo male with obesity, history of prostate cancer (1/2018 s/p prostatectomy and radiation Dr. Pederson), history of difficult intubation for surgery and glaucoma presents with feeling unwell, fever, decreased appetite and shortness of breath. Patient transferred from [facility] to [facility] 4/28/20 for higher level of care. Admitted to outside hospital on 3/31/20 for SOB and fever and tested positive for COVID-19.  Upon admission, CXR showed bilateral multifocal opacities. Procalcitonin, LDH, ferritin, CRP and D-dimer elevated. Renal function normal. Mild increase in LFTs. He was treated with Plaquinel, Rocephin and Azithromycin. His respiratory function declined and has been intubated since 4/2/20.     Hospital Course: Bharathi Garrido was admitted to ICU for management of respiratory failure secondary to suspected and/or confirmed COVID-19 requiring mechanical ventilation for respiratory support.      4/28/20: Patient transferred from [facility] for higher level of care as patient still intubated with high oxygen requirements. Sedated on Fentanyl and Precedex, but responding appropriately and following commands per nursing. Intubated on A/C, FiO2 75%, PEEP 10, , rate 18 with sat of 92%. CXR consistent with multilobar pneumonia. On empiric Vanc and Zosyn for recent white count with leukocytosis. PICC from 4/2 removed and central line and a-line placed. Repeat cxs sent. Hgb/Hct stable s/p 1 unit PRBCs on 4/27. NG set to intermittent suction. Abd x-ray showed gaseous distention.      4/29/20: Sedated on Propofol, Precedex, and Dilaudid. Initially on Levophed but weaned off and became hypertensive. Briefly on Cardene gtt. Vent settings  unchanged: FiO2 75% and PEEP 10. New cultures with no growth, cont on empiric abx. Good UOP with IV Lasix and stable renal function. Having BMs.      4/30/20: Weaned off Propofol and Precedex successfully. Cont on IV Dilaudid 1 mg/hr and scheduled Oxy. SpO2 improving. Vent settings weaned to FiO of 65% PEEP 10  Rate 18. Blood pressure stable off pressors/antihypertensives. Having BMs. Started TFs. Good UOP and net negative. Lytes WNR.      5/1/2020: Weaned off dilaudid gtt. SBT today. Put back on rate after 2 hours when pt became tired and tachypnic. Stopped lasix due to elevated creatinine. Consulted ID due to increased WBC and fever despite broad spectrum abx regimen.      5/2/2020: Patient now off dilaudid gtt and tolerating well. Still drowsy. Patient failed SBT today.  Will decrease PO oxy from Q6H to BID. WBC continues to increase, now 22.28, Tmax 100. BC NGTD. Remains on vanc/zosyn - awaiting ID recs. Possible hemoconcentration. Net negative 1.3L. Stop lasix and start free water 200 Q4H. On TFs. Last BM 4/28. On bowel regimen.     5/3/2020: On Precedex gtt and prn Morphine for agitation.  Vent settings this AM: 40% FiO2 and PEEP 5. Oxygen sats 94%. Failed SBT. Consult surgery for trach/PEG. Did have 4 min bradycardia/hypertension during SBT which resolved with oxygen and suctioning. Free water increased to 300mL Q4H for hypernatremia. Stopped vanc/zosyn per ID recs. NGTD on resp/blood/fungal cultures.     5/5-5/6/2020: Weaned off sedation and tolerating minimal vent settings. Successful SBTs with NIF 46 and RISB 70. Extubated on 5/6 to 4 L NC. Neuro intact.       Interval History: Hospital day 10. NAEO. Extubated 5/6 to 4 L NC.  Passed swallow evaluation and started on regular diet. Hypoglycemia improved with PO intake.  Hypernatremia with Na 147, encourage PO intake. Wound care consult for left hand/wrist with dry, stable eschar. Appropriate for transfer to stepdown today.     OBJECTIVE: Limited review  of systems and physical exam per team attending and/or nursing staff due to patient being in special isolation.     Vitals:   Vitals:    05/08/20 0200 05/08/20 0300 05/08/20 0400 05/08/20 0500   BP: (!) 151/77 (!) 143/84 (!) 152/90 (!) 156/79   Pulse: 106 87 87 87   Resp: (!) 26 (!) 28 (!) 32 (!) 29   Temp:       TempSrc:       SpO2: 99% 99%  100%   Weight:       Height:             Physical Exam   General appearance: Intact, AAO x 3  Lungs: CTAB  Heart: rrr, no m/r/g  Abdomen: SNTND: hypoactive bowel sounds  Extremities: no cyanosis or edema, or clubbing, warm  Skin: wound on dorsum of left hand, dry, stable eschar     Labs: All labs within the last 24 hours were reviewed.     Imaging: All imaging within the last 24 hours was reviewed.     ASSESSMENT & PLAN:   This is a 70 y.o. admitted on 4/28/2020 for respiratory failure secondary to confirmed/suspected COVID-19 requiring mechanical ventilation for respiratory support.     Today's Plan:   - PT/OT  - Encourage PO intake  - Discontinue del toro catheter  - Transfer to stepdown unit  - Discuss POC with family     Neuro:   -Intubated 34 days, extubated 5/6 to 4 L NC, BiPAP at night. Awake and alert, intact.   -Dilaudid 0.5 mg q6h PRN  -PT/OT consulted     Cardiac/Circulatory:   -Bedside TTE performed today by Dr. Huddleston. Technically very difficult with limited images but LVEF at least 40%  -Pressors d/c'd. BP stable and MAPs in 80s.   -NSR rate 80s-100.  -On DVT prophylaxis with Lovenox.      Pulmonary:   -Repeat COVID-19 on 4/21 positive.   -Completed course of Plaquenil, Azithromycin, and Rocephin   - Wean sedation with coordinated SAT and SBT daily as tolerated.   - Extubated 5/6, intubated 34 days      Renal:   -Monitoring urine output with consideration for dialysis initiation if necessary.   -Off IV Lasix since 5/1/20.   -WILBUR resolving.   -Hypernatremia, Na 147. Will encourage PO intake.      Gastrointestinal:   -Trending liver function daily. Transaminases  WNL.  -Diagnosed with ileus on 4/21/20.  Resolved.  -Famotidine for GI prophylaxis    -On bowel regimen with Miralax and Docusate. BM per rectal tube today.  - SLP, cleared for regular diet with thin liquids   - Albumin 2.0, improving.      Endocrine:   -No history of DM. Hgb A1C 6.0  -Goal BG <200. At goal.  - Continue sliding scale insulin, 5/6 d/c long acting insulin 2/2 to hypoglycemia      ID:   -Repeat blood and sputum cultures drawn 5/1/2020. NGTD.   -C diff antigen pos but toxin and PCR negative.  -Respiratory culture 4/26 with candida.  -WBC decreasing. Afebrile since 5/1/20.   -CXR 5/2/2020 with no significant changes.   -Discontinued vanc/zosyn per ID recs.     Hematology/Oncology:   -Trending CBC daily and monitor for signs of bleeding.   - Last transfusion on 5/5 for Hgb 6.8. H/H stable.   -Transfuse for Hgb <7.        Patient's wife updated with current status and discussed plan and goals of care. Will continue to update daily.     Yessi Ledesma DNP, FNP-BC  05/08/2020     Critical Care Time: 30  Critical care was time spent personally by me on the following activities: evaluating this patient's organ dysfunction, development of treatment plan, discussing treatment plan with patient or surrogate and bedside caregivers, discussions with consultants, evaluation of patient's response to treatment, examination of patient, ordering and performing treatments and interventions, ordering and review of laboratory studies, ordering and review of radiographic studies, re-evaluation of patient's condition. This critical care time did not overlap with that of any other provider or involve time for any procedures.

## 2020-05-08 NOTE — PLAN OF CARE
05/08/20 0922   Discharge Reassessment   Assessment Type Discharge Planning Reassessment   Do you have any problems affording any of your prescribed medications? TBD   Discharge Plan A Rehab   Discharge Plan B Skilled Nursing Facility   DME Needed Upon Discharge    (TBD - Patient previous independent)   Patient choice form signed by patient/caregiver N/A   Anticipated Discharge Disposition Rehab     Rehab Referral to be sent today

## 2020-05-08 NOTE — RESIDENT HANDOFF
Handoff     Patient Name: Bharathi Garrido  MRN: [id number]  Admit Date: 4/28/2020  Hospital Length of Stay: 10  ICU Length of Stay: 10    Code Status: Full Code     Reason for Admission: Acute respiratory failure with hypoxia    Covid-19 Status: POSITIVE     Airborne and Contact and Droplet    Brief HPI (pertinent PMH and diagnosis or differential diagnosis):   Mr. Bharathi Garrido is a 71 yo male with obesity, history of prostate cancer (1/2018 s/p prostatectomy and radiation Dr. Pederson), history of difficult intubation for surgery and glaucoma presents with feeling unwell, fever, decreased appetite and shortness of breath. Patient transferred from [facility] to [facility] 4/28/20 for higher level of care. Admitted to outside hospital on 3/31/20 for SOB and fever and tested positive for COVID-19.  Upon admission, CXR showed bilateral multifocal opacities. Procalcitonin, LDH, ferritin, CRP and D-dimer elevated. Renal function normal. Mild increase in LFTs. He was treated with Plaquinel, Rocephin and Azithromycin. His respiratory function declined and has been intubated since 4/2/20.    Hospital Course (updated, brief assessment by system or problem, significant events):   Bharathi Garrido was admitted to ICU for management of respiratory failure secondary to suspected and/or confirmed COVID-19 requiring mechanical ventilation for respiratory support.      4/28/20: Patient transferred from [facility] for higher level of care as patient still intubated with high oxygen requirements. Sedated on Fentanyl and Precedex, but responding appropriately and following commands per nursing. Intubated on A/C, FiO2 75%, PEEP 10, , rate 18 with sat of 92%. CXR consistent with multilobar pneumonia. On empiric Vanc and Zosyn for recent white count with leukocytosis. PICC from 4/2 removed and central line and a-line placed. Repeat cxs sent. Hgb/Hct stable s/p 1 unit PRBCs on 4/27. NG set to intermittent suction. Abd x-ray showed gaseous  distention.      4/29/20: Sedated on Propofol, Precedex, and Dilaudid. Initially on Levophed but weaned off and became hypertensive. Briefly on Cardene gtt. Vent settings unchanged: FiO2 75% and PEEP 10. New cultures with no growth, cont on empiric abx. Good UOP with IV Lasix and stable renal function. Having BMs.      4/30/20: Weaned off Propofol and Precedex successfully. Cont on IV Dilaudid 1 mg/hr and scheduled Oxy. SpO2 improving. Vent settings weaned to FiO of 65% PEEP 10  Rate 18. Blood pressure stable off pressors/antihypertensives. Having BMs. Started TFs. Good UOP and net negative. Lytes WNR.      5/1/2020: Weaned off dilaudid gtt. SBT today. Put back on rate after 2 hours when pt became tired and tachypnic. Stopped lasix due to elevated creatinine. Consulted ID due to increased WBC and fever despite broad spectrum abx regimen.      5/2/2020: Patient now off dilaudid gtt and tolerating well. Still drowsy. Patient failed SBT today.  Will decrease PO oxy from Q6H to BID. WBC continues to increase, now 22.28, Tmax 100. BC NGTD. Remains on vanc/zosyn - awaiting ID recs. Possible hemoconcentration. Net negative 1.3L. Stop lasix and start free water 200 Q4H. On TFs. Last BM 4/28. On bowel regimen.      5/3/2020: On Precedex gtt and prn Morphine for agitation.  Vent settings this AM: 40% FiO2 and PEEP 5. Oxygen sats 94%. Failed SBT. Consult surgery for trach/PEG. Did have 4 min bradycardia/hypertension during SBT which resolved with oxygen and suctioning. Free water increased to 300mL Q4H for hypernatremia. Stopped vanc/zosyn per ID recs. NGTD on resp/blood/fungal cultures.      5/5-5/6/2020: Weaned off sedation and tolerating minimal vent settings. Successful SBTs with NIF 46 and RISB 70. Extubated on 5/6 to 4 L NC. Neuro intact.      Tasks (specific, using if-then statements):   -Repeat COVID screening today. If NEG, repeat in 24hr. If POS, repeat in 72 hours.   -Monitor Na and for worsening  hypernatremia. Encourage PO intake.     Contingency Plan (special circumstances anticipated and plan):   - PT/OT, progress to SNF for ongoing rehab     Designated Decision Maker: Patient/spouse     Family Notified of Transfer: yes    Oxygen Requirements: 5 L     Vitals:    05/08/20 0736 05/08/20 0800 05/08/20 0900 05/08/20 1000   BP:  131/74 127/73 138/82   BP Location:  Left arm Left arm Left arm   Patient Position:  Lying Lying Lying   Pulse: 92 94 97 93   Resp: (!) 32 (!) 29 (!) 23 (!) 31   Temp:  98.5 °F (36.9 °C)     TempSrc:  Oral     SpO2: 96% 100% 96% 98%   Weight:       Height:           Mentored By: Dr. Ny

## 2020-05-08 NOTE — PLAN OF CARE
Problem: Physical Therapy Goal  Goal: Physical Therapy Goal  Description  Goals to be met by: 2020     Patient will increase functional independence with mobility by performin. Supine to sit with MInimal Assistance -not met  2. Sit to supine with MInimal Assistance -not met  3. Sit to stand transfer with Maximum Assistance -not met  4. Bed to chair transfer with Maximum Assistance using LRAD -not met  5. Gait  x 10 feet with Maximum Assistance using LRAD. -not met  6. Sitting at edge of bed x10 minutes with Stand-by Assistance while performing UE dynamic task to prepare for functional mobility and ADLs -not met  7. Lower extremity exercise program x20 reps per handout, with independence -not met      Outcome: Ongoing, Progressing   Goals remain appropriate. Sheridan Russell, PT  2020

## 2020-05-08 NOTE — PT/OT/SLP PROGRESS
Speech Language Pathology Treatment    Patient Name:  Bharathi Garrido   MRN:  [id number]  Admitting Diagnosis: Pneumonia due to COVID-19 virus    Recommendations:                 General Recommendations:  follow up x1 to ensure diet tolerance 2/2 patient with prolonged intubation.   Diet recommendations:  Regular, Liquid Diet Level: Thin   Aspiration Precautions: Standard aspiration precautions   General Precautions: Standard, fall, droplet, airborne, contact, vision impaired  Communication strategies:  none    Subjective     Patient awake;alert.     Pain/Comfort:  · Pain Rating 1: 0/10    Objective:     Has the patient been evaluated by SLP for swallowing?   Yes  Keep patient NPO? No   Current Respiratory Status: room air      Patient seen for follow up swallow assessment. Patient repositioned in bed per SLP for session with HOB elevated to ~80 degrees. Patient reported good tolerance of previous meals after most recent SLP evaluation.  Patient tolerated thin liquids via straw sips x5 along with regular solid trials x4 with no overt signs of airway compromise. Patient required liquid wash to clear solid trial x1.  Skilled education was provided to patient  re: diet recs, standard aspiration precautions of which to follow, and ongoing ST plan of care.  SLP to follow up x1 to ensure diet tolerance 2/2 patient with prolonged intubation.     Assessment:     Bharathi Garrido is a 70 y.o. male with an SLP diagnosis of at risk for aspiration. .      Goals:   Multidisciplinary Problems     SLP Goals        Problem: SLP Goal    Goal Priority Disciplines Outcome   SLP Goal     SLP Ongoing, Progressing   Description:  Speech Language Pathology Goals  Goals expected to be met by 5/14    1. Pt will tolerate in regular diet/thin liquids without overt clinical signs of aspiraiton                          Plan:     · Patient to be seen:  4 x/week   · Plan of Care expires:     · Plan of Care reviewed with:  patient   · SLP Follow-Up:  Yes        Discharge recommendations:  rehabilitation facility   Barriers to Discharge:  None    Time Tracking:     SLP Treatment Date:   05/08/20  Speech Start Time:  1014  Speech Stop Time:  1030     Speech Total Time (min):  16 min    Billable Minutes: Treatment Swallowing Dysfunction 8 and Seld Care/Home Management Training 8    Emily Abadie, CCC-SLP  05/08/2020

## 2020-05-08 NOTE — PLAN OF CARE
05/08/20 0938   Post-Acute Status   Post-Acute Authorization Placement   Post-Acute Placement Status Referrals Sent     SW [facility]    UPDATE 11:21 AM  Declined by [facility] (payor not accepted); [facility] even though they currently do not have any beds available.  SUNITHA sent additional referrals to the following providers via :  [facility], [place], [facility], and [facility].      Aiyana Cordova LMSW  [facility]  [phone]

## 2020-05-08 NOTE — PT/OT/SLP PROGRESS
Occupational Therapy   Treatment    Name: Bharathi Garrido  MRN: [id number]  Admitting Diagnosis:  Pneumonia due to COVID-19 virus       Recommendations:     Discharge Recommendations: rehabilitation facility  Discharge Equipment Recommendations:  other (see comments)(TBD closer to d/c.)  Barriers to discharge:  Other (Comment), Inaccessible home environment(Pt requires increased assistance at current functional level )    Assessment:     Bharathi Garrido is a 70 y.o. male with a medical diagnosis of Pneumonia due to COVID-19 virus.  He presents with performance deficits affecting function are weakness, impaired endurance, impaired self care skills, impaired functional mobilty, gait instability, impaired balance, visual deficits, decreased upper extremity function, decreased lower extremity function, decreased coordination, pain, impaired coordination, impaired cardiopulmonary response to activity, decreased ROM. Pt with intermittent confusion and forgetful. Pt inially stated he would not participate with therapy until receiving pain medication. Pt required frequent cues for re-orientation. Pt would benefit from continued skilled acute OT services in order to maximize independence and safety with ADLs and functional mobility to ensure safe return to PLOF in the least restrictive environment.     Rehab Prognosis:  Good; patient would benefit from acute skilled OT services to address these deficits and reach maximum level of function.       Plan:     Patient to be seen 5 x/week to address the above listed problems via self-care/home management, therapeutic activities, therapeutic exercises, neuromuscular re-education  · Plan of Care Expires: 06/05/20  · Plan of Care Reviewed with: patient    Subjective     Pain/Comfort:  · Pain Rating 1: (pt reported L knee pain but did not rate )  · Pain Addressed 2: Pre-medicate for activity, Distraction, Nurse notified    Objective:     Communicated with: RN prior to session.  Patient found  HOB elevated with pulse ox (continuous), telemetry, blood pressure cuff, oxygen, del toro catheter, bowel management system upon OT entry to room. 1st attempt pt requesting pain medication prior to OOB mobility. OT/pt returned 1 hour later and pt agreeable to therapy session.     General Precautions: Standard, fall, droplet, airborne, contact, vision impaired(COVID+)   Orthopedic Precautions:N/A   Braces: N/A     Occupational Performance:     Bed Mobility:    · Patient completed Rolling/Turning to Left with  total assistance and 2 persons  · Patient completed Scooting/Bridging with total assistance and 2 persons for anterior scooting towards EOB (unable to full plant B feet on ground due to pt being fearful of falling and pain). Total A x 2 person for supine scooting towards HOB   · Patient completed Supine to Sit with total assistance and 2 persons  · Patient completed Sit to Supine with total assistance and 2 persons     Functional Mobility/Transfers:  · EOB sitting balance:     Activities of Daily Living:  · Grooming: maximal assistance to wash face while sitting EOB; pt required facilication to bring R hand to face. Verbal cues for task initiation and physical assistance required     [facility] 6 Click ADL: 8    Treatment & Education:  - Pt educated on role of OT, POC, and goals for therapy.    - Pt oriented to person, place and month  - Pt with nonsensical conversation at times requiring redirection to current situation.   - Pt with observed visual deficits when attempting forward reaching with B UE as pt was unable to locate therapist hand when placed right in front of pt.   - Verbal and tactile cues provided for upright posture  - gentle cervical ROM provided while pt was in supine; pt with increased R cervical muscular tightness.   - Pt performed x 10 reps of AAROM for forward reaching while sitting EOB. Pt with limited ROM for shoulder joints and pt occasionally resisting ROM for L UE.   - Pt required jadyn  cues for redirection to task and sequencing of tasks    - Increased time provided for repositioning pt in bed with B UEs elevated   - Time provided for therapeutic counseling and discussion of health disposition.   - Pt completed ADLs and functional mobility for treatment session as noted above   - Pt verbalized understanding. Pt expressed no further concerns/questions.  - whiteboard updated     Patient left HOB elevated with all lines intact, call button in reach and RN notifiedEducation:      GOALS:   Multidisciplinary Problems     Occupational Therapy Goals        Problem: Occupational Therapy Goal    Goal Priority Disciplines Outcome Interventions   Occupational Therapy Goal     OT, PT/OT Ongoing, Progressing    Description:  Goals to be met by: 5/21/2020     Patient will increase functional independence with ADLs by performing:    Grooming while EOB with Minimal Assistance.  Toileting from bedside commode with Moderate Assistance for hygiene and clothing management.   Sitting at edge of bed x10 minutes with Stand-by Assistance.  Stand pivot transfers with Moderate Assistance.  Toilet transfer to bedside commode with Moderate Assistance.                      Time Tracking:     OT Date of Treatment: 05/08/20  OT Start Time: 1013  OT Stop Time: 1036  OT Total Time (min): 23 min (co-treat with PT)     Billable Minutes:Self Care/Home Management 8  Therapeutic Activity 15    Fanny Ford OT  5/8/2020

## 2020-05-08 NOTE — CARE UPDATE
Rapid Response Nurse Chart Check     Chart check completed. Spoke with Bedside RNSoy. No concerns verbalized at this time, instructed to call [phone] for further concerns or assistance.

## 2020-05-08 NOTE — PLAN OF CARE
Problem: Occupational Therapy Goal  Goal: Occupational Therapy Goal  Description  Goals to be met by: 5/21/2020     Patient will increase functional independence with ADLs by performing:    Grooming while EOB with Minimal Assistance.  Toileting from bedside commode with Moderate Assistance for hygiene and clothing management.   Sitting at edge of bed x10 minutes with Stand-by Assistance.  Stand pivot transfers with Moderate Assistance.  Toilet transfer to bedside commode with Moderate Assistance.     Outcome: Ongoing, Progressing    Fanny Ford, OTR/L  Pager: [phone]  5/8/2020

## 2020-05-08 NOTE — PT/OT/SLP PROGRESS
Physical Therapy Treatment    Patient Name:  Bharathi Garrido   MRN:  [id number]    Recommendations:     Discharge Recommendations:  rehabilitation facility   Discharge Equipment Recommendations: (will determine DME needs closer to discharge)   Barriers to discharge: Decreased caregiver support    Assessment:     Bharathi Garrido is a 70 y.o. male admitted with a medical diagnosis of Pneumonia due to COVID-19 virus.  He presents with the following impairments/functional limitations:  weakness, impaired functional mobilty, impaired cognition, decreased safety awareness, impaired endurance, gait instability, decreased lower extremity function, impaired balance pt ken treatment fair but cont very weak and deconditioned. Pt is confused which will decrease safety. Pt will benefit from cont skilled PT 5x/wk to progress physically. Pt will need inpt rehab when medically stable to maximize rehab potential.     Rehab Prognosis: Fair; patient would benefit from acute skilled PT services to address these deficits and reach maximum level of function.    Recent Surgery: * No surgery found *      Plan:     During this hospitalization, patient to be seen 4 x/week to address the identified rehab impairments via gait training, therapeutic activities, therapeutic exercises, neuromuscular re-education and progress toward the following goals:    · Plan of Care Expires:  06/07/20    Subjective     Chief Complaint: pt c/o pain in L knee but did not quantify pain level.   Patient/Family Comments/goals: to go home.   Pain/Comfort:  · Pain Rating 1: (pt c/o pain in L knee but did not quantify pain level. )  · Pain Rating Post-Intervention 1: (see above)      Objective:     Communicated with nurse prior to session.  Patient found supine with telemetry, blood pressure cuff, pulse ox (continuous), oxygen, del toro catheter, bowel management system(hep lock IV, ) upon PT entry to room.     General Precautions: Standard, airborne, fall   Orthopedic  Precautions:N/A   Braces:       Functional Mobility:  · Bed Mobility:  Pt needed verbal cues for hand placement and sequencing for functional mobility.  · Rolling Left:  total assistance and of 2 persons  · Supine to Sit: total assistance and of 2 persons  · Sit to Supine: total assistance and of 2 persons  ·   · Balance: pt sat on EOB x 10 min with total assist for sitting balance. pt had head turned to right side and forward flexed. PT facilitated trunk control and midline postural alignment while OT assisted with ADLs and BUE and BLE ther exer in sitting.       AM-PAC 6 CLICK MOBILITY  Turning over in bed (including adjusting bedclothes, sheets and blankets)?: 2  Sitting down on and standing up from a chair with arms (e.g., wheelchair, bedside commode, etc.): 1  Moving from lying on back to sitting on the side of the bed?: 2  Moving to and from a bed to a chair (including a wheelchair)?: 1  Need to walk in hospital room?: 1  Climbing 3-5 steps with a railing?: 1  Basic Mobility Total Score: 8       Therapeutic Activities and Exercises:   pt received verbal instruction in PT POC and verbally expressed understanding of such.     Patient left bed in chair position.  with all lines intact and call button in reach..    GOALS:   Multidisciplinary Problems     Physical Therapy Goals        Problem: Physical Therapy Goal    Goal Priority Disciplines Outcome Goal Variances Interventions   Physical Therapy Goal     PT, PT/OT Ongoing, Progressing     Description:  Goals to be met by: 2020     Patient will increase functional independence with mobility by performin. Supine to sit with MInimal Assistance -not met  2. Sit to supine with MInimal Assistance -not met  3. Sit to stand transfer with Maximum Assistance -not met  4. Bed to chair transfer with Maximum Assistance using LRAD -not met  5. Gait  x 10 feet with Maximum Assistance using LRAD. -not met  6. Sitting at edge of bed x10 minutes with Stand-by  Assistance while performing UE dynamic task to prepare for functional mobility and ADLs -not met  7. Lower extremity exercise program x20 reps per handout, with independence -not met                       Time Tracking:     PT Received On: 05/08/20  PT Start Time: 1013     PT Stop Time: 1036  PT Total Time (min): 23 min     Billable Minutes: Neuromuscular Re-education 23 min    Treatment Type: (co-treatment with OT)  PT/PTA: PT     PTA Visit Number: 0     Sheridan Russell, PT  05/08/2020

## 2020-05-08 NOTE — PLAN OF CARE
Problem: SLP Goal  Goal: SLP Goal  Description  Speech Language Pathology Goals  Goals expected to be met by 5/14    1. Pt will tolerate in regular diet/thin liquids without overt clinical signs of aspiraiton    Outcome: Ongoing, Progressing     Goals remain appropriate.   Emily P. Abadie M.S., CCC-SLP  Speech Language Pathologist  [phone]  05/08/2020

## 2020-05-08 NOTE — PLAN OF CARE
Pt Aax4, slow to answer at times, call light in reach, bed in lowest position, condom catheter draining to gravity. 02@4L NC 98%. Oriented pt to unit and room. VSS, frequent rounding completed. POC reviewed with pt. No other significant events throughout shift. Will continue to monitor.

## 2020-05-09 PROBLEM — D64.9 ANEMIA: Status: ACTIVE | Noted: 2020-05-09

## 2020-05-09 PROBLEM — R11.10 VOMITING: Status: ACTIVE | Noted: 2020-05-09

## 2020-05-09 PROBLEM — I10 HYPERTENSION: Status: ACTIVE | Noted: 2020-05-09

## 2020-05-09 PROBLEM — R33.9 URINARY RETENTION: Status: ACTIVE | Noted: 2020-05-09

## 2020-05-09 LAB
ALBUMIN SERPL BCP-MCNC: 2.2 G/DL (ref 3.5–5.2)
ALP SERPL-CCNC: 136 U/L (ref 55–135)
ALT SERPL W/O P-5'-P-CCNC: 94 U/L (ref 10–44)
ANION GAP SERPL CALC-SCNC: 11 MMOL/L (ref 8–16)
AST SERPL-CCNC: 49 U/L (ref 10–40)
BASOPHILS # BLD AUTO: 0.09 K/UL (ref 0–0.2)
BASOPHILS NFR BLD: 0.6 % (ref 0–1.9)
BILIRUB SERPL-MCNC: 1.8 MG/DL (ref 0.1–1)
BUN SERPL-MCNC: 28 MG/DL (ref 8–23)
CALCIUM SERPL-MCNC: 9.2 MG/DL (ref 8.7–10.5)
CHLORIDE SERPL-SCNC: 105 MMOL/L (ref 95–110)
CO2 SERPL-SCNC: 28 MMOL/L (ref 23–29)
CREAT SERPL-MCNC: 1.2 MG/DL (ref 0.5–1.4)
DIFFERENTIAL METHOD: ABNORMAL
EOSINOPHIL # BLD AUTO: 0.2 K/UL (ref 0–0.5)
EOSINOPHIL NFR BLD: 1.4 % (ref 0–8)
ERYTHROCYTE [DISTWIDTH] IN BLOOD BY AUTOMATED COUNT: 17 % (ref 11.5–14.5)
EST. GFR  (AFRICAN AMERICAN): >60 ML/MIN/1.73 M^2
EST. GFR  (NON AFRICAN AMERICAN): >60 ML/MIN/1.73 M^2
GLUCOSE SERPL-MCNC: 92 MG/DL (ref 70–110)
HCT VFR BLD AUTO: 28.6 % (ref 40–54)
HGB BLD-MCNC: 8.8 G/DL (ref 14–18)
IMM GRANULOCYTES # BLD AUTO: 0.09 K/UL (ref 0–0.04)
IMM GRANULOCYTES NFR BLD AUTO: 0.6 % (ref 0–0.5)
LYMPHOCYTES # BLD AUTO: 1.1 K/UL (ref 1–4.8)
LYMPHOCYTES NFR BLD: 7.8 % (ref 18–48)
MAGNESIUM SERPL-MCNC: 2.3 MG/DL (ref 1.6–2.6)
MCH RBC QN AUTO: 27.9 PG (ref 27–31)
MCHC RBC AUTO-ENTMCNC: 30.8 G/DL (ref 32–36)
MCV RBC AUTO: 91 FL (ref 82–98)
MONOCYTES # BLD AUTO: 1.3 K/UL (ref 0.3–1)
MONOCYTES NFR BLD: 9.6 % (ref 4–15)
NEUTROPHILS # BLD AUTO: 11.1 K/UL (ref 1.8–7.7)
NEUTROPHILS NFR BLD: 80 % (ref 38–73)
NRBC BLD-RTO: 0 /100 WBC
PHOSPHATE SERPL-MCNC: 4.2 MG/DL (ref 2.7–4.5)
PLATELET # BLD AUTO: 408 K/UL (ref 150–350)
PMV BLD AUTO: 12.1 FL (ref 9.2–12.9)
POCT GLUCOSE: 102 MG/DL (ref 70–110)
POCT GLUCOSE: 86 MG/DL (ref 70–110)
POCT GLUCOSE: 91 MG/DL (ref 70–110)
POTASSIUM SERPL-SCNC: 4 MMOL/L (ref 3.5–5.1)
PROT SERPL-MCNC: 7.5 G/DL (ref 6–8.4)
RBC # BLD AUTO: 3.15 M/UL (ref 4.6–6.2)
SODIUM SERPL-SCNC: 144 MMOL/L (ref 136–145)
WBC # BLD AUTO: 13.9 K/UL (ref 3.9–12.7)

## 2020-05-09 PROCEDURE — 11000001 HC ACUTE MED/SURG PRIVATE ROOM

## 2020-05-09 PROCEDURE — 36415 COLL VENOUS BLD VENIPUNCTURE: CPT

## 2020-05-09 PROCEDURE — 94761 N-INVAS EAR/PLS OXIMETRY MLT: CPT

## 2020-05-09 PROCEDURE — 99900035 HC TECH TIME PER 15 MIN (STAT)

## 2020-05-09 PROCEDURE — 27000221 HC OXYGEN, UP TO 24 HOURS

## 2020-05-09 PROCEDURE — 63600175 PHARM REV CODE 636 W HCPCS: Performed by: INTERNAL MEDICINE

## 2020-05-09 PROCEDURE — 25000003 PHARM REV CODE 250: Performed by: INTERNAL MEDICINE

## 2020-05-09 PROCEDURE — 99233 SBSQ HOSP IP/OBS HIGH 50: CPT | Mod: ,,, | Performed by: INTERNAL MEDICINE

## 2020-05-09 PROCEDURE — 83735 ASSAY OF MAGNESIUM: CPT

## 2020-05-09 PROCEDURE — 99233 PR SUBSEQUENT HOSPITAL CARE,LEVL III: ICD-10-PCS | Mod: ,,, | Performed by: INTERNAL MEDICINE

## 2020-05-09 PROCEDURE — 94668 MNPJ CHEST WALL SBSQ: CPT

## 2020-05-09 PROCEDURE — 51798 US URINE CAPACITY MEASURE: CPT

## 2020-05-09 PROCEDURE — 80053 COMPREHEN METABOLIC PANEL: CPT

## 2020-05-09 PROCEDURE — 85025 COMPLETE CBC W/AUTO DIFF WBC: CPT

## 2020-05-09 PROCEDURE — 25000242 PHARM REV CODE 250 ALT 637 W/ HCPCS: Performed by: INTERNAL MEDICINE

## 2020-05-09 PROCEDURE — 84100 ASSAY OF PHOSPHORUS: CPT

## 2020-05-09 PROCEDURE — 94640 AIRWAY INHALATION TREATMENT: CPT

## 2020-05-09 PROCEDURE — 94660 CPAP INITIATION&MGMT: CPT

## 2020-05-09 RX ORDER — PROMETHAZINE HYDROCHLORIDE 12.5 MG/1
12.5 TABLET ORAL EVERY 6 HOURS PRN
Status: DISCONTINUED | OUTPATIENT
Start: 2020-05-09 | End: 2020-05-20 | Stop reason: HOSPADM

## 2020-05-09 RX ORDER — PROMETHAZINE HYDROCHLORIDE 12.5 MG/1
12.5 SUPPOSITORY RECTAL EVERY 6 HOURS PRN
Status: DISCONTINUED | OUTPATIENT
Start: 2020-05-09 | End: 2020-05-11

## 2020-05-09 RX ORDER — AMLODIPINE BESYLATE 5 MG/1
5 TABLET ORAL DAILY
Status: DISCONTINUED | OUTPATIENT
Start: 2020-05-09 | End: 2020-05-11

## 2020-05-09 RX ORDER — ONDANSETRON 4 MG/1
4 TABLET, FILM COATED ORAL EVERY 6 HOURS PRN
Status: DISCONTINUED | OUTPATIENT
Start: 2020-05-09 | End: 2020-05-20 | Stop reason: HOSPADM

## 2020-05-09 RX ADMIN — MINERAL OIL AND WHITE PETROLATUM: 150; 830 OINTMENT OPHTHALMIC at 09:05

## 2020-05-09 RX ADMIN — ONDANSETRON HYDROCHLORIDE 4 MG: 4 TABLET, FILM COATED ORAL at 11:05

## 2020-05-09 RX ADMIN — AMLODIPINE BESYLATE 5 MG: 5 TABLET ORAL at 08:05

## 2020-05-09 RX ADMIN — FAMOTIDINE 20 MG: 20 TABLET, FILM COATED ORAL at 08:05

## 2020-05-09 RX ADMIN — SODIUM CHLORIDE SOLN NEBU 3% 4 ML: 3 NEBU SOLN at 08:05

## 2020-05-09 RX ADMIN — PROMETHAZINE HYDROCHLORIDE 12.5 MG: 12.5 TABLET ORAL at 05:05

## 2020-05-09 RX ADMIN — ALBUTEROL SULFATE 2.5 MG: 2.5 SOLUTION RESPIRATORY (INHALATION) at 07:05

## 2020-05-09 RX ADMIN — ONDANSETRON 4 MG: 2 INJECTION INTRAMUSCULAR; INTRAVENOUS at 06:05

## 2020-05-09 RX ADMIN — MINERAL OIL AND WHITE PETROLATUM: 150; 830 OINTMENT OPHTHALMIC at 08:05

## 2020-05-09 RX ADMIN — LATANOPROST 1 DROP: 50 SOLUTION OPHTHALMIC at 08:05

## 2020-05-09 RX ADMIN — BRIMONIDINE TARTRATE 1 DROP: 2 SOLUTION OPHTHALMIC at 08:05

## 2020-05-09 RX ADMIN — SODIUM CHLORIDE SOLN NEBU 3% 4 ML: 3 NEBU SOLN at 07:05

## 2020-05-09 RX ADMIN — TIMOLOL MALEATE 1 DROP: 5 SOLUTION/ DROPS OPHTHALMIC at 09:05

## 2020-05-09 RX ADMIN — ALBUTEROL SULFATE 2.5 MG: 2.5 SOLUTION RESPIRATORY (INHALATION) at 08:05

## 2020-05-09 RX ADMIN — PROMETHAZINE HYDROCHLORIDE 12.5 MG: 12.5 TABLET ORAL at 08:05

## 2020-05-09 RX ADMIN — TIMOLOL MALEATE 1 DROP: 5 SOLUTION/ DROPS OPHTHALMIC at 08:05

## 2020-05-09 RX ADMIN — ENOXAPARIN SODIUM 40 MG: 100 INJECTION SUBCUTANEOUS at 08:05

## 2020-05-09 RX ADMIN — CHLORHEXIDINE GLUCONATE 0.12% ORAL RINSE 15 ML: 1.2 LIQUID ORAL at 08:05

## 2020-05-09 NOTE — ASSESSMENT & PLAN NOTE
- Possibly from acute illness  - no active bleeding noted  - stable Hgb over several weeks around 7-8  - continue to monitor

## 2020-05-09 NOTE — PLAN OF CARE
Pt AAOx4, VSS, afebrile. SpO2 96% on 3L NC. Pt c/o pain associated with urinary retention. Straight-cathed patient with 300 ml output per MED G order. Pt had orange-yellow emesis x3, one immediately following drinking some cranberry juice. Bowel sounds hypoactive, abd soft and non-tender to touch. Pt denies abd pain. No other changes overnight. Bed alarm on, side rails up x3, free of falls. WCTM.

## 2020-05-09 NOTE — HOSPITAL COURSE
Bharathi Garrido was admitted to ICU for management of respiratory failure secondary to suspected and/or confirmed COVID-19 requiring mechanical ventilation for respiratory support. initially admitted 3/31 at OSH on 4/1/20.  Wife and son (entire household was infected with COVID- 19.     Transferred to [facility] ICU on 4/28. 4/28/20: Patient transferred from [facility] for higher level of care as patient still intubated with high oxygen requirements. Sedated on Fentanyl and Precedex, but responding appropriately and following commands per nursing. Intubated on A/C, FiO2 75%, PEEP 10, , rate 18 with sat of 92%. CXR consistent with multilobar pneumonia. On empiric Vanc and Zosyn for recent white count with leukocytosis.     4/28/20: Patient transferred from [facility] for higher level of care as patient still intubated with high oxygen requirements. Sedated on Fentanyl and Precedex, but responding appropriately and following commands per nursing. Intubated on A/C, FiO2 75%, PEEP 10, , rate 18 with sat of 92%. CXR consistent with multilobar pneumonia. On empiric Vanc and Zosyn for recent white count with leukocytosis.      Intubated 34 days (starting 4/2), extubated 5/6 to 4 L NC, BiPAP at night.   Stepped down from [facility] ICU on 5/8/20  Completed course of Plaquenil, Azithromycin, and Rocephin . Repeat COVID-19 on 3/31, 4/21 , 5/8 , and 5/13 positive     Remains on 1L NC. Significantly debilitated and needs rehab or SNF post hospitalization   05/16/20: Patient transferred to [facility]. Visit conducted today. Patient able to respond to questions without any cardiopulmonary distress. Reports lack of appetite. Will obtain swallowing eval and repeat COVID testing today. Patient is pending placement to COVID + Unit in Rehab.     Interval History (05/17/20):

## 2020-05-09 NOTE — CARE UPDATE
"RAPID RESPONSE NURSE PROACTIVE ROUNDING NOTE     Time of Visit:     Admit Date: 2020  LOS: 10  Code Status: Full Code   Date of Visit: 2020  : 1950  Age: 70 y.o.  Sex: male  Race: Black or   Bed: 8068/8068 A:   MRN: [id number]  Was the patient discharged from an ICU this admission? yes   Was the patient discharged from a PACU within last 24 hours?  no  Did the patient receive conscious sedation/general anesthesia in last 24 hours?  no  Was the patient in the ED within the past 24 hours?  no  Was the patient started on NIPPV within the past 24 hours?  no  Attending Physician: Tangela Hughes MD  Primary Service: Networked reference to record PCT     ASSESSMENT       Reason for alert: Rounding     Diagnosis: Pneumonia due to COVID-19 virus    Abnormal Vital Signs: BP (!) 146/80 (BP Location: Left arm, Patient Position: Lying)   Pulse 95   Temp 99.2 °F (37.3 °C) (Oral)   Resp (!) 31   Ht 5' 11" (1.803 m)   Wt 105.4 kg (232 lb 5.8 oz)   SpO2 (!) 90%   BMI 32.41 kg/m²      Clinical Issues: No acute issues    Patient  has a past medical history of Class 1 obesity with body mass index (BMI) of 33.0 to 33.9 in adult, Difficult intubation, Glaucoma, Prostate cancer, and Sebaceous cyst of scrotum.      Upon assessment, patient without complaints, respirations are even and unlabored, saturating 100% on 5L. Nasal cannula titrated down to 3L and maintaining at 96-96%.     INTERVENTIONS/ RECOMMENDATIONS     Continue to monitor, titrate O2 as tolerated.     Discussed plan of care with RNMaira.    PHYSICIAN ESCALATION     Yes/No  no    Orders received and case discussed with NA.    Disposition: Remain in room 8068.    FOLLOW-UP     Call back the Rapid Response Nurse, Hannah Bancroft, RN at [phone] for additional questions or concerns.          "

## 2020-05-09 NOTE — SUBJECTIVE & OBJECTIVE
Interval History: Nursing reported vomiting with every meal. Patient denies nausea, but states he does not feel like eating. He does ask for water. Emesis bag is at bedside. Urinary retention was also noted by nursing. Straight in/out cath performed with 300cc. Patient denies any current pain.    Review of Systems   Constitutional: Positive for appetite change. Negative for chills and fever.   HENT: Negative for mouth sores, sore throat and trouble swallowing.    Respiratory: Negative for cough and shortness of breath.    Cardiovascular: Negative for chest pain and palpitations.   Gastrointestinal: Negative for abdominal pain, constipation, diarrhea, nausea and vomiting.   Genitourinary: Positive for decreased urine volume. Negative for dysuria and urgency.   Musculoskeletal: Negative for arthralgias and myalgias.   Neurological: Negative for dizziness, syncope and light-headedness.   Psychiatric/Behavioral: Positive for sleep disturbance. Negative for agitation.     Objective:     Vital Signs (Most Recent):  Temp: 99 °F (37.2 °C) (05/09/20 0825)  Pulse: 66 (05/09/20 1014)  Resp: 20 (05/09/20 1014)  BP: (!) 140/79 (05/09/20 1014)  SpO2: 99 % (05/09/20 1014) Vital Signs (24h Range):  Temp:  [99 °F (37.2 °C)-99.5 °F (37.5 °C)] 99 °F (37.2 °C)  Pulse:  [66-95] 66  Resp:  [16-31] 20  SpO2:  [90 %-100 %] 99 %  BP: (130-155)/(71-97) 140/79     Weight: 105.4 kg (232 lb 5.8 oz)  Body mass index is 32.41 kg/m².    Intake/Output Summary (Last 24 hours) at 5/9/2020 1206  Last data filed at 5/9/2020 0900  Gross per 24 hour   Intake 905 ml   Output 585 ml   Net 320 ml      Physical Exam   Constitutional: He appears well-developed and well-nourished. No distress.   HENT:   Head: Normocephalic and atraumatic.   Eyes: Conjunctivae and EOM are normal. No scleral icterus.   Cardiovascular: Normal rate, regular rhythm, normal heart sounds and intact distal pulses.   Pulmonary/Chest: Effort normal and breath sounds normal. No  respiratory distress.   Examined anteriorly given patient's weakness inability to cooperate with exam   Abdominal: Soft. Bowel sounds are normal. He exhibits no distension. There is no tenderness.   Musculoskeletal: He exhibits no edema.   Neurological: He is alert. No cranial nerve deficit.   Skin: Skin is warm and dry. He is not diaphoretic.   Psychiatric: He has a normal mood and affect. Thought content normal.       Significant Labs:   CBC:   Recent Labs   Lab 05/08/20 0315 05/09/20 0339   WBC 15.47* 13.90*   HGB 8.6* 8.8*   HCT 28.7* 28.6*   * 408*     CMP:   Recent Labs   Lab 05/08/20 0315 05/09/20  0339   * 144   K 3.6 4.0    105   CO2 29 28   GLU 99 92   BUN 29* 28*   CREATININE 1.3 1.2   CALCIUM 8.8 9.2   PROT 7.7 7.5   ALBUMIN 2.0* 2.2*   BILITOT 2.0* 1.8*   ALKPHOS 142* 136*   AST 72* 49*   * 94*   ANIONGAP 11 11   EGFRNONAA 56.1* >60.0       Significant Imaging: I have reviewed all pertinent imaging results/findings within the past 24 hours.

## 2020-05-09 NOTE — PROGRESS NOTES
[facility] - Respiratory ICU  [facility] Medicine  Progress Note    Patient Name: Bharathi Garrido  MRN: [id number]  Patient Class: IP- Inpatient   Admission Date: 4/28/2020  Length of Stay: 11 days  Attending Physician: Tangela Hughes MD  Primary Care Provider: Melvin Gee MD (Inactive)        Subjective:     Principal Problem:Pneumonia due to COVID-19 virus        HPI:  Mr. Bharathi Garrido is a 71 yo male with obesity, history of prostate cancer (1/2018 s/p prostatectomy and radiation Dr. Pederson), history of difficult intubation for surgery and glaucoma presents with feeling unwell, fever, decreased appetite and shortness of breath. Patient transferred from [facility] to [facility] 4/28/20 for higher level of care. Admitted to outside hospital on 3/31/20 for SOB and fever and tested positive for COVID-19.  Upon admission, CXR showed bilateral multifocal opacities. Procalcitonin, LDH, ferritin, CRP and D-dimer elevated. Renal function normal. Mild increase in LFTs. He was treated with Plaquinel, Rocephin and Azithromycin. His respiratory function declined and has been intubated since 4/2/20.      Overview/Hospital Course:  Bharathi Garrido was admitted to ICU for management of respiratory failure secondary to suspected and/or confirmed COVID-19 requiring mechanical ventilation for respiratory support.      4/28/20: Patient transferred from [facility] for higher level of care as patient still intubated with high oxygen requirements. Sedated on Fentanyl and Precedex, but responding appropriately and following commands per nursing. Intubated on A/C, FiO2 75%, PEEP 10, , rate 18 with sat of 92%. CXR consistent with multilobar pneumonia. On empiric Vanc and Zosyn for recent white count with leukocytosis. PICC from 4/2 removed and central line and a-line placed. Repeat cxs sent. Hgb/Hct stable s/p 1 unit PRBCs on 4/27. NG set to intermittent suction. Abd x-ray showed gaseous distention.      4/29/20:  Sedated on Propofol, Precedex, and Dilaudid. Initially on Levophed but weaned off and became hypertensive. Briefly on Cardene gtt. Vent settings unchanged: FiO2 75% and PEEP 10. New cultures with no growth, cont on empiric abx. Good UOP with IV Lasix and stable renal function. Having BMs.      4/30/20: Weaned off Propofol and Precedex successfully. Cont on IV Dilaudid 1 mg/hr and scheduled Oxy. SpO2 improving. Vent settings weaned to FiO of 65% PEEP 10  Rate 18. Blood pressure stable off pressors/antihypertensives. Having BMs. Started TFs. Good UOP and net negative. Lytes WNR.      5/1/2020: Weaned off dilaudid gtt. SBT today. Put back on rate after 2 hours when pt became tired and tachypnic. Stopped lasix due to elevated creatinine. Consulted ID due to increased WBC and fever despite broad spectrum abx regimen.      5/2/2020: Patient now off dilaudid gtt and tolerating well. Still drowsy. Patient failed SBT today.  Will decrease PO oxy from Q6H to BID. WBC continues to increase, now 22.28, Tmax 100. BC NGTD. Remains on vanc/zosyn - awaiting ID recs. Possible hemoconcentration. Net negative 1.3L. Stop lasix and start free water 200 Q4H. On TFs. Last BM 4/28. On bowel regimen.      5/3/2020: On Precedex gtt and prn Morphine for agitation.  Vent settings this AM: 40% FiO2 and PEEP 5. Oxygen sats 94%. Failed SBT. Consult surgery for trach/PEG. Did have 4 min bradycardia/hypertension during SBT which resolved with oxygen and suctioning. Free water increased to 300mL Q4H for hypernatremia. Stopped vanc/zosyn per ID recs. NGTD on resp/blood/fungal cultures.      5/5-5/6/2020: Weaned off sedation and tolerating minimal vent settings. Successful SBTs with NIF 46 and RISB 70. Extubated on 5/6 to 4 L NC. Neuro intact.     5/8: Patient was transferred out of the ICU, approved for regular diet with thin liquids    Interval History: Nursing reported vomiting with every meal. Patient denies nausea, but states he does not  feel like eating. He does ask for water. Emesis bag is at bedside. Urinary retention was also noted by nursing. Straight in/out cath performed with 300cc. Patient denies any current pain.    Review of Systems   Constitutional: Positive for appetite change. Negative for chills and fever.   HENT: Negative for mouth sores, sore throat and trouble swallowing.    Respiratory: Negative for cough and shortness of breath.    Cardiovascular: Negative for chest pain and palpitations.   Gastrointestinal: Negative for abdominal pain, constipation, diarrhea, nausea and vomiting.   Genitourinary: Positive for decreased urine volume. Negative for dysuria and urgency.   Musculoskeletal: Negative for arthralgias and myalgias.   Neurological: Negative for dizziness, syncope and light-headedness.   Psychiatric/Behavioral: Positive for sleep disturbance. Negative for agitation.     Objective:     Vital Signs (Most Recent):  Temp: 99 °F (37.2 °C) (05/09/20 0825)  Pulse: 66 (05/09/20 1014)  Resp: 20 (05/09/20 1014)  BP: (!) 140/79 (05/09/20 1014)  SpO2: 99 % (05/09/20 1014) Vital Signs (24h Range):  Temp:  [99 °F (37.2 °C)-99.5 °F (37.5 °C)] 99 °F (37.2 °C)  Pulse:  [66-95] 66  Resp:  [16-31] 20  SpO2:  [90 %-100 %] 99 %  BP: (130-155)/(71-97) 140/79     Weight: 105.4 kg (232 lb 5.8 oz)  Body mass index is 32.41 kg/m².    Intake/Output Summary (Last 24 hours) at 5/9/2020 1206  Last data filed at 5/9/2020 0900  Gross per 24 hour   Intake 905 ml   Output 585 ml   Net 320 ml      Physical Exam   Constitutional: He appears well-developed and well-nourished. No distress.   HENT:   Head: Normocephalic and atraumatic.   Eyes: Conjunctivae and EOM are normal. No scleral icterus.   Cardiovascular: Normal rate, regular rhythm, normal heart sounds and intact distal pulses.   Pulmonary/Chest: Effort normal and breath sounds normal. No respiratory distress.   Examined anteriorly given patient's weakness inability to cooperate with exam   Abdominal:  Soft. Bowel sounds are normal. He exhibits no distension. There is no tenderness.   Musculoskeletal: He exhibits no edema.   Neurological: He is alert. No cranial nerve deficit.   Skin: Skin is warm and dry. He is not diaphoretic.   Psychiatric: He has a normal mood and affect. Thought content normal.       Significant Labs:   CBC:   Recent Labs   Lab 05/08/20 0315 05/09/20  0339   WBC 15.47* 13.90*   HGB 8.6* 8.8*   HCT 28.7* 28.6*   * 408*     CMP:   Recent Labs   Lab 05/08/20 0315 05/09/20  0339   * 144   K 3.6 4.0    105   CO2 29 28   GLU 99 92   BUN 29* 28*   CREATININE 1.3 1.2   CALCIUM 8.8 9.2   PROT 7.7 7.5   ALBUMIN 2.0* 2.2*   BILITOT 2.0* 1.8*   ALKPHOS 142* 136*   AST 72* 49*   * 94*   ANIONGAP 11 11   EGFRNONAA 56.1* >60.0       Significant Imaging: I have reviewed all pertinent imaging results/findings within the past 24 hours.      Assessment/Plan:      * Pneumonia due to COVID-19 virus  With acute respiratory failure  - Initially admitted 3/31 at OSH  - Intubated 34 days (starting 4/2), extubated 5/6 to 4 L NC, BiPAP at night. Awake and alert, intact.   - Completed course of Plaquenil, Azithromycin, and Rocephin   - Repeat COVID-19 on 4/21 and 5/8 positive  - Currently on NC 5L with adequate saturation      Respiratory failure  - initially from COVID-19  - intubated 34 days; extubated 5/6  - currently on 5L NC with O2sats adequate  - monitor      Hyponatremia  - Thought to be a result of lasix use while in ICU  - Improving since extubating and free water boluses through NG tube  - Na at 144 this AM  - encouraged continued oral intake of water      WILBUR (acute kidney injury)  - WILBUR improving  -Off IV Lasix since 5/1/20.   - Encouraged increased PO fluid intake    Urinary retention  - Perez removed 5/8  - Patient denies any urgency, but nursing noted decreased urine output with dribbling  - Bladder scan (difficult study) completed 5/9 AM with only 126 cc  - will continue to  monitor and straight cath as needed    Vomiting  - Uncertain exact cause of nausea/vomiting  - Possibly secondary to medication side effects from his hospitalization  - PRN zofran and PRN phenergan ordered  - Encouraged PO intake as tolerated      Anemia  - Possibly from acute illness  - no active bleeding noted  - stable Hgb over several weeks around 7-8  - continue to monitor        VTE Risk Mitigation (From admission, onward)         Ordered     enoxaparin injection 40 mg  Daily      04/28/20 1535     IP VTE HIGH RISK PATIENT  Once      04/28/20 1535              Diet: Regular diet  Disposition: to rehab facility      Tangela Hughes MD  Department of Hospital Medicine   [facility]

## 2020-05-09 NOTE — CARE UPDATE
"RAPID RESPONSE NURSE PROACTIVE ROUNDING NOTE     Time of Visit: 1036    Admit Date: 2020  LOS: 11  Code Status: Full Code   Date of Visit: 2020  : 1950  Age: 70 y.o.  Sex: male  Race: Black or   Bed: 8068/8068 A:   MRN: [id number]  Was the patient discharged from an ICU this admission? yes   Was the patient discharged from a PACU within last 24 hours?  no  Did the patient receive conscious sedation/general anesthesia in last 24 hours?  no  Was the patient in the ED within the past 24 hours?  no  Was the patient started on NIPPV within the past 24 hours?  no  Attending Physician: Tangela Hughes MD  Primary Service: [facility] MED G    ASSESSMENT       Diagnosis: Pneumonia due to COVID-19 virus    Abnormal Vital Signs: BP (!) 140/79   Pulse 66   Temp 99 °F (37.2 °C) (Axillary)   Resp 20   Ht 5' 11" (1.803 m)   Wt 105.4 kg (232 lb 5.8 oz)   SpO2 99%   BMI 32.41 kg/m²      Clinical Issues: Respiratory    Patient  has a past medical history of Class 1 obesity with body mass index (BMI) of 33.0 to 33.9 in adult, Difficult intubation, Glaucoma, Prostate cancer, and Sebaceous cyst of scrotum.     At bedside for rounds. Pt is sleepy after getting Phenergan for Nausea this AM. Pt opens eyes to voice and is AAOX4, follows commands. Pt denies pain, SOB and nausea at this time. Remains on 3 Liters NC/ O2 Sat 99%. VSS.      INTERVENTIONS/ RECOMMENDATIONS   Assess for Nausea  Keep HOB > 30 to prevent aspiration if more emesis occurs.  Vital Signs Q2h  Monitor Neuro Status Q2H    Discussed plan of care with RNBreanna    PHYSICIAN ESCALATION     Yes/No  no    Orders received and case discussed with NA.    Disposition: Remain in room 8068.    FOLLOW-UP     Call back the Rapid Response Nurse, Shraddha Oliveros RN at [phone] for additional questions or concerns.          "

## 2020-05-09 NOTE — ASSESSMENT & PLAN NOTE
- Perez removed 5/8  - Patient denies any urgency, but nursing noted decreased urine output with dribbling  - Bladder scan (difficult study) completed 5/9 AM with only 126 cc  - will continue to monitor and straight cath as needed

## 2020-05-09 NOTE — HPI
Mr. Bharathi Garrido is a 71 yo male with obesity, history of prostate cancer (1/2018 s/p prostatectomy and radiation Dr. Pederson), history of difficult intubation for surgery and glaucoma presents with feeling unwell, fever, decreased appetite and shortness of breath. Patient [facility] [place] [facility] [place] for higher level of care. Admitted to outside hospital on 3/31/20 for SOB and fever and tested positive for COVID-19.  Upon admission, CXR showed bilateral multifocal opacities. Procalcitonin, LDH, ferritin, CRP and D-dimer elevated. Renal function normal. Mild increase in LFTs. He was treated with Plaquinel, Rocephin and Azithromycin. His respiratory function declined and has been intubated since 4/2/20.

## 2020-05-09 NOTE — ASSESSMENT & PLAN NOTE
- Thought to be a result of lasix use while in ICU  - Improving since extubating and free water boluses through NG tube  - Na at 144 this AM  - encouraged continued oral intake of water

## 2020-05-09 NOTE — ASSESSMENT & PLAN NOTE
- initially from COVID-19  - intubated 34 days; extubated 5/6  - currently on 5L NC with O2sats adequate  - monitor

## 2020-05-09 NOTE — ASSESSMENT & PLAN NOTE
With acute respiratory failure  - Initially admitted 3/31 at OSH  - Intubated 34 days (starting 4/2), extubated 5/6 to 4 L NC, BiPAP at night. Awake and alert, intact.   - Completed course of Plaquenil, Azithromycin, and Rocephin   - Repeat COVID-19 on 4/21 and 5/8 positive  - Currently on NC 5L with adequate saturation

## 2020-05-10 LAB
ALBUMIN SERPL BCP-MCNC: 2.2 G/DL (ref 3.5–5.2)
ALP SERPL-CCNC: 136 U/L (ref 55–135)
ALT SERPL W/O P-5'-P-CCNC: 82 U/L (ref 10–44)
ANION GAP SERPL CALC-SCNC: 13 MMOL/L (ref 8–16)
AST SERPL-CCNC: 42 U/L (ref 10–40)
BACTERIA #/AREA URNS AUTO: ABNORMAL /HPF
BASOPHILS # BLD AUTO: 0.09 K/UL (ref 0–0.2)
BASOPHILS NFR BLD: 0.7 % (ref 0–1.9)
BILIRUB SERPL-MCNC: 1.8 MG/DL (ref 0.1–1)
BILIRUB UR QL STRIP: NEGATIVE
BUN SERPL-MCNC: 28 MG/DL (ref 8–23)
CALCIUM SERPL-MCNC: 9.3 MG/DL (ref 8.7–10.5)
CHLORIDE SERPL-SCNC: 106 MMOL/L (ref 95–110)
CLARITY UR REFRACT.AUTO: ABNORMAL
CO2 SERPL-SCNC: 26 MMOL/L (ref 23–29)
COLOR UR AUTO: ABNORMAL
CREAT SERPL-MCNC: 1.3 MG/DL (ref 0.5–1.4)
DIFFERENTIAL METHOD: ABNORMAL
EOSINOPHIL # BLD AUTO: 0.3 K/UL (ref 0–0.5)
EOSINOPHIL NFR BLD: 2.5 % (ref 0–8)
ERYTHROCYTE [DISTWIDTH] IN BLOOD BY AUTOMATED COUNT: 16.8 % (ref 11.5–14.5)
EST. GFR  (AFRICAN AMERICAN): >60 ML/MIN/1.73 M^2
EST. GFR  (NON AFRICAN AMERICAN): 56.1 ML/MIN/1.73 M^2
GLUCOSE SERPL-MCNC: 77 MG/DL (ref 70–110)
GLUCOSE UR QL STRIP: NEGATIVE
HCT VFR BLD AUTO: 28.7 % (ref 40–54)
HGB BLD-MCNC: 8.5 G/DL (ref 14–18)
HGB UR QL STRIP: ABNORMAL
HYALINE CASTS UR QL AUTO: 2 /LPF
IMM GRANULOCYTES # BLD AUTO: 0.05 K/UL (ref 0–0.04)
IMM GRANULOCYTES NFR BLD AUTO: 0.4 % (ref 0–0.5)
KETONES UR QL STRIP: NEGATIVE
LEUKOCYTE ESTERASE UR QL STRIP: ABNORMAL
LYMPHOCYTES # BLD AUTO: 1.2 K/UL (ref 1–4.8)
LYMPHOCYTES NFR BLD: 9.3 % (ref 18–48)
MAGNESIUM SERPL-MCNC: 2.3 MG/DL (ref 1.6–2.6)
MCH RBC QN AUTO: 27.2 PG (ref 27–31)
MCHC RBC AUTO-ENTMCNC: 29.6 G/DL (ref 32–36)
MCV RBC AUTO: 92 FL (ref 82–98)
MICROSCOPIC COMMENT: ABNORMAL
MONOCYTES # BLD AUTO: 1.3 K/UL (ref 0.3–1)
MONOCYTES NFR BLD: 9.9 % (ref 4–15)
NEUTROPHILS # BLD AUTO: 9.7 K/UL (ref 1.8–7.7)
NEUTROPHILS NFR BLD: 77.2 % (ref 38–73)
NITRITE UR QL STRIP: NEGATIVE
NRBC BLD-RTO: 0 /100 WBC
PH UR STRIP: 5 [PH] (ref 5–8)
PHOSPHATE SERPL-MCNC: 3.1 MG/DL (ref 2.7–4.5)
PLATELET # BLD AUTO: 313 K/UL (ref 150–350)
PMV BLD AUTO: 13 FL (ref 9.2–12.9)
POTASSIUM SERPL-SCNC: 4.1 MMOL/L (ref 3.5–5.1)
PROT SERPL-MCNC: 7.4 G/DL (ref 6–8.4)
PROT UR QL STRIP: NEGATIVE
RBC # BLD AUTO: 3.12 M/UL (ref 4.6–6.2)
RBC #/AREA URNS AUTO: 2 /HPF (ref 0–4)
SODIUM SERPL-SCNC: 145 MMOL/L (ref 136–145)
SP GR UR STRIP: 1.01 (ref 1–1.03)
SQUAMOUS #/AREA URNS AUTO: 1 /HPF
URN SPEC COLLECT METH UR: ABNORMAL
WBC # BLD AUTO: 12.61 K/UL (ref 3.9–12.7)
WBC #/AREA URNS AUTO: 10 /HPF (ref 0–5)

## 2020-05-10 PROCEDURE — 81001 URINALYSIS AUTO W/SCOPE: CPT

## 2020-05-10 PROCEDURE — 84100 ASSAY OF PHOSPHORUS: CPT

## 2020-05-10 PROCEDURE — 94660 CPAP INITIATION&MGMT: CPT

## 2020-05-10 PROCEDURE — 99233 SBSQ HOSP IP/OBS HIGH 50: CPT | Mod: ,,, | Performed by: INTERNAL MEDICINE

## 2020-05-10 PROCEDURE — 51701 INSERT BLADDER CATHETER: CPT

## 2020-05-10 PROCEDURE — 94640 AIRWAY INHALATION TREATMENT: CPT

## 2020-05-10 PROCEDURE — 63600175 PHARM REV CODE 636 W HCPCS: Performed by: INTERNAL MEDICINE

## 2020-05-10 PROCEDURE — 27000221 HC OXYGEN, UP TO 24 HOURS

## 2020-05-10 PROCEDURE — 94668 MNPJ CHEST WALL SBSQ: CPT

## 2020-05-10 PROCEDURE — 99233 PR SUBSEQUENT HOSPITAL CARE,LEVL III: ICD-10-PCS | Mod: ,,, | Performed by: INTERNAL MEDICINE

## 2020-05-10 PROCEDURE — 99900035 HC TECH TIME PER 15 MIN (STAT)

## 2020-05-10 PROCEDURE — 80053 COMPREHEN METABOLIC PANEL: CPT

## 2020-05-10 PROCEDURE — 25000003 PHARM REV CODE 250: Performed by: INTERNAL MEDICINE

## 2020-05-10 PROCEDURE — 25000242 PHARM REV CODE 250 ALT 637 W/ HCPCS: Performed by: INTERNAL MEDICINE

## 2020-05-10 PROCEDURE — 85025 COMPLETE CBC W/AUTO DIFF WBC: CPT

## 2020-05-10 PROCEDURE — 51702 INSERT TEMP BLADDER CATH: CPT

## 2020-05-10 PROCEDURE — 94761 N-INVAS EAR/PLS OXIMETRY MLT: CPT

## 2020-05-10 PROCEDURE — 83735 ASSAY OF MAGNESIUM: CPT

## 2020-05-10 PROCEDURE — 36415 COLL VENOUS BLD VENIPUNCTURE: CPT

## 2020-05-10 PROCEDURE — 11000001 HC ACUTE MED/SURG PRIVATE ROOM

## 2020-05-10 PROCEDURE — 51798 US URINE CAPACITY MEASURE: CPT

## 2020-05-10 RX ADMIN — BRIMONIDINE TARTRATE 1 DROP: 2 SOLUTION OPHTHALMIC at 08:05

## 2020-05-10 RX ADMIN — MINERAL OIL AND WHITE PETROLATUM: 150; 830 OINTMENT OPHTHALMIC at 08:05

## 2020-05-10 RX ADMIN — TIMOLOL MALEATE 1 DROP: 5 SOLUTION/ DROPS OPHTHALMIC at 08:05

## 2020-05-10 RX ADMIN — ONDANSETRON HYDROCHLORIDE 4 MG: 4 TABLET, FILM COATED ORAL at 07:05

## 2020-05-10 RX ADMIN — FAMOTIDINE 20 MG: 20 TABLET, FILM COATED ORAL at 08:05

## 2020-05-10 RX ADMIN — AMLODIPINE BESYLATE 5 MG: 5 TABLET ORAL at 08:05

## 2020-05-10 RX ADMIN — LATANOPROST 1 DROP: 50 SOLUTION OPHTHALMIC at 08:05

## 2020-05-10 RX ADMIN — ENOXAPARIN SODIUM 40 MG: 100 INJECTION SUBCUTANEOUS at 08:05

## 2020-05-10 RX ADMIN — CHLORHEXIDINE GLUCONATE 0.12% ORAL RINSE 15 ML: 1.2 LIQUID ORAL at 08:05

## 2020-05-10 RX ADMIN — ALBUTEROL SULFATE 2.5 MG: 2.5 SOLUTION RESPIRATORY (INHALATION) at 07:05

## 2020-05-10 RX ADMIN — ALBUTEROL SULFATE 2.5 MG: 2.5 SOLUTION RESPIRATORY (INHALATION) at 08:05

## 2020-05-10 RX ADMIN — SODIUM CHLORIDE SOLN NEBU 3% 4 ML: 3 NEBU SOLN at 08:05

## 2020-05-10 RX ADMIN — SODIUM CHLORIDE SOLN NEBU 3% 4 ML: 3 NEBU SOLN at 07:05

## 2020-05-10 NOTE — ASSESSMENT & PLAN NOTE
- Del Toro removed 5/8  - Nursing noted decreased urine output with dribbling  - Patient continues to have urinary retention with pain upon straining  - Will order UA  - will continue to monitor and straight cath as needed. May need to consider reinsertion of del toro catheter if he continues to have retention

## 2020-05-10 NOTE — ASSESSMENT & PLAN NOTE
- Patient without history of hypertension at home  - elevated -150 systolic noted  - will start low dose amlodipine 5 mg daily

## 2020-05-10 NOTE — PLAN OF CARE
Problem: Adult Inpatient Plan of Care  Goal: Plan of Care Review  Outcome: Ongoing, Progressing  Flowsheets (Taken 5/10/2020 7995)  Plan of Care Reviewed With: patient     Pt remains free from falls/injury. VSS. Bed in lowest position, wheels locked, side rails up times 2, call light w/in reach, bed alarm on. Room clear of obstacles. Pt remains on 3 L NC with good O2 sats. In am pt really struggles to void, straining, with minimal output, painful per pt report. Dr. Hughes notified, ordered UA and straight cath to relieve pressure and obtain sample. Pt with no other UO through out the shift. Perez cath ordered and inserted for urinary retention. Pt tolerated well. No c/o nausea, no vomiting this shift, though still refusing to eat, no appetite. Good water intake. WCTM.

## 2020-05-10 NOTE — CARE UPDATE
Rapid Response Nurse Follow-up Note     Followed up with patient for proactive rounding.   VSS, remains on 3L NC.   Recommend trying to wean O2 this afternoon.   Urinary retention issues, UA reflex ordered.   No acute issues at this time. Reviewed plan of care with bedside RNSophy .   Team will continue to follow.  Please call Rapid Response RN, Marissa Casper RN with any questions or concerns at [phone].

## 2020-05-10 NOTE — ASSESSMENT & PLAN NOTE
With acute respiratory failure  - Initially admitted 3/31 at OSH  - Intubated 34 days (starting 4/2), extubated 5/6 to 4 L NC, BiPAP at night. Awake and alert, intact.   - Completed course of Plaquenil, Azithromycin, and Rocephin   - Repeat COVID-19 on 4/21 and 5/8 positive  - Currently on NC 2L with adequate saturation

## 2020-05-10 NOTE — ASSESSMENT & PLAN NOTE
- initially from COVID-19  - intubated 34 days; extubated 5/6  - currently on 2L NC with O2sats adequate; wan O2 as tolerated  - Improving slowly  - monitor

## 2020-05-10 NOTE — ASSESSMENT & PLAN NOTE
- Uncertain exact cause of nausea/vomiting  - Possibly secondary to medication side effects from his hospitalization  - PRN zofran and PRN phenergan ordered  - Patient reports improvement of nausea 5/10 and has not vomited  - Encouraged PO intake as tolerated

## 2020-05-10 NOTE — ASSESSMENT & PLAN NOTE
- WILBUR improved  - Cr stabilizied around 1.1-1.3  - Off IV Lasix since 5/1/20  - Encouraged increased PO fluid intake

## 2020-05-10 NOTE — PLAN OF CARE
AAOx4. VSS. Patient now on 3L O2 via NC due to desaturations overnight. X1 BM and x1 urine occurrence overnight. Concerns for urinary retention due to patient straining when urinating. No other significant events. Bed in lowest position, call light and personal items within reach. Will continue to monitor.

## 2020-05-10 NOTE — CARE UPDATE
"RAPID RESPONSE NURSE PROACTIVE ROUNDING NOTE     Time of Visit:     Admit Date: 2020  LOS: 11  Code Status: Full Code   Date of Visit: 2020  : 1950  Age: 70 y.o.  Sex: male  Race: Black or   Bed: 8068/8068 A:   MRN: [id number]  Was the patient discharged from an ICU this admission? yes   Was the patient discharged from a PACU within last 24 hours?  no  Did the patient receive conscious sedation/general anesthesia in last 24 hours?  no  Was the patient in the ED within the past 24 hours?  no  Was the patient started on NIPPV within the past 24 hours?  no  Attending Physician: Tangela Hughes MD  Primary Service: [facility] [place] [place] [facility] MED G    ASSESSMENT     Reason for alert: No alert/ rounding    Diagnosis: Pneumonia due to COVID-19 virus    Abnormal Vital Signs: /77 (BP Location: Left arm, Patient Position: Lying)   Pulse 91   Temp 98 °F (36.7 °C) (Oral)   Resp 20   Ht 5' 11" (1.803 m)   Wt 105.4 kg (232 lb 5.8 oz)   SpO2 96%   BMI 32.41 kg/m²      Clinical Issues: No acute issues    Patient  has a past medical history of Class 1 obesity with body mass index (BMI) of 33.0 to 33.9 in adult, Difficult intubation, Glaucoma, Prostate cancer, and Sebaceous cyst of scrotum.      Upon assessment, patient resting comfortably with no complaints. His respirations are even and unlabored, O2 was attempted to be weaned off unsuccessfully desaturating down to 87%. Remains on 2L, saturating in the 90's.      INTERVENTIONS/ RECOMMENDATIONS     Continue to monitor/ call with concerns    Discussed plan of care with Yaenth BAE.    PHYSICIAN ESCALATION     Yes/No  no    Orders received and case discussed with NA.    Disposition: Remain in room 8068.    FOLLOW-UP     Call back the Rapid Response Nurse, Hannah Bancroft, RN at [phone] for additional questions or concerns.          "

## 2020-05-10 NOTE — PROGRESS NOTES
[facility] - Respiratory ICU  [facility] Medicine  Progress Note    Patient Name: Bharathi Garrido  MRN: [id number]  Patient Class: IP- Inpatient   Admission Date: 4/28/2020  Length of Stay: 12 days  Attending Physician: Tangela Hughes MD  Primary Care Provider: Melvin Gee MD (Inactive)        Subjective:     Principal Problem:Pneumonia due to COVID-19 virus        HPI:  Mr. Bharathi Garrido is a 71 yo male with obesity, history of prostate cancer (1/2018 s/p prostatectomy and radiation Dr. Pederson), history of difficult intubation for surgery and glaucoma presents with feeling unwell, fever, decreased appetite and shortness of breath. Patient transferred from [facility] to [facility] 4/28/20 for higher level of care. Admitted to outside hospital on 3/31/20 for SOB and fever and tested positive for COVID-19.  Upon admission, CXR showed bilateral multifocal opacities. Procalcitonin, LDH, ferritin, CRP and D-dimer elevated. Renal function normal. Mild increase in LFTs. He was treated with Plaquinel, Rocephin and Azithromycin. His respiratory function declined and has been intubated since 4/2/20.      Overview/Hospital Course:  Bahrathi Garrido was admitted to ICU for management of respiratory failure secondary to suspected and/or confirmed COVID-19 requiring mechanical ventilation for respiratory support.      4/28/20: Patient transferred from [facility] for higher level of care as patient still intubated with high oxygen requirements. Sedated on Fentanyl and Precedex, but responding appropriately and following commands per nursing. Intubated on A/C, FiO2 75%, PEEP 10, , rate 18 with sat of 92%. CXR consistent with multilobar pneumonia. On empiric Vanc and Zosyn for recent white count with leukocytosis. PICC from 4/2 removed and central line and a-line placed. Repeat cxs sent. Hgb/Hct stable s/p 1 unit PRBCs on 4/27. NG set to intermittent suction. Abd x-ray showed gaseous distention.      4/29/20:  Sedated on Propofol, Precedex, and Dilaudid. Initially on Levophed but weaned off and became hypertensive. Briefly on Cardene gtt. Vent settings unchanged: FiO2 75% and PEEP 10. New cultures with no growth, cont on empiric abx. Good UOP with IV Lasix and stable renal function. Having BMs.      4/30/20: Weaned off Propofol and Precedex successfully. Cont on IV Dilaudid 1 mg/hr and scheduled Oxy. SpO2 improving. Vent settings weaned to FiO of 65% PEEP 10  Rate 18. Blood pressure stable off pressors/antihypertensives. Having BMs. Started TFs. Good UOP and net negative. Lytes WNR.      5/1/2020: Weaned off dilaudid gtt. SBT today. Put back on rate after 2 hours when pt became tired and tachypnic. Stopped lasix due to elevated creatinine. Consulted ID due to increased WBC and fever despite broad spectrum abx regimen.      5/2/2020: Patient now off dilaudid gtt and tolerating well. Still drowsy. Patient failed SBT today.  Will decrease PO oxy from Q6H to BID. WBC continues to increase, now 22.28, Tmax 100. BC NGTD. Remains on vanc/zosyn - awaiting ID recs. Possible hemoconcentration. Net negative 1.3L. Stop lasix and start free water 200 Q4H. On TFs. Last BM 4/28. On bowel regimen.      5/3/2020: On Precedex gtt and prn Morphine for agitation.  Vent settings this AM: 40% FiO2 and PEEP 5. Oxygen sats 94%. Failed SBT. Consult surgery for trach/PEG. Did have 4 min bradycardia/hypertension during SBT which resolved with oxygen and suctioning. Free water increased to 300mL Q4H for hypernatremia. Stopped vanc/zosyn per ID recs. NGTD on resp/blood/fungal cultures.      5/5-5/6/2020: Weaned off sedation and tolerating minimal vent settings. Successful SBTs with NIF 46 and RISB 70. Extubated on 5/6 to 4 L NC. Neuro intact.     5/8: Patient was transferred out of the ICU, approved for regular diet with thin liquids    5/9: Some urinary retention and nausea noted    Interval History: Patient reports that his nausea has  improved, but he still does not have an appetite. He has discomfort due to his urinary retention and having to strain to go to the bathroom. He has also had some constipation.    Review of Systems   Constitutional: Positive for appetite change. Negative for chills and fever.   Respiratory: Negative for cough and shortness of breath.    Cardiovascular: Negative for chest pain and palpitations.   Gastrointestinal: Positive for constipation. Negative for abdominal pain, diarrhea, nausea and vomiting.   Genitourinary: Positive for decreased urine volume, difficulty urinating and urgency. Negative for dysuria.   Musculoskeletal: Negative for arthralgias and myalgias.   Neurological: Negative for dizziness, syncope and light-headedness.   Psychiatric/Behavioral: Negative for agitation and sleep disturbance.     Objective:     Vital Signs (Most Recent):  Temp: 98.4 °F (36.9 °C) (05/10/20 0800)  Pulse: 79 (05/10/20 1000)  Resp: 20 (05/10/20 1000)  BP: (!) 143/81 (05/10/20 1000)  SpO2: 98 % (05/10/20 1000) Vital Signs (24h Range):  Temp:  [97.8 °F (36.6 °C)-100.2 °F (37.9 °C)] 98.4 °F (36.9 °C)  Pulse:  [] 79  Resp:  [18-22] 20  SpO2:  [92 %-99 %] 98 %  BP: (129-143)/(72-83) 143/81     Weight: 105.4 kg (232 lb 5.8 oz)  Body mass index is 32.41 kg/m².    Intake/Output Summary (Last 24 hours) at 5/10/2020 1154  Last data filed at 5/10/2020 0940  Gross per 24 hour   Intake 1240 ml   Output 400 ml   Net 840 ml      Physical Exam   Constitutional: He appears well-developed and well-nourished. No distress.   HENT:   Head: Normocephalic and atraumatic.   Poor dentition   Eyes: Conjunctivae and EOM are normal. No scleral icterus.   Cardiovascular: Normal rate, regular rhythm, normal heart sounds and intact distal pulses.   Pulmonary/Chest: Effort normal and breath sounds normal. No respiratory distress.   Examined anteriorly given patient's weakness inability to cooperate with exam   Abdominal: Soft. Bowel sounds are normal.  He exhibits no distension. There is no tenderness.   Musculoskeletal: He exhibits no edema.   Neurological: He is alert. No cranial nerve deficit.   Skin: Skin is warm and dry. He is not diaphoretic.   Psychiatric: He has a normal mood and affect. Thought content normal.       Significant Labs:   CBC:   Recent Labs   Lab 05/09/20  0339 05/10/20  0610   WBC 13.90* 12.61   HGB 8.8* 8.5*   HCT 28.6* 28.7*   * 313     CMP:   Recent Labs   Lab 05/09/20  0339 05/10/20  0610    145   K 4.0 4.1    106   CO2 28 26   GLU 92 77   BUN 28* 28*   CREATININE 1.2 1.3   CALCIUM 9.2 9.3   PROT 7.5 7.4   ALBUMIN 2.2* 2.2*   BILITOT 1.8* 1.8*   ALKPHOS 136* 136*   AST 49* 42*   ALT 94* 82*   ANIONGAP 11 13   EGFRNONAA >60.0 56.1*       Significant Imaging: I have reviewed all pertinent imaging results/findings within the past 24 hours.      Assessment/Plan:      * Pneumonia due to COVID-19 virus  With acute respiratory failure  - Initially admitted 3/31 at OSH  - Intubated 34 days (starting 4/2), extubated 5/6 to 4 L NC, BiPAP at night. Awake and alert, intact.   - Completed course of Plaquenil, Azithromycin, and Rocephin   - Repeat COVID-19 on 4/21 and 5/8 positive  - Currently on NC 2L with adequate saturation      Respiratory failure  - initially from COVID-19  - intubated 34 days; extubated 5/6  - currently on 2L NC with O2sats adequate; wan O2 as tolerated  - Improving slowly  - monitor      Hyponatremia  - Patient has had both hypo and hypernatremia while inpatient  - Hypernatremia thought to be a result of lasix use while in ICU  - Improving since extubating and free water boluses through NG tube  - Na at 145 this AM, improving  - encouraged continued oral intake of water      WILBUR (acute kidney injury)  - WILBUR improved  - Cr stabilizied around 1.1-1.3  - Off IV Lasix since 5/1/20  - Encouraged increased PO fluid intake    Urinary retention  - Perez removed 5/8  - Nursing noted decreased urine output with  dribbling  - Patient continues to have urinary retention with pain upon straining  - UA 5/10 equivacol for overt infection  - will continue to monitor and straight cath as needed. May need to consider reinsertion of del toro catheter if he continues to have retention    Vomiting  - Uncertain exact cause of nausea/vomiting  - Possibly secondary to medication side effects from his hospitalization  - PRN zofran and PRN phenergan ordered  - Patient reports improvement of nausea 5/10 and has not vomited  - Encouraged PO intake as tolerated      Hypertension  - Patient without history of hypertension at home  - elevated -150 systolic noted  - will start low dose amlodipine 5 mg daily      Anemia  - Possibly from acute illness  - no active bleeding noted  - stable Hgb over several weeks around 7-8  - continue to monitor        VTE Risk Mitigation (From admission, onward)         Ordered     enoxaparin injection 40 mg  Daily      04/28/20 1535     IP VTE HIGH RISK PATIENT  Once      04/28/20 1530                      Tangela Hughes MD  Department of Hospital Medicine   [facility] Res[facility] ICU

## 2020-05-10 NOTE — ASSESSMENT & PLAN NOTE
- Patient has had both hypo and hypernatremia while inpatient  - Hypernatremia thought to be a result of lasix use while in ICU  - Improving since extubating and free water boluses through NG tube  - Na at 145 this AM, improving  - encouraged continued oral intake of water

## 2020-05-10 NOTE — SUBJECTIVE & OBJECTIVE
Interval History: Patient reports that his nausea has improved, but he still does not have an appetite. He has discomfort due to his urinary retention and having to strain to go to the bathroom. He has also had some constipation.    Review of Systems   Constitutional: Positive for appetite change. Negative for chills and fever.   Respiratory: Negative for cough and shortness of breath.    Cardiovascular: Negative for chest pain and palpitations.   Gastrointestinal: Positive for constipation. Negative for abdominal pain, diarrhea, nausea and vomiting.   Genitourinary: Positive for decreased urine volume, difficulty urinating and urgency. Negative for dysuria.   Musculoskeletal: Negative for arthralgias and myalgias.   Neurological: Negative for dizziness, syncope and light-headedness.   Psychiatric/Behavioral: Negative for agitation and sleep disturbance.     Objective:     Vital Signs (Most Recent):  Temp: 98.4 °F (36.9 °C) (05/10/20 0800)  Pulse: 79 (05/10/20 1000)  Resp: 20 (05/10/20 1000)  BP: (!) 143/81 (05/10/20 1000)  SpO2: 98 % (05/10/20 1000) Vital Signs (24h Range):  Temp:  [97.8 °F (36.6 °C)-100.2 °F (37.9 °C)] 98.4 °F (36.9 °C)  Pulse:  [] 79  Resp:  [18-22] 20  SpO2:  [92 %-99 %] 98 %  BP: (129-143)/(72-83) 143/81     Weight: 105.4 kg (232 lb 5.8 oz)  Body mass index is 32.41 kg/m².    Intake/Output Summary (Last 24 hours) at 5/10/2020 1154  Last data filed at 5/10/2020 0940  Gross per 24 hour   Intake 1240 ml   Output 400 ml   Net 840 ml      Physical Exam   Constitutional: He appears well-developed and well-nourished. No distress.   HENT:   Head: Normocephalic and atraumatic.   Poor dentition   Eyes: Conjunctivae and EOM are normal. No scleral icterus.   Cardiovascular: Normal rate, regular rhythm, normal heart sounds and intact distal pulses.   Pulmonary/Chest: Effort normal and breath sounds normal. No respiratory distress.   Examined anteriorly given patient's weakness inability to cooperate  with exam   Abdominal: Soft. Bowel sounds are normal. He exhibits no distension. There is no tenderness.   Musculoskeletal: He exhibits no edema.   Neurological: He is alert. No cranial nerve deficit.   Skin: Skin is warm and dry. He is not diaphoretic.   Psychiatric: He has a normal mood and affect. Thought content normal.       Significant Labs:   CBC:   Recent Labs   Lab 05/09/20  0339 05/10/20  0610   WBC 13.90* 12.61   HGB 8.8* 8.5*   HCT 28.6* 28.7*   * 313     CMP:   Recent Labs   Lab 05/09/20  0339 05/10/20  0610    145   K 4.0 4.1    106   CO2 28 26   GLU 92 77   BUN 28* 28*   CREATININE 1.2 1.3   CALCIUM 9.2 9.3   PROT 7.5 7.4   ALBUMIN 2.2* 2.2*   BILITOT 1.8* 1.8*   ALKPHOS 136* 136*   AST 49* 42*   ALT 94* 82*   ANIONGAP 11 13   EGFRNONAA >60.0 56.1*       Significant Imaging: I have reviewed all pertinent imaging results/findings within the past 24 hours.

## 2020-05-10 NOTE — PLAN OF CARE
Problem: Adult Inpatient Plan of Care  Goal: Plan of Care Review  Outcome: Ongoing, Progressing  Flowsheets (Taken 5/9/2020 1920)  Plan of Care Reviewed With: patient   Pt remains free from falls/injury. No acute events during shift. VSS, NAD. Bed in lowest position, wheels locked, side rails up times 2, call light w/in reach, bed alarm on. Room clear of obstacles. O2 weaned down to 2 L NC. Tolerates well. Pt nauseous through out shift. No vomiting. Nausea managed with PRN meds with mild relief of s/s. Concern for urinary retention from night RN; pt was able to void spontaneously this shift, though incontinent. WCTM.

## 2020-05-11 LAB
ALBUMIN SERPL BCP-MCNC: 2.2 G/DL (ref 3.5–5.2)
ALP SERPL-CCNC: 130 U/L (ref 55–135)
ALT SERPL W/O P-5'-P-CCNC: 69 U/L (ref 10–44)
ANION GAP SERPL CALC-SCNC: 11 MMOL/L (ref 8–16)
AST SERPL-CCNC: 37 U/L (ref 10–40)
BASOPHILS # BLD AUTO: 0.09 K/UL (ref 0–0.2)
BASOPHILS NFR BLD: 0.7 % (ref 0–1.9)
BILIRUB SERPL-MCNC: 1.8 MG/DL (ref 0.1–1)
BUN SERPL-MCNC: 23 MG/DL (ref 8–23)
CALCIUM SERPL-MCNC: 8.8 MG/DL (ref 8.7–10.5)
CHLORIDE SERPL-SCNC: 103 MMOL/L (ref 95–110)
CO2 SERPL-SCNC: 29 MMOL/L (ref 23–29)
CREAT SERPL-MCNC: 1.1 MG/DL (ref 0.5–1.4)
DIFFERENTIAL METHOD: ABNORMAL
EOSINOPHIL # BLD AUTO: 0.3 K/UL (ref 0–0.5)
EOSINOPHIL NFR BLD: 2.2 % (ref 0–8)
ERYTHROCYTE [DISTWIDTH] IN BLOOD BY AUTOMATED COUNT: 16.5 % (ref 11.5–14.5)
EST. GFR  (AFRICAN AMERICAN): >60 ML/MIN/1.73 M^2
EST. GFR  (NON AFRICAN AMERICAN): >60 ML/MIN/1.73 M^2
GLUCOSE SERPL-MCNC: 73 MG/DL (ref 70–110)
HCT VFR BLD AUTO: 28 % (ref 40–54)
HGB BLD-MCNC: 8.4 G/DL (ref 14–18)
IMM GRANULOCYTES # BLD AUTO: 0.05 K/UL (ref 0–0.04)
IMM GRANULOCYTES NFR BLD AUTO: 0.4 % (ref 0–0.5)
LYMPHOCYTES # BLD AUTO: 1 K/UL (ref 1–4.8)
LYMPHOCYTES NFR BLD: 8.2 % (ref 18–48)
MAGNESIUM SERPL-MCNC: 2.2 MG/DL (ref 1.6–2.6)
MCH RBC QN AUTO: 27.2 PG (ref 27–31)
MCHC RBC AUTO-ENTMCNC: 30 G/DL (ref 32–36)
MCV RBC AUTO: 91 FL (ref 82–98)
MONOCYTES # BLD AUTO: 1.1 K/UL (ref 0.3–1)
MONOCYTES NFR BLD: 9.4 % (ref 4–15)
NEUTROPHILS # BLD AUTO: 9.5 K/UL (ref 1.8–7.7)
NEUTROPHILS NFR BLD: 79.1 % (ref 38–73)
NRBC BLD-RTO: 0 /100 WBC
PHOSPHATE SERPL-MCNC: 3.4 MG/DL (ref 2.7–4.5)
PLATELET # BLD AUTO: 362 K/UL (ref 150–350)
PMV BLD AUTO: 12.4 FL (ref 9.2–12.9)
POTASSIUM SERPL-SCNC: 3.9 MMOL/L (ref 3.5–5.1)
PROT SERPL-MCNC: 7.2 G/DL (ref 6–8.4)
RBC # BLD AUTO: 3.09 M/UL (ref 4.6–6.2)
SODIUM SERPL-SCNC: 143 MMOL/L (ref 136–145)
WBC # BLD AUTO: 12.01 K/UL (ref 3.9–12.7)

## 2020-05-11 PROCEDURE — 25000003 PHARM REV CODE 250: Performed by: INTERNAL MEDICINE

## 2020-05-11 PROCEDURE — 92526 ORAL FUNCTION THERAPY: CPT

## 2020-05-11 PROCEDURE — 85025 COMPLETE CBC W/AUTO DIFF WBC: CPT

## 2020-05-11 PROCEDURE — 97110 THERAPEUTIC EXERCISES: CPT

## 2020-05-11 PROCEDURE — 99900035 HC TECH TIME PER 15 MIN (STAT)

## 2020-05-11 PROCEDURE — 63600175 PHARM REV CODE 636 W HCPCS: Performed by: INTERNAL MEDICINE

## 2020-05-11 PROCEDURE — 80053 COMPREHEN METABOLIC PANEL: CPT

## 2020-05-11 PROCEDURE — 25000242 PHARM REV CODE 250 ALT 637 W/ HCPCS: Performed by: INTERNAL MEDICINE

## 2020-05-11 PROCEDURE — 83735 ASSAY OF MAGNESIUM: CPT

## 2020-05-11 PROCEDURE — 99233 PR SUBSEQUENT HOSPITAL CARE,LEVL III: ICD-10-PCS | Mod: ,,, | Performed by: INTERNAL MEDICINE

## 2020-05-11 PROCEDURE — 27000221 HC OXYGEN, UP TO 24 HOURS

## 2020-05-11 PROCEDURE — 11000001 HC ACUTE MED/SURG PRIVATE ROOM

## 2020-05-11 PROCEDURE — 99233 SBSQ HOSP IP/OBS HIGH 50: CPT | Mod: ,,, | Performed by: INTERNAL MEDICINE

## 2020-05-11 PROCEDURE — 97530 THERAPEUTIC ACTIVITIES: CPT

## 2020-05-11 PROCEDURE — 84100 ASSAY OF PHOSPHORUS: CPT

## 2020-05-11 PROCEDURE — 97112 NEUROMUSCULAR REEDUCATION: CPT

## 2020-05-11 PROCEDURE — 94761 N-INVAS EAR/PLS OXIMETRY MLT: CPT

## 2020-05-11 PROCEDURE — 94640 AIRWAY INHALATION TREATMENT: CPT

## 2020-05-11 PROCEDURE — 36415 COLL VENOUS BLD VENIPUNCTURE: CPT

## 2020-05-11 RX ORDER — AMLODIPINE BESYLATE 10 MG/1
10 TABLET ORAL DAILY
Status: DISCONTINUED | OUTPATIENT
Start: 2020-05-11 | End: 2020-05-17

## 2020-05-11 RX ORDER — FAMOTIDINE 20 MG/1
20 TABLET, FILM COATED ORAL 2 TIMES DAILY PRN
Status: DISCONTINUED | OUTPATIENT
Start: 2020-05-11 | End: 2020-05-20 | Stop reason: HOSPADM

## 2020-05-11 RX ADMIN — TIMOLOL MALEATE 1 DROP: 5 SOLUTION/ DROPS OPHTHALMIC at 09:05

## 2020-05-11 RX ADMIN — ALBUTEROL SULFATE 2.5 MG: 2.5 SOLUTION RESPIRATORY (INHALATION) at 09:05

## 2020-05-11 RX ADMIN — MINERAL OIL AND WHITE PETROLATUM: 150; 830 OINTMENT OPHTHALMIC at 09:05

## 2020-05-11 RX ADMIN — LATANOPROST 1 DROP: 50 SOLUTION OPHTHALMIC at 08:05

## 2020-05-11 RX ADMIN — BRIMONIDINE TARTRATE 1 DROP: 2 SOLUTION OPHTHALMIC at 08:05

## 2020-05-11 RX ADMIN — PROMETHAZINE HYDROCHLORIDE 12.5 MG: 12.5 TABLET ORAL at 04:05

## 2020-05-11 RX ADMIN — ALBUTEROL SULFATE 2.5 MG: 2.5 SOLUTION RESPIRATORY (INHALATION) at 07:05

## 2020-05-11 RX ADMIN — SODIUM CHLORIDE SOLN NEBU 3% 4 ML: 3 NEBU SOLN at 09:05

## 2020-05-11 RX ADMIN — SODIUM CHLORIDE SOLN NEBU 3% 4 ML: 3 NEBU SOLN at 07:05

## 2020-05-11 RX ADMIN — MINERAL OIL AND WHITE PETROLATUM: 150; 830 OINTMENT OPHTHALMIC at 08:05

## 2020-05-11 RX ADMIN — TIMOLOL MALEATE 1 DROP: 5 SOLUTION/ DROPS OPHTHALMIC at 08:05

## 2020-05-11 RX ADMIN — ENOXAPARIN SODIUM 40 MG: 100 INJECTION SUBCUTANEOUS at 08:05

## 2020-05-11 RX ADMIN — AMLODIPINE BESYLATE 10 MG: 10 TABLET ORAL at 08:05

## 2020-05-11 NOTE — PROGRESS NOTES
"  [facility] Res[facility] ICU  Adult Nutrition  Consult Note    SUMMARY     Recommendations    1. Continue current Regular diet + Boost Plus ONS. Continue to encourage adequate PO intake as tolerated.   2. RD to monitor & follow-up.    Goals: receive nutrition by RD follow-up  Nutrition Goal Status: goal met  Communication of RD Recs: (POC)    Reason for Assessment    Reason For Assessment: RD follow-up  Diagnosis: (Pneumonia due to COVID-19 virus)  Relevant Medical History: obesity, prostate cancer 1/2018   Interdisciplinary Rounds: did not attend    General Information Comments: RD working remotely, +COVID19. Pt extubated 5/6, diet advanced per SLP recommendations. Pt tolerating diet, however poor PO intake noted. + Nausea. ONS ordered to aid in caloric intake. Good appetite, stable wt PTA (UBW: 230#). Pt doesnt meet malnutrition criteria, however at risk if poor PO intake continues.  Nutrition Discharge Planning: Adequate PO intake    Nutrition/Diet History    Spiritual, Cultural Beliefs, Taoist Practices, Values that Affect Care: no  Factors Affecting Nutritional Intake: decreased appetite    Anthropometrics    Temp: 99.5 °F (37.5 °C)  Height: 5' 11" (180.3 cm)  Height (inches): 71 in  Weight Method: Bed Scale  Weight: 105.4 kg (232 lb 5.8 oz)  Weight (lb): 232.37 lb  Ideal Body Weight (IBW), Male: 172 lb  % Ideal Body Weight, Male (lb): 135.1 %  BMI (Calculated): 32.4  BMI Grade: 30 - 34.9- obesity - grade I    Lab/Procedures/Meds    Pertinent Labs Reviewed: reviewed  Pertinent Labs Comments: -  Pertinent Medications Reviewed: reviewed  Pertinent Medications Comments: -    Estimated/Assessed Needs    Weight Used For Calorie Calculations: 105.4 kg (232 lb 5.8 oz)     Energy Calorie Requirements (kcal): 2215 kcal/d  Energy Need Method: [place]-St Jeor(1.2 PAL)     Protein Requirements: 105 g/d (1 g/kg)  Weight Used For Protein Calculations: 105.4 kg (232 lb 5.8 oz)     Fluid Requirements (mL): 1 " mL/kcal or per MD    Nutrition Prescription Ordered    Current Diet Order: Regular  Nutrition Order Comments: Boost Plus ONS  Current Nutrition Support Formula Ordered: Other (Comment)(Discontinued)    Evaluation of Received Nutrient/Fluid Intake    Comments: LBM: 5/10    Tolerance: tolerating    Nutrition Risk    Level of Risk/Frequency of Follow-up: (1x/week)     Assessment and Plan    Nutrition Problem  Inadequate oral intake    Related to (etiology):   Decreased appetite     Signs and Symptoms (as evidenced by):   Poor PO intake     Interventions(treatment strategy):  Collaboration of nutrition care w/ other providers      Nutrition Diagnosis Status:   New     Monitor and Evaluation    Food and Nutrient Intake: energy intake, food and beverage intake  Food and Nutrient Adminstration: diet order  Physical Activity and Function: nutrition-related ADLs and IADLs  Anthropometric Measurements: weight, weight change  Biochemical Data, Medical Tests and Procedures: electrolyte and renal panel, gastrointestinal profile, glucose/endocrine profile, inflammatory profile, lipid profile  Nutrition-Focused Physical Findings: overall appearance     Nutrition Follow-Up    RD Follow-up?: Yes

## 2020-05-11 NOTE — ASSESSMENT & PLAN NOTE
- Patient has had both hypo and hypernatremia while inpatient  - Hypernatremia thought to be a result of lasix use while in ICU  - Improved after free water boluses  - encouraged continued oral intake of water

## 2020-05-11 NOTE — PLAN OF CARE
AAOx4. VSS. Able to be weaned down to 1L O2 via NC. Tolerating well with O2 sats >95%. Perez remains in place with good UOP. PRN meds administered x2 for intermittent nausea. Moderate relief noted. No acute needs at the moment. Bed in lowest position, call light and personal items within reach. Will continue to monitor.

## 2020-05-11 NOTE — CARE UPDATE
Rapid Response Nurse Chart Check     Chart check completed, abnormal VS noted. Bedside RN Yaneth contacted, no concerns verbalized at this time, instructed to call [phone] for further concerns or assistance.

## 2020-05-11 NOTE — PT/OT/SLP PROGRESS
Occupational Therapy   Treatment    Name: Bharathi Garrido  MRN: [id number]  Admitting Diagnosis:  Pneumonia due to COVID-19 virus       Recommendations:     Discharge Recommendations: rehabilitation facility  Discharge Equipment Recommendations:  other (see comments)(TBD closer to d/c.)      Assessment:     Bharathi Garrido is a 70 y.o. male with a medical diagnosis of Pneumonia due to COVID-19 virus.  He presents with fair participation and motivation.  Pt continues to require (A) with all activities & is at risk for falls. Performance deficits affecting function are weakness, impaired endurance, impaired self care skills, impaired functional mobilty, impaired balance, decreased upper extremity function, decreased lower extremity function, decreased coordination, decreased safety awareness, impaired cardiopulmonary response to activity.     Rehab Prognosis:  Fair; patient would benefit from acute skilled OT services to address these deficits and reach maximum level of function.       Plan:     Patient to be seen 5 x/week to address the above listed problems via self-care/home management, therapeutic exercises, therapeutic activities, neuromuscular re-education  · Plan of Care Expires: 06/05/20  · Plan of Care Reviewed with: patient    Subjective     Pain/Comfort:  · Pain Rating 1: 0/10  · Pain Rating Post-Intervention 1: 0/10    Objective:     Communicated with: RN prior to session.  Patient found supine with blood pressure cuff, pulse ox (continuous), telemetry, bed alarm, oxygen upon OT entry to room.    General Precautions: Standard, airborne, droplet, fall, contact   Occupational Performance:     Bed Mobility:    · Patient completed Rolling/Turning to Right with maximal assistance  · Patient completed Scooting/Bridging with total assistance scooting forward on EOB & up HOB while supine  · Patient completed Supine to Sit with maximal assistance and 2 persons  · Patient completed Sit to Supine with maximal assistance and  2 persons     Activities of Daily Living:  · Grooming: total assistance seated EOB      [facility] 6 Click ADL: 6    Treatment & Education:  Pt required SBA-Total (A) (SBA for 1 minute towards end, Total (A) initially & Min (A) for majority of time) for postural control while seated EOB x 10 minutes.  Provided verbal & physical cues to facilitate postural control.  Pt required increased O2 from 1 to 3 liters due to desat to ~78-79% and increased to 90's with 3 liters.  O2 back to 1 liter at end of session & vitals stable.  Provided RUE weight bearing while seated EOB.  Provided AAROM with RUE in all planes x 10 reps each.      Patient left supine with all lines intact, call button in reach, bed alarm on and RN notifiedEducation:      GOALS:   Multidisciplinary Problems     Occupational Therapy Goals        Problem: Occupational Therapy Goal    Goal Priority Disciplines Outcome Interventions   Occupational Therapy Goal     OT, PT/OT Ongoing, Progressing    Description:  Goals to be met by: 5/21/2020     Patient will increase functional independence with ADLs by performing:    Grooming while EOB with Minimal Assistance.  Toileting from bedside commode with Moderate Assistance for hygiene and clothing management.   Sitting at edge of bed x10 minutes with Stand-by Assistance.  Stand pivot transfers with Moderate Assistance.  Toilet transfer to bedside commode with Moderate Assistance.                      Time Tracking:     OT Date of Treatment: 05/11/20  OT Start Time: 0849  OT Stop Time: 0912  OT Total Time (min): 23 min    Billable Minutes:Therapeutic Activity 13  Therapeutic Exercise 10    LAURY Goldsmith  5/11/2020

## 2020-05-11 NOTE — PT/OT/SLP PROGRESS
Physical Therapy  Co-Treatment with OT    Patient Name:  Bharathi Garrido   MRN:  [id number]    Recommendations:     Discharge Recommendations:  rehabilitation facility   Discharge Equipment Recommendations: (will determine DME needs closer to discharge.)   Barriers to discharge: Decreased caregiver support    Assessment:     Bharathi Garrido is a 70 y.o. male admitted with a medical diagnosis of Pneumonia due to COVID-19 virus.  He presents with the following impairments/functional limitations:  weakness, impaired functional mobilty, impaired cognition, decreased safety awareness, impaired endurance, gait instability, impaired balance, decreased lower extremity function pt ken treatment better needing less assistance to sit on EOB and being able to hold his head in postural alignment. Pt will benefit from cont skilled PT 4x/wk and will need inpt rehab when medically stable.     Rehab Prognosis: Good; patient would benefit from acute skilled PT services to address these deficits and reach maximum level of function.    Recent Surgery: * No surgery found *      Plan:     During this hospitalization, patient to be seen 4 x/week to address the identified rehab impairments via gait training, therapeutic activities, therapeutic exercises, neuromuscular re-education and progress toward the following goals:    · Plan of Care Expires:  06/07/20    Subjective     Chief Complaint: pt had no complaints during treatment.   Patient/Family Comments/goals:  To get better and go home.   Pain/Comfort:  · Pain Rating 1: 0/10  · Pain Rating Post-Intervention 1: 0/10      Objective:     Communicated with nurse prior to session.  Patient found supine with telemetry, pulse ox (continuous), blood pressure cuff, oxygen, del toro catheter, bed alarm, PICC line upon PT entry to room.     General Precautions: Standard, airborne, fall   Orthopedic Precautions:N/A   Braces:       Functional Mobility:  · Bed Mobility:pt needed verbal cues for hand placement  and sequencing for functional mobility.      · Rolling Right: maximal assistance and of 2 persons  · Supine to Sit: maximal assistance and of 2 persons  · Sit to Supine: total assistance and of 2 persons  ·   · Balance: pt sat on EOB x 9 min with total assist and leaning to Right side. Pt was able to sit on EOB x 1 min with SBA. (total EOB 10 min)  pt had stooped posture during sitting with SBA.       AM-PAC 6 CLICK MOBILITY  Turning over in bed (including adjusting bedclothes, sheets and blankets)?: 2  Sitting down on and standing up from a chair with arms (e.g., wheelchair, bedside commode, etc.): 1  Moving from lying on back to sitting on the side of the bed?: 2  Moving to and from a bed to a chair (including a wheelchair)?: 1  Need to walk in hospital room?: 1  Climbing 3-5 steps with a railing?: 1  Basic Mobility Total Score: 8       Therapeutic Activities and Exercises:   pt performed AAROM there exer BLE in supine x 10 reps. Pt received verbal instructions for PT POC and verbally expressed understanding of such.     Patient left supine with all lines intact, call button in reach and bed alarm on..    GOALS:   Multidisciplinary Problems     Physical Therapy Goals        Problem: Physical Therapy Goal    Goal Priority Disciplines Outcome Goal Variances Interventions   Physical Therapy Goal     PT, PT/OT Ongoing, Progressing     Description:  Goals to be met by: 2020     Patient will increase functional independence with mobility by performin. Supine to sit with MInimal Assistance -not met  2. Sit to supine with MInimal Assistance -not met  3. Sit to stand transfer with Maximum Assistance -not met  4. Bed to chair transfer with Maximum Assistance using LRAD -not met  5. Gait  x 10 feet with Maximum Assistance using LRAD. -not met  6. Sitting at edge of bed x10 minutes with Stand-by Assistance while performing UE dynamic task to prepare for functional mobility and ADLs -not met  7. Lower extremity  exercise program x20 reps per handout, with independence -not met                       Time Tracking:     PT Received On: 05/11/20  PT Start Time: 0849     PT Stop Time: 0916  PT Total Time (min): 27 min     Billable Minutes: Therapeutic Exercise 10 min and Neuromuscular Re-education 17 min    Treatment Type: (co-treatment with OT)  PT/PTA: PT     PTA Visit Number: 0     Sheridan Russell, PT  05/11/2020

## 2020-05-11 NOTE — CARE UPDATE
"RAPID RESPONSE NURSE PROACTIVE ROUNDING NOTE     Time of Visit: 0800      Admit Date: 2020  LOS: 13  Code Status: Full Code   Date of Visit: 2020  : 1950  Age: 70 y.o.  Sex: male  Race: Black or   Bed: 8068/8068 A:   MRN: [id number]  Was the patient discharged from an ICU this admission? yes   Was the patient discharged from a PACU within last 24 hours?  no  Did the patient receive conscious sedation/general anesthesia in last 24 hours?  no  Was the patient in the ED within the past 24 hours?  no  Was the patient started on NIPPV within the past 24 hours?  no  Attending Physician: Tangela Hughes MD  Primary Service: [facility] MED     ASSESSMENT     Notified by Epic patient alert.  Reason for alert: MEWS    Diagnosis: Pneumonia due to COVID-19 virus    Abnormal Vital Signs: /82   Pulse 84   Temp 99.5 °F (37.5 °C) (Oral)   Resp 18   Ht 5' 11" (1.803 m)   Wt 105.4 kg (232 lb 5.8 oz)   SpO2 (!) 94%   BMI 32.41 kg/m²      Clinical Issues: Respiratory    Patient  has a past medical history of Class 1 obesity with body mass index (BMI) of 33.0 to 33.9 in adult, Difficult intubation, Glaucoma, Prostate cancer, and Sebaceous cyst of scrotum.      AM proactive rounding. NAD, VSS, pt resting comfortably. No concerns from bedside RN.      INTERVENTIONS/ RECOMMENDATIONS     VS per protocol, PT/OT.     Discussed plan of care with Maribell BAE     PHYSICIAN ESCALATION     Yes/No  no    Orders received and case discussed with NA.    Disposition: Remain in room 8068.    FOLLOW-UP     Call back the Rapid Response Nurse, Dahlia Jain RN at [phone] for additional questions or concerns.        "

## 2020-05-11 NOTE — PT/OT/SLP PROGRESS
"Speech Language Pathology Treatment  Discharge    Patient Name:  Bharathi Garrido   MRN:  [id number]  Admitting Diagnosis: Pneumonia due to COVID-19 virus    Recommendations:                 General Recommendations:  Follow-up not indicated  Diet recommendations:  Regular, Liquid Diet Level: Thin   Aspiration Precautions: Small bites/sips and Standard aspiration precautions   General Precautions: Standard, fall, droplet, airborne, contact, vision impaired  Communication strategies:  none    Subjective     Awake/alert  "I don't like the food"    Pain/Comfort:  · Pain Rating 1: 0/10  · Pain Rating Post-Intervention 1: 0/10    Objective:     Has the patient been evaluated by SLP for swallowing?   Yes  Keep patient NPO? No   Current Respiratory Status: room air      Pt repositioned upright in bed for PO trials. He denied any difficulty with PO intake at this time. Ismael cracker x4 and thin liquids via straw x8 oz tolerated with timely swallow initiation, adequate bolus control, and no overt clinical signs of airway compromise. SLP provided ongoing education to pt on swallow precautions and diet recs. Recommend continue regular diet/thin liquids at this time. No further ST warranted.     Assessment:     Bharathi Garrido is a 70 y.o. male with an SLP diagnosis of Dysphagia.  He presents s/p prolonged intubation    Goals:   Multidisciplinary Problems     SLP Goals        Problem: SLP Goal    Goal Priority Disciplines Outcome   SLP Goal     SLP Ongoing, Progressing   Description:  Speech Language Pathology Goals  Goals expected to be met by 5/14    1. Pt will tolerate in regular diet/thin liquids without overt clinical signs of aspiraiton                          Plan:     · Plan of Care reviewed with:  patient   · SLP Follow-Up:  No       Discharge recommendations:  rehabilitation facility       Time Tracking:     SLP Treatment Date:   05/11/20  Speech Start Time:  1100  Speech Stop Time:  1108     Speech Total Time (min):  8 " min    Billable Minutes: Treatment Swallowing Dysfunction 8    Ирина Hollis CCC-SLP  05/11/2020

## 2020-05-11 NOTE — ASSESSMENT & PLAN NOTE
- Patient without history of hypertension at home  - elevated -150 systolic noted  - will increase amlodipine to 10 mg daily

## 2020-05-11 NOTE — CARE UPDATE
Rapid Response Nurse Follow-up Note     Followed up with patient for proactive rounding.   No acute issues at this time. Reviewed plan of care with bedside RNMaribell.   Team will continue to follow.  Please call Rapid Response RN, Dahlia Jain RN with any questions or concerns at [phone]

## 2020-05-11 NOTE — PLAN OF CARE
Problem: Occupational Therapy Goal  Goal: Occupational Therapy Goal  Description  Goals to be met by: 5/21/2020     Patient will increase functional independence with ADLs by performing:    Grooming while EOB with Minimal Assistance.  Toileting from bedside commode with Moderate Assistance for hygiene and clothing management.   Sitting at edge of bed x10 minutes with Stand-by Assistance.  Stand pivot transfers with Moderate Assistance.  Toilet transfer to bedside commode with Moderate Assistance.     Outcome: Ongoing, Progressing     Goals remain appropriate

## 2020-05-11 NOTE — PLAN OF CARE
Recommendations     1. Continue current Regular diet + Boost Plus ONS. Continue to encourage adequate PO intake as tolerated.   2. RD to monitor & follow-up.     Goals: receive nutrition by RD follow-up  Nutrition Goal Status: goal met  Communication of RD Recs: (POC)

## 2020-05-11 NOTE — ASSESSMENT & PLAN NOTE
- Del Toro removed 5/8, replaced 5/10  - Nursing noted decreased urine output with dribbling  - Patient continues to have urinary retention with pain upon straining  - he reports he had a stent placed after his prostatectomy, and he has to press a button to release urine  - UA not overtly indicative of UTI  - will keep del toro for 1 more day and remove it as the patient does not believe he actually had urinary retention

## 2020-05-11 NOTE — PROGRESS NOTES
[facility] - Respiratory ICU  [facility] Medicine  Progress Note    Patient Name: Bharathi Garrido  MRN: [id number]  Patient Class: IP- Inpatient   Admission Date: 4/28/2020  Length of Stay: 13 days  Attending Physician: Tangela Hughes MD  Primary Care Provider: Melvin Gee MD (Inactive)        Subjective:     Principal Problem:Pneumonia due to COVID-19 virus        HPI:  Mr. Bharathi Garrido is a 71 yo male with obesity, history of prostate cancer (1/2018 s/p prostatectomy and radiation Dr. Pederson), history of difficult intubation for surgery and glaucoma presents with feeling unwell, fever, decreased appetite and shortness of breath. Patient transferred from [facility] to [facility] 4/28/20 for higher level of care. Admitted to outside hospital on 3/31/20 for SOB and fever and tested positive for COVID-19.  Upon admission, CXR showed bilateral multifocal opacities. Procalcitonin, LDH, ferritin, CRP and D-dimer elevated. Renal function normal. Mild increase in LFTs. He was treated with Plaquinel, Rocephin and Azithromycin. His respiratory function declined and has been intubated since 4/2/20.      Overview/Hospital Course:  Bharathi Garrido was admitted to ICU for management of respiratory failure secondary to suspected and/or confirmed COVID-19 requiring mechanical ventilation for respiratory support.      4/28/20: Patient transferred from [facility] for higher level of care as patient still intubated with high oxygen requirements. Sedated on Fentanyl and Precedex, but responding appropriately and following commands per nursing. Intubated on A/C, FiO2 75%, PEEP 10, , rate 18 with sat of 92%. CXR consistent with multilobar pneumonia. On empiric Vanc and Zosyn for recent white count with leukocytosis. PICC from 4/2 removed and central line and a-line placed. Repeat cxs sent. Hgb/Hct stable s/p 1 unit PRBCs on 4/27. NG set to intermittent suction. Abd x-ray showed gaseous distention.      4/29/20:  Sedated on Propofol, Precedex, and Dilaudid. Initially on Levophed but weaned off and became hypertensive. Briefly on Cardene gtt. Vent settings unchanged: FiO2 75% and PEEP 10. New cultures with no growth, cont on empiric abx. Good UOP with IV Lasix and stable renal function. Having BMs.      4/30/20: Weaned off Propofol and Precedex successfully. Cont on IV Dilaudid 1 mg/hr and scheduled Oxy. SpO2 improving. Vent settings weaned to FiO of 65% PEEP 10  Rate 18. Blood pressure stable off pressors/antihypertensives. Having BMs. Started TFs. Good UOP and net negative. Lytes WNR.      5/1/2020: Weaned off dilaudid gtt. SBT today. Put back on rate after 2 hours when pt became tired and tachypnic. Stopped lasix due to elevated creatinine. Consulted ID due to increased WBC and fever despite broad spectrum abx regimen.      5/2/2020: Patient now off dilaudid gtt and tolerating well. Still drowsy. Patient failed SBT today.  Will decrease PO oxy from Q6H to BID. WBC continues to increase, now 22.28, Tmax 100. BC NGTD. Remains on vanc/zosyn - awaiting ID recs. Possible hemoconcentration. Net negative 1.3L. Stop lasix and start free water 200 Q4H. On TFs. Last BM 4/28. On bowel regimen.      5/3/2020: On Precedex gtt and prn Morphine for agitation.  Vent settings this AM: 40% FiO2 and PEEP 5. Oxygen sats 94%. Failed SBT. Consult surgery for trach/PEG. Did have 4 min bradycardia/hypertension during SBT which resolved with oxygen and suctioning. Free water increased to 300mL Q4H for hypernatremia. Stopped vanc/zosyn per ID recs. NGTD on resp/blood/fungal cultures.      5/5-5/6/2020: Weaned off sedation and tolerating minimal vent settings. Successful SBTs with NIF 46 and RISB 70. Extubated on 5/6 to 4 L NC. Neuro intact.     5/8: Patient was transferred out of the ICU, approved for regular diet with thin liquids    5/9: Some urinary retention and nausea noted    5/10: Perez replaced. Nausea improving.    Interval History:  Patient reports his nausea has improved some with treatment. He had his del toro replaced, but states that he forgot he received a device that allows him to release urine after his prostatectomy and he was not using this prior to the del toro. He has an appetite and has been eating.    Review of Systems   Constitutional: Negative for chills and fever.   Respiratory: Negative for cough and shortness of breath.    Cardiovascular: Negative for chest pain and palpitations.   Gastrointestinal: Positive for constipation. Negative for abdominal pain, diarrhea, nausea and vomiting.   Genitourinary: Negative for decreased urine volume, dysuria and urgency.   Musculoskeletal: Negative for arthralgias and myalgias.   Neurological: Negative for dizziness, syncope and light-headedness.   Psychiatric/Behavioral: Negative for agitation and sleep disturbance.     Objective:     Vital Signs (Most Recent):  Temp: 99.5 °F (37.5 °C) (05/11/20 1145)  Pulse: 84 (05/11/20 1200)  Resp: 18 (05/11/20 1200)  BP: 138/82 (05/11/20 1200)  SpO2: (!) 94 % (05/11/20 1200) Vital Signs (24h Range):  Temp:  [98 °F (36.7 °C)-99.5 °F (37.5 °C)] 99.5 °F (37.5 °C)  Pulse:  [] 84  Resp:  [18-24] 18  SpO2:  [91 %-100 %] 94 %  BP: (120-159)/(69-82) 138/82     Weight: 105.4 kg (232 lb 5.8 oz)  Body mass index is 32.41 kg/m².    Intake/Output Summary (Last 24 hours) at 5/11/2020 1422  Last data filed at 5/11/2020 1100  Gross per 24 hour   Intake 300 ml   Output 1200 ml   Net -900 ml      Physical Exam   Constitutional: He appears well-developed and well-nourished. No distress.   HENT:   Head: Normocephalic and atraumatic.   Poor dentition   Eyes: Conjunctivae and EOM are normal. No scleral icterus.   Cardiovascular: Normal rate, regular rhythm, normal heart sounds and intact distal pulses.   Pulmonary/Chest: Effort normal and breath sounds normal. No respiratory distress.   Examined anteriorly given patient's weakness inability to cooperate with exam    Abdominal: Soft. Bowel sounds are normal. He exhibits no distension. There is no tenderness.   Musculoskeletal: He exhibits no edema or tenderness.   Neurological: He is alert. No cranial nerve deficit.   Skin: Skin is warm and dry. He is not diaphoretic.   Psychiatric: He has a normal mood and affect. Thought content normal.       Significant Labs:   CBC:   Recent Labs   Lab 05/10/20  0610 05/11/20  0259   WBC 12.61 12.01   HGB 8.5* 8.4*   HCT 28.7* 28.0*    362*     CMP:   Recent Labs   Lab 05/10/20  0610 05/11/20  0259    143   K 4.1 3.9    103   CO2 26 29   GLU 77 73   BUN 28* 23   CREATININE 1.3 1.1   CALCIUM 9.3 8.8   PROT 7.4 7.2   ALBUMIN 2.2* 2.2*   BILITOT 1.8* 1.8*   ALKPHOS 136* 130   AST 42* 37   ALT 82* 69*   ANIONGAP 13 11   EGFRNONAA 56.1* >60.0       Significant Imaging: I have reviewed all pertinent imaging results/findings within the past 24 hours.      Assessment/Plan:      * Pneumonia due to COVID-19 virus  With acute respiratory failure  - Initially admitted 3/31 at OSH  - Intubated 34 days (starting 4/2), extubated 5/6 to 4 L NC, BiPAP at night. Awake and alert, intact.   - Completed course of Plaquenil, Azithromycin, and Rocephin   - Repeat COVID-19 on 4/21 and 5/8 positive  - Currently on NC 2L with adequate saturation      Respiratory failure  - initially from COVID-19  - intubated 34 days; extubated 5/6  - currently on 2L NC with O2sats adequate; wan O2 as tolerated  - Improving slowly  - monitor      Hyponatremia  - Patient has had both hypo and hypernatremia while inpatient  - Hypernatremia thought to be a result of lasix use while in ICU  - Improved after free water boluses  - encouraged continued oral intake of water      WILBUR (acute kidney injury)  - WILBUR improved  - Cr stabilizied around 1.1-1.3  - Off IV Lasix since 5/1/20  - Encouraged increased PO fluid intake    Urinary retention  - Perez removed 5/8, replaced 5/10  - Nursing noted decreased urine output with  dribbling  - Patient continues to have urinary retention with pain upon straining  - he reports he had a stent placed after his prostatectomy, and he has to press a button to release urine  - UA not overtly indicative of UTI  - will keep del toro for 1 more day and remove it as the patient does not believe he actually had urinary retention    Vomiting  - Uncertain exact cause of nausea/vomiting  - Possibly secondary to medication side effects from his hospitalization  - PRN zofran and PRN phenergan helping  - Vomiting has resolved, patient with some nausea despite meds    Hypertension  - Patient without history of hypertension at home  - elevated -150 systolic noted  - will increase amlodipine to 10 mg daily      Anemia  - Possibly from acute illness  - no active bleeding noted  - stable Hgb over several weeks around 7-8  - continue to monitor        VTE Risk Mitigation (From admission, onward)         Ordered     enoxaparin injection 40 mg  Daily      04/28/20 1535     IP VTE HIGH RISK PATIENT  Once      04/28/20 1535                Diet: Regular with thin liquids  Disposition: To rehabilitation facility once medically stable      Tangela Hughes MD  Department of Hospital Medicine   [facility] - Respiratory ICU

## 2020-05-11 NOTE — PLAN OF CARE
Problem: SLP Goal  Goal: SLP Goal  Description  Speech Language Pathology Goals  Goals expected to be met by 5/14    1. Pt will tolerate in regular diet/thin liquids without overt clinical signs of aspiraiton         Outcome: Ongoing, Progressing   Recommend regular diet/thin liquids at this time. No further ST warranted.   Ирина Hollis, GOVIND-SLP  5/11/2020

## 2020-05-11 NOTE — SUBJECTIVE & OBJECTIVE
Interval History: Patient reports his nausea has improved some with treatment. He had his del toro replaced, but states that he forgot he received a device that allows him to release urine after his prostatectomy and he was not using this prior to the del toro. He has an appetite and has been eating.    Review of Systems   Constitutional: Negative for chills and fever.   Respiratory: Negative for cough and shortness of breath.    Cardiovascular: Negative for chest pain and palpitations.   Gastrointestinal: Positive for constipation. Negative for abdominal pain, diarrhea, nausea and vomiting.   Genitourinary: Negative for decreased urine volume, dysuria and urgency.   Musculoskeletal: Negative for arthralgias and myalgias.   Neurological: Negative for dizziness, syncope and light-headedness.   Psychiatric/Behavioral: Negative for agitation and sleep disturbance.     Objective:     Vital Signs (Most Recent):  Temp: 99.5 °F (37.5 °C) (05/11/20 1145)  Pulse: 84 (05/11/20 1200)  Resp: 18 (05/11/20 1200)  BP: 138/82 (05/11/20 1200)  SpO2: (!) 94 % (05/11/20 1200) Vital Signs (24h Range):  Temp:  [98 °F (36.7 °C)-99.5 °F (37.5 °C)] 99.5 °F (37.5 °C)  Pulse:  [] 84  Resp:  [18-24] 18  SpO2:  [91 %-100 %] 94 %  BP: (120-159)/(69-82) 138/82     Weight: 105.4 kg (232 lb 5.8 oz)  Body mass index is 32.41 kg/m².    Intake/Output Summary (Last 24 hours) at 5/11/2020 1422  Last data filed at 5/11/2020 1100  Gross per 24 hour   Intake 300 ml   Output 1200 ml   Net -900 ml      Physical Exam   Constitutional: He appears well-developed and well-nourished. No distress.   HENT:   Head: Normocephalic and atraumatic.   Poor dentition   Eyes: Conjunctivae and EOM are normal. No scleral icterus.   Cardiovascular: Normal rate, regular rhythm, normal heart sounds and intact distal pulses.   Pulmonary/Chest: Effort normal and breath sounds normal. No respiratory distress.   Examined anteriorly given patient's weakness inability to cooperate  with exam   Abdominal: Soft. Bowel sounds are normal. He exhibits no distension. There is no tenderness.   Musculoskeletal: He exhibits no edema or tenderness.   Neurological: He is alert. No cranial nerve deficit.   Skin: Skin is warm and dry. He is not diaphoretic.   Psychiatric: He has a normal mood and affect. Thought content normal.       Significant Labs:   CBC:   Recent Labs   Lab 05/10/20  0610 05/11/20  0259   WBC 12.61 12.01   HGB 8.5* 8.4*   HCT 28.7* 28.0*    362*     CMP:   Recent Labs   Lab 05/10/20  0610 05/11/20 0259    143   K 4.1 3.9    103   CO2 26 29   GLU 77 73   BUN 28* 23   CREATININE 1.3 1.1   CALCIUM 9.3 8.8   PROT 7.4 7.2   ALBUMIN 2.2* 2.2*   BILITOT 1.8* 1.8*   ALKPHOS 136* 130   AST 42* 37   ALT 82* 69*   ANIONGAP 13 11   EGFRNONAA 56.1* >60.0       Significant Imaging: I have reviewed all pertinent imaging results/findings within the past 24 hours.

## 2020-05-11 NOTE — PLAN OF CARE
Problem: Adult Inpatient Plan of Care  Goal: Plan of Care Review  Outcome: Ongoing, Progressing      Pt is A&Ox4. Denies pain. Assist x2 with repositioning. Sat on side of bed for a little while today. Pressure injury to buttocks and open blister on back. Cleansed and zinc applied to both. Turned and repositioned q2. Pt has poor appetite. Pt states he has a hard time tasting regular food. Boost supplements ordered and patient enjoyed that. No difficulty swallowing liquids. NSR on tele. 1L nasal cannula. Attempted to wean a few times but de sat to 80s. Deep breathing and cough exercises encouraged. Incentive spirometer teaching done. Uses call light appropriately. Bed alarm on. Call light within reach.

## 2020-05-12 PROBLEM — E87.6 HYPOKALEMIA: Status: ACTIVE | Noted: 2020-05-12

## 2020-05-12 LAB
ALBUMIN SERPL BCP-MCNC: 2.1 G/DL (ref 3.5–5.2)
ALP SERPL-CCNC: 120 U/L (ref 55–135)
ALT SERPL W/O P-5'-P-CCNC: 61 U/L (ref 10–44)
ANION GAP SERPL CALC-SCNC: 11 MMOL/L (ref 8–16)
AST SERPL-CCNC: 35 U/L (ref 10–40)
BASOPHILS # BLD AUTO: 0.08 K/UL (ref 0–0.2)
BASOPHILS NFR BLD: 0.8 % (ref 0–1.9)
BILIRUB SERPL-MCNC: 1.7 MG/DL (ref 0.1–1)
BUN SERPL-MCNC: 19 MG/DL (ref 8–23)
CALCIUM SERPL-MCNC: 8.3 MG/DL (ref 8.7–10.5)
CHLORIDE SERPL-SCNC: 101 MMOL/L (ref 95–110)
CO2 SERPL-SCNC: 28 MMOL/L (ref 23–29)
CREAT SERPL-MCNC: 1 MG/DL (ref 0.5–1.4)
DIFFERENTIAL METHOD: ABNORMAL
EOSINOPHIL # BLD AUTO: 0.2 K/UL (ref 0–0.5)
EOSINOPHIL NFR BLD: 2 % (ref 0–8)
ERYTHROCYTE [DISTWIDTH] IN BLOOD BY AUTOMATED COUNT: 16.4 % (ref 11.5–14.5)
EST. GFR  (AFRICAN AMERICAN): >60 ML/MIN/1.73 M^2
EST. GFR  (NON AFRICAN AMERICAN): >60 ML/MIN/1.73 M^2
GLUCOSE SERPL-MCNC: 86 MG/DL (ref 70–110)
HCT VFR BLD AUTO: 28.8 % (ref 40–54)
HGB BLD-MCNC: 8.8 G/DL (ref 14–18)
IMM GRANULOCYTES # BLD AUTO: 0.04 K/UL (ref 0–0.04)
IMM GRANULOCYTES NFR BLD AUTO: 0.4 % (ref 0–0.5)
LYMPHOCYTES # BLD AUTO: 0.9 K/UL (ref 1–4.8)
LYMPHOCYTES NFR BLD: 9 % (ref 18–48)
MAGNESIUM SERPL-MCNC: 2 MG/DL (ref 1.6–2.6)
MCH RBC QN AUTO: 27.1 PG (ref 27–31)
MCHC RBC AUTO-ENTMCNC: 30.6 G/DL (ref 32–36)
MCV RBC AUTO: 89 FL (ref 82–98)
MONOCYTES # BLD AUTO: 0.9 K/UL (ref 0.3–1)
MONOCYTES NFR BLD: 9 % (ref 4–15)
NEUTROPHILS # BLD AUTO: 7.9 K/UL (ref 1.8–7.7)
NEUTROPHILS NFR BLD: 78.8 % (ref 38–73)
NRBC BLD-RTO: 0 /100 WBC
PHOSPHATE SERPL-MCNC: 3.1 MG/DL (ref 2.7–4.5)
PLATELET # BLD AUTO: 338 K/UL (ref 150–350)
PMV BLD AUTO: 12 FL (ref 9.2–12.9)
POTASSIUM SERPL-SCNC: 3.4 MMOL/L (ref 3.5–5.1)
PROT SERPL-MCNC: 6.8 G/DL (ref 6–8.4)
RBC # BLD AUTO: 3.25 M/UL (ref 4.6–6.2)
SODIUM SERPL-SCNC: 140 MMOL/L (ref 136–145)
WBC # BLD AUTO: 10.08 K/UL (ref 3.9–12.7)

## 2020-05-12 PROCEDURE — 94660 CPAP INITIATION&MGMT: CPT

## 2020-05-12 PROCEDURE — 84100 ASSAY OF PHOSPHORUS: CPT

## 2020-05-12 PROCEDURE — 94640 AIRWAY INHALATION TREATMENT: CPT

## 2020-05-12 PROCEDURE — 94761 N-INVAS EAR/PLS OXIMETRY MLT: CPT

## 2020-05-12 PROCEDURE — 36415 COLL VENOUS BLD VENIPUNCTURE: CPT

## 2020-05-12 PROCEDURE — 27000221 HC OXYGEN, UP TO 24 HOURS

## 2020-05-12 PROCEDURE — 97112 NEUROMUSCULAR REEDUCATION: CPT

## 2020-05-12 PROCEDURE — 11000001 HC ACUTE MED/SURG PRIVATE ROOM

## 2020-05-12 PROCEDURE — 99233 PR SUBSEQUENT HOSPITAL CARE,LEVL III: ICD-10-PCS | Mod: ,,, | Performed by: INTERNAL MEDICINE

## 2020-05-12 PROCEDURE — 25000003 PHARM REV CODE 250: Performed by: INTERNAL MEDICINE

## 2020-05-12 PROCEDURE — 27000646 HC AEROBIKA DEVICE

## 2020-05-12 PROCEDURE — 94664 DEMO&/EVAL PT USE INHALER: CPT

## 2020-05-12 PROCEDURE — 97110 THERAPEUTIC EXERCISES: CPT

## 2020-05-12 PROCEDURE — 80053 COMPREHEN METABOLIC PANEL: CPT

## 2020-05-12 PROCEDURE — 97530 THERAPEUTIC ACTIVITIES: CPT

## 2020-05-12 PROCEDURE — 99233 SBSQ HOSP IP/OBS HIGH 50: CPT | Mod: ,,, | Performed by: INTERNAL MEDICINE

## 2020-05-12 PROCEDURE — 99900035 HC TECH TIME PER 15 MIN (STAT)

## 2020-05-12 PROCEDURE — 85025 COMPLETE CBC W/AUTO DIFF WBC: CPT

## 2020-05-12 PROCEDURE — 25000242 PHARM REV CODE 250 ALT 637 W/ HCPCS: Performed by: INTERNAL MEDICINE

## 2020-05-12 PROCEDURE — 83735 ASSAY OF MAGNESIUM: CPT

## 2020-05-12 RX ORDER — POTASSIUM CHLORIDE 20 MEQ/1
20 TABLET, EXTENDED RELEASE ORAL ONCE
Status: COMPLETED | OUTPATIENT
Start: 2020-05-12 | End: 2020-05-12

## 2020-05-12 RX ADMIN — POTASSIUM CHLORIDE 20 MEQ: 1500 TABLET, EXTENDED RELEASE ORAL at 05:05

## 2020-05-12 RX ADMIN — SODIUM CHLORIDE SOLN NEBU 3% 4 ML: 3 NEBU SOLN at 07:05

## 2020-05-12 RX ADMIN — AMLODIPINE BESYLATE 10 MG: 10 TABLET ORAL at 08:05

## 2020-05-12 RX ADMIN — BRIMONIDINE TARTRATE 1 DROP: 2 SOLUTION OPHTHALMIC at 09:05

## 2020-05-12 RX ADMIN — BRIMONIDINE TARTRATE 1 DROP: 2 SOLUTION OPHTHALMIC at 08:05

## 2020-05-12 RX ADMIN — TIMOLOL MALEATE 1 DROP: 5 SOLUTION/ DROPS OPHTHALMIC at 08:05

## 2020-05-12 RX ADMIN — ALBUTEROL SULFATE 2.5 MG: 2.5 SOLUTION RESPIRATORY (INHALATION) at 07:05

## 2020-05-12 RX ADMIN — LATANOPROST 1 DROP: 50 SOLUTION OPHTHALMIC at 09:05

## 2020-05-12 RX ADMIN — MINERAL OIL AND WHITE PETROLATUM: 150; 830 OINTMENT OPHTHALMIC at 08:05

## 2020-05-12 RX ADMIN — MINERAL OIL AND WHITE PETROLATUM: 150; 830 OINTMENT OPHTHALMIC at 09:05

## 2020-05-12 RX ADMIN — SODIUM CHLORIDE SOLN NEBU 3% 4 ML: 3 NEBU SOLN at 08:05

## 2020-05-12 RX ADMIN — ALBUTEROL SULFATE 2.5 MG: 2.5 SOLUTION RESPIRATORY (INHALATION) at 08:05

## 2020-05-12 RX ADMIN — TIMOLOL MALEATE 1 DROP: 5 SOLUTION/ DROPS OPHTHALMIC at 09:05

## 2020-05-12 NOTE — CARE UPDATE
"RAPID RESPONSE NURSE PROACTIVE ROUNDING NOTE     Time of Visit:     Admit Date: 2020  LOS: 13  Code Status: Full Code   Date of Visit: 2020  : 1950  Age: 70 y.o.  Sex: male  Race: Black or   Bed: 8068/8068 A:   MRN: [id number]  Was the patient discharged from an ICU this admission? yes   Was the patient discharged from a PACU within last 24 hours?  no  Did the patient receive conscious sedation/general anesthesia in last 24 hours?  no  Was the patient in the ED within the past 24 hours?  no  Was the patient started on NIPPV within the past 24 hours?  no  Attending Physician: Tangela Hughes MD  Primary Service: [facility] MED     ASSESSMENT     Notified by Chart Review.    Diagnosis: Pneumonia due to COVID-19 virus    Abnormal Vital Signs: /70 (BP Location: Left arm, Patient Position: Lying)   Pulse (!) 112 Comment: will continue to monitor closely  Temp 99.1 °F (37.3 °C) (Oral)   Resp 20   Ht 5' 11" (1.803 m)   Wt 105.4 kg (232 lb 5.8 oz)   SpO2 99%   BMI 32.41 kg/m²      Clinical Issues: Respiratory    Patient  has a past medical history of Class 1 obesity with body mass index (BMI) of 33.0 to 33.9 in adult, Difficult intubation, Glaucoma, Prostate cancer, and Sebaceous cyst of scrotum.      RRN Proactive Round. 1L NC w/ SpO2 95-96%. HR 110s. Patient resting comfortably in bed. No concerns from primary RN.      INTERVENTIONS/ RECOMMENDATIONS     - Continue to monitor per unit policy    Discussed plan of care with RN, Angelo [id number].    PHYSICIAN ESCALATION     Yes/No  no    Orders received and case discussed with NA.    Disposition: Remain in room 8068A.    FOLLOW-UP     Call back the Rapid Response Nurse, Alma Delia Haji RN at [phone] for additional questions or concerns.        "

## 2020-05-12 NOTE — ASSESSMENT & PLAN NOTE
- Patient has had both hypo and hypernatremia while inpatient  - Hypernatremia thought to be a result of lasix use while in ICU  - Improved after free water boluses and now PO intake adequate  - Resolved

## 2020-05-12 NOTE — SUBJECTIVE & OBJECTIVE
Interval History: Mr. Garrido is doing well this morning. He reports one episode of vomiting this morning. He denies any other acute issues, and states he has been eating well despite the vomiting this morning.    Review of Systems   Constitutional: Negative for chills and fever.   Respiratory: Negative for cough and shortness of breath.    Cardiovascular: Negative for chest pain and palpitations.   Gastrointestinal: Positive for nausea and vomiting. Negative for abdominal pain, constipation and diarrhea.   Genitourinary: Negative for decreased urine volume, dysuria and urgency.   Musculoskeletal: Negative for arthralgias and myalgias.   Neurological: Negative for dizziness, syncope and light-headedness.   Psychiatric/Behavioral: Negative for agitation and sleep disturbance.     Objective:     Vital Signs (Most Recent):  Temp: 97.4 °F (36.3 °C) (05/12/20 1508)  Pulse: 101 (05/12/20 1533)  Resp: (!) 23 (05/12/20 1508)  BP: 128/86 (05/12/20 1400)  SpO2: (!) 94 % (05/12/20 1508) Vital Signs (24h Range):  Temp:  [97.4 °F (36.3 °C)-99.4 °F (37.4 °C)] 97.4 °F (36.3 °C)  Pulse:  [] 101  Resp:  [20-30] 23  SpO2:  [93 %-99 %] 94 %  BP: (123-156)/(66-88) 128/86     Weight: 105.4 kg (232 lb 5.8 oz)  Body mass index is 32.41 kg/m².    Intake/Output Summary (Last 24 hours) at 5/12/2020 1535  Last data filed at 5/12/2020 1100  Gross per 24 hour   Intake 776 ml   Output 1000 ml   Net -224 ml      Physical Exam   Constitutional: He appears well-developed and well-nourished. No distress.   HENT:   Head: Normocephalic and atraumatic.   Poor dentition   Eyes: Conjunctivae and EOM are normal. No scleral icterus.   Cardiovascular: Normal rate, regular rhythm, normal heart sounds and intact distal pulses.   Pulmonary/Chest: Effort normal and breath sounds normal. No respiratory distress.   Abdominal: Soft. Bowel sounds are normal. He exhibits no distension. There is no tenderness.   Musculoskeletal: He exhibits no edema or  tenderness.   Neurological: He is alert. No cranial nerve deficit.   Skin: Skin is warm and dry. He is not diaphoretic.   Psychiatric: He has a normal mood and affect. Thought content normal.       Significant Labs:   CBC:   Recent Labs   Lab 05/11/20 0259 05/12/20  0301   WBC 12.01 10.08   HGB 8.4* 8.8*   HCT 28.0* 28.8*   * 338     CMP:   Recent Labs   Lab 05/11/20 0259 05/12/20  0300    140   K 3.9 3.4*    101   CO2 29 28   GLU 73 86   BUN 23 19   CREATININE 1.1 1.0   CALCIUM 8.8 8.3*   PROT 7.2 6.8   ALBUMIN 2.2* 2.1*   BILITOT 1.8* 1.7*   ALKPHOS 130 120   AST 37 35   ALT 69* 61*   ANIONGAP 11 11   EGFRNONAA >60.0 >60.0       Significant Imaging: I have reviewed all pertinent imaging results/findings within the past 24 hours.

## 2020-05-12 NOTE — PLAN OF CARE
Problem: Adult Inpatient Plan of Care  Goal: Plan of Care Review  Outcome: Ongoing, Progressing   POC reviewed with pt. VSS. Wound care competed. Refusing for katey to be taken out today, agreeable to d/c katey tomorrow. MD aware. No acute changes. Safety maintained. Will continue to monitor.

## 2020-05-12 NOTE — CONSULTS
Wound care consulted for left forearm wound, gluteal and right shoulder blade.  The patient is covid-19 positive.  The left forearm has black eschar over the wound bed/stable, eschar edges dry and lifting. Wound is ~ 12 cm L x 3 cm.W  The left buttock has partial thickness shearing, jagged edges, pink/moist tissue~ 3 cm L x 0.7 cm W   The gluteal cleft has intertrigo ~ 0.5 cm L x 0.2 cmW  The right mid back has a resolving wound, pink, dry tissue.   Recommendations:  Left forearm/hand- paint daily with betadine solution- leave open to air  The left buttock/gluteal area- Triad ointment BID/prn cleansing  Right mid back- foam dressing --change weekly   Nursing to continue care and pressure prevention measures  EHOB waffle overlay, heel foams, nutrition, moisture barriers  Wound care signing off, please reconsult as needed.   ELICIA Roman RN, CWCN  [id number]

## 2020-05-12 NOTE — PT/OT/SLP PROGRESS
Occupational Therapy   Treatment    Name: Bharathi Garrido  MRN: [id number]  Admitting Diagnosis:  Pneumonia due to COVID-19 virus       Recommendations:     Discharge Recommendations: rehabilitation facility  Discharge Equipment Recommendations:  other (see comments)(TBD closer to d/c.)    Assessment:     Bharathi Garrido is a 70 y.o. male with a medical diagnosis of Pneumonia due to COVID-19 virus.  He presents with fair participation and motivation.  Pt with improved activity endurance however continues to require (A) with all activities.  Pt is at risk for falls.  Performance deficits affecting function are weakness, impaired endurance, impaired self care skills, decreased coordination, impaired functional mobilty, impaired cognition, visual deficits, impaired balance, decreased upper extremity function, decreased lower extremity function, decreased safety awareness, impaired cardiopulmonary response to activity.     Rehab Prognosis:  Fair; patient would benefit from acute skilled OT services to address these deficits and reach maximum level of function.       Plan:     Patient to be seen 5 x/week to address the above listed problems via self-care/home management, therapeutic activities, therapeutic exercises, neuromuscular re-education, cognitive retraining  · Plan of Care Expires: 06/05/20  · Plan of Care Reviewed with: patient    Subjective     Pain/Comfort:  · Pain Rating 1: 0/10  · Pain Rating Post-Intervention 1: 0/10    Objective:     Communicated with: RN prior to session.  Patient found supine with blood pressure cuff, del toro catheter, telemetry, pulse ox (continuous), oxygen upon OT entry to room.    General Precautions: Standard, fall, droplet, contact, airborne     Occupational Performance:     Bed Mobility:    · Patient completed Scooting/Bridging with maximal assistance  · Patient completed Supine to Sit with maximal assistance and 2 persons  · Patient completed Sit to Supine with maximal assistance and 2  persons     Activities of Daily Living:  · Grooming: maximal assistance seated EOB with (A) required for UE mobility      AMPAC 6 Click ADL: 7    Treatment & Education:  Pt required SBA-Min (A) for postural control while seated EOB.  Provided verbal & physical cues to facilitate postural control.  Pt performed weight shifting in all directions to facilitate postural control while seated EOB as well as cervical & thoracic extension while seated EOB.  Provided facilitated reaching with BUE during postural control activities.  Pt performed A/AAROM with BUE in 3 planes x 10 reps each while seated EOB.  Provided education on bed mobility & pt progression.  Pt had no further questions & when asked whether there were any concerns pt reported none.    Patient left supine with all lines intact, call button in reach, bed alarm on and RN notifiedEducation:      GOALS:   Multidisciplinary Problems     Occupational Therapy Goals        Problem: Occupational Therapy Goal    Goal Priority Disciplines Outcome Interventions   Occupational Therapy Goal     OT, PT/OT Ongoing, Progressing    Description:  Goals to be met by: 5/21/2020     Patient will increase functional independence with ADLs by performing:    Grooming while EOB with Minimal Assistance.  Toileting from bedside commode with Moderate Assistance for hygiene and clothing management.   Sitting at edge of bed x10 minutes with Stand-by Assistance.  Stand pivot transfers with Moderate Assistance.  Toilet transfer to bedside commode with Moderate Assistance.                      Time Tracking:     OT Date of Treatment: 05/12/20  OT Start Time: 1310  OT Stop Time: 1340  OT Total Time (min): 30 min    Billable Minutes:Therapeutic Activity 15  Therapeutic Exercise 10    LAURY Goldsmith  5/12/2020

## 2020-05-12 NOTE — ASSESSMENT & PLAN NOTE
- Uncertain exact cause of nausea/vomiting  - Possibly secondary to medication side effects from his hospitalization  - PRN zofran and PRN phenergan helping  - Improving

## 2020-05-12 NOTE — ASSESSMENT & PLAN NOTE
With acute respiratory failure  - Initially admitted 3/31 at OSH  - Intubated 34 days (starting 4/2), extubated 5/6 to 4 L NC, BiPAP at night. Awake and alert, intact.   - Completed course of Plaquenil, Azithromycin, and Rocephin   - Repeat COVID-19 on 4/21 and 5/8 positive  - Currently on NC 1L with adequate saturation. Will wean as tolerated

## 2020-05-12 NOTE — ASSESSMENT & PLAN NOTE
- initially from COVID-19  - intubated 34 days; extubated 5/6  - currently on 1L NC with O2sats adequate; wean O2 as tolerated  - Improving slowly  - monitor

## 2020-05-12 NOTE — CODE/ RAPID DOCUMENTATION
"RAPID RESPONSE NURSE PROACTIVE ROUNDING NOTE     Time of Visit: 1705    Admit Date: 2020  LOS: 14  Code Status: Full Code   Date of Visit: 2020  : 1950  Age: 70 y.o.  Sex: male  Race: Black or   Bed: 8068/8068 A:   MRN: [id number]  Was the patient discharged from an ICU this admission? yes   Was the patient discharged from a PACU within last 24 hours?  no  Did the patient receive conscious sedation/general anesthesia in last 24 hours?  no  Was the patient in the ED within the past 24 hours?  no  Was the patient started on NIPPV within the past 24 hours?  no  Attending Physician: Tangela Hughes MD  Primary Service[place] [facility]    ASSESSMENT     Notified by proactive rounding.  Reason for alert: proactive rounding    Diagnosis: Pneumonia due to COVID-19 virus    Abnormal Vital Signs: /70   Pulse 98   Temp 97.7 °F (36.5 °C)   Resp (!) 26   Ht 5' 11" (1.803 m)   Wt 105.4 kg (232 lb 5.8 oz)   SpO2 97%   BMI 32.41 kg/m²      Clinical Issues: Respiratory    Patient  has a past medical history of Class 1 obesity with body mass index (BMI) of 33.0 to 33.9 in adult, Difficult intubation, Glaucoma, Prostate cancer, and Sebaceous cyst of scrotum.    Pt resting comfortably in bed on 1L NC w/ SpO2 97-98%. HR and BP stable. No distress noted.       INTERVENTIONS/ RECOMMENDATIONS     - Continue monitoring per unit policy.    Discussed plan of care with RNSandra.    PHYSICIAN ESCALATION     Yes/No  no    Disposition: Remain in room 8068.    FOLLOW-UP     Call back the Rapid Response Nurse, Madai Francois RN at [phone] for additional questions or concerns.          "

## 2020-05-12 NOTE — PT/OT/SLP PROGRESS
Physical Therapy Treatment    Patient Name:  Bharathi Garrido   MRN:  [id number]  Admit Date: 2020  Admitting Diagnosis:  Pneumonia due to COVID-19 virus   Length of Stay: 14 days  Recent Surgery: * No surgery found *      Recommendations:     Discharge Recommendations:  rehabilitation facility   Discharge Equipment Recommendations: (TBD)       Plan:     During this hospitalization, patient to be seen 4 x/week to address the listed problems via gait training, therapeutic activities, therapeutic exercises, neuromuscular re-education  · Plan of Care Expires:  20   Plan of Care Reviewed with: patient    Assessment:     Bharathi Garrido is a 70 y.o. male admitted with a medical diagnosis of Pneumonia due to COVID-19 virus. Pt progressing towards goals, but not at PLOF. Pt tolerated session well but continues with debility. Pt is improving with therapy evidenced by improved sitting balance and improved activity tolerance. Pt would benefit from continued PT services to improve overall functional mobility. Pt is an excellent IP rehab candidate due to good activity tolerance, decline from PLOF, good family support, and excellent motivation. Recommend d/c to Rehab to maximize functional independence.       Problem List: weakness, impaired endurance, impaired self care skills, impaired functional mobilty, impaired balance, decreased coordination, decreased upper extremity function, decreased lower extremity function, impaired cardiopulmonary response to activity.  Rehab Prognosis: Good     GOALS:   Multidisciplinary Problems     Physical Therapy Goals        Problem: Physical Therapy Goal    Goal Priority Disciplines Outcome Goal Variances Interventions   Physical Therapy Goal     PT, PT/OT Ongoing, Progressing     Description:  Goals to be met by: 2020     Patient will increase functional independence with mobility by performin. Supine to sit with MInimal Assistance -not met  2. Sit to supine with MInimal  "Assistance -not met  3. Sit to stand transfer with Maximum Assistance -not met  4. Bed to chair transfer with Maximum Assistance using LRAD -not met  5. Gait  x 10 feet with Maximum Assistance using LRAD. -not met  6. Sitting at edge of bed x10 minutes with Stand-by Assistance while performing UE dynamic task to prepare for functional mobility and ADLs -not met  7. Lower extremity exercise program x20 reps per handout, with independence -not met                       Subjective   Communicated with RN prior to session.  Patient found HOB elevated upon PT entry to room, agreeable to evaluation. Bharathisean Garrido's alone during session.    Chief Complaint: debility   Patient/Family Comments/goals: to get better and return home   Pain/Comfort:  · Pain Rating 1: 0/10  · Pain Rating Post-Intervention 1: 0/10    Objective:   Patient found with: telemetry, pulse ox (continuous), blood pressure cuff, oxygen, del toro catheter   General Precautions: Standard, Cardiac airborne, droplet, fall, contact(COVID (+))   Orthopedic Precautions:N/A   Braces: N/A   Oxygen Device: Nasal Cannula   Vitals: /66   Pulse 103   Temp 98 °F (36.7 °C) (Oral)   Resp (!) 28   Ht 5' 11" (1.803 m)   Wt 105.4 kg (232 lb 5.8 oz)   SpO2 95%   BMI 32.41 kg/m²     Outcome Measures:  AM-PAC 6 CLICK MOBILITY  Turning over in bed (including adjusting bedclothes, sheets and blankets)?: 2  Sitting down on and standing up from a chair with arms (e.g., wheelchair, bedside commode, etc.): 1  Moving from lying on back to sitting on the side of the bed?: 2  Moving to and from a bed to a chair (including a wheelchair)?: 1  Need to walk in hospital room?: 1  Climbing 3-5 steps with a railing?: 1  Basic Mobility Total Score: 8     Cognition:   · Alert and Cooperative  · Ox4  · Command following: Follows multistep  commands  · Fluency: clear/fluent    Functional Mobility:  Additional staff present: OT  Bed Mobility:    Supine to Sit: maximal assistance and 2 " persons; HOB elevated   Scooting anteriorly to EOB to have both feet planted on floor: maximal assistance   Sit to Supine: maximal assistance and 2 persons; HOB flat    Sitting Balance at Edge of Bed:   Assistance Level Required: Stand-by Assistance   Time: 20 minutes   Postural deviations noted: slouched posture, rounded shoulders, forward head, posterior pelvic tilt and increased cervical flexion   Comments:   o Worked extensively on postural control re: strengthening postural muscles, balance training, and neutral head/neck    Transfers:   Not performed 2nd to pt still working on sitting balance      Gait:  Not performed     Therapeutic Activities, Exercises, and Education:   Educated pt on PT role/POc  Educated pt on importance of moving B UEs and B LE daily   Pt verbalized understanding    Scapular squeezes 1x5  Hip flexion in sitting with trunk moving over B LEs and single arm reaching 1x5  Hip extension in sitting with trunk moving posteriorly 1x5  B forearm push ups 1x5    B UE TE with OT  Wash face with OT      Patient left HOB elevated with all lines intact, call button in reach and RN notified..    Time Tracking:     PT Received On: 05/12/20  PT Start Time: 1314     PT Stop Time: 1344  PT Total Time (min): 30 min       Billable Minutes:   · Neuromuscular Re-education 23    Treatment Type: Treatment  PT/PTA: PT       Marianna Marr, PT, DPT  5/12/2020  [phone]

## 2020-05-12 NOTE — PROGRESS NOTES
[facility] - Respiratory ICU  [facility] Medicine  Progress Note    Patient Name: Bharathi Garrido  MRN: [id number]  Patient Class: IP- Inpatient   Admission Date: 4/28/2020  Length of Stay: 14 days  Attending Physician: Tangela Hughes MD  Primary Care Provider: Melvin Gee MD (Inactive)        Subjective:     Principal Problem:Pneumonia due to COVID-19 virus        HPI:  Mr. Bharathi Garrido is a 69 yo male with obesity, history of prostate cancer (1/2018 s/p prostatectomy and radiation Dr. Pederson), history of difficult intubation for surgery and glaucoma presents with feeling unwell, fever, decreased appetite and shortness of breath. Patient transferred from [facility] to [facility] 4/28/20 for higher level of care. Admitted to outside hospital on 3/31/20 for SOB and fever and tested positive for COVID-19.  Upon admission, CXR showed bilateral multifocal opacities. Procalcitonin, LDH, ferritin, CRP and D-dimer elevated. Renal function normal. Mild increase in LFTs. He was treated with Plaquinel, Rocephin and Azithromycin. His respiratory function declined and has been intubated since 4/2/20.      Overview/Hospital Course:  Bharathi Garrido was admitted to ICU for management of respiratory failure secondary to suspected and/or confirmed COVID-19 requiring mechanical ventilation for respiratory support.      4/28/20: Patient transferred from [facility] for higher level of care as patient still intubated with high oxygen requirements. Sedated on Fentanyl and Precedex, but responding appropriately and following commands per nursing. Intubated on A/C, FiO2 75%, PEEP 10, , rate 18 with sat of 92%. CXR consistent with multilobar pneumonia. On empiric Vanc and Zosyn for recent white count with leukocytosis. PICC from 4/2 removed and central line and a-line placed. Repeat cxs sent. Hgb/Hct stable s/p 1 unit PRBCs on 4/27. NG set to intermittent suction. Abd x-ray showed gaseous distention.      4/29/20:  Sedated on Propofol, Precedex, and Dilaudid. Initially on Levophed but weaned off and became hypertensive. Briefly on Cardene gtt. Vent settings unchanged: FiO2 75% and PEEP 10. New cultures with no growth, cont on empiric abx. Good UOP with IV Lasix and stable renal function. Having BMs.      4/30/20: Weaned off Propofol and Precedex successfully. Cont on IV Dilaudid 1 mg/hr and scheduled Oxy. SpO2 improving. Vent settings weaned to FiO of 65% PEEP 10  Rate 18. Blood pressure stable off pressors/antihypertensives. Having BMs. Started TFs. Good UOP and net negative. Lytes WNR.      5/1/2020: Weaned off dilaudid gtt. SBT today. Put back on rate after 2 hours when pt became tired and tachypnic. Stopped lasix due to elevated creatinine. Consulted ID due to increased WBC and fever despite broad spectrum abx regimen.      5/2/2020: Patient now off dilaudid gtt and tolerating well. Still drowsy. Patient failed SBT today.  Will decrease PO oxy from Q6H to BID. WBC continues to increase, now 22.28, Tmax 100. BC NGTD. Remains on vanc/zosyn - awaiting ID recs. Possible hemoconcentration. Net negative 1.3L. Stop lasix and start free water 200 Q4H. On TFs. Last BM 4/28. On bowel regimen.      5/3/2020: On Precedex gtt and prn Morphine for agitation.  Vent settings this AM: 40% FiO2 and PEEP 5. Oxygen sats 94%. Failed SBT. Consult surgery for trach/PEG. Did have 4 min bradycardia/hypertension during SBT which resolved with oxygen and suctioning. Free water increased to 300mL Q4H for hypernatremia. Stopped vanc/zosyn per ID recs. NGTD on resp/blood/fungal cultures.      5/5-5/6/2020: Weaned off sedation and tolerating minimal vent settings. Successful SBTs with NIF 46 and RISB 70. Extubated on 5/6 to 4 L NC. Neuro intact.     5/8: Patient was transferred out of the ICU, approved for regular diet with thin liquids    5/9: Some urinary retention and nausea noted    5/10: Perez replaced. Nausea improving.    Interval History:  Mr. Garrido is doing well this morning. He reports one episode of vomiting this morning. He denies any other acute issues, and states he has been eating well despite the vomiting this morning.    Review of Systems   Constitutional: Negative for chills and fever.   Respiratory: Negative for cough and shortness of breath.    Cardiovascular: Negative for chest pain and palpitations.   Gastrointestinal: Positive for nausea and vomiting. Negative for abdominal pain, constipation and diarrhea.   Genitourinary: Negative for decreased urine volume, dysuria and urgency.   Musculoskeletal: Negative for arthralgias and myalgias.   Neurological: Negative for dizziness, syncope and light-headedness.   Psychiatric/Behavioral: Negative for agitation and sleep disturbance.     Objective:     Vital Signs (Most Recent):  Temp: 97.4 °F (36.3 °C) (05/12/20 1508)  Pulse: 101 (05/12/20 1533)  Resp: (!) 23 (05/12/20 1508)  BP: 128/86 (05/12/20 1400)  SpO2: (!) 94 % (05/12/20 1508) Vital Signs (24h Range):  Temp:  [97.4 °F (36.3 °C)-99.4 °F (37.4 °C)] 97.4 °F (36.3 °C)  Pulse:  [] 101  Resp:  [20-30] 23  SpO2:  [93 %-99 %] 94 %  BP: (123-156)/(66-88) 128/86     Weight: 105.4 kg (232 lb 5.8 oz)  Body mass index is 32.41 kg/m².    Intake/Output Summary (Last 24 hours) at 5/12/2020 1535  Last data filed at 5/12/2020 1100  Gross per 24 hour   Intake 776 ml   Output 1000 ml   Net -224 ml      Physical Exam   Constitutional: He appears well-developed and well-nourished. No distress.   HENT:   Head: Normocephalic and atraumatic.   Poor dentition   Eyes: Conjunctivae and EOM are normal. No scleral icterus.   Cardiovascular: Normal rate, regular rhythm, normal heart sounds and intact distal pulses.   Pulmonary/Chest: Effort normal and breath sounds normal. No respiratory distress.   Abdominal: Soft. Bowel sounds are normal. He exhibits no distension. There is no tenderness.   Musculoskeletal: He exhibits no edema or tenderness.   Neurological:  He is alert. No cranial nerve deficit.   Skin: Skin is warm and dry. He is not diaphoretic.   Psychiatric: He has a normal mood and affect. Thought content normal.       Significant Labs:   CBC:   Recent Labs   Lab 05/11/20  0259 05/12/20  0301   WBC 12.01 10.08   HGB 8.4* 8.8*   HCT 28.0* 28.8*   * 338     CMP:   Recent Labs   Lab 05/11/20  0259 05/12/20  0300    140   K 3.9 3.4*    101   CO2 29 28   GLU 73 86   BUN 23 19   CREATININE 1.1 1.0   CALCIUM 8.8 8.3*   PROT 7.2 6.8   ALBUMIN 2.2* 2.1*   BILITOT 1.8* 1.7*   ALKPHOS 130 120   AST 37 35   ALT 69* 61*   ANIONGAP 11 11   EGFRNONAA >60.0 >60.0       Significant Imaging: I have reviewed all pertinent imaging results/findings within the past 24 hours.      Assessment/Plan:      * Pneumonia due to COVID-19 virus  With acute respiratory failure  - Initially admitted 3/31 at OSH  - Intubated 34 days (starting 4/2), extubated 5/6 to 4 L NC, BiPAP at night. Awake and alert, intact.   - Completed course of Plaquenil, Azithromycin, and Rocephin   - Repeat COVID-19 on 4/21 and 5/8 positive  - Currently on NC 1L with adequate saturation. Will wean as tolerated      Respiratory failure  - initially from COVID-19  - intubated 34 days; extubated 5/6  - currently on 1L NC with O2sats adequate; wean O2 as tolerated  - Improving slowly  - monitor      Hyponatremia  - Patient has had both hypo and hypernatremia while inpatient  - Hypernatremia thought to be a result of lasix use while in ICU  - Improved after free water boluses and now PO intake adequate  - Resolved      WILBUR (acute kidney injury)  - WILBUR improved  - Cr stabilizied around 1.1-1.3  - Off IV Lasix since 5/1/20  - Encouraged increased PO fluid intake    Urinary retention  - Del Toro removed 5/8, replaced 5/10  - he reports he had a stent placed after his prostatectomy, and he has to press a button to release urine  - UA not overtly indicative of UTI  - will keep del toro for 1 more day and remove it  5/13    Hypokalemia  - Noted on labs 5/12  - will replete with oral potassium      Vomiting  - Uncertain exact cause of nausea/vomiting  - Possibly secondary to medication side effects from his hospitalization  - PRN zofran and PRN phenergan helping  - Improving    Hypertension  - Patient without history of hypertension at home  - elevated -150 systolic noted with some at goal in 120-130s  - continue amlodipine to 10 mg daily      Anemia  - Possibly from acute illness  - no active bleeding noted  - stable Hgb over several weeks around 7-8  - continue to monitor        VTE Risk Mitigation (From admission, onward)         Ordered     enoxaparin injection 40 mg  Daily      04/28/20 1535     IP VTE HIGH RISK PATIENT  Once      04/28/20 1535                Diet: Regular  Disposition: Rehab facility when medically ready      Tangela Hughes MD  Department of Hospital Medicine   [facility] - Res[facility] ICU

## 2020-05-12 NOTE — ASSESSMENT & PLAN NOTE
- Patient without history of hypertension at home  - elevated -150 systolic noted with some at goal in 120-130s  - continue amlodipine to 10 mg daily

## 2020-05-12 NOTE — PLAN OF CARE
Pt is AOX4. No complaints of pain. HR reaches the 120's after breathing treatments but comes down slowly over time and stays in the 90's. Left arm wound is open to air. Patient really likes the chocolate boost drinks. Encouraged repositioning but pt is most comfortable laying on the right side. Will continue to monitor for safety and comfort.   Problem: Adult Inpatient Plan of Care  Goal: Plan of Care Review  Outcome: Ongoing, Progressing  Goal: Patient-Specific Goal (Individualization)  Outcome: Ongoing, Progressing  Goal: Absence of Hospital-Acquired Illness or Injury  Outcome: Ongoing, Progressing  Goal: Optimal Comfort and Wellbeing  Outcome: Ongoing, Progressing  Goal: Readiness for Transition of Care  Outcome: Ongoing, Progressing  Goal: Rounds/Family Conference  Outcome: Ongoing, Progressing     Problem: Fluid Imbalance (Pneumonia)  Goal: Fluid Balance  Outcome: Ongoing, Progressing     Problem: Infection (Pneumonia)  Goal: Resolution of Infection Signs/Symptoms  Outcome: Ongoing, Progressing     Problem: Respiratory Compromise (Pneumonia)  Goal: Effective Oxygenation and Ventilation  Outcome: Ongoing, Progressing     Problem: Electrolyte Imbalance (Acute Kidney Injury/Impairment)  Goal: Serum Electrolyte Balance  Outcome: Ongoing, Progressing     Problem: Fluid Imbalance (Acute Kidney Injury/Impairment)  Goal: Optimal Fluid Balance  Outcome: Ongoing, Progressing     Problem: Hematologic Alteration (Acute Kidney Injury/Impairment)  Goal: Hemoglobin, Hematocrit and Platelets Within Normal Range  Outcome: Ongoing, Progressing     Problem: Oral Intake Inadequate (Acute Kidney Injury/Impairment)  Goal: Optimal Nutrition Intake  Outcome: Ongoing, Progressing     Problem: Renal Function Impairment (Acute Kidney Injury/Impairment)  Goal: Effective Renal Function  Outcome: Ongoing, Progressing     Problem: Infection  Goal: Infection Symptom Resolution  Outcome: Ongoing, Progressing     Problem: Wound  Goal: Optimal  Wound Healing  Outcome: Ongoing, Progressing     Problem: Nutrition Impairment (Mechanical Ventilation, Invasive)  Goal: Optimal Nutrition Delivery  Outcome: Ongoing, Progressing     Problem: Skin and Tissue Injury (Mechanical Ventilation, Invasive)  Goal: Absence of Device-Related Skin and Tissue Injury  Outcome: Ongoing, Progressing     Problem: Skin and Tissue Injury (Artificial Airway)  Goal: Absence of Device-Related Skin or Tissue Injury  Outcome: Ongoing, Progressing     Problem: Fall Injury Risk  Goal: Absence of Fall and Fall-Related Injury  Outcome: Ongoing, Progressing     Problem: Skin Injury Risk Increased  Goal: Skin Health and Integrity  Outcome: Ongoing, Progressing

## 2020-05-12 NOTE — ASSESSMENT & PLAN NOTE
- Del Toro removed 5/8, replaced 5/10  - he reports he had a stent placed after his prostatectomy, and he has to press a button to release urine  - UA not overtly indicative of UTI  - will keep del toro for 1 more day and remove it 5/13

## 2020-05-13 PROBLEM — I47.10 SUPRAVENTRICULAR TACHYCARDIA: Status: ACTIVE | Noted: 2020-05-13

## 2020-05-13 PROBLEM — R00.0 SINUS TACHYCARDIA: Status: ACTIVE | Noted: 2020-05-13

## 2020-05-13 LAB
ALBUMIN SERPL BCP-MCNC: 2.3 G/DL (ref 3.5–5.2)
ALP SERPL-CCNC: 129 U/L (ref 55–135)
ALT SERPL W/O P-5'-P-CCNC: 60 U/L (ref 10–44)
ANION GAP SERPL CALC-SCNC: 10 MMOL/L (ref 8–16)
AST SERPL-CCNC: 34 U/L (ref 10–40)
BASOPHILS # BLD AUTO: 0.1 K/UL (ref 0–0.2)
BASOPHILS NFR BLD: 0.8 % (ref 0–1.9)
BILIRUB SERPL-MCNC: 1.8 MG/DL (ref 0.1–1)
BUN SERPL-MCNC: 17 MG/DL (ref 8–23)
CALCIUM SERPL-MCNC: 8.6 MG/DL (ref 8.7–10.5)
CHLORIDE SERPL-SCNC: 101 MMOL/L (ref 95–110)
CO2 SERPL-SCNC: 28 MMOL/L (ref 23–29)
CREAT SERPL-MCNC: 0.9 MG/DL (ref 0.5–1.4)
DIFFERENTIAL METHOD: ABNORMAL
EOSINOPHIL # BLD AUTO: 0.2 K/UL (ref 0–0.5)
EOSINOPHIL NFR BLD: 1.2 % (ref 0–8)
ERYTHROCYTE [DISTWIDTH] IN BLOOD BY AUTOMATED COUNT: 16.5 % (ref 11.5–14.5)
EST. GFR  (AFRICAN AMERICAN): >60 ML/MIN/1.73 M^2
EST. GFR  (NON AFRICAN AMERICAN): >60 ML/MIN/1.73 M^2
GLUCOSE SERPL-MCNC: 98 MG/DL (ref 70–110)
HCT VFR BLD AUTO: 29.1 % (ref 40–54)
HGB BLD-MCNC: 9 G/DL (ref 14–18)
IMM GRANULOCYTES # BLD AUTO: 0.08 K/UL (ref 0–0.04)
IMM GRANULOCYTES NFR BLD AUTO: 0.6 % (ref 0–0.5)
LYMPHOCYTES # BLD AUTO: 1 K/UL (ref 1–4.8)
LYMPHOCYTES NFR BLD: 8 % (ref 18–48)
MAGNESIUM SERPL-MCNC: 2 MG/DL (ref 1.6–2.6)
MCH RBC QN AUTO: 26.9 PG (ref 27–31)
MCHC RBC AUTO-ENTMCNC: 30.9 G/DL (ref 32–36)
MCV RBC AUTO: 87 FL (ref 82–98)
MONOCYTES # BLD AUTO: 0.9 K/UL (ref 0.3–1)
MONOCYTES NFR BLD: 7.5 % (ref 4–15)
NEUTROPHILS # BLD AUTO: 10.3 K/UL (ref 1.8–7.7)
NEUTROPHILS NFR BLD: 81.9 % (ref 38–73)
NRBC BLD-RTO: 0 /100 WBC
PHOSPHATE SERPL-MCNC: 2.6 MG/DL (ref 2.7–4.5)
PLATELET # BLD AUTO: 336 K/UL (ref 150–350)
PMV BLD AUTO: 11.4 FL (ref 9.2–12.9)
POTASSIUM SERPL-SCNC: 3.2 MMOL/L (ref 3.5–5.1)
PROT SERPL-MCNC: 7.2 G/DL (ref 6–8.4)
RBC # BLD AUTO: 3.35 M/UL (ref 4.6–6.2)
SODIUM SERPL-SCNC: 139 MMOL/L (ref 136–145)
WBC # BLD AUTO: 12.55 K/UL (ref 3.9–12.7)

## 2020-05-13 PROCEDURE — 36415 COLL VENOUS BLD VENIPUNCTURE: CPT

## 2020-05-13 PROCEDURE — 25000242 PHARM REV CODE 250 ALT 637 W/ HCPCS: Performed by: INTERNAL MEDICINE

## 2020-05-13 PROCEDURE — 63600175 PHARM REV CODE 636 W HCPCS: Performed by: INTERNAL MEDICINE

## 2020-05-13 PROCEDURE — 51798 US URINE CAPACITY MEASURE: CPT

## 2020-05-13 PROCEDURE — 94761 N-INVAS EAR/PLS OXIMETRY MLT: CPT

## 2020-05-13 PROCEDURE — 99233 SBSQ HOSP IP/OBS HIGH 50: CPT | Mod: ,,, | Performed by: INTERNAL MEDICINE

## 2020-05-13 PROCEDURE — 11000001 HC ACUTE MED/SURG PRIVATE ROOM

## 2020-05-13 PROCEDURE — 85025 COMPLETE CBC W/AUTO DIFF WBC: CPT

## 2020-05-13 PROCEDURE — 97112 NEUROMUSCULAR REEDUCATION: CPT

## 2020-05-13 PROCEDURE — 25000003 PHARM REV CODE 250: Performed by: INTERNAL MEDICINE

## 2020-05-13 PROCEDURE — 97530 THERAPEUTIC ACTIVITIES: CPT

## 2020-05-13 PROCEDURE — 83735 ASSAY OF MAGNESIUM: CPT

## 2020-05-13 PROCEDURE — 94640 AIRWAY INHALATION TREATMENT: CPT

## 2020-05-13 PROCEDURE — 94664 DEMO&/EVAL PT USE INHALER: CPT

## 2020-05-13 PROCEDURE — 84100 ASSAY OF PHOSPHORUS: CPT

## 2020-05-13 PROCEDURE — 27000221 HC OXYGEN, UP TO 24 HOURS

## 2020-05-13 PROCEDURE — 97535 SELF CARE MNGMENT TRAINING: CPT

## 2020-05-13 PROCEDURE — 99233 PR SUBSEQUENT HOSPITAL CARE,LEVL III: ICD-10-PCS | Mod: ,,, | Performed by: INTERNAL MEDICINE

## 2020-05-13 PROCEDURE — 97110 THERAPEUTIC EXERCISES: CPT

## 2020-05-13 PROCEDURE — 80053 COMPREHEN METABOLIC PANEL: CPT

## 2020-05-13 PROCEDURE — 99900035 HC TECH TIME PER 15 MIN (STAT)

## 2020-05-13 PROCEDURE — U0002 COVID-19 LAB TEST NON-CDC: HCPCS

## 2020-05-13 PROCEDURE — 94660 CPAP INITIATION&MGMT: CPT

## 2020-05-13 RX ORDER — POTASSIUM CHLORIDE 20 MEQ/1
40 TABLET, EXTENDED RELEASE ORAL 2 TIMES DAILY
Status: DISCONTINUED | OUTPATIENT
Start: 2020-05-13 | End: 2020-05-14

## 2020-05-13 RX ADMIN — TIMOLOL MALEATE 1 DROP: 5 SOLUTION/ DROPS OPHTHALMIC at 08:05

## 2020-05-13 RX ADMIN — ENOXAPARIN SODIUM 40 MG: 100 INJECTION SUBCUTANEOUS at 08:05

## 2020-05-13 RX ADMIN — MINERAL OIL AND WHITE PETROLATUM: 150; 830 OINTMENT OPHTHALMIC at 08:05

## 2020-05-13 RX ADMIN — AMLODIPINE BESYLATE 10 MG: 10 TABLET ORAL at 08:05

## 2020-05-13 RX ADMIN — ALBUTEROL SULFATE 2.5 MG: 2.5 SOLUTION RESPIRATORY (INHALATION) at 07:05

## 2020-05-13 RX ADMIN — LATANOPROST 1 DROP: 50 SOLUTION OPHTHALMIC at 08:05

## 2020-05-13 RX ADMIN — BRIMONIDINE TARTRATE 1 DROP: 2 SOLUTION OPHTHALMIC at 08:05

## 2020-05-13 RX ADMIN — SODIUM CHLORIDE SOLN NEBU 3% 4 ML: 3 NEBU SOLN at 07:05

## 2020-05-13 RX ADMIN — POTASSIUM CHLORIDE 40 MEQ: 1500 TABLET, EXTENDED RELEASE ORAL at 08:05

## 2020-05-13 NOTE — CARE UPDATE
Rapid Response Nurse Chart Check     Chart check completed. Call [phone] for further concerns or assistance.

## 2020-05-13 NOTE — SUBJECTIVE & OBJECTIVE
Interval History: Mr. Garrido states he feels weak overall, but denies any acute concerns. He denies any shortness of breath or palipitations.    At the time of today's exam he was working with PT.    Review of Systems   Constitutional: Negative for chills and fever.   Respiratory: Negative for cough and shortness of breath.    Cardiovascular: Negative for chest pain and palpitations.   Gastrointestinal: Negative for abdominal pain, constipation, diarrhea, nausea and vomiting.   Genitourinary: Negative for decreased urine volume, dysuria and urgency.   Musculoskeletal: Negative for arthralgias and myalgias.   Neurological: Positive for weakness. Negative for dizziness, syncope and light-headedness.   Psychiatric/Behavioral: Negative for agitation and sleep disturbance.     Objective:     Vital Signs (Most Recent):  Temp: 99.2 °F (37.3 °C) (05/13/20 1128)  Pulse: 103 (05/13/20 1000)  Resp: (!) 27 (05/13/20 1100)  BP: (!) 144/85 (05/13/20 1000)  SpO2: 95 % (05/13/20 1000) Vital Signs (24h Range):  Temp:  [97.4 °F (36.3 °C)-99.2 °F (37.3 °C)] 99.2 °F (37.3 °C)  Pulse:  [] 103  Resp:  [19-35] 27  SpO2:  [94 %-100 %] 95 %  BP: (122-157)/(65-93) 144/85     Weight: 105.4 kg (232 lb 5.8 oz)  Body mass index is 32.41 kg/m².    Intake/Output Summary (Last 24 hours) at 5/13/2020 1328  Last data filed at 5/13/2020 1100  Gross per 24 hour   Intake --   Output 975 ml   Net -975 ml      Physical Exam   Constitutional: He appears well-developed and well-nourished. No distress.   HENT:   Head: Normocephalic and atraumatic.   Poor dentition   Eyes: Conjunctivae and EOM are normal. No scleral icterus.   Cardiovascular: Regular rhythm, normal heart sounds and intact distal pulses.   Tachycardic at time of exam   Pulmonary/Chest: Effort normal and breath sounds normal. No respiratory distress.   Abdominal: Soft. Bowel sounds are normal. He exhibits no distension. There is no tenderness.   Musculoskeletal: He exhibits no edema or  tenderness.   Neurological: He is alert. No cranial nerve deficit.   Skin: Skin is warm and dry. He is not diaphoretic.   Psychiatric: He has a normal mood and affect. Thought content normal.       Significant Labs:   CBC:   Recent Labs   Lab 05/12/20 0301 05/13/20 0422   WBC 10.08 12.55   HGB 8.8* 9.0*   HCT 28.8* 29.1*    336     CMP:   Recent Labs   Lab 05/12/20 0300 05/13/20 0422    139   K 3.4* 3.2*    101   CO2 28 28   GLU 86 98   BUN 19 17   CREATININE 1.0 0.9   CALCIUM 8.3* 8.6*   PROT 6.8 7.2   ALBUMIN 2.1* 2.3*   BILITOT 1.7* 1.8*   ALKPHOS 120 129   AST 35 34   ALT 61* 60*   ANIONGAP 11 10   EGFRNONAA >60.0 >60.0       Significant Imaging: I have reviewed all pertinent imaging results/findings within the past 24 hours.

## 2020-05-13 NOTE — PT/OT/SLP PROGRESS
Physical Therapy Treatment    Patient Name:  Bharathi Garrido   MRN:  [id number]  Admitting Diagnosis:  Pneumonia due to COVID-19 virus   Recent Surgery: * No surgery found *    Admit Date: 4/28/2020  Length of Stay: 15 days    Recommendations:     Discharge Recommendations:  rehabilitation facility   Discharge Equipment Recommendations: other (see comments)(tbd pending discharge dispo)   Barriers to discharge: None    Assessment:     Bharathi Garrido is a 70 y.o. male admitted with a medical diagnosis of Pneumonia due to COVID-19 virus.  He presents with the following impairments/functional limitations:  impaired endurance, weakness, gait instability, impaired balance, impaired self care skills, impaired functional mobilty, decreased upper extremity function, decreased lower extremity function, decreased coordination, decreased safety awareness, impaired cardiopulmonary response to activity, impaired fine motor, impaired coordination. Pt tolerated session well today. Pt with c/o fatigue on this date but still able to increase time spent EOB. Pt did have drop in SPO2 to 79% while EOB. Pt increased to 2L and SPO2 quickly yessi to 79%. Pt demonstrating impaired core control on this date and req'd Min A while performing dynamic tasks. Pt will continue to benefit from skilled PT services during this hospital admit to continue to improve transfer ability and efficiency as well as continue to progress pt's ambulation distance and cardiopulmonary endurance to maximize pt's functional independence and return to PLOF. Pt is an excellent candidate for inpatient rehabilitation, as pt has a qualifying diagnosis, displays good activity tolerance, has experienced decline from PLOF, has good family support, and is motivated to participate in therapy.     Rehab Prognosis: Good; patient would benefit from acute skilled PT services to address these deficits and reach maximum level of function.    Recent Surgery: * No surgery found *      Plan:      During this hospitalization, patient to be seen 4 x/week to address the identified rehab impairments via gait training, therapeutic exercises, therapeutic activities, neuromuscular re-education and progress toward the following goals:    · Plan of Care Expires:  06/01/20    Subjective     RN notified prior to session. No family/friends present upon PT entrance into room.    Chief Complaint: Pt reported feeling more tired today  Patient/Family Comments/goals: get stronger  Pain/Comfort:  · Pain Rating 1: 0/10  · Pain Rating Post-Intervention 1: 0/10      Objective:     Additional staff present: OT for cotx 2/2 impaired activity tolerance    Patient found HOB elevated with: blood pressure cuff, telemetry, pulse ox (continuous), oxygen, del toro catheter, peripheral IV   Mental Status: Patient is oriented to AxOx4 and follows single step commands. Patient is Alert and Cooperative during session.    General Precautions: Standard, Cardiac airborne, contact, droplet, fall   Orthopedic Precautions:N/A   Braces: N/A   Body mass index is 32.41 kg/m².  Oxygen Device: Nasal Cannula 1L - increased to 2L while EOB to keep SPO2 >90%. Returned to 1L once supine, SPO2>94% at rest.      Outcome Measures:  AM-PAC 6 CLICK MOBILITY  Turning over in bed (including adjusting bedclothes, sheets and blankets)?: 2  Sitting down on and standing up from a chair with arms (e.g., wheelchair, bedside commode, etc.): 1  Moving from lying on back to sitting on the side of the bed?: 2  Moving to and from a bed to a chair (including a wheelchair)?: 1  Need to walk in hospital room?: 1  Climbing 3-5 steps with a railing?: 1  Basic Mobility Total Score: 8     Functional Mobility:    Bed Mobility:   · Scooting to HOB via supine bridge: total assistance and 2 persons  · Supine to Sit: maximal assistance, 2 persons and with HOB elevated; from Rt side of bed  · Scooting anteriorly to EOB to have both feet planted on floor: maximal assistance  · Sit to  Supine: maximal assistance and 2 persons; to Rt side of bed    Sitting Balance at Edge of Bed:   Assistance Level Required: Stand-by Assistance, Contact Guard Assistance and Minimal Assistance   Time: 25 min   Postural deviations noted: slouched posture, rounded shoulders, forward head, increased kyphosis and posterior pelvic tilt   Encouraged: scapular retraction, neutral c-sp   Comments: Pt req'd SBA to CGA for static sitting with and without UE support. However when dynamic task with BUE performed pt req'd Min A due to increased posterior lean and impaired core control. Cueing for upright gaze and scapular retraction to correct kyphotic posture pt presents with at rest    Transfers: deferred 2/2 fatigue      Gait: Deferred due to pt's performance with above listed functional mobility    Education:   Time provided for education, counseling and discussion of health disposition in regards to patient's current status   All questions answered within PT scope of practice and to patient's satisfaction   PT role in POC to address current functional deficits   Pt educated on proper body mechanics, safety techniques, and energy conservation with PT facilitation and cueing throughout session   Whiteboard updated with pt's current mobility status documented above    Patient left HOB elevated with all lines intact, call button in reach, bed alarm on and RN notified.    GOALS:   Multidisciplinary Problems     Physical Therapy Goals        Problem: Physical Therapy Goal    Goal Priority Disciplines Outcome Goal Variances Interventions   Physical Therapy Goal     PT, PT/OT Ongoing, Progressing     Description:  Goals to be met by: 2020     Patient will increase functional independence with mobility by performin. Supine to sit with MInimal Assistance -not met  2. Sit to supine with MInimal Assistance -not met  3. Sit to stand transfer with Maximum Assistance -not met  4. Bed to chair transfer with Maximum  Assistance using LRAD -not met  5. Gait  x 10 feet with Maximum Assistance using LRAD. -not met  6. Sitting at edge of bed x10 minutes with Stand-by Assistance while performing UE dynamic task to prepare for functional mobility and ADLs -not met  7. Lower extremity exercise program x20 reps per handout, with independence -not met                     Time Tracking:     PT Received On: 05/13/20  PT Start Time: 0903     PT Stop Time: 0944  PT Total Time (min): 41 min       Billable Minutes:   · Therapeutic Activity 25 and Neuromuscular Re-education 15    Treatment Type: Treatment  PT/PTA: PT       Yessi Ramírez PT, DPT  5/13/2020  Pager: [phone]

## 2020-05-13 NOTE — PROGRESS NOTES
[facility] - Respiratory ICU  [facility] Medicine  Progress Note    Patient Name: Bharathi Garrido  MRN: [id number]  Patient Class: IP- Inpatient   Admission Date: 4/28/2020  Length of Stay: 15 days  Attending Physician: Tangela Hughes MD  Primary Care Provider: Melvin Gee MD (Inactive)        Subjective:     Principal Problem:Pneumonia due to COVID-19 virus        HPI:  Mr. Bharathi Garrido is a 69 yo male with obesity, history of prostate cancer (1/2018 s/p prostatectomy and radiation Dr. Pederson), history of difficult intubation for surgery and glaucoma presents with feeling unwell, fever, decreased appetite and shortness of breath. Patient transferred from [facility] to [facility] 4/28/20 for higher level of care. Admitted to outside hospital on 3/31/20 for SOB and fever and tested positive for COVID-19.  Upon admission, CXR showed bilateral multifocal opacities. Procalcitonin, LDH, ferritin, CRP and D-dimer elevated. Renal function normal. Mild increase in LFTs. He was treated with Plaquinel, Rocephin and Azithromycin. His respiratory function declined and has been intubated since 4/2/20.      Overview/Hospital Course:  Bharathi Garrido was admitted to ICU for management of respiratory failure secondary to suspected and/or confirmed COVID-19 requiring mechanical ventilation for respiratory support.      4/28/20: Patient transferred from [facility] for higher level of care as patient still intubated with high oxygen requirements. Sedated on Fentanyl and Precedex, but responding appropriately and following commands per nursing. Intubated on A/C, FiO2 75%, PEEP 10, , rate 18 with sat of 92%. CXR consistent with multilobar pneumonia. On empiric Vanc and Zosyn for recent white count with leukocytosis. PICC from 4/2 removed and central line and a-line placed. Repeat cxs sent. Hgb/Hct stable s/p 1 unit PRBCs on 4/27. NG set to intermittent suction. Abd x-ray showed gaseous distention.      4/29/20:  Sedated on Propofol, Precedex, and Dilaudid. Initially on Levophed but weaned off and became hypertensive. Briefly on Cardene gtt. Vent settings unchanged: FiO2 75% and PEEP 10. New cultures with no growth, cont on empiric abx. Good UOP with IV Lasix and stable renal function. Having BMs.      4/30/20: Weaned off Propofol and Precedex successfully. Cont on IV Dilaudid 1 mg/hr and scheduled Oxy. SpO2 improving. Vent settings weaned to FiO of 65% PEEP 10  Rate 18. Blood pressure stable off pressors/antihypertensives. Having BMs. Started TFs. Good UOP and net negative. Lytes WNR.      5/1/2020: Weaned off dilaudid gtt. SBT today. Put back on rate after 2 hours when pt became tired and tachypnic. Stopped lasix due to elevated creatinine. Consulted ID due to increased WBC and fever despite broad spectrum abx regimen.      5/2/2020: Patient now off dilaudid gtt and tolerating well. Still drowsy. Patient failed SBT today.  Will decrease PO oxy from Q6H to BID. WBC continues to increase, now 22.28, Tmax 100. BC NGTD. Remains on vanc/zosyn - awaiting ID recs. Possible hemoconcentration. Net negative 1.3L. Stop lasix and start free water 200 Q4H. On TFs. Last BM 4/28. On bowel regimen.      5/3/2020: On Precedex gtt and prn Morphine for agitation.  Vent settings this AM: 40% FiO2 and PEEP 5. Oxygen sats 94%. Failed SBT. Consult surgery for trach/PEG. Did have 4 min bradycardia/hypertension during SBT which resolved with oxygen and suctioning. Free water increased to 300mL Q4H for hypernatremia. Stopped vanc/zosyn per ID recs. NGTD on resp/blood/fungal cultures.      5/5-5/6/2020: Weaned off sedation and tolerating minimal vent settings. Successful SBTs with NIF 46 and RISB 70. Extubated on 5/6 to 4 L NC. Neuro intact.     5/8: Patient was transferred out of the ICU, approved for regular diet with thin liquids    5/9: Some urinary retention and nausea noted    5/10: Perez replaced. Nausea improving.    Interval History:  Mr. Garrido states he feels weak overall, but denies any acute concerns. He denies any shortness of breath or palipitations.    At the time of today's exam he was working with PT.    Review of Systems   Constitutional: Negative for chills and fever.   Respiratory: Negative for cough and shortness of breath.    Cardiovascular: Negative for chest pain and palpitations.   Gastrointestinal: Negative for abdominal pain, constipation, diarrhea, nausea and vomiting.   Genitourinary: Negative for decreased urine volume, dysuria and urgency.   Musculoskeletal: Negative for arthralgias and myalgias.   Neurological: Positive for weakness. Negative for dizziness, syncope and light-headedness.   Psychiatric/Behavioral: Negative for agitation and sleep disturbance.     Objective:     Vital Signs (Most Recent):  Temp: 99.2 °F (37.3 °C) (05/13/20 1128)  Pulse: 103 (05/13/20 1000)  Resp: (!) 27 (05/13/20 1100)  BP: (!) 144/85 (05/13/20 1000)  SpO2: 95 % (05/13/20 1000) Vital Signs (24h Range):  Temp:  [97.4 °F (36.3 °C)-99.2 °F (37.3 °C)] 99.2 °F (37.3 °C)  Pulse:  [] 103  Resp:  [19-35] 27  SpO2:  [94 %-100 %] 95 %  BP: (122-157)/(65-93) 144/85     Weight: 105.4 kg (232 lb 5.8 oz)  Body mass index is 32.41 kg/m².    Intake/Output Summary (Last 24 hours) at 5/13/2020 1328  Last data filed at 5/13/2020 1100  Gross per 24 hour   Intake --   Output 975 ml   Net -975 ml      Physical Exam   Constitutional: He appears well-developed and well-nourished. No distress.   HENT:   Head: Normocephalic and atraumatic.   Poor dentition   Eyes: Conjunctivae and EOM are normal. No scleral icterus.   Cardiovascular: Regular rhythm, normal heart sounds and intact distal pulses.   Tachycardic at time of exam   Pulmonary/Chest: Effort normal and breath sounds normal. No respiratory distress.   Abdominal: Soft. Bowel sounds are normal. He exhibits no distension. There is no tenderness.   Musculoskeletal: He exhibits no edema or tenderness.    Neurological: He is alert. No cranial nerve deficit.   Skin: Skin is warm and dry. He is not diaphoretic.   Psychiatric: He has a normal mood and affect. Thought content normal.       Significant Labs:   CBC:   Recent Labs   Lab 05/12/20  0301 05/13/20  0422   WBC 10.08 12.55   HGB 8.8* 9.0*   HCT 28.8* 29.1*    336     CMP:   Recent Labs   Lab 05/12/20  0300 05/13/20  0422    139   K 3.4* 3.2*    101   CO2 28 28   GLU 86 98   BUN 19 17   CREATININE 1.0 0.9   CALCIUM 8.3* 8.6*   PROT 6.8 7.2   ALBUMIN 2.1* 2.3*   BILITOT 1.7* 1.8*   ALKPHOS 120 129   AST 35 34   ALT 61* 60*   ANIONGAP 11 10   EGFRNONAA >60.0 >60.0       Significant Imaging: I have reviewed all pertinent imaging results/findings within the past 24 hours.      Assessment/Plan:      * Pneumonia due to COVID-19 virus  With acute respiratory failure  - Initially admitted 3/31 at OSH  - Intubated 34 days (starting 4/2), extubated 5/6 to 4 L NC, BiPAP at night. Awake and alert, intact.   - Completed course of Plaquenil, Azithromycin, and Rocephin   - Repeat COVID-19 on 4/21 and 5/8 positive  - He is noted to desaturate some when working with PT, but responds well to increase in supplemental oxygen  - Currently on NC 1L with adequate saturation. Will wean as tolerated      Respiratory failure  - initially from COVID-19  - intubated 34 days; extubated 5/6  - currently on 1L NC with O2sats adequate; wean O2 as tolerated  - Improving slowly  - monitor      Hyponatremia  - Patient has had both hypo and hypernatremia while inpatient  - Hypernatremia thought to be a result of lasix use while in ICU  - Improved after free water boluses and now PO intake adequate  - Resolved      WILBUR (acute kidney injury)  - WILBUR improved  - Cr stabilizied around 1.1-1.3  - Off IV Lasix since 5/1/20  - Encouraged increased PO fluid intake    Urinary retention  - Perez removed 5/8, replaced 5/10  - he reports he had a stent placed after his prostatectomy, and he  has to press a button to release urine  - UA not overtly indicative of UTI  - remove del toro today, 5/13    Sinus tachycardia  - Patient noted to have high-normal HR with range   - some tachycardia coincides with working with PT/OT  - last EKG 4/16/2020 showed sinus rhythm with PVCs  - will order EKG today  - may consider addition of beta blocker given hypertension      Hypokalemia  - Noted on labs 5/13  - will replete with oral potassium      Vomiting  - Uncertain exact cause of nausea/vomiting  - Possibly secondary to medication side effects from his hospitalization  - PRN zofran and PRN phenergan helping  - Reports no vomiting in last 24 hours    Hypertension  - Patient without history of hypertension at home  - occasionally still with some hypertension with systolic -150  - continue amlodipine to 10 mg daily  - may consider addition of BB given continued hypertension and some mild tachycardia      Anemia  - Possibly from acute illness  - no active bleeding noted  - stable Hgb over several weeks around 7-8  - continue to monitor        VTE Risk Mitigation (From admission, onward)         Ordered     enoxaparin injection 40 mg  Daily      04/28/20 1535     IP VTE HIGH RISK PATIENT  Once      04/28/20 1535                Diet: Regular diet      Tangela Hughes MD  Department of Hospital Medicine   [facility] - Respiratory ICU

## 2020-05-13 NOTE — PLAN OF CARE
Problem: Adult Inpatient Plan of Care  Goal: Plan of Care Review  Outcome: Ongoing, Progressing   POC reviewed with ptTana valdes/yossi at 1800. No other changes. Safety maintained. Will continue to monitor.

## 2020-05-13 NOTE — CODE/ RAPID DOCUMENTATION
"RAPID RESPONSE NURSE PROACTIVE ROUNDING NOTE     Time of Visit:     Admit Date: 2020  LOS: 14  Code Status: Full Code   Date of Visit: 2020  : 1950  Age: 70 y.o.  Sex: male  Race: Black or   Bed: 8068/8068 A:   MRN: [id number]  Was the patient discharged from an ICU this admission? YES   Was the patient discharged from a PACU within last 24 hours?  no  Did the patient receive conscious sedation/general anesthesia in last 24 hours?  no  Was the patient in the ED within the past 24 hours?  no  Was the patient started on NIPPV within the past 24 hours?  no  Attending Physician: Tangela Hughes MD  Primary Service: [facility] MED G    ASSESSMENT     Notified by proactive rounding.  Reason for alert: Proactive Rounding.    Diagnosis: Pneumonia due to COVID-19 virus    Abnormal Vital Signs: /65   Pulse (!) 116   Temp 97.7 °F (36.5 °C)   Resp 20   Ht 5' 11" (1.803 m)   Wt 105.4 kg (232 lb 5.8 oz)   SpO2 100%   BMI 32.41 kg/m²      Clinical Issues:  Respiratory    Patient  has a past medical history of Class 1 obesity with body mass index (BMI) of 33.0 to 33.9 in adult, Difficult intubation, Glaucoma, Prostate cancer, and Sebaceous cyst of scrotum.      Patient is resting comfortably in bed watching TV. Currently SpO2 100& on 1L/NC. All vital signs WNL.      INTERVENTIONS/ RECOMMENDATIONS     Continue to monitor as per [facility] policy.     Discussed plan of care with RNMaira    PHYSICIAN ESCALATION     Yes/No  no       Disposition: Remain in room 8068.    FOLLOW-UP     Call back the Rapid Response Nurse, Marni Beltran RN at [phone] for additional questions or concerns.        "

## 2020-05-13 NOTE — ASSESSMENT & PLAN NOTE
- Uncertain exact cause of nausea/vomiting  - Possibly secondary to medication side effects from his hospitalization  - PRN zofran and PRN phenergan helping  - Reports no vomiting in last 24 hours

## 2020-05-13 NOTE — PLAN OF CARE
Discharge Recommendation: Rehab.    0 goals met today. PT goals appropriate.    Progressive Mobility Protocol: Patient is safe to perform EOB activity with assist x 1 with nursing staff.    Yessi Ramírez, PT, DPT  2020  Pager: [phone]        Problem: Physical Therapy Goal  Goal: Physical Therapy Goal  Description  Goals to be met by: 2020     Patient will increase functional independence with mobility by performin. Supine to sit with MInimal Assistance -not met  2. Sit to supine with MInimal Assistance -not met  3. Sit to stand transfer with Maximum Assistance -not met  4. Bed to chair transfer with Maximum Assistance using LRAD -not met  5. Gait  x 10 feet with Maximum Assistance using LRAD. -not met  6. Sitting at edge of bed x10 minutes with Stand-by Assistance while performing UE dynamic task to prepare for functional mobility and ADLs -not met  7. Lower extremity exercise program x20 reps per handout, with independence -not met      Outcome: Ongoing, Progressing

## 2020-05-13 NOTE — ASSESSMENT & PLAN NOTE
- Patient noted to have high-normal HR with range   - some tachycardia coincides with working with PT/OT  - last EKG 4/16/2020 showed sinus rhythm with PVCs  - will order EKG today  - may consider addition of beta blocker given hypertension

## 2020-05-13 NOTE — PT/OT/SLP PROGRESS
Occupational Therapy   Treatment    Name: Bharathi Garrido  MRN: [id number]  Admitting Diagnosis:  Pneumonia due to COVID-19 virus       Recommendations:     Discharge Recommendations: rehabilitation facility  Discharge Equipment Recommendations:  other (see comments)(TBD closer to d/c.)      Assessment:     Bharathi Garrido is a 70 y.o. male with a medical diagnosis of Pneumonia due to COVID-19 virus.  He presents with fair participation and motivation.  Pt continues to require extensive (A) with all activities & at risk for falls. Performance deficits affecting function are weakness, impaired endurance, impaired self care skills, impaired functional mobilty, impaired balance, impaired cognition, decreased upper extremity function, decreased lower extremity function, decreased coordination, visual deficits, decreased safety awareness, impaired cardiopulmonary response to activity.     Rehab Prognosis:  Fair; patient would benefit from acute skilled OT services to address these deficits and reach maximum level of function.       Plan:     Patient to be seen 5 x/week to address the above listed problems via self-care/home management, therapeutic activities, therapeutic exercises, neuromuscular re-education, cognitive retraining  · Plan of Care Expires: 06/05/20  · Plan of Care Reviewed with: patient    Subjective     Pain/Comfort:  · Pain Rating 1: 0/10  · Pain Rating Post-Intervention 1: 0/10    Objective:     Communicated with: RN prior to session.  Patient found supine with blood pressure cuff, pulse ox (continuous), del toro catheter, telemetry, oxygen upon OT entry to room.    General Precautions: Standard, airborne, droplet, fall, contact     Occupational Performance:     Bed Mobility:    · Patient completed Scooting/Bridging with total assistance scooting forward on EOB; dependent drawsheet transfer up [facility] while supine  · Patient completed Supine to Sit with maximal assistance and 2 persons  · Patient completed Sit to  Supine with maximal assistance and 2 persons     Activities of Daily Living:  · Grooming: maximal assistance seated EOB      AMPAC 6 Click ADL: 8    Treatment & Education:  Pt required CGA-Min (A) for postural control while seated EOB for ~ 25 minutes.  Provided verbal & physical cues to facilitate postural control.  Provided education on deep breathing.  Pt with noted confusion during session.  Provided cues for cervical extension.  Pt performed AAROM with BUE in 2 planes x 10 reps each while seated EOB.  Pt required increased O2 to 2 liters during session & then able to go back to 1 liter at end of session to maintain stable vitals.  Pt had no further questions & when asked whether there were any concerns pt reported none.        Patient left supine with all lines intact, call button in reach, bed alarm on and RN notifiedEducation:      GOALS:   Multidisciplinary Problems     Occupational Therapy Goals        Problem: Occupational Therapy Goal    Goal Priority Disciplines Outcome Interventions   Occupational Therapy Goal     OT, PT/OT Ongoing, Progressing    Description:  Goals to be met by: 5/21/2020     Patient will increase functional independence with ADLs by performing:    Grooming while EOB with Minimal Assistance.  Toileting from bedside commode with Moderate Assistance for hygiene and clothing management.   Sitting at edge of bed x10 minutes with Stand-by Assistance.  Stand pivot transfers with Moderate Assistance.  Toilet transfer to bedside commode with Moderate Assistance.                      Time Tracking:     OT Date of Treatment: 05/13/20  OT Start Time: 0903  OT Stop Time: 0945  OT Total Time (min): 42 min    Billable Minutes:Self Care/Home Management 15  Therapeutic Activity 15  Therapeutic Exercise 12    LAURY Goldsmith  5/13/2020

## 2020-05-13 NOTE — ASSESSMENT & PLAN NOTE
With acute respiratory failure  - Initially admitted 3/31 at OSH  - Intubated 34 days (starting 4/2), extubated 5/6 to 4 L NC, BiPAP at night. Awake and alert, intact.   - Completed course of Plaquenil, Azithromycin, and Rocephin   - Repeat COVID-19 on 4/21 and 5/8 positive  - He is noted to desaturate some when working with PT, but responds well to increase in supplemental oxygen  - Currently on NC 1L with adequate saturation. Will wean as tolerated

## 2020-05-13 NOTE — PLAN OF CARE
Pt w/ therapy recs for rehab. Pt w/ multiple referrals out - several SNF denials, [facility] out of network and [facility] & [facility] are under review.        05/13/20 1418   Discharge Reassessment   Assessment Type Discharge Planning Reassessment   Discharge Plan A Rehab   Discharge Plan B Skilled Nursing Facility   Anticipated Discharge Disposition Rehab   Post-Acute Status   Post-Acute Authorization Placement   Post-Acute Placement Status Referrals Sent   Discharge Delays (!) Other  (awaiting facility acceptance)

## 2020-05-13 NOTE — ASSESSMENT & PLAN NOTE
- Patient without history of hypertension at home  - occasionally still with some hypertension with systolic -150  - continue amlodipine to 10 mg daily  - may consider addition of BB given continued hypertension and some mild tachycardia

## 2020-05-13 NOTE — PLAN OF CARE
AAOx4, VSS, afebrile. No acute changes overnight. Perez remained in place per pt's request with plan to DC today 5/13.  MD aware. Safety maintained. Will continue to monitor.

## 2020-05-13 NOTE — ASSESSMENT & PLAN NOTE
- Del Toro removed 5/8, replaced 5/10  - he reports he had a stent placed after his prostatectomy, and he has to press a button to release urine  - UA not overtly indicative of UTI  - remove del toro today, 5/13

## 2020-05-14 PROBLEM — N17.9 AKI (ACUTE KIDNEY INJURY): Status: RESOLVED | Noted: 2020-04-03 | Resolved: 2020-05-14

## 2020-05-14 PROBLEM — R11.10 VOMITING: Status: RESOLVED | Noted: 2020-05-09 | Resolved: 2020-05-14

## 2020-05-14 PROBLEM — R53.81 DEBILITY: Status: ACTIVE | Noted: 2020-05-14

## 2020-05-14 PROBLEM — R65.10 SIRS (SYSTEMIC INFLAMMATORY RESPONSE SYNDROME): Status: RESOLVED | Noted: 2020-05-02 | Resolved: 2020-05-14

## 2020-05-14 PROBLEM — E87.1 HYPONATREMIA: Status: RESOLVED | Noted: 2020-04-22 | Resolved: 2020-05-14

## 2020-05-14 PROBLEM — D63.8 ANEMIA OF CHRONIC DISEASE: Status: ACTIVE | Noted: 2020-05-09

## 2020-05-14 PROBLEM — E87.6 HYPOKALEMIA: Status: RESOLVED | Noted: 2020-05-12 | Resolved: 2020-05-14

## 2020-05-14 LAB
ANION GAP SERPL CALC-SCNC: 12 MMOL/L (ref 8–16)
BNP SERPL-MCNC: 12 PG/ML (ref 0–99)
BUN SERPL-MCNC: 17 MG/DL (ref 8–23)
CALCIUM SERPL-MCNC: 8.7 MG/DL (ref 8.7–10.5)
CHLORIDE SERPL-SCNC: 102 MMOL/L (ref 95–110)
CO2 SERPL-SCNC: 26 MMOL/L (ref 23–29)
CREAT SERPL-MCNC: 1 MG/DL (ref 0.5–1.4)
EST. GFR  (AFRICAN AMERICAN): >60 ML/MIN/1.73 M^2
EST. GFR  (NON AFRICAN AMERICAN): >60 ML/MIN/1.73 M^2
GLUCOSE SERPL-MCNC: 97 MG/DL (ref 70–110)
POTASSIUM SERPL-SCNC: 3.6 MMOL/L (ref 3.5–5.1)
PREALB SERPL-MCNC: 18 MG/DL (ref 20–43)
SARS-COV-2 RNA RESP QL NAA+PROBE: DETECTED
SODIUM SERPL-SCNC: 140 MMOL/L (ref 136–145)

## 2020-05-14 PROCEDURE — 94761 N-INVAS EAR/PLS OXIMETRY MLT: CPT

## 2020-05-14 PROCEDURE — 97112 NEUROMUSCULAR REEDUCATION: CPT

## 2020-05-14 PROCEDURE — 99232 PR SUBSEQUENT HOSPITAL CARE,LEVL II: ICD-10-PCS | Mod: ,,, | Performed by: HOSPITALIST

## 2020-05-14 PROCEDURE — 83880 ASSAY OF NATRIURETIC PEPTIDE: CPT

## 2020-05-14 PROCEDURE — 63600175 PHARM REV CODE 636 W HCPCS: Performed by: INTERNAL MEDICINE

## 2020-05-14 PROCEDURE — 25000003 PHARM REV CODE 250: Performed by: INTERNAL MEDICINE

## 2020-05-14 PROCEDURE — 27000221 HC OXYGEN, UP TO 24 HOURS

## 2020-05-14 PROCEDURE — 99232 SBSQ HOSP IP/OBS MODERATE 35: CPT | Mod: ,,, | Performed by: HOSPITALIST

## 2020-05-14 PROCEDURE — 84134 ASSAY OF PREALBUMIN: CPT

## 2020-05-14 PROCEDURE — 51798 US URINE CAPACITY MEASURE: CPT

## 2020-05-14 PROCEDURE — 25000242 PHARM REV CODE 250 ALT 637 W/ HCPCS: Performed by: INTERNAL MEDICINE

## 2020-05-14 PROCEDURE — 94660 CPAP INITIATION&MGMT: CPT

## 2020-05-14 PROCEDURE — 11000001 HC ACUTE MED/SURG PRIVATE ROOM

## 2020-05-14 PROCEDURE — 97110 THERAPEUTIC EXERCISES: CPT

## 2020-05-14 PROCEDURE — 97535 SELF CARE MNGMENT TRAINING: CPT

## 2020-05-14 PROCEDURE — 80048 BASIC METABOLIC PNL TOTAL CA: CPT

## 2020-05-14 PROCEDURE — 94664 DEMO&/EVAL PT USE INHALER: CPT

## 2020-05-14 PROCEDURE — 99900035 HC TECH TIME PER 15 MIN (STAT)

## 2020-05-14 PROCEDURE — 94799 UNLISTED PULMONARY SVC/PX: CPT

## 2020-05-14 PROCEDURE — 94640 AIRWAY INHALATION TREATMENT: CPT

## 2020-05-14 PROCEDURE — 36415 COLL VENOUS BLD VENIPUNCTURE: CPT

## 2020-05-14 RX ADMIN — SODIUM CHLORIDE SOLN NEBU 3% 4 ML: 3 NEBU SOLN at 07:05

## 2020-05-14 RX ADMIN — FAMOTIDINE 20 MG: 20 TABLET, FILM COATED ORAL at 08:05

## 2020-05-14 RX ADMIN — LATANOPROST 1 DROP: 50 SOLUTION OPHTHALMIC at 08:05

## 2020-05-14 RX ADMIN — TIMOLOL MALEATE 1 DROP: 5 SOLUTION/ DROPS OPHTHALMIC at 09:05

## 2020-05-14 RX ADMIN — ONDANSETRON HYDROCHLORIDE 4 MG: 4 TABLET, FILM COATED ORAL at 08:05

## 2020-05-14 RX ADMIN — BRIMONIDINE TARTRATE 1 DROP: 2 SOLUTION OPHTHALMIC at 08:05

## 2020-05-14 RX ADMIN — MINERAL OIL AND WHITE PETROLATUM: 150; 830 OINTMENT OPHTHALMIC at 08:05

## 2020-05-14 RX ADMIN — AMLODIPINE BESYLATE 10 MG: 10 TABLET ORAL at 08:05

## 2020-05-14 RX ADMIN — BRIMONIDINE TARTRATE 1 DROP: 2 SOLUTION OPHTHALMIC at 09:05

## 2020-05-14 RX ADMIN — TIMOLOL MALEATE 1 DROP: 5 SOLUTION/ DROPS OPHTHALMIC at 08:05

## 2020-05-14 RX ADMIN — ENOXAPARIN SODIUM 40 MG: 100 INJECTION SUBCUTANEOUS at 08:05

## 2020-05-14 RX ADMIN — MINERAL OIL AND WHITE PETROLATUM: 150; 830 OINTMENT OPHTHALMIC at 09:05

## 2020-05-14 NOTE — PT/OT/SLP PROGRESS
Occupational Therapy   Treatment    Name: Bharathi Garrido  MRN: [id number]  Admitting Diagnosis:  Pneumonia due to COVID-19 virus       Recommendations:     Discharge Recommendations: rehabilitation facility  Discharge Equipment Recommendations:  none  Barriers to discharge:  Other (Comment)    Assessment:     Bharathi Garrido is a 70 y.o. male with a medical diagnosis of Pneumonia due to COVID-19 virus.  He presents with decline in ADLs and functional mobility. Pt sat EOB for 15 minutes initially requiring max A to maintain sitting balance. After a few minutes, he was able to maintain his balance with SBA.  Pt able to engage in oral care but limited secondary to UE weakness. Pt would benefit from skilled OT services in order to maximize independence with ADLs and facilitate safe discharge. . Performance deficits affecting function are weakness, impaired self care skills, impaired functional mobilty, gait instability, impaired endurance, decreased upper extremity function, decreased lower extremity function, decreased coordination, impaired cardiopulmonary response to activity.     Rehab Prognosis:  Good; patient would benefit from acute skilled OT services to address these deficits and reach maximum level of function.       Plan:     Patient to be seen 5 x/week to address the above listed problems via self-care/home management, therapeutic activities, therapeutic exercises, neuromuscular re-education  · Plan of Care Expires: 06/05/20  · Plan of Care Reviewed with: patient    Subjective     Pain/Comfort:  · Pain Rating 1: 0/10  · Pain Rating Post-Intervention 1: 0/10  · Pain Addressed 2: Pre-medicate for activity, Reposition, Distraction  · Pain Rating Post-Intervention 2: 0/10    Objective:     Communicated with: RN prior to session.  Patient found HOB elevated with blood pressure cuff, pulse ox (continuous), del toro catheter, telemetry, oxygen upon OT entry to room.    General Precautions: Standard, airborne, fall, droplet,  contact   Orthopedic Precautions:N/A   Braces: N/A     Occupational Performance:     Bed Mobility:    · Patient completed Scooting/Bridging with total assistance x 2  · Patient completed Supine to Sit maximal assist  · Patient completed Sit to Supine with maximal assistance and 2 persons     Functional Mobility/Transfers:  · Patient completed Sit <> Stand Transfer with total assistance and of 2 persons  with  hand-held assist   · Functional Mobility: completed 2 sit to stands  · Pt sat EOB for 15 minutes, 3 minutes requring max A then SBA thereafter.     Activities of Daily Living:  · Feeding:  moderate assistance    · Grooming: moderate assistance for oral care while sitting EOB. Pt has UE weakness. Educated on resting elbows on table to give proximal support.   · Upper Body Dressing: maximal assistance    · Lower Body Dressing: total assistance    · Toileting: total assistance bed level      [facility] 6 Click ADL: 8    Treatment & Education:  · Pt educated on role of OT in acute care setting.   · Assisted with ADLs and functional mobility with assist levels noted above    Patient left HOB elevated with all lines intact and call button in reachEducation:      GOALS:   Multidisciplinary Problems     Occupational Therapy Goals        Problem: Occupational Therapy Goal    Goal Priority Disciplines Outcome Interventions   Occupational Therapy Goal     OT, PT/OT Ongoing, Progressing    Description:  Goals to be met by: 5/21/2020     Patient will increase functional independence with ADLs by performing:    Grooming while EOB with Minimal Assistance.  Toileting from bedside commode with Moderate Assistance for hygiene and clothing management.   Sitting at edge of bed x10 minutes with Stand-by Assistance. Goal met  Stand pivot transfers with Moderate Assistance.  Toilet transfer to bedside commode with Moderate Assistance.                       Time Tracking:     OT Date of Treatment: 05/14/20  OT Start Time: 1020  OT Stop  Time: 1053  OT Total Time (min): 33 min    Billable Minutes:Self Care/Home Management 33    LAURY Brown  5/14/2020

## 2020-05-14 NOTE — PLAN OF CARE
SW sent out 39 referrals for facilities accepting covid + patients.  Of note, pt has [employer], so a [facility] will most likely need to be obtained upon facility acceptance.  SW will continue to F/U as pt is getting closer to being medically ready.  SW will continue to F/U.      Amirah Gustafson, SW  [phone]

## 2020-05-14 NOTE — CONSULTS
Inpatient consult to Physical Medicine Rehab  Consult performed by: Arleen Malcolm NP  Consult ordered by: Tangela Hughes MD  Reason for consult: assess rehab needs        Patient is COVID positive from 5/13. Will perform chart review w/ PM&R staff for PAC recommendation.     MANFRED Andrew, FNP-C  Physical Medicine & Rehabilitation   05/14/2020

## 2020-05-14 NOTE — CARE UPDATE
Rapid Response Nurse Chart Check     Chart check completed.  Discussed POC with bedside RNJacquelyn. Patient due to void del toro catheter D/C 5/13. Patient with hx of prostate CA, with implanted device for voiding. Patient assisted patient however patient was unable to urinate. Patient requesting to be up out of bed to void however patient has been able to stand with physical therapy. Instructed RN to offer to assist patient sit on side of bed however would not rec getting patient out of bed. Nurse to bladder scan patient and I&O cath per policy if patient un able to void. Monitor Intake and output closely. Patient has had poor appetite and intermittent nausea. Abdomen is rounded and distended. Patient has history of illeus, may need NGT for gastric decompression if abdominal symptoms do not resolve.  No further concerns at this time. Instructed to call [phone] for further concerns or assistance.

## 2020-05-14 NOTE — PLAN OF CARE
Problem: Adult Inpatient Plan of Care  Goal: Plan of Care Review  Outcome: Ongoing, Progressing  Goal: Patient-Specific Goal (Individualization)  Outcome: Ongoing, Progressing  Goal: Absence of Hospital-Acquired Illness or Injury  Outcome: Ongoing, Progressing  Goal: Optimal Comfort and Wellbeing  Outcome: Ongoing, Progressing  Goal: Readiness for Transition of Care  Outcome: Ongoing, Progressing  Goal: Rounds/Family Conference  Outcome: Ongoing, Progressing     Problem: Fluid Imbalance (Pneumonia)  Goal: Fluid Balance  Outcome: Ongoing, Progressing     Problem: Infection (Pneumonia)  Goal: Resolution of Infection Signs/Symptoms  Outcome: Ongoing, Progressing     Problem: Respiratory Compromise (Pneumonia)  Goal: Effective Oxygenation and Ventilation  Outcome: Ongoing, Progressing     Problem: Electrolyte Imbalance (Acute Kidney Injury/Impairment)  Goal: Serum Electrolyte Balance  Outcome: Ongoing, Progressing     Problem: Fluid Imbalance (Acute Kidney Injury/Impairment)  Goal: Optimal Fluid Balance  Outcome: Ongoing, Progressing     Problem: Hematologic Alteration (Acute Kidney Injury/Impairment)  Goal: Hemoglobin, Hematocrit and Platelets Within Normal Range  Outcome: Ongoing, Progressing     Problem: Oral Intake Inadequate (Acute Kidney Injury/Impairment)  Goal: Optimal Nutrition Intake  Outcome: Ongoing, Progressing     Problem: Renal Function Impairment (Acute Kidney Injury/Impairment)  Goal: Effective Renal Function  Outcome: Ongoing, Progressing     Problem: Infection  Goal: Infection Symptom Resolution  Outcome: Ongoing, Progressing     Problem: Wound  Goal: Optimal Wound Healing  Outcome: Ongoing, Progressing     Problem: Nutrition Impairment (Mechanical Ventilation, Invasive)  Goal: Optimal Nutrition Delivery  Outcome: Ongoing, Progressing     Problem: Skin and Tissue Injury (Mechanical Ventilation, Invasive)  Goal: Absence of Device-Related Skin and Tissue Injury  Outcome: Ongoing, Progressing    "  Problem: Skin and Tissue Injury (Artificial Airway)  Goal: Absence of Device-Related Skin or Tissue Injury  Outcome: Ongoing, Progressing     Problem: Fall Injury Risk  Goal: Absence of Fall and Fall-Related Injury  Outcome: Ongoing, Progressing     Problem: Skin Injury Risk Increased  Goal: Skin Health and Integrity  Outcome: Ongoing, Progressing   Mr. Garrido has been Aox4, afebrile, and is on 1L O2 with sats >94%.  He voided unmeasured output once this morning upon initial assessment and again in the late afternoon.  He reports no s/s of discomfort or retention for me today.  PT worked with him today to good effect.  He continues to decline most of the cafeteria food, with the pt stating "I don't like the food here."  I've encouraged him to drink boost and stay hydrated to good effect.  Hourly safety checks performed, NAD, will continue to monitor.    Renetta Gibbs RN      "

## 2020-05-14 NOTE — PT/OT/SLP PROGRESS
Physical Therapy  Co-Treatment with OT    Patient Name:  Bharathi Garrido   MRN:  [id number]    Recommendations:     Discharge Recommendations:  rehabilitation facility   Discharge Equipment Recommendations: (will determine DME needs closer to discharge)   Barriers to discharge: Decreased caregiver support family will not be able to assist at current functional level.     Assessment:     Bharathi Garrido is a 70 y.o. male admitted with a medical diagnosis of Pneumonia due to COVID-19 virus.  He presents with the following impairments/functional limitations:  weakness, gait instability, impaired balance, impaired endurance, decreased lower extremity function, decreased safety awareness, impaired functional mobilty pt ken treatment better being able to sit on EOB longer without as much assistance. Pt will benefit from cont skilled PT 4x/wk to progress physically. Pt will need inpt rehab when medically stable to maximize rehab potential.     Rehab Prognosis: Good; patient would benefit from acute skilled PT services to address these deficits and reach maximum level of function.    Recent Surgery: * No surgery found *      Plan:     During this hospitalization, patient to be seen 4 x/week to address the identified rehab impairments via gait training, therapeutic activities, therapeutic exercises, neuromuscular re-education and progress toward the following goals:    · Plan of Care Expires:  06/01/20    Subjective     Chief Complaint: pt stated that he was weak from being in the bed.   Patient/Family Comments/goals: to get better and go home.   Pain/Comfort:  · Pain Rating 1: 0/10  · Pain Rating Post-Intervention 1: 0/10      Objective:     Communicated with nurse prior to session.  Patient found supine with telemetry, pulse ox (continuous), blood pressure cuff, oxygen, bed alarm upon PT entry to room.     General Precautions: Standard, airborne, fall   Orthopedic Precautions:N/A   Braces:       Functional Mobility:  · Bed  Mobility:  Pt needed verbal cues for hand placement and sequencing for functional mobility.  Pt O2 per NC was increased from 1L to 2 L during treatment 2nd to decreased O2 sats. O2 was returned to 1L after treatment.   · Rolling Right: maximal assistance  · Supine to Sit: maximal assistance  · Sit to Supine: maximal assistance  ·   · Transfers:     · Sit to Stand:  total assistance and of 2 persons with hand-held assist.  Pt stood x 2 reps and was able to come half-way to upright posture.   ·   · Balance: pt sat on EOB 15 min total.  First 3 minutes pt was max assist to achieve balance then pt was SBA x 12 min . pt needed verbal cues for posture. pt sat in posterior pelvic tilt on EOB.       AM-PAC 6 CLICK MOBILITY  Turning over in bed (including adjusting bedclothes, sheets and blankets)?: 2  Sitting down on and standing up from a chair with arms (e.g., wheelchair, bedside commode, etc.): 2  Moving from lying on back to sitting on the side of the bed?: 2  Moving to and from a bed to a chair (including a wheelchair)?: 1  Need to walk in hospital room?: 1  Climbing 3-5 steps with a railing?: 1  Basic Mobility Total Score: 9       Therapeutic Activities and Exercises:   pt performed AAROM there exer BLE in supine x 10 reps. Pt received verbal instruction in PT POC and pt verbally expressed understanding of such.     Patient left with bed in chair position with all lines intact and call button in reach..    GOALS:   Multidisciplinary Problems     Physical Therapy Goals        Problem: Physical Therapy Goal    Goal Priority Disciplines Outcome Goal Variances Interventions   Physical Therapy Goal     PT, PT/OT Ongoing, Progressing     Description:  Goals to be met by: 2020     Patient will increase functional independence with mobility by performin. Supine to sit with MInimal Assistance -not met  2. Sit to supine with MInimal Assistance -not met  3. Sit to stand transfer with Maximum Assistance -not met  4.  Bed to chair transfer with Maximum Assistance using LRAD -not met  5. Gait  x 10 feet with Maximum Assistance using LRAD. -not met  6. Sitting at edge of bed x10 minutes with Stand-by Assistance while performing UE dynamic task to prepare for functional mobility and ADLs - met 5/14  7. Lower extremity exercise program x20 reps per handout, with independence -not met                        Time Tracking:     PT Received On: 05/14/20  PT Start Time: 1019     PT Stop Time: 1052  PT Total Time (min): 33 min     Billable Minutes: Therapeutic Exercise 15 min  and Neuromuscular Re-education 18 min    Treatment Type: (co-treatment with OT)  PT/PTA: PT     PTA Visit Number: 0     Sheridan Russell, PT  05/14/2020

## 2020-05-14 NOTE — PLAN OF CARE
Problem: Physical Therapy Goal  Goal: Physical Therapy Goal  Description  Goals to be met by: 2020     Patient will increase functional independence with mobility by performin. Supine to sit with MInimal Assistance -not met  2. Sit to supine with MInimal Assistance -not met  3. Sit to stand transfer with Maximum Assistance -not met  4. Bed to chair transfer with Maximum Assistance using LRAD -not met  5. Gait  x 10 feet with Maximum Assistance using LRAD. -not met  6. Sitting at edge of bed x10 minutes with Stand-by Assistance while performing UE dynamic task to prepare for functional mobility and ADLs - met   7. Lower extremity exercise program x20 reps per handout, with independence -not met       Outcome: Ongoing, Progressing   Goals remain appropriate. Sheridan Russell, PT  2020

## 2020-05-14 NOTE — PLAN OF CARE
Pt AAO*4, calm, cooperative. Per del toro protocol, Bladder scanned pt twice overnight. Per pt Implanted device to help void which hasn't been used in past month due to del toro catheter. Pt had one episode of emesis, unable to tolerate food. Encouraged Fluid intake, But pt has poor nutrition complaining of intermittent nausea. Pt on 1 L oxygen via nasal cannula. VSS, afebrile throughout shift. No falls or injuries noted. Wound care performed per orders. Will continue to monitor.

## 2020-05-15 PROBLEM — U07.1 ACUTE RESPIRATORY DISTRESS SYNDROME (ARDS) DUE TO 2019 NOVEL CORONAVIRUS: Status: ACTIVE | Noted: 2020-05-15

## 2020-05-15 PROBLEM — J80 ACUTE RESPIRATORY DISTRESS SYNDROME (ARDS) DUE TO 2019 NOVEL CORONAVIRUS: Status: ACTIVE | Noted: 2020-05-15

## 2020-05-15 PROCEDURE — 94660 CPAP INITIATION&MGMT: CPT

## 2020-05-15 PROCEDURE — 97110 THERAPEUTIC EXERCISES: CPT

## 2020-05-15 PROCEDURE — 25000003 PHARM REV CODE 250: Performed by: INTERNAL MEDICINE

## 2020-05-15 PROCEDURE — 97530 THERAPEUTIC ACTIVITIES: CPT

## 2020-05-15 PROCEDURE — 27000221 HC OXYGEN, UP TO 24 HOURS

## 2020-05-15 PROCEDURE — 63600175 PHARM REV CODE 636 W HCPCS: Performed by: INTERNAL MEDICINE

## 2020-05-15 PROCEDURE — 11000001 HC ACUTE MED/SURG PRIVATE ROOM

## 2020-05-15 PROCEDURE — 94761 N-INVAS EAR/PLS OXIMETRY MLT: CPT

## 2020-05-15 PROCEDURE — 99900035 HC TECH TIME PER 15 MIN (STAT)

## 2020-05-15 PROCEDURE — 94664 DEMO&/EVAL PT USE INHALER: CPT

## 2020-05-15 PROCEDURE — 99232 PR SUBSEQUENT HOSPITAL CARE,LEVL II: ICD-10-PCS | Mod: ,,, | Performed by: HOSPITALIST

## 2020-05-15 PROCEDURE — 99232 SBSQ HOSP IP/OBS MODERATE 35: CPT | Mod: ,,, | Performed by: HOSPITALIST

## 2020-05-15 RX ADMIN — ENOXAPARIN SODIUM 40 MG: 100 INJECTION SUBCUTANEOUS at 09:05

## 2020-05-15 RX ADMIN — TIMOLOL MALEATE 1 DROP: 5 SOLUTION/ DROPS OPHTHALMIC at 11:05

## 2020-05-15 RX ADMIN — MINERAL OIL AND WHITE PETROLATUM: 150; 830 OINTMENT OPHTHALMIC at 11:05

## 2020-05-15 RX ADMIN — TIMOLOL MALEATE 1 DROP: 5 SOLUTION/ DROPS OPHTHALMIC at 08:05

## 2020-05-15 RX ADMIN — AMLODIPINE BESYLATE 10 MG: 10 TABLET ORAL at 08:05

## 2020-05-15 RX ADMIN — BRIMONIDINE TARTRATE 1 DROP: 2 SOLUTION OPHTHALMIC at 09:05

## 2020-05-15 RX ADMIN — BRIMONIDINE TARTRATE 1 DROP: 2 SOLUTION OPHTHALMIC at 08:05

## 2020-05-15 RX ADMIN — ONDANSETRON HYDROCHLORIDE 4 MG: 4 TABLET, FILM COATED ORAL at 08:05

## 2020-05-15 RX ADMIN — MINERAL OIL AND WHITE PETROLATUM: 150; 830 OINTMENT OPHTHALMIC at 08:05

## 2020-05-15 RX ADMIN — LATANOPROST 1 DROP: 50 SOLUTION OPHTHALMIC at 11:05

## 2020-05-15 NOTE — PLAN OF CARE
Problem: Adult Inpatient Plan of Care  Goal: Plan of Care Review  5/15/2020 1857 by Renetta Gibbs RN  Outcome: Ongoing, Progressing  5/15/2020 1706 by Renetta Gibbs RN  Outcome: Ongoing, Progressing  Goal: Patient-Specific Goal (Individualization)  5/15/2020 1857 by Renetta Gibbs RN  Outcome: Ongoing, Progressing  5/15/2020 1706 by Renetta Gibbs RN  Outcome: Ongoing, Progressing  Goal: Absence of Hospital-Acquired Illness or Injury  5/15/2020 1857 by Renetta Gibbs RN  Outcome: Ongoing, Progressing  5/15/2020 1706 by Renetta Gibbs RN  Outcome: Ongoing, Progressing  Goal: Optimal Comfort and Wellbeing  5/15/2020 1857 by Renetta Gibbs RN  Outcome: Ongoing, Progressing  5/15/2020 1706 by Renetta Gibbs RN  Outcome: Ongoing, Progressing  Goal: Readiness for Transition of Care  5/15/2020 1857 by Renetta Gibbs RN  Outcome: Ongoing, Progressing  5/15/2020 1706 by Renetta Gibbs RN  Outcome: Ongoing, Progressing  Goal: Rounds/Family Conference  5/15/2020 1857 by Renetta Gibbs RN  Outcome: Ongoing, Progressing  5/15/2020 1706 by Renetta Gibbs RN  Outcome: Ongoing, Progressing     Problem: Fluid Imbalance (Pneumonia)  Goal: Fluid Balance  5/15/2020 1857 by Renetta Gibbs RN  Outcome: Ongoing, Progressing  5/15/2020 1706 by Renetta Gibbs RN  Outcome: Ongoing, Progressing     Problem: Infection (Pneumonia)  Goal: Resolution of Infection Signs/Symptoms  5/15/2020 1857 by Renetta Gibbs RN  Outcome: Ongoing, Progressing  5/15/2020 1706 by Renetta Gibbs RN  Outcome: Ongoing, Progressing     Problem: Respiratory Compromise (Pneumonia)  Goal: Effective Oxygenation and Ventilation  5/15/2020 1857 by Renetta Gibbs RN  Outcome: Ongoing, Progressing  5/15/2020 1706 by Renetta Gibbs RN  Outcome: Ongoing, Progressing     Problem: Electrolyte Imbalance (Acute Kidney Injury/Impairment)  Goal: Serum Electrolyte Balance  5/15/2020 1857 by Renetta Gibbs RN  Outcome: Ongoing,  Progressing  5/15/2020 1706 by Renetta Gibbs RN  Outcome: Ongoing, Progressing     Problem: Fluid Imbalance (Acute Kidney Injury/Impairment)  Goal: Optimal Fluid Balance  5/15/2020 1857 by Renetta Gibbs RN  Outcome: Ongoing, Progressing  5/15/2020 1706 by Renetta Gibbs RN  Outcome: Ongoing, Progressing     Problem: Hematologic Alteration (Acute Kidney Injury/Impairment)  Goal: Hemoglobin, Hematocrit and Platelets Within Normal Range  5/15/2020 1857 by Renetta Gibbs RN  Outcome: Ongoing, Progressing  5/15/2020 1706 by Renetta Gibbs RN  Outcome: Ongoing, Progressing     Problem: Oral Intake Inadequate (Acute Kidney Injury/Impairment)  Goal: Optimal Nutrition Intake  5/15/2020 1857 by Renetta Gibbs RN  Outcome: Ongoing, Progressing  5/15/2020 1706 by Renetta Gibbs RN  Outcome: Ongoing, Progressing     Problem: Renal Function Impairment (Acute Kidney Injury/Impairment)  Goal: Effective Renal Function  5/15/2020 1857 by Renetta Gibbs RN  Outcome: Ongoing, Progressing  5/15/2020 1706 by Renetta Gibbs RN  Outcome: Ongoing, Progressing     Problem: Infection  Goal: Infection Symptom Resolution  5/15/2020 1857 by Renetta Gibbs RN  Outcome: Ongoing, Progressing  5/15/2020 1706 by Renetta Gibbs RN  Outcome: Ongoing, Progressing     Problem: Wound  Goal: Optimal Wound Healing  5/15/2020 1857 by Renetta Gibbs RN  Outcome: Ongoing, Progressing  5/15/2020 1706 by Renetta Gibbs RN  Outcome: Ongoing, Progressing     Problem: Nutrition Impairment (Mechanical Ventilation, Invasive)  Goal: Optimal Nutrition Delivery  5/15/2020 1857 by Renetta Gibbs RN  Outcome: Ongoing, Progressing  5/15/2020 1706 by Renetta Gibbs RN  Outcome: Ongoing, Progressing     Problem: Skin and Tissue Injury (Mechanical Ventilation, Invasive)  Goal: Absence of Device-Related Skin and Tissue Injury  5/15/2020 1857 by Renetta Gibbs RN  Outcome: Ongoing, Progressing  5/15/2020 1706 by Renetta Gibbs RN  Outcome:  Ongoing, Progressing     Problem: Skin and Tissue Injury (Artificial Airway)  Goal: Absence of Device-Related Skin or Tissue Injury  5/15/2020 1857 by Renetta Gibbs RN  Outcome: Ongoing, Progressing  5/15/2020 1706 by Renetta Gibbs RN  Outcome: Ongoing, Progressing     Problem: Fall Injury Risk  Goal: Absence of Fall and Fall-Related Injury  5/15/2020 1857 by Renetta Gibbs RN  Outcome: Ongoing, Progressing  5/15/2020 1706 by Renetta Gibbs RN  Outcome: Ongoing, Progressing     Problem: Skin Injury Risk Increased  Goal: Skin Health and Integrity  5/15/2020 1857 by Renetta Gibbs RN  Outcome: Ongoing, Progressing  5/15/2020 1706 by Renetta Gibbs RN  Outcome: Ongoing, Progressing     Problem: ARDS (Acute Respiratory Distress Syndrome)  Goal: Effective Oxygenation  5/15/2020 1857 by Renetta Gibbs RN  Outcome: Ongoing, Progressing  5/15/2020 1706 by Renetta Gibbs RN  Outcome: Ongoing, Progressing

## 2020-05-15 NOTE — PT/OT/SLP PROGRESS
Occupational Therapy   Treatment    Name: Bharathi Garrido  MRN: [id number]  Admitting Diagnosis:  Pneumonia due to COVID-19 virus       Recommendations:     Discharge Recommendations: rehabilitation facility  Discharge Equipment Recommendations:  none      Assessment:     Bharathi Garrido is a 70 y.o. male with a medical diagnosis of Pneumonia due to COVID-19 virus.  He presents with fair participation and motivation.  Pt continues to require (A) with all activities & is at risk for falls. Performance deficits affecting function are weakness, impaired endurance, impaired self care skills, impaired functional mobilty, impaired balance, impaired cognition, decreased upper extremity function, decreased lower extremity function, decreased safety awareness, impaired cardiopulmonary response to activity.     Rehab Prognosis:  Fair; patient would benefit from acute skilled OT services to address these deficits and reach maximum level of function.       Plan:     Patient to be seen 5 x/week to address the above listed problems via self-care/home management, therapeutic activities, therapeutic exercises, neuromuscular re-education  · Plan of Care Expires: 06/05/20  · Plan of Care Reviewed with: patient    Subjective     Pain/Comfort:  · Pain Rating 1: 0/10  · Pain Rating Post-Intervention 1: 0/10    Objective:     Communicated with: RN prior to session.  Patient found supine with blood pressure cuff, pulse ox (continuous), telemetry, bed alarm, oxygen upon OT entry to room.    General Precautions: Standard, airborne, contact, fall, droplet   Occupational Performance:     Bed Mobility:    · Patient completed Scooting/Bridging with total assistance scooting to the right along EOB; Max (A) with scooting forward on EOB  · Patient completed Supine to Sit with maximal assistance  · Patient completed Sit to Supine with moderate assistance       AMPAC 6 Click ADL: 8    Treatment & Education:  Pt required SBA-Min (A) for postural control  while seated EOB.  Provided verbal & physical cues to facilitate postural control.  Pt performed reaching activities while seated EOB to facilitate postural control.  Provided cues for cervical extension while seated EOB.  Pt performed A/AAROM with BUE in 4 planes x 10 reps each while seated EOB.  Pt had no further questions & when asked whether there were any concerns pt reported none.        Patient left supine with all lines intact, call button in reach, bed alarm on and RN notifiedEducation:      GOALS:   Multidisciplinary Problems     Occupational Therapy Goals        Problem: Occupational Therapy Goal    Goal Priority Disciplines Outcome Interventions   Occupational Therapy Goal     OT, PT/OT Ongoing, Progressing    Description:  Goals to be met by: 5/21/2020     Patient will increase functional independence with ADLs by performing:    Grooming while EOB with Minimal Assistance.  Toileting from bedside commode with Moderate Assistance for hygiene and clothing management.   Sitting at edge of bed x10 minutes with Stand-by Assistance. Goal met  Stand pivot transfers with Moderate Assistance.  Toilet transfer to bedside commode with Moderate Assistance.                       Time Tracking:     OT Date of Treatment: 05/15/20  OT Start Time: 0845  OT Stop Time: 0911  OT Total Time (min): 26 min    Billable Minutes:Therapeutic Activity 13  Therapeutic Exercise 13    LAURY Goldsmith  5/15/2020

## 2020-05-15 NOTE — CARE UPDATE
"RAPID RESPONSE NURSE PROACTIVE ROUNDING NOTE     Time of Visit: 2200    Admit Date: 2020  LOS: 16  Code Status: Full Code   Date of Visit: 2020  : 1950  Age: 70 y.o.  Sex: male  Race: Black or   Bed: 8068/8068 A:   MRN: [id number]  Was the patient discharged from an ICU this admission? yes   Was the patient discharged from a PACU within last 24 hours?  no  Did the patient receive conscious sedation/general anesthesia in last 24 hours?  no  Was the patient in the ED within the past 24 hours?  no  Was the patient started on NIPPV within the past 24 hours?  no  Attending Physician: Claritza Parker MD  Primary Service: [facility]    ASSESSMENT     Reason for alert: No alert/rounding    Diagnosis: Pneumonia due to COVID-19 virus    Abnormal Vital Signs: /89 (BP Location: Right arm, Patient Position: Lying)   Pulse 104   Temp 98.2 °F (36.8 °C) (Oral)   Resp 19   Ht 5' 11" (1.803 m)   Wt 105.4 kg (232 lb 5.8 oz)   SpO2 96%   BMI 32.41 kg/m²      Clinical Issues: No acute issues    Patient  has a past medical history of Class 1 obesity with body mass index (BMI) of 33.0 to 33.9 in adult, Difficult intubation, Glaucoma, Prostate cancer, and Sebaceous cyst of scrotum.    Upon assessment, patient is resting comfortably respirations are even and unlabored saturating 94-96% on 1L. Reports having a BM either this morning or yesterday, he denies N/V or abdominal pain.      INTERVENTIONS/ RECOMMENDATIONS     Continue to monitor/call with concerns.     Discussed plan of care with Sylvia BAE.    PHYSICIAN ESCALATION     Yes/No No    Orders received and case discussed with NA.    Disposition: Remain in room 8068.    FOLLOW-UP     Call back the Rapid Response Nurse, Hannah Bancroft, RN at [phone] for additional questions or concerns.          "

## 2020-05-15 NOTE — PLAN OF CARE
05/15/20 1604   Discharge Reassessment   Assessment Type Discharge Planning Reassessment   Discharge Plan A Rehab   Discharge Plan B Long-term acute care facility (LTAC)   DME Needed Upon Discharge  other (see comments)  (TBD)   Anticipated Discharge Disposition Rehab   Post-Acute Status   Post-Acute Authorization Placement   Post-Acute Placement Status Referrals Sent

## 2020-05-15 NOTE — PROGRESS NOTES
PM&R consult follow up.      PAC recommendation: inpatient rehab pending medical stability and negative COVID test x2 prior to d/c.    MANFRED Andrew, FNP-C  Physical Medicine & Rehabilitation   05/15/2020

## 2020-05-15 NOTE — CARE UPDATE
Rapid Response Nurse Chart Check     Chart check completed, abnormal VS noted. Instructed to call [phone] for further concerns or assistance.

## 2020-05-15 NOTE — PROGRESS NOTES
Progress Note  Hospital Medicine  [facility] [place]       Patient Name: Bharathi Garrido  MRN:  [id number]  Hospital Medicine Team: [facility] [place] [facility] HOSP MED G Claritza Parker MD  Date of Admission:  4/28/2020     Length of Stay:  LOS: 17 days   Expected Discharge Date: 5/18/2020  Principal Problem:  Pneumonia due to COVID-19 virus     Subjective:     Interval History/Overnight Events:    Doing well   No acute issues.  HDS.  HR in 100s. Remains On 1L NC   Pending placement -  rehab   Telemedicine consulted for transfer to their team while pt is pending placement     [facility] [phone]- called on 5/15 and updated        amLODIPine  10 mg Oral Daily    brimonidine 0.2%  1 drop Both Eyes BID    enoxaparin  40 mg Subcutaneous Daily    latanoprost  1 drop Both Eyes QHS    timolol maleate 0.5%  1 drop Both Eyes BID    white petrolatum-mineral oiL   Both Eyes BID           acetaminophen, albuterol, docusate sodium, famotidine, ondansetron, promethazine, sodium chloride 0.9%    Review of Systems   Constitutional: Negative for chills and fever.   Respiratory: Negative for cough and shortness of breath.    Cardiovascular: Negative for chest pain and palpitations.   Gastrointestinal: Negative for abdominal pain, constipation, diarrhea, nausea and vomiting.   Genitourinary: Negative for decreased urine volume, dysuria and urgency.   Musculoskeletal: Negative for arthralgias and myalgias.   Neurological: Positive for weakness. Negative for dizziness, syncope and light-headedness.   Psychiatric/Behavioral: Negative for agitation and sleep disturbance.     Objective:     Temp:  [97.3 °F (36.3 °C)-98.2 °F (36.8 °C)]   Pulse:  []   Resp:  [16-26]   BP: (125-137)/(75-91)   SpO2:  [93 %-98 %]         Weight change:    Body mass index is 32.41 kg/m².       Physical Exam   Constitutional: He appears well-developed and well-nourished. No distress.   HENT:   Head: Normocephalic and atraumatic.   Poor dentition    Eyes: Conjunctivae and EOM are normal. No scleral icterus.   Cardiovascular: tachy RR, normal heart sounds and intact distal pulses.    Pulmonary/Chest: Effort normal and breath sounds normal. No respiratory distress.   Abdominal: Soft. Bowel sounds are normal. He exhibits no distension. There is no tenderness.   Musculoskeletal: He exhibits no edema or tenderness.   Neurological: He is alert. No cranial nerve deficit.   Skin: Skin is warm and dry. He is not diaphoretic.   Psychiatric: He has a normal mood and affect. Thought content normal.     Labs:     Ref. Range 3/31/2020 20:34 4/21/2020 14:36 5/8/2020 13:38 5/13/2020 18:00   SARS-CoV2 (COVID-19) Qualitative PCR Latest Ref Range: Not Detected  Detected (A) Detected (A) Detected (A) Detected (A)       Chemistries:   Recent Labs   Lab 05/11/20 0259 05/12/20 0300 05/13/20 0422 05/14/20  0305    140 139 140   K 3.9 3.4* 3.2* 3.6    101 101 102   CO2 29 28 28 26   BUN 23 19 17 17   CREATININE 1.1 1.0 0.9 1.0   CALCIUM 8.8 8.3* 8.6* 8.7   PROT 7.2 6.8 7.2  --    BILITOT 1.8* 1.7* 1.8*  --    ALKPHOS 130 120 129  --    ALT 69* 61* 60*  --    AST 37 35 34  --    MG 2.2 2.0 2.0  --    PHOS 3.4 3.1 2.6*  --         WBC:   Recent Labs   Lab 05/09/20  0339 05/10/20  0610 05/11/20 0259 05/12/20  0301 05/13/20  0422   WBC 13.90* 12.61 12.01 10.08 12.55     Bands:     CBC/Anemia Labs:   Recent Labs   Lab 05/11/20 0259 05/12/20 0301 05/13/20  0422   WBC 12.01 10.08 12.55   HGB 8.4* 8.8* 9.0*   HCT 28.0* 28.8* 29.1*   * 338 336   MCV 91 89 87   RDW 16.5* 16.4* 16.5*            Assessment and Plan     Hospital Course:    Mr. Bharathi Garrido was admitted to Hospital Medicine for management of  Pneumonia due to COVID-19 virus      HPI:  Mr. Bharathi Garrido is a 71 yo male with obesity, history of prostate cancer (1/2018 s/p prostatectomy and radiation Dr. Pederson), history of difficult intubation for surgery and glaucoma presents with feeling unwell, fever,  decreased appetite and shortness of breath. Patient transferred from [facility] to [facility] [place] 4/28/20 for higher level of care. Admitted to outside hospital on 3/31/20 for SOB and fever and tested positive for COVID-19.  Upon admission, CXR showed bilateral multifocal opacities. Procalcitonin, LDH, ferritin, CRP and D-dimer elevated. Renal function normal. Mild increase in LFTs. He was treated with Plaquinel, Rocephin and Azithromycin. His respiratory function declined and has been intubated since 4/2/20.       Overview/Hospital Course:  Bharathi Garrido was admitted to ICU for management of respiratory failure secondary to suspected and/or confirmed COVID-19 requiring mechanical ventilation for respiratory support. initially admitted 3/31 at OSH on 4/1/20.  Wife and son (entire household was infected with COVID- 19.    Transferred to [facility] [place] ICU on 4/28. 4/28/20: Patient transferred from [facility] for higher level of care as patient still intubated with high oxygen requirements. Sedated on Fentanyl and Precedex, but responding appropriately and following commands per nursing. Intubated on A/C, FiO2 75%, PEEP 10, , rate 18 with sat of 92%. CXR consistent with multilobar pneumonia. On empiric Vanc and Zosyn for recent white count with leukocytosis.    4/28/20: Patient transferred from [facility] for higher level of care as patient still intubated with high oxygen requirements. Sedated on Fentanyl and Precedex, but responding appropriately and following commands per nursing. Intubated on A/C, FiO2 75%, PEEP 10, , rate 18 with sat of 92%. CXR consistent with multilobar pneumonia. On empiric Vanc and Zosyn for recent white count with leukocytosis.     Intubated 34 days (starting 4/2), extubated 5/6 to 4 L NC, BiPAP at night.   Stepped down from [facility] [place] ICU on 5/8/20  Completed course of Plaquenil, Azithromycin, and Rocephin . Repeat COVID-19 on 3/31, 4/21 , 5/8 , and 5/13 positive    Remains on 1L NC. Significantly  debilitated and needs rehab or SNF post hospitalization     Active Hospital Problems    Diagnosis  POA    *Pneumonia due to COVID-19 virus [U07.1, J12.89]  Yes    Acute respiratory distress syndrome (ARDS) due to 2019 novel coronavirus [U07.1, J80]  Yes    Debility [R53.81]  Yes    Sinus tachycardia [R00.0]  No    Anemia of chronic disease [D63.8]  Yes    Urinary retention [R33.9]  No    Hypertension [I10]  No    COVID-19 virus infection [U07.1]  Yes    Acute respiratory failure with hypoxia [J96.01]  Yes    Class 1 obesity with body mass index (BMI) of 34.0 to 34.9 in adult [E66.9, Z68.34]  Not Applicable      Resolved Hospital Problems    Diagnosis Date Resolved POA    Hypokalemia [E87.6] 05/14/2020 No    Vomiting [R11.10] 05/14/2020 No    SIRS (systemic inflammatory response syndrome) [R65.10] 05/14/2020 Yes    Anemia [D64.9] 05/08/2020 Yes    Hyponatremia [E87.1] 05/14/2020 Yes    Ileus [K56.7] 05/08/2020 Yes    WILBUR (acute kidney injury) [N17.9] 05/14/2020 Yes    History of prostate cancer [Z85.46] 05/08/2020 Not Applicable       Pneumonia due to COVID-19 virus  With acute respiratory failure  Acute respiratory distress syndrome (ARDS) due to 2019 novel coronavirus- ARDS resolved   - Initially admitted 3/31 at OSH on 4/1/20.  Transferred to [facility] ICU on 4/28.  Intubated 34 days (starting 4/2), extubated 5/6 to 4 L NC, BiPAP at night.  stepped down from [facility] ICU on 5/8/20  - Completed course of Plaquenil, Azithromycin, and Rocephin   - Repeat COVID-19 on 3/31, 4/21 , 5/8 , and 5/13 positive  - He is noted to desaturate some when working with PT, but responds well to increase in supplemental oxygen  - Currently on NC 1L with adequate saturation. Will wean as tolerated  - re checking COVID testing on 5/16 in hopes of obtaining negative results and placing pt in a rehab     Urinary retention  - Perez removed 5/8, replaced 5/10  - he reports he had a stent placed after his prostatectomy, and he has to  press a button to release urine  - UA not overtly indicative of UTI  - remove del toro today, 5/13     Sinus tachycardia  - Patient noted to have high-normal HR with range   - some tachycardia coincides with working with PT/OT  - last EKG 4/16/2020 showed sinus rhythm with PVCs  - pending EKG   - may consider addition of beta blocker given hypertension     Hypertension  - Patient without history of hypertension at home  - occasionally still with some hypertension with systolic -150  - continue amlodipine to 10 mg daily  - may consider addition of BB given continued hypertension and some mild tachycardia     Anemia of chronic disease  - Possibly from acute illness  - no active bleeding noted  - stable Hgb over several weeks around 7-8    Debility  - due to critical illness and prolonged hospitalization 2/2 COVID  - needs Rehab vs LTAC - pending     Disposition:      Medically ready and stable    Telemedicine consulted for transfer to their team while pt is pending placement     Pending placement - Rehab (still remains COVID + ) vs LTAC - CM and SW aware and working on the case      Signing Physician:     Claritza Parker MD  Department of Hospital Medicine   [facility] [facility]  Pager [phone]  Spectra [phone]  5/15/2020

## 2020-05-15 NOTE — PLAN OF CARE
Problem: Adult Inpatient Plan of Care  Goal: Plan of Care Review  Outcome: Ongoing, Progressing  Goal: Patient-Specific Goal (Individualization)  Outcome: Ongoing, Progressing  Goal: Absence of Hospital-Acquired Illness or Injury  Outcome: Ongoing, Progressing  Goal: Optimal Comfort and Wellbeing  Outcome: Ongoing, Progressing  Goal: Readiness for Transition of Care  Outcome: Ongoing, Progressing  Goal: Rounds/Family Conference  Outcome: Ongoing, Progressing     Problem: Fluid Imbalance (Pneumonia)  Goal: Fluid Balance  Outcome: Ongoing, Progressing     Problem: Infection (Pneumonia)  Goal: Resolution of Infection Signs/Symptoms  Outcome: Ongoing, Progressing     Problem: Respiratory Compromise (Pneumonia)  Goal: Effective Oxygenation and Ventilation  Outcome: Ongoing, Progressing     Problem: Electrolyte Imbalance (Acute Kidney Injury/Impairment)  Goal: Serum Electrolyte Balance  Outcome: Ongoing, Progressing     Problem: Fluid Imbalance (Acute Kidney Injury/Impairment)  Goal: Optimal Fluid Balance  Outcome: Ongoing, Progressing     Problem: Hematologic Alteration (Acute Kidney Injury/Impairment)  Goal: Hemoglobin, Hematocrit and Platelets Within Normal Range  Outcome: Ongoing, Progressing     Problem: Oral Intake Inadequate (Acute Kidney Injury/Impairment)  Goal: Optimal Nutrition Intake  Outcome: Ongoing, Progressing     Problem: Renal Function Impairment (Acute Kidney Injury/Impairment)  Goal: Effective Renal Function  Outcome: Ongoing, Progressing     Problem: Infection  Goal: Infection Symptom Resolution  Outcome: Ongoing, Progressing     Problem: Wound  Goal: Optimal Wound Healing  Outcome: Ongoing, Progressing     Problem: Nutrition Impairment (Mechanical Ventilation, Invasive)  Goal: Optimal Nutrition Delivery  Outcome: Ongoing, Progressing     Problem: Skin and Tissue Injury (Mechanical Ventilation, Invasive)  Goal: Absence of Device-Related Skin and Tissue Injury  Outcome: Ongoing, Progressing      Problem: Skin and Tissue Injury (Artificial Airway)  Goal: Absence of Device-Related Skin or Tissue Injury  Outcome: Ongoing, Progressing     Problem: Fall Injury Risk  Goal: Absence of Fall and Fall-Related Injury  Outcome: Ongoing, Progressing     Problem: Skin Injury Risk Increased  Goal: Skin Health and Integrity  Outcome: Ongoing, Progressing     Problem: ARDS (Acute Respiratory Distress Syndrome)  Goal: Effective Oxygenation  Outcome: Ongoing, Progressing    Mr. Vasquez has been Aox4 today, afebrile, and in NAD.  He is still sat'ing well on 1L at >93% most of the day.  He still desats with activity.

## 2020-05-15 NOTE — PROGRESS NOTES
Progress Note  Hospital Medicine  [facility] [place]       Patient Name: Bharathi Garrido  MRN:  [id number]  Hospital Medicine Team: [facility] [place] HOSP MED G Claritza Parker MD  Date of Admission:  4/28/2020     Length of Stay:  LOS: 16 days   Expected Discharge Date: 5/15/2020  Principal Problem:  Pneumonia due to COVID-19 virus     Subjective:     Interval History/Overnight Events:    Doing well   No acute issues.  HDS- HR in 100s. On 1L NC   Labs stable  Pending placement - SNF vs rehab      amLODIPine  10 mg Oral Daily    brimonidine 0.2%  1 drop Both Eyes BID    enoxaparin  40 mg Subcutaneous Daily    latanoprost  1 drop Both Eyes QHS    timolol maleate 0.5%  1 drop Both Eyes BID    white petrolatum-mineral oiL   Both Eyes BID           acetaminophen, albuterol, docusate sodium, famotidine, ondansetron, promethazine, sodium chloride 0.9%    Review of Systems   Constitutional: Negative for chills and fever.   Respiratory: Negative for cough and shortness of breath.    Cardiovascular: Negative for chest pain and palpitations.   Gastrointestinal: Negative for abdominal pain, constipation, diarrhea, nausea and vomiting.   Genitourinary: Negative for decreased urine volume, dysuria and urgency.   Musculoskeletal: Negative for arthralgias and myalgias.   Neurological: Positive for weakness. Negative for dizziness, syncope and light-headedness.   Psychiatric/Behavioral: Negative for agitation and sleep disturbance.     Objective:     Temp:  [97 °F (36.1 °C)-98.2 °F (36.8 °C)]   Pulse:  []   Resp:  [8-34]   BP: (116-158)/(65-92)   SpO2:  [92 %-98 %]         Weight change:    Body mass index is 32.41 kg/m².       Physical Exam   Constitutional: He appears well-developed and well-nourished. No distress.   HENT:   Head: Normocephalic and atraumatic.   Poor dentition   Eyes: Conjunctivae and EOM are normal. No scleral icterus.   Cardiovascular: Regular rhythm, normal heart sounds and intact distal  pulses.   Tachycardic at time of exam   Pulmonary/Chest: Effort normal and breath sounds normal. No respiratory distress.   Abdominal: Soft. Bowel sounds are normal. He exhibits no distension. There is no tenderness.   Musculoskeletal: He exhibits no edema or tenderness.   Neurological: He is alert. No cranial nerve deficit.   Skin: Skin is warm and dry. He is not diaphoretic.   Psychiatric: He has a normal mood and affect. Thought content normal.     Labs:    Chemistries:   Recent Labs   Lab 05/11/20  0259 05/12/20  0300 05/13/20  0422 05/14/20  0305    140 139 140   K 3.9 3.4* 3.2* 3.6    101 101 102   CO2 29 28 28 26   BUN 23 19 17 17   CREATININE 1.1 1.0 0.9 1.0   CALCIUM 8.8 8.3* 8.6* 8.7   PROT 7.2 6.8 7.2  --    BILITOT 1.8* 1.7* 1.8*  --    ALKPHOS 130 120 129  --    ALT 69* 61* 60*  --    AST 37 35 34  --    MG 2.2 2.0 2.0  --    PHOS 3.4 3.1 2.6*  --         WBC:   Recent Labs   Lab 05/09/20  0339 05/10/20  0610 05/11/20  0259 05/12/20  0301 05/13/20  0422   WBC 13.90* 12.61 12.01 10.08 12.55     Bands:     CBC/Anemia Labs: Coags:    Recent Labs   Lab 05/11/20  0259 05/12/20  0301 05/13/20  0422   WBC 12.01 10.08 12.55   HGB 8.4* 8.8* 9.0*   HCT 28.0* 28.8* 29.1*   * 338 336   MCV 91 89 87   RDW 16.5* 16.4* 16.5*    No results for input(s): PT, INR, APTT in the last 168 hours.     POCT Glucose: HbA1c:          Assessment and Plan     Hospital Course:    Mr. Bharathi Garrido was admitted to Hospital Medicine for management of  Pneumonia due to COVID-19 virus      HPI:  Mr. Bharathi Garrido is a 71 yo male with obesity, history of prostate cancer (1/2018 s/p prostatectomy and radiation Dr. Pederson), history of difficult intubation for surgery and glaucoma presents with feeling unwell, fever, decreased appetite and shortness of breath. Patient transferred from [facility] to [facility] [place] 4/28/20 for higher level of care. Admitted to outside hospital on 3/31/20 for SOB and fever and tested positive for  COVID-19.  Upon admission, CXR showed bilateral multifocal opacities. Procalcitonin, LDH, ferritin, CRP and D-dimer elevated. Renal function normal. Mild increase in LFTs. He was treated with Plaquinel, Rocephin and Azithromycin. His respiratory function declined and has been intubated since 4/2/20.       Overview/Hospital Course:  Bharathi Garrido was admitted to ICU for management of respiratory failure secondary to suspected and/or confirmed COVID-19 requiring mechanical ventilation for respiratory support.      4/28/20: Patient transferred from [facility] for higher level of care as patient still intubated with high oxygen requirements. Sedated on Fentanyl and Precedex, but responding appropriately and following commands per nursing. Intubated on A/C, FiO2 75%, PEEP 10, , rate 18 with sat of 92%. CXR consistent with multilobar pneumonia. On empiric Vanc and Zosyn for recent white count with leukocytosis. PICC from 4/2 removed and central line and a-line placed. Repeat cxs sent. Hgb/Hct stable s/p 1 unit PRBCs on 4/27. NG set to intermittent suction. Abd x-ray showed gaseous distention.      4/29/20: Sedated on Propofol, Precedex, and Dilaudid. Initially on Levophed but weaned off and became hypertensive. Briefly on Cardene gtt. Vent settings unchanged: FiO2 75% and PEEP 10. New cultures with no growth, cont on empiric abx. Good UOP with IV Lasix and stable renal function. Having BMs.      4/30/20: Weaned off Propofol and Precedex successfully. Cont on IV Dilaudid 1 mg/hr and scheduled Oxy. SpO2 improving. Vent settings weaned to FiO of 65% PEEP 10  Rate 18. Blood pressure stable off pressors/antihypertensives. Having BMs. Started TFs. Good UOP and net negative. Lytes WNR.      5/1/2020: Weaned off dilaudid gtt. SBT today. Put back on rate after 2 hours when pt became tired and tachypnic. Stopped lasix due to elevated creatinine. Consulted ID due to increased WBC and fever despite broad spectrum abx  regimen.      5/2/2020: Patient now off dilaudid gtt and tolerating well. Still drowsy. Patient failed SBT today.  Will decrease PO oxy from Q6H to BID. WBC continues to increase, now 22.28, Tmax 100. BC NGTD. Remains on vanc/zosyn - awaiting ID recs. Possible hemoconcentration. Net negative 1.3L. Stop lasix and start free water 200 Q4H. On TFs. Last BM 4/28. On bowel regimen.      5/3/2020: On Precedex gtt and prn Morphine for agitation.  Vent settings this AM: 40% FiO2 and PEEP 5. Oxygen sats 94%. Failed SBT. Consult surgery for trach/PEG. Did have 4 min bradycardia/hypertension during SBT which resolved with oxygen and suctioning. Free water increased to 300mL Q4H for hypernatremia. Stopped vanc/zosyn per ID recs. NGTD on resp/blood/fungal cultures.      5/5-5/6/2020: Weaned off sedation and tolerating minimal vent settings. Successful SBTs with NIF 46 and RISB 70. Extubated on 5/6 to 4 L NC. Neuro intact.      5/8: Patient was transferred out of the ICU, approved for regular diet with thin liquids     5/9: Some urinary retention and nausea noted     5/10: Perez replaced. Nausea improving.    Active Hospital Problems    Diagnosis  POA    *Pneumonia due to COVID-19 virus [U07.1, J12.89]  Yes    Debility [R53.81]  Yes    Sinus tachycardia [R00.0]  No    Anemia of chronic disease [D63.8]  Yes    Urinary retention [R33.9]  No    Hypertension [I10]  No    COVID-19 virus infection [U07.1]  Yes    Acute respiratory failure with hypoxia [J96.01]  Yes    Class 1 obesity with body mass index (BMI) of 34.0 to 34.9 in adult [E66.9, Z68.34]  Not Applicable      Resolved Hospital Problems    Diagnosis Date Resolved POA    Hypokalemia [E87.6] 05/14/2020 No    Vomiting [R11.10] 05/14/2020 No    SIRS (systemic inflammatory response syndrome) [R65.10] 05/14/2020 Yes    Anemia [D64.9] 05/08/2020 Yes    Hyponatremia [E87.1] 05/14/2020 Yes    Ileus [K56.7] 05/08/2020 Yes    WILBUR (acute kidney injury) [N17.9] 05/14/2020  Yes    History of prostate cancer [Z85.46] 05/08/2020 Not Applicable       Pneumonia due to COVID-19 virus  With acute respiratory failure  - Initially admitted 3/31 at OSH  - Intubated 34 days (starting 4/2), extubated 5/6 to 4 L NC, BiPAP at night. Awake and alert, intact.   - Completed course of Plaquenil, Azithromycin, and Rocephin   - Repeat COVID-19 on 4/21 and 5/8 positive  - He is noted to desaturate some when working with PT, but responds well to increase in supplemental oxygen  - Currently on NC 1L with adequate saturation. Will wean as tolerated     Urinary retention  - Del Toro removed 5/8, replaced 5/10  - he reports he had a stent placed after his prostatectomy, and he has to press a button to release urine  - UA not overtly indicative of UTI  - remove del toro today, 5/13     Sinus tachycardia  - Patient noted to have high-normal HR with range   - some tachycardia coincides with working with PT/OT  - last EKG 4/16/2020 showed sinus rhythm with PVCs  - pending EKG   - may consider addition of beta blocker given hypertension       Hypertension  - Patient without history of hypertension at home  - occasionally still with some hypertension with systolic -150  - continue amlodipine to 10 mg daily  - may consider addition of BB given continued hypertension and some mild tachycardia        Anemia of chronic disease  - Possibly from acute illness  - no active bleeding noted  - stable Hgb over several weeks around 7-8         Hyponatremia  - Patient has had both hypo and hypernatremia while inpatient  - Hypernatremia thought to be a result of lasix use while in ICU  - Improved after free water boluses and now PO intake adequate  - RESOLVED         WILBUR (acute kidney injury)  - WILBUR improved  - Cr stabilizied around 1.1-1.3  - Off IV Lasix since 5/1/20  - Encouraged increased PO fluid intake  - RESOLVED     Debility  - due to critical illness and prolonged hospitalization 2/2 COVID  - needs Rehab vs SNF-  pending     Disposition:      Medically ready and stable    Pending placement- SNF vs Rehab vs LTAC ?       Signing Physician:     Claritza Parker MD  Department of [facility] Medicine   [facility]  Pager [phone] [place] [phone]  5/14/2020

## 2020-05-15 NOTE — PLAN OF CARE
Problem: Occupational Therapy Goal  Goal: Occupational Therapy Goal  Description  Goals to be met by: 5/21/2020     Patient will increase functional independence with ADLs by performing:    Grooming while EOB with Minimal Assistance.  Toileting from bedside commode with Moderate Assistance for hygiene and clothing management.   Sitting at edge of bed x10 minutes with Stand-by Assistance. Goal met  Stand pivot transfers with Moderate Assistance.  Toilet transfer to bedside commode with Moderate Assistance.      Outcome: Ongoing, Progressing     Goals remain appropriate

## 2020-05-15 NOTE — PLAN OF CARE
No acute changes overnight. Bedside monitoring in use. Pt currently on 1 L NC with O2 sat of 96%. Pt resting. No needs this time. Call bell in reach. Will continue to monitor.

## 2020-05-16 PROCEDURE — 63600175 PHARM REV CODE 636 W HCPCS: Performed by: INTERNAL MEDICINE

## 2020-05-16 PROCEDURE — 99232 SBSQ HOSP IP/OBS MODERATE 35: CPT | Mod: ,,, | Performed by: INTERNAL MEDICINE

## 2020-05-16 PROCEDURE — 11000001 HC ACUTE MED/SURG PRIVATE ROOM

## 2020-05-16 PROCEDURE — 25000003 PHARM REV CODE 250: Performed by: INTERNAL MEDICINE

## 2020-05-16 PROCEDURE — 93010 ELECTROCARDIOGRAM REPORT: CPT | Mod: ,,, | Performed by: INTERNAL MEDICINE

## 2020-05-16 PROCEDURE — 94761 N-INVAS EAR/PLS OXIMETRY MLT: CPT

## 2020-05-16 PROCEDURE — U0003 INFECTIOUS AGENT DETECTION BY NUCLEIC ACID (DNA OR RNA); SEVERE ACUTE RESPIRATORY SYNDROME CORONAVIRUS 2 (SARS-COV-2) (CORONAVIRUS DISEASE [COVID-19]), AMPLIFIED PROBE TECHNIQUE, MAKING USE OF HIGH THROUGHPUT TECHNOLOGIES AS DESCRIBED BY CMS-2020-01-R: HCPCS

## 2020-05-16 PROCEDURE — 27000221 HC OXYGEN, UP TO 24 HOURS

## 2020-05-16 PROCEDURE — 94664 DEMO&/EVAL PT USE INHALER: CPT

## 2020-05-16 PROCEDURE — 99232 PR SUBSEQUENT HOSPITAL CARE,LEVL II: ICD-10-PCS | Mod: ,,, | Performed by: INTERNAL MEDICINE

## 2020-05-16 PROCEDURE — 94660 CPAP INITIATION&MGMT: CPT

## 2020-05-16 PROCEDURE — 93005 ELECTROCARDIOGRAM TRACING: CPT

## 2020-05-16 PROCEDURE — 93010 EKG 12-LEAD: ICD-10-PCS | Mod: ,,, | Performed by: INTERNAL MEDICINE

## 2020-05-16 PROCEDURE — 99900035 HC TECH TIME PER 15 MIN (STAT)

## 2020-05-16 PROCEDURE — 92610 EVALUATE SWALLOWING FUNCTION: CPT

## 2020-05-16 RX ADMIN — TIMOLOL MALEATE 1 DROP: 5 SOLUTION/ DROPS OPHTHALMIC at 09:05

## 2020-05-16 RX ADMIN — LATANOPROST 1 DROP: 50 SOLUTION OPHTHALMIC at 11:05

## 2020-05-16 RX ADMIN — AMLODIPINE BESYLATE 10 MG: 10 TABLET ORAL at 09:05

## 2020-05-16 RX ADMIN — TIMOLOL MALEATE 1 DROP: 5 SOLUTION/ DROPS OPHTHALMIC at 11:05

## 2020-05-16 RX ADMIN — MINERAL OIL AND WHITE PETROLATUM: 150; 830 OINTMENT OPHTHALMIC at 09:05

## 2020-05-16 RX ADMIN — ENOXAPARIN SODIUM 40 MG: 100 INJECTION SUBCUTANEOUS at 11:05

## 2020-05-16 RX ADMIN — BRIMONIDINE TARTRATE 1 DROP: 2 SOLUTION OPHTHALMIC at 09:05

## 2020-05-16 RX ADMIN — MINERAL OIL AND WHITE PETROLATUM: 150; 830 OINTMENT OPHTHALMIC at 11:05

## 2020-05-16 RX ADMIN — BRIMONIDINE TARTRATE 1 DROP: 2 SOLUTION OPHTHALMIC at 11:05

## 2020-05-16 NOTE — PT/OT/SLP EVAL
"Speech Language Pathology Evaluation  Bedside Swallow    Patient Name:  Bharathi Garrido   MRN:  [id number]  Admitting Diagnosis: Pneumonia due to COVID-19 virus    Recommendations:                 General Recommendations:  Follow-up not indicated  Diet recommendations:  Regular, Thin   Aspiration Precautions: HOB to 90 degrees, Remain upright 30 minutes post meal, Small bites/sips and Standard aspiration precautions   General Precautions: Standard, airborne, droplet, contact, fall  Communication strategies:  none    History:     Past Medical History:   Diagnosis Date    Class 1 obesity with body mass index (BMI) of 33.0 to 33.9 in adult 2/1/2018    Difficult intubation     Glaucoma     Prostate cancer 2/1/2018    Sebaceous cyst of scrotum        Past Surgical History:   Procedure Laterality Date    INCISION AND DRAINAGE OF WOUND Left 2011, 2017    scrotum; multiple I&D       Prior Intubation HX:  4/2-5/6    Prior diet: reg/thin per ST d/c on 5/11    Subjective     "It's not my throat, I just can't taste"    Pain/Comfort:  Pain Rating 1: 0/10  Pain Rating Post-Intervention 1: 0/10    Objective:     Oral Musculature Evaluation  Oral Musculature: WFL  Dentition: scattered dentition  Oral Labial Strength and Mobility: WFL  Lingual Strength and Mobility: WFL  Volitional Cough: elicited  Volitional Swallow: elicited  Voice Prior to PO Intake: clear    Bedside Swallow Eval:   Consistencies Assessed:  · Thin liquids via consecutive straw sips  · Solids cracker portions     Oral Phase:   · WFL    Pharyngeal Phase:   · no overt clinical signs/symptoms of aspiration  · no overt clinical signs/symptoms of pharyngeal dysphagia    Compensatory Strategies  · None    Treatment: Provided education re: role of ST, POC, recommendations to continue regular diet with thin liquids, and plan to sign off as pt's swallow function is WFL. He verbalized understanding.     Assessment:     Bharathi Garrido is a 70 y.o. male with an SLP diagnosis " of functional oropharyngeal swallow.      Goals:   Multidisciplinary Problems     SLP Goals        Problem: SLP Goal    Goal Priority Disciplines Outcome   SLP Goal     SLP Ongoing, Progressing   Description:  Speech Language Pathology Goals  Goals expected to be met by 5/14    1. Pt will tolerate in regular diet/thin liquids without overt clinical signs of aspiraiton                          Plan:     · Patient to be seen:  4 x/week   · Plan of Care expires:     · Plan of Care reviewed with:  patient   · SLP Follow-Up:  No       Discharge recommendations:  rehabilitation facility   Barriers to Discharge:  Level of Skilled Assistance Needed .    Time Tracking:     SLP Treatment Date:   05/16/20  Speech Start Time:  1315  Speech Stop Time:  1325     Speech Total Time (min):  10 min    Billable Minutes: Eval Swallow and Oral Function 10 minutes    Roxi Andrews CCC-SLP  05/16/2020

## 2020-05-16 NOTE — SUBJECTIVE & OBJECTIVE
Interval History (05/16/20):     Review of Systems  Objective:     Vital Signs (Most Recent):  Temp: 98.1 °F (36.7 °C) (05/16/20 0800)  Pulse: 103 (05/16/20 1000)  Resp: 20 (05/16/20 1000)  BP: 126/75 (05/16/20 1000)  SpO2: 97 % (05/16/20 1000) Vital Signs (24h Range):  Temp:  [97.3 °F (36.3 °C)-98.4 °F (36.9 °C)] 98.1 °F (36.7 °C)  Pulse:  [] 103  Resp:  [16-29] 20  SpO2:  [95 %-98 %] 97 %  BP: (119-145)/(55-90) 126/75     Weight: 105.4 kg (232 lb 5.8 oz)  Body mass index is 32.41 kg/m².    Intake/Output Summary (Last 24 hours) at 5/16/2020 1024  Last data filed at 5/16/2020 0346  Gross per 24 hour   Intake 350 ml   Output 850 ml   Net -500 ml      Physical Exam    Significant Labs: CBC: No results for input(s): WBC, HGB, HCT, PLT in the last 48 hours.  CMP: No results for input(s): NA, K, CL, CO2, GLU, BUN, CREATININE, CALCIUM, PROT, ALBUMIN, BILITOT, ALKPHOS, AST, ALT, ANIONGAP, EGFRNONAA in the last 48 hours.    Invalid input(s): ESTGFA[place]    Significant Imaging: I have reviewed all pertinent imaging results/findings within the past 24 hours.

## 2020-05-16 NOTE — PLAN OF CARE
Problem: Adult Inpatient Plan of Care  Goal: Plan of Care Review  Outcome: Ongoing, Progressing  Goal: Patient-Specific Goal (Individualization)  Outcome: Ongoing, Progressing  Flowsheets (Taken 5/16/2020 7173)  Individualized Care Needs: make wife's phone number visible to pt, make phone reachable, pt able to dial phone him self.  Goal: Absence of Hospital-Acquired Illness or Injury  Outcome: Ongoing, Progressing   POC reviewed with pt.  Discharge planning in place, pt had telemedicine visit at bedside, discussed discharge planning to rehab for physical strengthening.  Pt agreeable to plan.  Pt able to feed self if set up and food cut for him.  Denies pain/distress throughout shift.  VSS.  Wounds healing.

## 2020-05-16 NOTE — PLAN OF CARE
"CM received a request to check on LTAC admission status. Referrals were sent to 19 LTAC facilities yesterday. Patient has not been accepted yet but is "under review" for a few. CM informed Dr. Tate of above.   "

## 2020-05-16 NOTE — PROGRESS NOTES
[facility]  [facility] Medicine  Telemedicine Progress Note    Patient Name: Bharathi Garrido  MRN: [id number]  Patient Class: IP- Inpatient   Admission Date: 4/28/2020  Length of Stay: 18 days  Attending Physician: Maribel Israel MD  Primary Care Provider: Melvin Gee MD (Inactive)    Start time: 9:15 am  The patient location is: [facility] [address]  Present with the patient at the time of the telemed/virtual assessment: Tele Presenter    Subjective:     Principal Problem:Pneumonia due to COVID-19 virus    HPI:  Mr. Bharathi Garrido is a 71 yo male with obesity, history of prostate cancer (1/2018 s/p prostatectomy and radiation Dr. Pederson), history of difficult intubation for surgery and glaucoma presents with feeling unwell, fever, decreased appetite and shortness of breath. Patient transferred from [facility] to [facility] 4/28/20 for higher level of care. Admitted to outside hospital on 3/31/20 for SOB and fever and tested positive for COVID-19.  Upon admission, CXR showed bilateral multifocal opacities. Procalcitonin, LDH, ferritin, CRP and D-dimer elevated. Renal function normal. Mild increase in LFTs. He was treated with Plaquinel, Rocephin and Azithromycin. His respiratory function declined and has been intubated since 4/2/20.       Overview/Hospital Course:  Bharathi Garrido was admitted to ICU for management of respiratory failure secondary to suspected and/or confirmed COVID-19 requiring mechanical ventilation for respiratory support. initially admitted 3/31 at OSH on 4/1/20.  Wife and son (entire household was infected with COVID- 19.     Transferred to [facility] ICU on 4/28. 4/28/20: Patient transferred from [facility] for higher level of care as patient still intubated with high oxygen requirements. Sedated on Fentanyl and Precedex, but responding appropriately and following commands per nursing. Intubated on A/C, FiO2 75%, PEEP 10, , rate 18 with sat of 92%. CXR consistent  with multilobar pneumonia. On empiric Vanc and Zosyn for recent white count with leukocytosis.     4/28/20: Patient transferred from [facility] for higher level of care as patient still intubated with high oxygen requirements. Sedated on Fentanyl and Precedex, but responding appropriately and following commands per nursing. Intubated on A/C, FiO2 75%, PEEP 10, , rate 18 with sat of 92%. CXR consistent with multilobar pneumonia. On empiric Vanc and Zosyn for recent white count with leukocytosis.      Intubated 34 days (starting 4/2), extubated 5/6 to 4 L NC, BiPAP at night.   Stepped down from [facility] ICU on 5/8/20  Completed course of Plaquenil, Azithromycin, and Rocephin . Repeat COVID-19 on 3/31, 4/21 , 5/8 , and 5/13 positive     Remains on 1L NC. Significantly debilitated and needs rehab or SNF post hospitalization     Interval History (05/16/20): Patient transferred to [facility]. Visit conducted today. Patient able to respond to questions without any cardiopulmonary distress. Reports lack of appetite. Will obtain swallowing eval and repeat COVID testing today. Patient is pending placement to COVID + Unit in Rehab.     Review of Systems  Constitutional: Negative for chills and fever.   Respiratory: Negative for cough and shortness of breath.    Cardiovascular: Negative for chest pain and palpitations.   Gastrointestinal: Negative for abdominal pain, constipation, diarrhea, nausea and vomiting.   Genitourinary: Negative for decreased urine volume, dysuria and urgency.   Musculoskeletal: Negative for arthralgias and myalgias.   Neurological: Positive for weakness. Negative for dizziness, syncope and light-headedness.   Psychiatric/Behavioral: Negative for agitation and sleep disturbance.      Objective:     Vital Signs (Most Recent):  Temp: 98.1 °F (36.7 °C) (05/16/20 0800)  Pulse: 103 (05/16/20 1000)  Resp: 20 (05/16/20 1000)  BP: 126/75 (05/16/20 1000)  SpO2: 97 % (05/16/20 1000) Vital Signs (24h  Range):  Temp:  [97.3 °F (36.3 °C)-98.4 °F (36.9 °C)] 98.1 °F (36.7 °C)  Pulse:  [] 103  Resp:  [16-29] 20  SpO2:  [95 %-98 %] 97 %  BP: (119-145)/(55-90) 126/75     Weight: 105.4 kg (232 lb 5.8 oz)  Body mass index is 32.41 kg/m².    Intake/Output Summary (Last 24 hours) at 5/16/2020 1024  Last data filed at 5/16/2020 0346  Gross per 24 hour   Intake 350 ml   Output 850 ml   Net -500 ml      Physical Exam  Limited due to Tele visit  Patient is alert and oriented x 3  Verbal; NAD    Significant Labs: CBC: No results for input(s): WBC, HGB, HCT, PLT in the last 48 hours.  CMP: No results for input(s): NA, K, CL, CO2, GLU, BUN, CREATININE, CALCIUM, PROT, ALBUMIN, BILITOT, ALKPHOS, AST, ALT, ANIONGAP, EGFRNONAA in the last 48 hours.    Significant Imaging: I have reviewed all pertinent imaging results/findings within the past 24 hours.    Assessment/Plan:     Pneumonia due to COVID-19 virus  With acute respiratory failure  - Initially admitted 3/31 at OSH  - Intubated 34 days (starting 4/2), extubated 5/6 to 4 L NC, BiPAP at night. Awake and alert, intact.   - Completed course of Plaquenil, Azithromycin, and Rocephin   - Repeat COVID-19 on 4/21 and 5/8 positive  - He is noted to desaturate some when working with PT, but responds well to increase in supplemental oxygen  - Currently on NC 1L with adequate saturation. Will wean as tolerated     Urinary retention  - Del Toro removed 5/8, replaced 5/10  - he reports he had a stent placed after his prostatectomy, and he has to press a button to release urine  - UA not overtly indicative of UTI  - remove del toro  5/13  -  cc/24 hours, will monitor     Sinus tachycardia  - Patient noted to have high-normal HR with range   - some tachycardia coincides with working with PT/OT  - last EKG 4/16/2020 showed sinus rhythm with PVCs  - Currently HR 74 bpm and patient is normotensive     Hypertension  - Patient without history of hypertension at home  - occasionally still  with some hypertension with systolic -150  - continue amlodipine to 10 mg daily  - currently normotensive     Anemia of chronic disease  - Possibly from acute illness  - no active bleeding noted  - stable Hgb over several weeks around 7-8     Hyponatremia  - Patient has had both hypo and hypernatremia while inpatient  - Hypernatremia thought to be a result of lasix use while in ICU  - Improved after free water boluses and now PO intake adequate  - RESOLVED      WILBUR (acute kidney injury)  - WILBUR improved  - Cr stabilizied around 1.1-1.3  - Off IV Lasix since 5/1/20  - Encouraged increased PO fluid intake  - RESOLVED      Debility  - due to critical illness and prolonged hospitalization 2/2 COVID  - needs Rehab vs SNF- pending     Diet: Regular with thin fluids; pending swallowing iris  DVT ppx: Lovenox 40 mg daily  Dispo: Pending Rehab per case management.      VTE Risk Mitigation (From admission, onward)         Ordered     enoxaparin injection 40 mg  Daily      04/28/20 1535     IP VTE HIGH RISK PATIENT  Once      04/28/20 1535              I have assessed these finding virtually using telemed platform and with assistance of bedside nurse     End time:  9:30 am    Total time spent with patient: 15 minutes     The attending portion of this evaluation, treatment, and documentation was performed per Swathi Taet MD via audiovisual.      Swathi Tate MD   Internal Medicine PGY 3  Department of Hospital Medicine   [facility]

## 2020-05-16 NOTE — CARE UPDATE
"RAPID RESPONSE NURSE PROACTIVE ROUNDING NOTE     Time of Visit:     Admit Date: 2020  LOS: 18  Code Status: Full Code   Date of Visit: 2020  : 1950  Age: 70 y.o.  Sex: male  Race: Black or   Bed: 8068/8068 A:   MRN: [id number]  Was the patient discharged from an ICU this admission? yes   Was the patient discharged from a PACU within last 24 hours?  no  Did the patient receive conscious sedation/general anesthesia in last 24 hours?  no  Was the patient in the ED within the past 24 hours?  no  Was the patient started on NIPPV within the past 24 hours?  no  Attending Physician: Claritza Parker MD  Primary Service: Networked reference to record PCT     ASSESSMENT   Reason for alert: No alert/rounding    Diagnosis: Pneumonia due to COVID-19 virus    Abnormal Vital Signs: BP (!) 143/84   Pulse 82   Temp 98.4 °F (36.9 °C) (Oral)   Resp 19   Ht 5' 11" (1.803 m)   Wt 105.4 kg (232 lb 5.8 oz)   SpO2 96%   BMI 32.41 kg/m²      Clinical Issues: No acute issues    Patient  has a past medical history of Class 1 obesity with body mass index (BMI) of 33.0 to 33.9 in adult, Difficult intubation, Glaucoma, Prostate cancer, and Sebaceous cyst of scrotum.      Upon assessment, patient resting comfortably and in no acute distress. He is saturating well on room air, respirations are even and unlabored. Patient states he is ready to get discharged.     INTERVENTIONS/ RECOMMENDATIONS     Continue to monitor, call with concerns.    Discussed plan of care with Karla BAE.    PHYSICIAN ESCALATION     Yes/No  no    Orders received and case discussed with NA.    Disposition: Remain in room 8068.    FOLLOW-UP     Call back the Rapid Response Nurse, Hannah Bancroft, RN at [phone] for additional questions or concerns.          "

## 2020-05-16 NOTE — CONSULTS
[facility]  [facility] Medicine  Telemedicine Consult Note    Patient Name: Bharathi Garrido  MRN: [id number]  Admission Date: 4/28/2020  Hospital Length of Stay: 17 days  Attending Physician: Claritza Parker MD   Primary Care Provider: Melvin Gee MD (Inactive)     Bharathi Garrido has been accepted for transfer to [facility] and will be followed through telemedicine services beginning 05/16/20 at 6 AM.        Maribel Israel MD  Department of Hospital Medicine   [facility]

## 2020-05-17 LAB
ALBUMIN SERPL BCP-MCNC: 2.5 G/DL (ref 3.5–5.2)
ALP SERPL-CCNC: 128 U/L (ref 55–135)
ALT SERPL W/O P-5'-P-CCNC: 48 U/L (ref 10–44)
ANION GAP SERPL CALC-SCNC: 13 MMOL/L (ref 8–16)
AST SERPL-CCNC: 27 U/L (ref 10–40)
BASOPHILS # BLD AUTO: 0.1 K/UL (ref 0–0.2)
BASOPHILS NFR BLD: 0.9 % (ref 0–1.9)
BILIRUB SERPL-MCNC: 1.4 MG/DL (ref 0.1–1)
BUN SERPL-MCNC: 15 MG/DL (ref 8–23)
CALCIUM SERPL-MCNC: 8.7 MG/DL (ref 8.7–10.5)
CHLORIDE SERPL-SCNC: 98 MMOL/L (ref 95–110)
CO2 SERPL-SCNC: 25 MMOL/L (ref 23–29)
CREAT SERPL-MCNC: 0.9 MG/DL (ref 0.5–1.4)
CRP SERPL-MCNC: 50.7 MG/L (ref 0–8.2)
D DIMER PPP IA.FEU-MCNC: 13.92 MG/L FEU
DIFFERENTIAL METHOD: ABNORMAL
EOSINOPHIL # BLD AUTO: 0.2 K/UL (ref 0–0.5)
EOSINOPHIL NFR BLD: 1.7 % (ref 0–8)
ERYTHROCYTE [DISTWIDTH] IN BLOOD BY AUTOMATED COUNT: 16 % (ref 11.5–14.5)
EST. GFR  (AFRICAN AMERICAN): >60 ML/MIN/1.73 M^2
EST. GFR  (NON AFRICAN AMERICAN): >60 ML/MIN/1.73 M^2
FERRITIN SERPL-MCNC: 1691 NG/ML (ref 20–300)
GLUCOSE SERPL-MCNC: 94 MG/DL (ref 70–110)
HCT VFR BLD AUTO: 29.7 % (ref 40–54)
HGB BLD-MCNC: 9.1 G/DL (ref 14–18)
IMM GRANULOCYTES # BLD AUTO: 0.15 K/UL (ref 0–0.04)
IMM GRANULOCYTES NFR BLD AUTO: 1.3 % (ref 0–0.5)
INR PPP: 1 (ref 0.8–1.2)
LDH SERPL L TO P-CCNC: 388 U/L (ref 110–260)
LYMPHOCYTES # BLD AUTO: 1.4 K/UL (ref 1–4.8)
LYMPHOCYTES NFR BLD: 12.2 % (ref 18–48)
MAGNESIUM SERPL-MCNC: 1.8 MG/DL (ref 1.6–2.6)
MCH RBC QN AUTO: 27.2 PG (ref 27–31)
MCHC RBC AUTO-ENTMCNC: 30.6 G/DL (ref 32–36)
MCV RBC AUTO: 89 FL (ref 82–98)
MONOCYTES # BLD AUTO: 0.7 K/UL (ref 0.3–1)
MONOCYTES NFR BLD: 5.8 % (ref 4–15)
NEUTROPHILS # BLD AUTO: 9.1 K/UL (ref 1.8–7.7)
NEUTROPHILS NFR BLD: 78.1 % (ref 38–73)
NRBC BLD-RTO: 0 /100 WBC
PHOSPHATE SERPL-MCNC: 3.1 MG/DL (ref 2.7–4.5)
PLATELET # BLD AUTO: 265 K/UL (ref 150–350)
PMV BLD AUTO: 12.1 FL (ref 9.2–12.9)
POTASSIUM SERPL-SCNC: 3.6 MMOL/L (ref 3.5–5.1)
PROT SERPL-MCNC: 7.3 G/DL (ref 6–8.4)
PROTHROMBIN TIME: 10.7 SEC (ref 9–12.5)
RBC # BLD AUTO: 3.35 M/UL (ref 4.6–6.2)
SARS-COV-2 RNA RESP QL NAA+PROBE: DETECTED
SODIUM SERPL-SCNC: 136 MMOL/L (ref 136–145)
WBC # BLD AUTO: 11.66 K/UL (ref 3.9–12.7)

## 2020-05-17 PROCEDURE — 99900035 HC TECH TIME PER 15 MIN (STAT)

## 2020-05-17 PROCEDURE — 83735 ASSAY OF MAGNESIUM: CPT

## 2020-05-17 PROCEDURE — 27000221 HC OXYGEN, UP TO 24 HOURS

## 2020-05-17 PROCEDURE — 25000003 PHARM REV CODE 250: Performed by: STUDENT IN AN ORGANIZED HEALTH CARE EDUCATION/TRAINING PROGRAM

## 2020-05-17 PROCEDURE — 86140 C-REACTIVE PROTEIN: CPT

## 2020-05-17 PROCEDURE — 82728 ASSAY OF FERRITIN: CPT

## 2020-05-17 PROCEDURE — 94761 N-INVAS EAR/PLS OXIMETRY MLT: CPT

## 2020-05-17 PROCEDURE — 36415 COLL VENOUS BLD VENIPUNCTURE: CPT

## 2020-05-17 PROCEDURE — 84100 ASSAY OF PHOSPHORUS: CPT

## 2020-05-17 PROCEDURE — 85610 PROTHROMBIN TIME: CPT

## 2020-05-17 PROCEDURE — 94660 CPAP INITIATION&MGMT: CPT

## 2020-05-17 PROCEDURE — 83615 LACTATE (LD) (LDH) ENZYME: CPT

## 2020-05-17 PROCEDURE — 85379 FIBRIN DEGRADATION QUANT: CPT

## 2020-05-17 PROCEDURE — 27000646 HC AEROBIKA DEVICE

## 2020-05-17 PROCEDURE — 94664 DEMO&/EVAL PT USE INHALER: CPT

## 2020-05-17 PROCEDURE — 25000003 PHARM REV CODE 250: Performed by: INTERNAL MEDICINE

## 2020-05-17 PROCEDURE — 99231 PR SUBSEQUENT HOSPITAL CARE,LEVL I: ICD-10-PCS | Mod: ,,, | Performed by: INTERNAL MEDICINE

## 2020-05-17 PROCEDURE — 11000001 HC ACUTE MED/SURG PRIVATE ROOM

## 2020-05-17 PROCEDURE — 80053 COMPREHEN METABOLIC PANEL: CPT

## 2020-05-17 PROCEDURE — 85025 COMPLETE CBC W/AUTO DIFF WBC: CPT

## 2020-05-17 PROCEDURE — 99231 SBSQ HOSP IP/OBS SF/LOW 25: CPT | Mod: ,,, | Performed by: INTERNAL MEDICINE

## 2020-05-17 RX ADMIN — TIMOLOL MALEATE 1 DROP: 5 SOLUTION/ DROPS OPHTHALMIC at 08:05

## 2020-05-17 RX ADMIN — MINERAL OIL AND WHITE PETROLATUM: 150; 830 OINTMENT OPHTHALMIC at 08:05

## 2020-05-17 RX ADMIN — BRIMONIDINE TARTRATE 1 DROP: 2 SOLUTION OPHTHALMIC at 08:05

## 2020-05-17 RX ADMIN — METOPROLOL SUCCINATE 12.5 MG: 25 TABLET, EXTENDED RELEASE ORAL at 10:05

## 2020-05-17 RX ADMIN — AMLODIPINE BESYLATE 10 MG: 10 TABLET ORAL at 08:05

## 2020-05-17 RX ADMIN — MINERAL OIL AND WHITE PETROLATUM: 150; 830 OINTMENT OPHTHALMIC at 09:05

## 2020-05-17 RX ADMIN — LATANOPROST 1 DROP: 50 SOLUTION OPHTHALMIC at 08:05

## 2020-05-17 NOTE — PROGRESS NOTES
[facility]  [facility] Medicine  Telemedicine Progress Note    Patient Name: Bharathi Garrido  MRN: [id number]  Patient Class: IP- Inpatient   Admission Date: 4/28/2020  Length of Stay: 19 days  Attending Physician: Maribel Israel MD  Primary Care Provider: Melvin Gee MD (Inactive)    Start time: 10:00  Chief complaint: Pneumonia due to COVID -19 Virus  The patient location is: [facility] [address]  Present with the patient at the time of the telemed/virtual assessment: Tele Presenter    Subjective:     Principal Problem:Pneumonia due to COVID-19 virus    HPI:  Mr. Bharathi Garrido is a 69 yo male with obesity, history of prostate cancer (1/2018 s/p prostatectomy and radiation Dr. Pederson), history of difficult intubation for surgery and glaucoma presents with feeling unwell, fever, decreased appetite and shortness of breath. Patient transferred from [facility] to [facility] 4/28/20 for higher level of care. Admitted to outside hospital on 3/31/20 for SOB and fever and tested positive for COVID-19.  Upon admission, CXR showed bilateral multifocal opacities. Procalcitonin, LDH, ferritin, CRP and D-dimer elevated. Renal function normal. Mild increase in LFTs. He was treated with Plaquinel, Rocephin and Azithromycin. His respiratory function declined and has been intubated since 4/2/20.       Overview/Hospital Course:  Bharathi Garrido was admitted to ICU for management of respiratory failure secondary to suspected and/or confirmed COVID-19 requiring mechanical ventilation for respiratory support. initially admitted 3/31 at OSH on 4/1/20.  Wife and son (entire household was infected with COVID- 19.     Transferred to [facility] ICU on 4/28. 4/28/20: Patient transferred from [facility] for higher level of care as patient still intubated with high oxygen requirements. Sedated on Fentanyl and Precedex, but responding appropriately and following commands per nursing. Intubated on A/C, FiO2 75%, PEEP 10, TV  450, rate 18 with sat of 92%. CXR consistent with multilobar pneumonia. On empiric Vanc and Zosyn for recent white count with leukocytosis.     4/28/20: Patient transferred from [facility] for higher level of care as patient still intubated with high oxygen requirements. Sedated on Fentanyl and Precedex, but responding appropriately and following commands per nursing. Intubated on A/C, FiO2 75%, PEEP 10, , rate 18 with sat of 92%. CXR consistent with multilobar pneumonia. On empiric Vanc and Zosyn for recent white count with leukocytosis.      Intubated 34 days (starting 4/2), extubated 5/6 to 4 L NC, BiPAP at night.   Stepped down from [facility] ICU on 5/8/20  Completed course of Plaquenil, Azithromycin, and Rocephin . Repeat COVID-19 on 3/31, 4/21 , 5/8 , and 5/13 positive     Remains on 1L NC. Significantly debilitated and needs rehab or SNF post hospitalization   05/16/20: Patient transferred to [facility]. Visit conducted today. Patient able to respond to questions without any cardiopulmonary distress. Reports lack of appetite. Will obtain swallowing eval and repeat COVID testing today. Patient is pending placement to COVID + Unit in Rehab.     Interval History (05/17/20): Patient doing well. No apparent concerns. Reports improved appetite.  overnight, added metoprolol 12.5 mg daily (05/17/20)    Review of Systems  Constitutional: Negative for chills and fever.   Respiratory: Negative for cough and shortness of breath.    Cardiovascular: Negative for chest pain and palpitations.   Gastrointestinal: Negative for abdominal pain, constipation, diarrhea, nausea and vomiting.   Genitourinary: Negative for decreased urine volume, dysuria and urgency.   Musculoskeletal: Negative for arthralgias and myalgias.   Neurological: Positive for weakness. Negative for dizziness, syncope and light-headedness.   Psychiatric/Behavioral: Negative for agitation and sleep disturbance.     Objective:     Vital Signs (Most  Recent):  Temp: 98 °F (36.7 °C) (05/17/20 0814)  Pulse: (!) 115 (05/17/20 0833)  Resp: (!) 30 (05/17/20 0833)  BP: 129/78 (05/17/20 0814)  SpO2: 97 % (05/17/20 0833) Vital Signs (24h Range):  Temp:  [98 °F (36.7 °C)-98.6 °F (37 °C)] 98 °F (36.7 °C)  Pulse:  [] 115  Resp:  [15-30] 30  SpO2:  [88 %-100 %] 97 %  BP: (117-140)/(70-83) 129/78     Weight: 105.4 kg (232 lb 5.8 oz)  Body mass index is 32.41 kg/m².    Intake/Output Summary (Last 24 hours) at 5/17/2020 0920  Last data filed at 5/16/2020 2300  Gross per 24 hour   Intake 1050 ml   Output 300 ml   Net 750 ml      Physical Exam  Limited due to Tele visit  Patient is alert and oriented x 3  Verbal; NAD    Significant Labs:   CBC:   Recent Labs   Lab 05/17/20  0529   WBC 11.66   HGB 9.1*   HCT 29.7*        CMP:   Recent Labs   Lab 05/17/20  0529      K 3.6   CL 98   CO2 25   GLU 94   BUN 15   CREATININE 0.9   CALCIUM 8.7   PROT 7.3   ALBUMIN 2.5*   BILITOT 1.4*   ALKPHOS 128   AST 27   ALT 48*   ANIONGAP 13   EGFRNONAA >60.0     Significant Imaging: I have reviewed all pertinent imaging results/findings within the past 24 hours.    Assessment/Plan:     Pneumonia due to COVID-19 virus  With acute respiratory failure  - Initially admitted 3/31 at OSH  - Intubated 34 days (starting 4/2), extubated 5/6 to 4 L NC, BiPAP at night. Awake and alert, intact.   - Completed course of Plaquenil, Azithromycin, and Rocephin   - Repeat COVID-19 on 4/21 and 5/8 positive; Positive 05/16  - He is noted to desaturate some when working with PT, but responds well to increase in supplemental oxygen  - Currently on NC 1L with adequate saturation. Will wean as tolerated     Urinary retention  - Del Toro removed 5/8, replaced 5/10  - he reports he had a stent placed after his prostatectomy, and he has to press a button to release urine  - UA not overtly indicative of UTI  - remove del toro  5/13  -  cc/24 hours, will monitor (05/17/20)     Sinus tachycardia  - Patient  noted to have high-normal HR with range   - some tachycardia coincides with working with PT/OT  - last EKG 4/16/2020 showed sinus rhythm with PVCs  - Currently -115 bpm and patient is normotensive  - started metoprolol 12.5 mg daily on 05/17/20     Hypertension  - Patient without history of hypertension at home  - occasionally still with some hypertension with systolic -150  - will start metoprolol 12.5 mg daily started 05/17  - currently normotensive     Anemia of chronic disease  - Possibly from acute illness  - no active bleeding noted  - stable Hgb over several weeks around 7-8     Hyponatremia  - Patient has had both hypo and hypernatremia while inpatient  - Hypernatremia thought to be a result of lasix use while in ICU  - Improved after free water boluses and now PO intake adequate  - RESOLVED      WILBUR (acute kidney injury)  - WILBUR improved  - Cr stabilizied around 1.1-1.3  - Off IV Lasix since 5/1/20  - Encouraged increased PO fluid intake  - RESOLVED      Debility  - due to critical illness and prolonged hospitalization 2/2 COVID  - needs Rehab vs LTAC- pending      Diet: Regular with thin fluids; pending swallowing iris  DVT ppx: Lovenox 40 mg daily  Dispo: Pending LTAC per case management. Referral sent. Last COVID positive 05/16/20    VTE Risk Mitigation (From admission, onward)         Ordered     enoxaparin injection 40 mg  Daily      04/28/20 1535     IP VTE HIGH RISK PATIENT  Once      04/28/20 1535              I have assessed these finding virtually using telemed platform and with assistance of bedside nurse     End time:  10:15 am    Total time spent with patient: 15 minutes    The attending portion of this evaluation, treatment, and documentation was performed per Swathi Tate MD via audiovisual.    Swathi Tate MD   Internal Medicine PGY 3  Department of Hospital Medicine   [facility]

## 2020-05-17 NOTE — RESPIRATORY THERAPY
Rapid Response Respiratory Therapy Proactive Rounding Note      Time of visit: 1005       Code Status: Full Code   : 1950  Bed: 8068/8068 A:   MRN: [id number]    SITUATION     Evaluated patient for: Non-Invasive Positive Pressure Ventilation Compliance     BACKGROUND     Patient has a past medical history of Class 1 obesity with body mass index (BMI) of 33.0 to 33.9 in adult, Difficult intubation, Glaucoma, Prostate cancer, and Sebaceous cyst of scrotum.    ASSESSMENT/INTERVENTIONS     Upon arrival in room Patient resting comfortably and in no acute distress. Room air. Awaiting discharge.     Pulse: 102 Respiratory rate: 25 SpO2: 100  Level of Consciousness: Level of Consciousness (AVPU): alert  Respiratory Effort: Respiratory Effort: Normal, Unlabored Expansion/Accessory Muscle Usage: Expansion/Accessory Muscles/Retractions: no use of accessory muscles, no retractions, expansion symmetric  All Lung Field Breath Sounds: All Lung Fields Breath Sounds: Anterior:, Lateral:, diminished  TAMMY Breath Sounds: diminished  LLL Breath Sounds: diminished  RUL Breath Sounds: diminished  RML Breath Sounds: diminished  RLL Breath Sounds: diminished  O2 Device/Concentration: 2L NC  Most recent blood gas: No results for input(s): PH, PCO2, PO2, HCO3, POCSATURATED, BE in the last 72 hours.  NIPPV: Yes, Type: BiPAP Settings: Standby   Ambu at bedside: Ambu bag with the patient?: Yes, Adult Ambu    Current Respiratory Care Orders:   20  Flutter Valve Treatment Q12H Every 12 hours ( released)    Release    20 0849   20 1752  Oxygen Continuous Continuous     References: Oxygen Titration Protocol   Question Answer Comment   Device type: Low flow    Device: Nasal Cannula (1- 5 Liters)    LPM: 1-5    Titrate O2 per Oxygen Titration Protocol: Yes    To maintain SpO2 goal of: >= 90%    Notify MD of: Inability to achieve desired SpO2; Sudden change in patient status and requires 20% increase in FiO2;  Patient requires >60% FiO2        05/06/20 1751   05/03/20 2000  Inhalation Treatment Q12H Every 12 hours (29 of 37 released)    Release    05/03/20 1624   04/28/20 1600  Please use the respiratory therapy protocols to escalate and de-escalate therapy: Airway Clearance Protocol, Airway Clearance Protocol, Atelectasis/Hyperinflation Protocol, Bronchodilator Protocol Every 4 hours (117 of 146 released)    Release   Comments: Assess patient q4h until patient is below 35% oxygen    04/28/20 1400   04/28/20 1535  Pulse Oximetry Continuous Continuous      04/28/20 1535   Unscheduled  Bipap PRN Use PRN (0 of 69233 released)    Release   Question Answer Comment   FiO2% 40    Inspiratory pressure: 10    Expiratory pressure: 5        05/14/20 2045      IP Meds - Nasal and Inhaled   Comment  Hide   (From admission, onward)      Last edited by  on  at    Start   Stop Status Route Frequency Ordered   04/28/20 1534  albuterol inhaler 2 puff    Discontinue    -- Verified Inhl Every 6 hours PRN 04/28/20 1535         RECOMMENDATIONS     We recommend:  Continue current POC.  Wean as tolerated    ESCALATION      Physician Escalation (Yes/No) No   Discussed plan of care primary RTAniya, RT    FOLLOW-UP     Please call back the Rapid Response Jerson BURROWS, RRT at x [phone] for any questions or concerns.

## 2020-05-17 NOTE — CARE UPDATE
"RAPID RESPONSE NURSE PROACTIVE ROUNDING NOTE     Time of Visit: 1025    Admit Date: 2020  LOS: 19  Code Status: Full Code   Date of Visit: 2020  : 1950  Age: 70 y.o.  Sex: male  Race: Black or   Bed: 8068/8068 A:   MRN: [id number]  Was the patient discharged from an ICU this admission? yes   Was the patient discharged from a PACU within last 24 hours?  no  Did the patient receive conscious sedation/general anesthesia in last 24 hours?  no  Was the patient in the ED within the past 24 hours?  no  Was the patient started on NIPPV within the past 24 hours?  no  Attending Physician: Maribel Israel MD  [place] [facility] [place]    ASSESSMENT     Notified by rounding.  Reason for alert:     Diagnosis: Pneumonia due to COVID-19 virus    Abnormal Vital Signs: /78 (BP Location: Right arm, Patient Position: Lying)   Pulse (!) 115   Temp 98 °F (36.7 °C) (Oral)   Resp (!) 30   Ht 5' 11" (1.803 m)   Wt 105.4 kg (232 lb 5.8 oz)   SpO2 97%   BMI 32.41 kg/m²      Clinical Issues: no acute issues    Patient  has a past medical history of Class 1 obesity with body mass index (BMI) of 33.0 to 33.9 in adult, Difficult intubation, Glaucoma, Prostate cancer, and Sebaceous cyst of scrotum.      Pt found resting comfortably in bed, no apparent distress. Pt had just been cleaned by RN/PCT, with elevated HR 110s. HR now 99 and still coming down. SaO2 100% on 2L NC.  UOP marginal.  Awaiting discharge.     INTERVENTIONS/ RECOMMENDATIONS     Continue to monitor UOP. Wean O2 as able. Continue plan of care, call with concerns.      Discussed plan of care with Kiki BAE.    PHYSICIAN ESCALATION     Yes/No  no    Orders received and case discussed with NA.    Disposition: Remain in room 8068.    FOLLOW-UP     Call back the Rapid Response Nurse, Maura Dumont RN at   [phone] for additional questions or concerns.          "

## 2020-05-17 NOTE — CARE UPDATE
"RAPID RESPONSE NURSE PROACTIVE ROUNDING NOTE     Time of Visit:     Admit Date: 2020  LOS: 18  Code Status: Full Code   Date of Visit: 2020  : 1950  Age: 70 y.o.  Sex: male  Race: Black or   Bed: 8068/8068 A:   MRN: [id number]  Was the patient discharged from an ICU this admission? yes   Was the patient discharged from a PACU within last 24 hours?  no  Did the patient receive conscious sedation/general anesthesia in last 24 hours?  no  Was the patient in the ED within the past 24 hours?  no  Was the patient started on NIPPV within the past 24 hours?  no  Attending Physician: Maribel Israel MD  Primary Service: [facility] [place] VIRTUAL TEAM 10    ASSESSMENT     Notified by rounding.  Reason for alert: n/a    Diagnosis: Pneumonia due to COVID-19 virus    Abnormal Vital Signs: /83   Pulse 108   Temp 98 °F (36.7 °C)   Resp (!) 30   Ht 5' 11" (1.803 m)   Wt 105.4 kg (232 lb 5.8 oz)   SpO2 95%   BMI 32.41 kg/m²      Clinical Issues: no acute issues    Patient  has a past medical history of Class 1 obesity with body mass index (BMI) of 33.0 to 33.9 in adult, Difficult intubation, Glaucoma, Prostate cancer, and Sebaceous cyst of scrotum.      Patient resting comfortably and in no acute distress. Room air. Awaiting discharge.      INTERVENTIONS/ RECOMMENDATIONS     Continue plan of care per orders and unit policy, call with concerns    Discussed plan of care with RNAylin.    PHYSICIAN ESCALATION     Yes/No  no    Orders received and case discussed with NA.    Disposition: Remain in room 8068.    FOLLOW-UP     Call back the Rapid Response Nurse, Miguel Vickers RN at [phone] for additional questions or concerns.          "

## 2020-05-17 NOTE — PLAN OF CARE
Problem: Adult Inpatient Plan of Care  Goal: Plan of Care Review  Outcome: Ongoing, Progressing   Plan of care was reviewed with the pt. AAOx3. Vitals were stable. Cardiac monitoring and wound care was done. Pt was turned Q2 and help assist himself with meals. I and Os were charted. Pt was able to titrate down from 2L of O2 to 1L. No major changes through out the day. Safety and covid precautions were in placed.

## 2020-05-17 NOTE — SUBJECTIVE & OBJECTIVE
Interval History (05/17/20):    Review of Systems  Objective:     Vital Signs (Most Recent):  Temp: 98 °F (36.7 °C) (05/17/20 0814)  Pulse: (!) 115 (05/17/20 0833)  Resp: (!) 30 (05/17/20 0833)  BP: 129/78 (05/17/20 0814)  SpO2: 97 % (05/17/20 0833) Vital Signs (24h Range):  Temp:  [98 °F (36.7 °C)-98.6 °F (37 °C)] 98 °F (36.7 °C)  Pulse:  [] 115  Resp:  [15-30] 30  SpO2:  [88 %-100 %] 97 %  BP: (117-140)/(70-83) 129/78     Weight: 105.4 kg (232 lb 5.8 oz)  Body mass index is 32.41 kg/m².    Intake/Output Summary (Last 24 hours) at 5/17/2020 0920  Last data filed at 5/16/2020 2300  Gross per 24 hour   Intake 1050 ml   Output 300 ml   Net 750 ml      Physical Exam    Significant Labs:   CBC:   Recent Labs   Lab 05/17/20  0529   WBC 11.66   HGB 9.1*   HCT 29.7*        CMP:   Recent Labs   Lab 05/17/20  0529      K 3.6   CL 98   CO2 25   GLU 94   BUN 15   CREATININE 0.9   CALCIUM 8.7   PROT 7.3   ALBUMIN 2.5*   BILITOT 1.4*   ALKPHOS 128   AST 27   ALT 48*   ANIONGAP 13   EGFRNONAA >60.0     Significant Imaging: I have reviewed all pertinent imaging results/findings within the past 24 hours.

## 2020-05-18 PROCEDURE — 99231 SBSQ HOSP IP/OBS SF/LOW 25: CPT | Mod: ,,, | Performed by: INTERNAL MEDICINE

## 2020-05-18 PROCEDURE — 63600175 PHARM REV CODE 636 W HCPCS: Performed by: INTERNAL MEDICINE

## 2020-05-18 PROCEDURE — 97110 THERAPEUTIC EXERCISES: CPT

## 2020-05-18 PROCEDURE — 97535 SELF CARE MNGMENT TRAINING: CPT

## 2020-05-18 PROCEDURE — 20600001 HC STEP DOWN PRIVATE ROOM

## 2020-05-18 PROCEDURE — 25000003 PHARM REV CODE 250: Performed by: INTERNAL MEDICINE

## 2020-05-18 PROCEDURE — 25000003 PHARM REV CODE 250: Performed by: STUDENT IN AN ORGANIZED HEALTH CARE EDUCATION/TRAINING PROGRAM

## 2020-05-18 PROCEDURE — 20000000 HC ICU ROOM

## 2020-05-18 PROCEDURE — 94761 N-INVAS EAR/PLS OXIMETRY MLT: CPT

## 2020-05-18 PROCEDURE — 27000221 HC OXYGEN, UP TO 24 HOURS

## 2020-05-18 PROCEDURE — 99231 PR SUBSEQUENT HOSPITAL CARE,LEVL I: ICD-10-PCS | Mod: ,,, | Performed by: INTERNAL MEDICINE

## 2020-05-18 PROCEDURE — 99900035 HC TECH TIME PER 15 MIN (STAT)

## 2020-05-18 PROCEDURE — 97530 THERAPEUTIC ACTIVITIES: CPT

## 2020-05-18 RX ORDER — LACTULOSE 10 G/15ML
20 SOLUTION ORAL EVERY 6 HOURS PRN
Status: DISCONTINUED | OUTPATIENT
Start: 2020-05-18 | End: 2020-05-20 | Stop reason: HOSPADM

## 2020-05-18 RX ORDER — AMOXICILLIN 250 MG
1 CAPSULE ORAL 2 TIMES DAILY
Status: DISCONTINUED | OUTPATIENT
Start: 2020-05-18 | End: 2020-05-20 | Stop reason: HOSPADM

## 2020-05-18 RX ADMIN — BRIMONIDINE TARTRATE 1 DROP: 2 SOLUTION OPHTHALMIC at 09:05

## 2020-05-18 RX ADMIN — BRIMONIDINE TARTRATE 1 DROP: 2 SOLUTION OPHTHALMIC at 08:05

## 2020-05-18 RX ADMIN — METOPROLOL SUCCINATE 12.5 MG: 25 TABLET, EXTENDED RELEASE ORAL at 09:05

## 2020-05-18 RX ADMIN — STANDARDIZED SENNA CONCENTRATE AND DOCUSATE SODIUM 1 TABLET: 8.6; 5 TABLET ORAL at 11:05

## 2020-05-18 RX ADMIN — TIMOLOL MALEATE 1 DROP: 5 SOLUTION/ DROPS OPHTHALMIC at 09:05

## 2020-05-18 RX ADMIN — TIMOLOL MALEATE 1 DROP: 5 SOLUTION/ DROPS OPHTHALMIC at 08:05

## 2020-05-18 RX ADMIN — ENOXAPARIN SODIUM 40 MG: 100 INJECTION SUBCUTANEOUS at 08:05

## 2020-05-18 RX ADMIN — LATANOPROST 1 DROP: 50 SOLUTION OPHTHALMIC at 08:05

## 2020-05-18 RX ADMIN — MINERAL OIL AND WHITE PETROLATUM: 150; 830 OINTMENT OPHTHALMIC at 09:05

## 2020-05-18 NOTE — CARE UPDATE
"RAPID RESPONSE NURSE PROACTIVE ROUNDING NOTE     Time of Visit:     Admit Date: 2020  LOS: 19  Code Status: Full Code   Date of Visit: 2020  : 1950  Age: 70 y.o.  Sex: male  Race: Black or   Bed: 8068/8068 A:   MRN: [id number]  Was the patient discharged from an ICU this admission? yes   Was the patient discharged from a PACU within last 24 hours?  no  Did the patient receive conscious sedation/general anesthesia in last 24 hours?  no  Was the patient in the ED within the past 24 hours?  no  Was the patient started on NIPPV within the past 24 hours?  no  Attending Physician: Maribel Israel MD  Primary Service: [facility] [place] VIRTUAL TEAM 10    ASSESSMENT     Notified by Chart Review.    Diagnosis: Pneumonia due to COVID-19 virus    Abnormal Vital Signs: /76 (BP Location: Right arm, Patient Position: Lying)   Pulse 102   Temp 97.9 °F (36.6 °C) (Oral)   Resp (!) 25   Ht 5' 11" (1.803 m)   Wt 105.4 kg (232 lb 5.8 oz)   SpO2 96%   BMI 32.41 kg/m²      Clinical Issues: Respiratory    Patient  has a past medical history of Class 1 obesity with body mass index (BMI) of 33.0 to 33.9 in adult, Difficult intubation, Glaucoma, Prostate cancer, and Sebaceous cyst of scrotum.      RRN Proactive Review. Patient resting in comfortably in bed at time of visit. VSS: , RR 25, SpO2 96% on 1L NC, /71 (98). No acute issues at this time. POC reviewed with primary RN.     INTERVENTIONS/ RECOMMENDATIONS     - Continue to monitor per [place] policy    Discussed plan of care with Karla BAE.    PHYSICIAN ESCALATION     Yes/No  no    Orders received and case discussed with NA.    Disposition: Remain in room 8068A.    FOLLOW-UP     Call back the Rapid Response Nurse, Alma Delia Haji RN at [phone] for additional questions or concerns.          "

## 2020-05-18 NOTE — CARE UPDATE
"RAPID RESPONSE NURSE PROACTIVE ROUNDING NOTE     Time of Visit: 0830    Admit Date: 2020  LOS: 20  Code Status: Full Code   Date of Visit: 2020  : 1950  Age: 70 y.o.  Sex: male  Race: Black or   Bed: 8068/8068 A:   MRN: [id number]  Was the patient discharged from an ICU this admission? yes   Was the patient discharged from a PACU within last 24 hours?  no  Did the patient receive conscious sedation/general anesthesia in last 24 hours?  no  Was the patient in the ED within the past 24 hours?  no  Was the patient started on NIPPV within the past 24 hours?  no  Attending Physician: Maribel Israel MD  Primary Service: [facility] [place] VIRTUAL TEAM 10    ASSESSMENT     Notified by Epic patient alert.  Reason for alert: MEWS    Diagnosis: Pneumonia due to COVID-19 virus    Abnormal Vital Signs: /81   Pulse 110   Temp 98.2 °F (36.8 °C) (Axillary)   Resp 20   Ht 5' 11" (1.803 m)   Wt 105.4 kg (232 lb 5.8 oz)   SpO2 (!) 94%   BMI 32.41 kg/m²      Clinical Issues: Respiratory    Patient  has a past medical history of Class 1 obesity with body mass index (BMI) of 33.0 to 33.9 in adult, Difficult intubation, Glaucoma, Prostate cancer, and Sebaceous cyst of scrotum.     AM proactive round:  Pt noted to have HR in 140s while sitting on side of bed. O@ increased per PT/OT as O2 dropped below parameters with activity. Pt returned to supine position, HR improved.      INTERVENTIONS/ RECOMMENDATIONS     IS every hour while awake  PT/OT as tolerated  Monitor per unit policy     Discussed plan of care with Clarisa BAE     PHYSICIAN ESCALATION     Yes/No  no    Orders received and case discussed with NA.    Disposition: Remain in room 8068.    FOLLOW-UP     Call back the Rapid Response Nurse, Dahlia Jain RN at [phone] for additional questions or concerns.        "

## 2020-05-18 NOTE — NURSING TRANSFER
Nursing Transfer Note      5/18/2020     Transfer from 8068 to 7068    Transfer via bed   Transfer with portable oxygen, bedside monitor, TeleMedicine pad, personal belongings.     Transported by Taylor BALDWIN RN, Violeta HEAD and Clarisa SALINAS RN    Medicines : eye drops delivered with pt.     Chart send with patient: Yes    Notified: MD  Patient reassessed at: 1515 5/18/20  Upon arrival to floor:Pt received by Estephanie VALLADARES and Sandra BAE.

## 2020-05-18 NOTE — PROGRESS NOTES
[facility] Hospital Medicine  Telemedicine Progress Note    Patient Name: Bharathi Garrido  MRN: [id number]  Patient Class: IP- Inpatient   Admission Date: 4/28/2020  Length of Stay: 20 days  Attending Physician: Maribel Israel MD  Primary Care Provider: Melvin Gee MD (Inactive)    [facility] Medicine [place] [facility] [place] 10 Maribel Israel MD    This service was provided through telemedicine.  Start time: 1:31 PM  Chief complaint: COVID-19  [facility]  Present with the patient at the time of the telemed/virtual assessment:   End time: 1:35 PM  Total time spent with patient: 4 min  The attending portion of this evaluation, treatment, and documentation was performed per Maribel Israel MD via audiovisual.  Additional time spent in care of the patient: coordinating care including speaking with case management, reviewing records, discussing case with consultants, or discussing the plan of care with the patient or family.  Total 18 minutes.    Patient was transferred to the telemedicine service on: 5/16/2020    Subjective:     Principal Problem:Pneumonia due to COVID-19 virus    .Interval History / Events Overnight: No significant events reported by Nursing.  Patient complains of myalgias. Symptoms have been unchanged since yesterday. Associated symptoms include: fatigue and poor but improving appetite. Treatment thus far includes symptomatic management and supportive treatments.  SpO2 98% on 1 L NC  Date of initial symptoms:  3/30/2020  Data reviewed 5/18/2020:  H&H stable. CRP improved.    I have assessed these findings virtually using telemed platform and with assistance of bedside nurse or telemedicine presenter.    Review of Systems   Constitutional: Negative for fever.   Gastrointestinal: Positive for constipation.     Objective:     Vital Signs (Most Recent):  Temp: 98.5 °F (36.9 °C) (05/18/20 1200)  Pulse: 108 (05/18/20 1600)  Resp: 20 (05/18/20 1600)  BP:  121/76 (05/18/20 1600)  SpO2: 99 % (05/18/20 1600) Vital Signs (24h Range):  Temp:  [97.9 °F (36.6 °C)-98.6 °F (37 °C)] 98.5 °F (36.9 °C)  Pulse:  [] 108  Resp:  [20-30] 20  SpO2:  [91 %-100 %] 99 %  BP: (119-135)/(76-87) 121/76     Weight: 105.4 kg (232 lb 5.8 oz)  Body mass index is 32.41 kg/m².    Intake/Output Summary (Last 24 hours) at 5/18/2020 1703  Last data filed at 5/18/2020 1400  Gross per 24 hour   Intake 640 ml   Output 1100 ml   Net -460 ml      Physical Exam   Constitutional: He is cooperative. No distress.   Eyes: Conjunctivae and lids are normal. Right eye exhibits no discharge. Left eye exhibits no discharge. No scleral icterus.   Cardiovascular: Tachycardia present.   Pulmonary/Chest: Effort normal. No accessory muscle usage. No tachypnea. No respiratory distress.   Abdominal: Normal appearance.   Neurological: He is alert. He is not disoriented.   Skin: No rash noted. He is not diaphoretic. No cyanosis. No pallor.       Significant Labs:     Recent Labs   Lab 04/04/20  0020   HGBA1C 6.0*     Recent Labs   Lab 05/17/20  0529   MG 1.8     Recent Labs   Lab 05/12/20  0301 05/13/20  0422 05/17/20  0529   WBC 10.08 12.55 11.66   HGB 8.8* 9.0* 9.1*   HCT 28.8* 29.1* 29.7*    336 265      Recent Labs   Lab 05/17/20  0529   INR 1.0      Recent Labs   Lab 05/12/20  0300 05/13/20  0422 05/14/20  0305 05/17/20  0529    139 140 136   K 3.4* 3.2* 3.6 3.6    101 102 98   CO2 28 28 26 25   BUN 19 17 17 15   CREATININE 1.0 0.9 1.0 0.9   ANIONGAP 11 10 12 13   CALCIUM 8.3* 8.6* 8.7 8.7        Recent Labs   Lab 05/12/20  0300 05/13/20  0422 05/17/20  0529   PROT 6.8 7.2 7.3   ALBUMIN 2.1* 2.3* 2.5*   BILITOT 1.7* 1.8* 1.4*   ALKPHOS 120 129 128   AST 35 34 27   ALT 61* 60* 48*      Recent Labs   Lab 05/14/20  0305 05/17/20  0529   BNP 12  --    CRP  --  50.7*   DDIMER  --  13.92*   LDH  --  388*      BNP (pg/mL)   Date Value   05/14/2020 12   04/28/2020 383 (H)     CRP (mg/L)   Date Value    05/17/2020 50.7 (H)   04/28/2020 307.2 (H)   04/27/2020 335.6 (H)   04/26/2020 380.2 (H)   04/25/2020 247.9 (H)     Ferritin (ng/mL)   Date Value   05/17/2020 1,691 (H)   04/28/2020 4,082 (H)   04/02/2020 1,911 (H)   03/31/2020 1,590 (H)     LD (U/L)   Date Value   05/17/2020 388 (H)   04/28/2020 477 (H)   04/02/2020 783 (H)   03/31/2020 580 (H)   03/30/2017 222     SARS-CoV2 (COVID-19) Qualitative PCR (no units)   Date Value   05/16/2020 Detected (A)   05/13/2020 Detected (A)   05/08/2020 Detected (A)   04/21/2020 Detected (A)   03/31/2020 Detected (A)     Blood Culture, Routine (no units)   Date Value   05/01/2020 No Growth after 4 days.    05/01/2020 No Growth after 4 days.    04/28/2020 No Growth after 4 days.    04/28/2020 No Growth after 4 days.    04/25/2020 No Growth after 4 days.       Significant Imaging:     Assessment/Plan:      Active Diagnoses:    Diagnosis Date Noted POA    PRINCIPAL PROBLEM:  Pneumonia due to COVID-19 virus [U07.1, J12.89] 03/31/2020 Yes    Acute respiratory distress syndrome (ARDS) due to 2019 novel coronavirus [U07.1, J80] 05/15/2020 Yes    Debility [R53.81] 05/14/2020 Yes    Sinus tachycardia [R00.0] 05/13/2020 No    Anemia of chronic disease [D63.8] 05/09/2020 Yes    Urinary retention [R33.9] 05/09/2020 No    Hypertension [I10] 05/09/2020 No    COVID-19 virus infection [U07.1] 04/02/2020 Yes    Acute respiratory failure with hypoxia [J96.01] 04/01/2020 Yes    Class 1 obesity with body mass index (BMI) of 34.0 to 34.9 in adult [E66.9, Z68.34] 02/01/2018 Not Applicable      Problems Resolved During this Admission:    Diagnosis Date Noted Date Resolved POA    Hypokalemia [E87.6] 05/12/2020 05/14/2020 No    Vomiting [R11.10] 05/09/2020 05/14/2020 No    SIRS (systemic inflammatory response syndrome) [R65.10] 05/02/2020 05/14/2020 Yes    Anemia [D64.9] 04/27/2020 05/08/2020 Yes    Hyponatremia [E87.1] 04/22/2020 05/14/2020 Yes    Ileus [K56.7] 04/21/2020 05/08/2020  Yes    WILBUR (acute kidney injury) [N17.9] 04/03/2020 05/14/2020 Yes    History of prostate cancer [Z85.46] 02/01/2018 05/08/2020 Not Applicable       Inpatient Medications Prescribed for Management of Current Problems:     Scheduled Meds:    brimonidine 0.2%  1 drop Both Eyes BID    enoxaparin  40 mg Subcutaneous Daily    latanoprost  1 drop Both Eyes QHS    metoprolol succinate  12.5 mg Oral Daily    timolol maleate 0.5%  1 drop Both Eyes BID    white petrolatum-mineral oiL   Both Eyes BID     Continuous Infusions:   As Needed: acetaminophen, albuterol, docusate sodium, famotidine, ondansetron, promethazine, sodium chloride 0.9%    Assessment and Plan by Problem    Pneumonia due to COVID-19 virus  With acute respiratory failure  - Initially admitted 3/31 at OSH  - Intubated 34 days (starting 4/2), extubated 5/6 to 4 L NC, BiPAP at night. Awake and alert, intact.   - Completed course of Plaquenil, Azithromycin, and Rocephin   - Repeat COVID-19 on 4/21 and 5/8 positive; Positive 05/16  - He is noted to desaturate some when working with PT, but responds well to increase in supplemental oxygen  - Currently on NC 1L with adequate saturation. Wean as tolerated     Urinary retention  - Perez removed 5/8, replaced 5/10  - he reports he had a stent placed after his prostatectomy, and he has to press a button to release urine  - UA not overtly indicative of UTI  - removed Perez  5/13  -  cc/24 hours, monitor (05/17/20)     Sinus tachycardia  - Patient noted to have high-normal HR with range   - some tachycardia coincides with working with PT/OT  - last EKG 4/16/2020 showed sinus rhythm with PVCs  - Currently -115 bpm and patient is normotensive  - started metoprolol 12.5 mg daily on 05/17/20; remains tachycardic     Hypertension  - Patient without history of hypertension at home  - occasionally still with some hypertension with systolic -150  - started metoprolol 12.5 mg daily started 05/17  -  currently normotensive     Anemia of chronic disease  - Possibly from acute illness  - no active bleeding noted  - stable Hgb over several weeks around 7-8     Hyponatremia  - Patient has had both hypo and hypernatremia while inpatient  - Hypernatremia thought to be a result of Lasix use while in ICU  - Improved after free water boluses and now PO intake adequate  - RESOLVED      WILBUR (acute kidney injury)  - WILBUR improved  - Cr stabilizied around 1.1-1.3  - Off IV Lasix since 5/1/20  - Encouraged increased PO fluid intake  - RESOLVED      Debility  - due to critical illness and prolonged hospitalization 2/2 COVID  - needs Rehab vs LTAC- pending     HIGH RISK CONDITION(S):   Patient has a condition that poses threat to life and bodily function: Severe Respiratory Distress and Acute Renal Failure     Discharge plan and follow up  Rehab Facility  [facility] 5/19/2020  Previous admission:  3/31/20    Goals of Care:  Code Status: Full Code  Comfort Only: No  Hospice: No    Diet: Diet Adult Regular (IDDSI Level 7) Thin  GI Prophylaxis: Not indicated  Significant LDAs:   IV Access Type: Peripheral  Urinary Catheter Indication if present: Patient Does Not Have Urinary Catheter  Other Lines/Tubes/Drains:    VTE Risk Mitigation (From admission, onward)         Ordered     enoxaparin injection 40 mg  Daily      04/28/20 1535     IP VTE HIGH RISK PATIENT  Once      04/28/20 1535                Maribel Israel MD  Department of Hospital Medicine   [facility] - Respiratory ICU

## 2020-05-18 NOTE — RESPIRATORY THERAPY
Rapid Response Respiratory Therapy Proactive Rounding Note      Time of visit: 0849     Code Status: Full Code   : 1950  Bed: 8068/8068 A:   MRN: [id number]    SITUATION     Evaluated patient for: BIPAP PRN    BACKGROUND     Patient has a past medical history of Class 1 obesity with body mass index (BMI) of 33.0 to 33.9 in adult, Difficult intubation, Glaucoma, Prostate cancer, and Sebaceous cyst of scrotum.    ASSESSMENT/INTERVENTIONS     Upon arrival in room patient sleeping comfortably on nasal cannula.  BIPAP machine not needed at this time.    Pulse: 117 Respiratory rate: 25 Temperature: Temp: 98.5 °F (36.9 °C) BP: BP: 129/85 SpO2: 95%  Level of Consciousness: Level of Consciousness (AVPU): alert  Respiratory Effort: Respiratory Effort: Normal, Unlabored Expansion/Accessory Muscle Usage: Expansion/Accessory Muscles/Retractions: expansion symmetric, no retractions, no use of accessory muscles  All Lung Field Breath Sounds: All Lung Fields Breath Sounds: Anterior:, Posterior:  TAMMY Breath Sounds: diminished, clear  LLL Breath Sounds: diminished  RUL Breath Sounds: clear  RML Breath Sounds: clear  RLL Breath Sounds: diminished  O2 Device/Concentration: 1L Nasal Cannula  Most recent blood gas: No results for input(s): PH, PCO2, PO2, HCO3, POCSATURATED, BE in the last 72 hours.  NIPPV: Yes, Type: BIPAP Settings: 10/5 40% Surgical airway: No  ETCO2 monitored:    Ambu at bedside: Ambu bag with the patient?: Yes, Adult Ambu    Current Respiratory Care Orders:   Start   Ordered   20  Flutter Valve Treatment Q12H Every 12 hours ( released)    Release    20 0849   20 1752  Oxygen Continuous Continuous     References: Oxygen Titration Protocol   Question Answer Comment   Device type: Low flow    Device: Nasal Cannula (1- 5 Liters)    LPM: 1-5    Titrate O2 per Oxygen Titration Protocol: Yes    To maintain SpO2 goal of: >= 90%    Notify MD of: Inability to achieve desired SpO2;  Sudden change in patient status and requires 20% increase in FiO2; Patient requires >60% FiO2        05/06/20 1751   05/03/20 2000  Inhalation Treatment Q12H Every 12 hours (31 of 37 released)    Release    05/03/20 1624   04/28/20 1600  Please use the respiratory therapy protocols to escalate and de-escalate therapy: Airway Clearance Protocol, Airway Clearance Protocol, Atelectasis/Hyperinflation Protocol, Bronchodilator Protocol Every 4 hours (123 of 146 released)    Release   Comments: Assess patient q4h until patient is below 35% oxygen    04/28/20 1400   04/28/20 1535  Pulse Oximetry Continuous Continuous      04/28/20 1535   Unscheduled  Bipap PRN Use PRN (0 of 96162 released)    Release   Question Answer Comment   FiO2% 40    Inspiratory pressure: 10    Expiratory pressure: 5        05/14/20 2045      IP Meds - Nasal and Inhaled   Comment  Hide   (From admission, onward)      Last edited by  on  at    Start   Stop Status Route Frequency Ordered   04/28/20 1534  albuterol inhaler 2 puff    Discontinue    -- Verified Inhl Every 6 hours PRN 04/28/20 1535         RECOMMENDATIONS     We recommend: patient be put on BIPAP if clinically necessary.  Call respiratory with any questions or concerns.    ESCALATION      Physician Escalation (Yes/No) No  Discussed plan of care primary RT, Jaron Vallejo.     FOLLOW-UP     Please call back the Rapid Response RT, Hanh Schwarz, RRT at x [phone] for any questions or concerns.

## 2020-05-18 NOTE — PLAN OF CARE
SUNITHA spoke with Bethanie with [facility] and was told that they have medically accepted pt, they are just waiting for [employer] auth.  They have submitted and are waiting on response/auth.  Bethanie also stated that she has spoken with pt's spouse, Mrs. Fletcher, and she is in agreement with the plan for pt to go to [facility].  SUNITHA will continue to F/U.      [place] SW  [phone]

## 2020-05-18 NOTE — PT/OT/SLP PROGRESS
Occupational Therapy   Treatment    Name: Bharathi Garrido  MRN: [id number]  Admitting Diagnosis:  Pneumonia due to COVID-19 virus       Recommendations:     Discharge Recommendations: rehabilitation facility  Discharge Equipment Recommendations:  none      Assessment:     Bharathi Garrido is a 70 y.o. male with a medical diagnosis of Pneumonia due to COVID-19 virus.  He presents with fair participation and motivation. Performance deficits affecting function are weakness, impaired endurance, impaired self care skills, impaired functional mobilty, impaired balance, decreased upper extremity function, decreased lower extremity function, decreased safety awareness, impaired cardiopulmonary response to activity.     Rehab Prognosis:  Fair; patient would benefit from acute skilled OT services to address these deficits and reach maximum level of function.       Plan:     Patient to be seen 5 x/week to address the above listed problems via self-care/home management, therapeutic activities, therapeutic exercises, neuromuscular re-education  · Plan of Care Expires: 06/05/20  · Plan of Care Reviewed with: patient    Subjective     Pain/Comfort:  · Pain Rating 1: 0/10  · Pain Rating Post-Intervention 1: 0/10    Objective:     Communicated with: RN prior to session.  Patient found supine with blood pressure cuff, telemetry, pulse ox (continuous), bed alarm, Condom Catheter upon OT entry to room.    General Precautions: Standard, aspiration, airborne, contact, droplet, fall     Occupational Performance:     Bed Mobility:    · Patient completed Rolling/Turning with maximal assistance  · Patient completed Scooting/Bridging with maximal assistance scooting forward on EOB; total (A) with scooting up HOB while supine  · Patient completed Supine to Sit with maximal assistance  · Patient completed Sit to Supine with total assistance     Activities of Daily Living:  · Grooming: stand by assistance seated EOB  · Upper Body Dressing: maximal  assistance seated EOB      [facility] 6 Click ADL: 11    Treatment & Education:  Pt required SBA for postural control while seated EOB.  Provided verbal & physical cues to facilitate postural control. O2 sats were in mid 80's therefore elevated O2 from 1 to 2 liters for session & then lowed back to 1 liter at end of session (O2 sats WFL).  Pt's HR was 130's to 144 while seated EOB with minimal decrease after period of rest therefore returned pt back to supine with HR decreasing to 128.  Notified RN.  Pt had no further questions & when asked whether there were any concerns pt reported none.        Patient left supine with all lines intact, call button in reach, bed alarm on and RN notifiedEducation:      GOALS:   Multidisciplinary Problems     Occupational Therapy Goals        Problem: Occupational Therapy Goal    Goal Priority Disciplines Outcome Interventions   Occupational Therapy Goal     OT, PT/OT Ongoing, Progressing    Description:  Goals to be met by: 5/21/2020     Patient will increase functional independence with ADLs by performing:    Grooming while EOB with Minimal Assistance.  Toileting from bedside commode with Moderate Assistance for hygiene and clothing management.   Sitting at edge of bed x10 minutes with Stand-by Assistance. Goal met  Stand pivot transfers with Moderate Assistance.  Toilet transfer to bedside commode with Moderate Assistance.                       Time Tracking:     OT Date of Treatment: 05/18/20  OT Start Time: 0822  OT Stop Time: 0845  OT Total Time (min): 23 min    Billable Minutes:Self Care/Home Management 13  Therapeutic Activity 10    LAURY Goldsmith  5/18/2020

## 2020-05-18 NOTE — PROGRESS NOTES
Reviewed patient history and current admission. Recommend Long-term Acute Care.  LTACH preference per patient/[facility].      MANFRED Izaguirre, FNP-C  Physical Medicine & Rehabilitation   05/18/2020  Spectralink: [id number]

## 2020-05-18 NOTE — PLAN OF CARE
Problem: Physical Therapy Goal  Goal: Physical Therapy Goal  Description  Goals to be met by:      Patient will increase functional independence with mobility by performin. Supine to sit with MInimal Assistance -not met  2. Sit to supine with MInimal Assistance -not met  3. Sit to stand transfer with Maximum Assistance -not met  4. Bed to chair transfer with Maximum Assistance using LRAD -not met  5. Gait  x 10 feet with Maximum Assistance using LRAD. -not met  6. Sitting at edge of bed x10 minutes with Stand-by Assistance while performing UE dynamic task to prepare for functional mobility and ADLs - met   7. Lower extremity exercise program x20 reps per handout, with independence -not met        Outcome: Ongoing, Progressing   Goals updated for time frame and are appropriate. Sheridan Russell, PT  2020

## 2020-05-18 NOTE — PT/OT/SLP PROGRESS
Physical Therapy  Co-Treatment with OT    Patient Name:  Bharathi Garrido   MRN:  [id number]    Recommendations:     Discharge Recommendations:  rehabilitation facility   Discharge Equipment Recommendations: (will determine DME needs closer to discharge)   Barriers to discharge: Decreased caregiver support    Assessment:     Bharathi Garrido is a 70 y.o. male admitted with a medical diagnosis of Pneumonia due to COVID-19 virus.  He presents with the following impairments/functional limitations:  weakness, gait instability, impaired balance, impaired endurance, decreased lower extremity function, impaired functional mobilty, decreased coordination, decreased safety awareness pt ken treatment well and will benefit from cont skilled PT 4x/wk to progress physically. Pt will need inpt rehab when medically stable to maximize rehab potential.     Rehab Prognosis: Good; patient would benefit from acute skilled PT services to address these deficits and reach maximum level of function.    Recent Surgery: * No surgery found *      Plan:     During this hospitalization, patient to be seen 4 x/week to address the identified rehab impairments via gait training, therapeutic activities, therapeutic exercises, neuromuscular re-education and progress toward the following goals:    · Plan of Care Expires:  06/01/20    Subjective     Chief Complaint: pt asked if other covid pt were as weak as he is.   Patient/Family Comments/goals:  To get better and go home.   Pain/Comfort:  · Pain Rating 1: 0/10  · Pain Rating Post-Intervention 1: 0/10      Objective:     Communicated with nurse prior to session.  Patient found supine with telemetry, blood pressure cuff, pulse ox (continuous), oxygen, bed alarm(condom catheter, hep lock IV) upon PT entry to room.     General Precautions: Standard, airborne, fall   Orthopedic Precautions:N/A   Braces:       Functional Mobility:  · Bed Mobility:   Pt needed verbal cues for hand placement and sequencing for  functional mobility.   · Rolling Right: maximal assistance  · Supine to Sit: total assistance and of 2 persons  · Sit to Supine: total assistance and of 2 persons  ·   · Balance: pt sat on EOB x 15 min with CGA. Pt needed verbal cues to hold head upright with sitting.  pt HR increased to 144 and O2 had to be increased from 1L to 2L during treatment. pt was returned to supine in bed and HR decreased to 128.       AM-PAC 6 CLICK MOBILITY  Turning over in bed (including adjusting bedclothes, sheets and blankets)?: 2  Sitting down on and standing up from a chair with arms (e.g., wheelchair, bedside commode, etc.): 1  Moving from lying on back to sitting on the side of the bed?: 2  Moving to and from a bed to a chair (including a wheelchair)?: 1  Need to walk in hospital room?: 1  Climbing 3-5 steps with a railing?: 1  Basic Mobility Total Score: 8       Therapeutic Activities and Exercises:   pt performed AAROM there exer BLE in supine x 15 reps with rest periods as needed. Pt received verbal instruction in PT POC and verbally expressed understanding of such.     Patient left supine with all lines intact, call button in reach and bed alarm on..    GOALS:   Multidisciplinary Problems     Physical Therapy Goals        Problem: Physical Therapy Goal    Goal Priority Disciplines Outcome Goal Variances Interventions   Physical Therapy Goal     PT, PT/OT Ongoing, Progressing     Description:  Goals to be met by:      Patient will increase functional independence with mobility by performin. Supine to sit with MInimal Assistance -not met  2. Sit to supine with MInimal Assistance -not met  3. Sit to stand transfer with Maximum Assistance -not met  4. Bed to chair transfer with Maximum Assistance using LRAD -not met  5. Gait  x 10 feet with Maximum Assistance using LRAD. -not met  6. Sitting at edge of bed x10 minutes with Stand-by Assistance while performing UE dynamic task to prepare for functional mobility and  ADLs - met 5/14  7. Lower extremity exercise program x20 reps per handout, with independence -not met                         Time Tracking:     PT Received On: 05/18/20  PT Start Time: 0808     PT Stop Time: 0841  PT Total Time (min): 33 min     Billable Minutes: Therapeutic Exercise 33 min    Treatment Type: (co-treatment with OT)  PT/PTA: PT     PTA Visit Number: 0     Sheridan Russell, PT  05/18/2020

## 2020-05-18 NOTE — PROGRESS NOTES
"  [facility] - Respiratory ICU  Adult Nutrition  Consult Note    SUMMARY     Recommendations    1. Continue current Regular diet + Boost Plus ONS. Continue to encourage adequate PO intake as tolerated.   2. RD to monitor & follow-up.    Goals: receive nutrition by RD follow-up  Nutrition Goal Status: goal met  Communication of RD Recs: (POC)    Reason for Assessment    Reason For Assessment: RD follow-up  Diagnosis: (Pneumonia due to COVID-19 virus)  Relevant Medical History: obesity, prostate cancer 1/2018   Interdisciplinary Rounds: did not attend    General Information Comments: RD working remotely, +COVID19. Pt continues w/ poor to fair appetite, consuming 25-50% of meals, "trying" to drink ONS. Good appetite, stable wt PTA (UBW: 230#). Pt doesnt meet malnutrition criteria, however at risk if poor PO intake continues. ST recommends Regular diet w/ thin liquids.  Nutrition Discharge Planning: Adequate PO intake    Nutrition Risk Screen    Nutrition Risk Screen: large or nonhealing wound, burn or pressure injury    Nutrition/Diet History    Spiritual, Cultural Beliefs, Taoist Practices, Values that Affect Care: no  Factors Affecting Nutritional Intake: decreased appetite    Anthropometrics    Temp: 97.9 °F (36.6 °C)  Height: 5' 11" (180.3 cm)  Height (inches): 71 in  Weight Method: Bed Scale  Weight: 105.4 kg (232 lb 5.8 oz)  Weight (lb): 232.37 lb  Ideal Body Weight (IBW), Male: 172 lb  % Ideal Body Weight, Male (lb): 135.1 %  BMI (Calculated): 32.4  BMI Grade: 30 - 34.9- obesity - grade I    Lab/Procedures/Meds    Pertinent Labs Reviewed: reviewed  Pertinent Labs Comments: -  Pertinent Medications Reviewed: reviewed  Pertinent Medications Comments: -    Estimated/Assessed Needs    Weight Used For Calorie Calculations: 105.4 kg (232 lb 5.8 oz)     Energy Calorie Requirements (kcal): 2215 kcal/d  Energy Need Method: Vaishali Ramirez(1.2 PAL)     Protein Requirements: 105 g/d (1 g/kg)  Weight Used For " Protein Calculations: 105.4 kg (232 lb 5.8 oz)     Fluid Requirements (mL): 1 mL/kcal or per MD    Nutrition Prescription Ordered    Current Diet Order: Regular  Nutrition Order Comments: Boost Plus ONS    Evaluation of Received Nutrient/Fluid Intake    Comments: LBM: 5/16    Tolerance: tolerating    Nutrition Risk    Level of Risk/Frequency of Follow-up: (1x/week)     Assessment and Plan    Nutrition Problem  Inadequate oral intake     Related to (etiology):   Decreased appetite      Signs and Symptoms (as evidenced by):   Poor PO intake      Interventions(treatment strategy):  Collaboration of nutrition care w/ other providers       Nutrition Diagnosis Status:   Continues     Monitor and Evaluation    Food and Nutrient Intake: energy intake, food and beverage intake  Food and Nutrient Adminstration: diet order  Physical Activity and Function: nutrition-related ADLs and IADLs  Anthropometric Measurements: weight, weight change  Biochemical Data, Medical Tests and Procedures: electrolyte and renal panel, gastrointestinal profile, glucose/endocrine profile, inflammatory profile, lipid profile  Nutrition-Focused Physical Findings: overall appearance     Nutrition Follow-Up    RD Follow-up?: Yes

## 2020-05-18 NOTE — CARE UPDATE
Rapid Response Nurse Follow-up Note     Followed up with patient for proactive rounding.   No acute issues at this time. Reviewed plan of care with Bedside RNSandra .   Team will continue to follow.  Please call Rapid Response RN, Dahlia Jain RN with any questions or concerns at [phone]

## 2020-05-18 NOTE — PLAN OF CARE
Patient instructed through Tele-,health yesterday, they are preparing for transfer to Rehab,when a site becomes available.

## 2020-05-19 PROBLEM — R00.0 SINUS TACHYCARDIA: Status: RESOLVED | Noted: 2020-05-13 | Resolved: 2020-05-19

## 2020-05-19 LAB
ALBUMIN SERPL BCP-MCNC: 2.5 G/DL (ref 3.5–5.2)
ALP SERPL-CCNC: 130 U/L (ref 55–135)
ALT SERPL W/O P-5'-P-CCNC: 43 U/L (ref 10–44)
ANION GAP SERPL CALC-SCNC: 10 MMOL/L (ref 8–16)
AST SERPL-CCNC: 27 U/L (ref 10–40)
BASOPHILS # BLD AUTO: 0.09 K/UL (ref 0–0.2)
BASOPHILS NFR BLD: 0.6 % (ref 0–1.9)
BILIRUB SERPL-MCNC: 1.3 MG/DL (ref 0.1–1)
BUN SERPL-MCNC: 17 MG/DL (ref 8–23)
CALCIUM SERPL-MCNC: 8.8 MG/DL (ref 8.7–10.5)
CHLORIDE SERPL-SCNC: 97 MMOL/L (ref 95–110)
CO2 SERPL-SCNC: 28 MMOL/L (ref 23–29)
CREAT SERPL-MCNC: 1.1 MG/DL (ref 0.5–1.4)
DIFFERENTIAL METHOD: ABNORMAL
EOSINOPHIL # BLD AUTO: 0.2 K/UL (ref 0–0.5)
EOSINOPHIL NFR BLD: 1.1 % (ref 0–8)
ERYTHROCYTE [DISTWIDTH] IN BLOOD BY AUTOMATED COUNT: 15.6 % (ref 11.5–14.5)
EST. GFR  (AFRICAN AMERICAN): >60 ML/MIN/1.73 M^2
EST. GFR  (NON AFRICAN AMERICAN): >60 ML/MIN/1.73 M^2
GLUCOSE SERPL-MCNC: 96 MG/DL (ref 70–110)
HCT VFR BLD AUTO: 29.3 % (ref 40–54)
HGB BLD-MCNC: 9.5 G/DL (ref 14–18)
IMM GRANULOCYTES # BLD AUTO: 0.09 K/UL (ref 0–0.04)
IMM GRANULOCYTES NFR BLD AUTO: 0.6 % (ref 0–0.5)
LYMPHOCYTES # BLD AUTO: 1.7 K/UL (ref 1–4.8)
LYMPHOCYTES NFR BLD: 12.1 % (ref 18–48)
MAGNESIUM SERPL-MCNC: 1.8 MG/DL (ref 1.6–2.6)
MCH RBC QN AUTO: 27.6 PG (ref 27–31)
MCHC RBC AUTO-ENTMCNC: 32.4 G/DL (ref 32–36)
MCV RBC AUTO: 85 FL (ref 82–98)
MONOCYTES # BLD AUTO: 1.2 K/UL (ref 0.3–1)
MONOCYTES NFR BLD: 8.7 % (ref 4–15)
NEUTROPHILS # BLD AUTO: 10.7 K/UL (ref 1.8–7.7)
NEUTROPHILS NFR BLD: 76.9 % (ref 38–73)
NRBC BLD-RTO: 0 /100 WBC
PHOSPHATE SERPL-MCNC: 3.3 MG/DL (ref 2.7–4.5)
PLATELET # BLD AUTO: 306 K/UL (ref 150–350)
PMV BLD AUTO: 11.6 FL (ref 9.2–12.9)
POTASSIUM SERPL-SCNC: 3.3 MMOL/L (ref 3.5–5.1)
PROT SERPL-MCNC: 7.3 G/DL (ref 6–8.4)
RBC # BLD AUTO: 3.44 M/UL (ref 4.6–6.2)
SODIUM SERPL-SCNC: 135 MMOL/L (ref 136–145)
WBC # BLD AUTO: 13.93 K/UL (ref 3.9–12.7)

## 2020-05-19 PROCEDURE — 99900035 HC TECH TIME PER 15 MIN (STAT)

## 2020-05-19 PROCEDURE — 25000003 PHARM REV CODE 250: Performed by: INTERNAL MEDICINE

## 2020-05-19 PROCEDURE — 97110 THERAPEUTIC EXERCISES: CPT

## 2020-05-19 PROCEDURE — 80053 COMPREHEN METABOLIC PANEL: CPT

## 2020-05-19 PROCEDURE — 36415 COLL VENOUS BLD VENIPUNCTURE: CPT

## 2020-05-19 PROCEDURE — 27000221 HC OXYGEN, UP TO 24 HOURS

## 2020-05-19 PROCEDURE — 97530 THERAPEUTIC ACTIVITIES: CPT

## 2020-05-19 PROCEDURE — 85025 COMPLETE CBC W/AUTO DIFF WBC: CPT

## 2020-05-19 PROCEDURE — 20600001 HC STEP DOWN PRIVATE ROOM

## 2020-05-19 PROCEDURE — 99231 SBSQ HOSP IP/OBS SF/LOW 25: CPT | Mod: ,,, | Performed by: INTERNAL MEDICINE

## 2020-05-19 PROCEDURE — 94761 N-INVAS EAR/PLS OXIMETRY MLT: CPT

## 2020-05-19 PROCEDURE — 25000003 PHARM REV CODE 250: Performed by: STUDENT IN AN ORGANIZED HEALTH CARE EDUCATION/TRAINING PROGRAM

## 2020-05-19 PROCEDURE — 63600175 PHARM REV CODE 636 W HCPCS: Performed by: INTERNAL MEDICINE

## 2020-05-19 PROCEDURE — 97535 SELF CARE MNGMENT TRAINING: CPT

## 2020-05-19 PROCEDURE — 84100 ASSAY OF PHOSPHORUS: CPT

## 2020-05-19 PROCEDURE — 99231 PR SUBSEQUENT HOSPITAL CARE,LEVL I: ICD-10-PCS | Mod: ,,, | Performed by: INTERNAL MEDICINE

## 2020-05-19 PROCEDURE — 83735 ASSAY OF MAGNESIUM: CPT

## 2020-05-19 PROCEDURE — 94660 CPAP INITIATION&MGMT: CPT

## 2020-05-19 RX ORDER — AMOXICILLIN 250 MG
1 CAPSULE ORAL 2 TIMES DAILY
Start: 2020-05-19

## 2020-05-19 RX ORDER — POTASSIUM CHLORIDE 750 MG/1
30 CAPSULE, EXTENDED RELEASE ORAL 3 TIMES DAILY
Status: COMPLETED | OUTPATIENT
Start: 2020-05-19 | End: 2020-05-19

## 2020-05-19 RX ORDER — ALBUTEROL SULFATE 90 UG/1
2 AEROSOL, METERED RESPIRATORY (INHALATION) EVERY 6 HOURS PRN
Start: 2020-05-19

## 2020-05-19 RX ORDER — LACTULOSE 10 G/15ML
20 SOLUTION ORAL EVERY 6 HOURS PRN
Start: 2020-05-19 | End: 2020-05-29

## 2020-05-19 RX ORDER — METOPROLOL SUCCINATE 25 MG/1
12.5 TABLET, EXTENDED RELEASE ORAL DAILY
Qty: 15 TABLET | Refills: 11
Start: 2020-05-20 | End: 2021-05-20

## 2020-05-19 RX ADMIN — TIMOLOL MALEATE 1 DROP: 5 SOLUTION/ DROPS OPHTHALMIC at 10:05

## 2020-05-19 RX ADMIN — MINERAL OIL AND WHITE PETROLATUM: 150; 830 OINTMENT OPHTHALMIC at 09:05

## 2020-05-19 RX ADMIN — TIMOLOL MALEATE 1 DROP: 5 SOLUTION/ DROPS OPHTHALMIC at 09:05

## 2020-05-19 RX ADMIN — POTASSIUM CHLORIDE 30 MEQ: 750 CAPSULE, EXTENDED RELEASE ORAL at 05:05

## 2020-05-19 RX ADMIN — BRIMONIDINE TARTRATE 1 DROP: 2 SOLUTION OPHTHALMIC at 09:05

## 2020-05-19 RX ADMIN — ENOXAPARIN SODIUM 40 MG: 100 INJECTION SUBCUTANEOUS at 09:05

## 2020-05-19 RX ADMIN — STANDARDIZED SENNA CONCENTRATE AND DOCUSATE SODIUM 1 TABLET: 8.6; 5 TABLET ORAL at 09:05

## 2020-05-19 RX ADMIN — METOPROLOL SUCCINATE 12.5 MG: 25 TABLET, EXTENDED RELEASE ORAL at 10:05

## 2020-05-19 RX ADMIN — BRIMONIDINE TARTRATE 1 DROP: 2 SOLUTION OPHTHALMIC at 10:05

## 2020-05-19 RX ADMIN — MINERAL OIL AND WHITE PETROLATUM: 150; 830 OINTMENT OPHTHALMIC at 10:05

## 2020-05-19 RX ADMIN — LATANOPROST 1 DROP: 50 SOLUTION OPHTHALMIC at 09:05

## 2020-05-19 RX ADMIN — STANDARDIZED SENNA CONCENTRATE AND DOCUSATE SODIUM 1 TABLET: 8.6; 5 TABLET ORAL at 10:05

## 2020-05-19 RX ADMIN — POTASSIUM CHLORIDE 30 MEQ: 750 CAPSULE, EXTENDED RELEASE ORAL at 09:05

## 2020-05-19 NOTE — PLAN OF CARE
05/19/20 1621   Post-Acute Status   Post-Acute Authorization Placement   Post-Acute Placement Status Authorization Obtained  (LTAC)     SW informed by Bethanie Suresh ([phone]) that they received authorization but that their admissions coordinator left for the day.  Also since LTAC orders are still pending decided to plan to admit in the morning.  Dr Israel notified.    Aiyana Cordova, LAURIE  [facility]  [phone]

## 2020-05-19 NOTE — PT/OT/SLP PROGRESS
Physical Therapy  Co-Treatment with OT    Patient Name:  Bharathi Garrido   MRN:  [id number]    Recommendations:     Discharge Recommendations:  rehabilitation facility   Discharge Equipment Recommendations: (will determine DME needs closer to discharge)   Barriers to discharge: Decreased caregiver support family will not be able to assist at current functional level.     Assessment:     Bharathi Garrido is a 70 y.o. male admitted with a medical diagnosis of Pneumonia due to COVID-19 virus.  He presents with the following impairments/functional limitations:  weakness, impaired functional mobilty, impaired endurance, gait instability, impaired balance, decreased lower extremity function pt ken treatment better being able to stand more upright. Pt will benefit from cont skilled PT to progress physically. Pt will need inpt rehab when medically stable to maximize rehab potential.       Rehab Prognosis: Good; patient would benefit from acute skilled PT services to address these deficits and reach maximum level of function.    Recent Surgery: * No surgery found *      Plan:     During this hospitalization, patient to be seen 5 x/week to address the identified rehab impairments via gait training, therapeutic activities, therapeutic exercises, neuromuscular re-education and progress toward the following goals:    · Plan of Care Expires:  06/01/20    Subjective     Chief Complaint: pt stated that he was ready to go to rehab.   Patient/Family Comments/goals:  To get better and go home.   Pain/Comfort:  · Pain Rating 1: 0/10  · Pain Rating Post-Intervention 1: 0/10      Objective:     Communicated with nurse prior to session.  Patient found supine with telemetry, pulse ox (continuous), oxygen(condom catheter, hep lock IV) upon PT entry to room.     General Precautions: Standard, airborne, fall   Orthopedic Precautions:N/A   Braces:       Functional Mobility:  · Bed Mobility:  Pt needed verbal cues for hand placement and sequencing for  functional mobility.    · Rolling Right: maximal assistance  · Supine to Sit: maximal assistance and of 2 persons  · Sit to Supine: maximal assistance and of 2 persons  ·   · Transfers:     · Sit to Stand:  maximal assistance and of 2 persons with hand-held assist. Pt stood x 2 reps but was not able to come to full upright posture. Pt had B hips and knees flexed.   ·   · Balance: pt sat on EOB x 10 min with SBA. pt performed ADLS with OT.       AM-PAC 6 CLICK MOBILITY  Turning over in bed (including adjusting bedclothes, sheets and blankets)?: 2  Sitting down on and standing up from a chair with arms (e.g., wheelchair, bedside commode, etc.): 2  Moving from lying on back to sitting on the side of the bed?: 2  Moving to and from a bed to a chair (including a wheelchair)?: 1  Need to walk in hospital room?: 1  Climbing 3-5 steps with a railing?: 1  Basic Mobility Total Score: 9       Therapeutic Activities and Exercises:   pt performed AAROM there exer BLE in supine x 15 reps with rest periods as needed.     Patient left supine with all lines intact and call button in reach..    GOALS:   Multidisciplinary Problems     Physical Therapy Goals        Problem: Physical Therapy Goal    Goal Priority Disciplines Outcome Goal Variances Interventions   Physical Therapy Goal     PT, PT/OT Ongoing, Progressing     Description:  Goals to be met by:      Patient will increase functional independence with mobility by performin. Supine to sit with MInimal Assistance -not met  2. Sit to supine with MInimal Assistance -not met  3. Sit to stand transfer with Maximum Assistance -not met  4. Bed to chair transfer with Maximum Assistance using LRAD -not met  5. Gait  x 10 feet with Maximum Assistance using LRAD. -not met  6. Sitting at edge of bed x10 minutes with Stand-by Assistance while performing UE dynamic task to prepare for functional mobility and ADLs - met   7. Lower extremity exercise program x20 reps per  handout, with independence -not met                         Time Tracking:     PT Received On: 05/19/20  PT Start Time: 0743     PT Stop Time: 0756  PT Total Time (min): 13 min     2nd session start time 0907                       End time 0933   13 min +26 min = 39 min total treatment time.     Billable Minutes: Therapeutic Activity 24 min and Therapeutic Exercise 15 min    Treatment Type: (partial co-treatment with OT)  PT/PTA: PT     PTA Visit Number: 0     Sheridan Russell, PT  05/19/2020

## 2020-05-19 NOTE — PLAN OF CARE
[facility]    FACILITY TRANSFER ORDERS      Patient Name: Bharathi Garrido  YOB: 1950    PCP: Melvin Gee MD (Inactive)   PCP Address: [address] [place] [address] [address] / [place] [id number]  PCP Phone Number: [phone]  PCP Fax: [phone]    Encounter Date: 05/20/2020    Admit to:   LTAC    Vital Signs:  Routine    Diagnoses:   Active Hospital Problems    Diagnosis  POA    *Pneumonia due to COVID-19 virus [U07.1, J12.89]  Yes    Acute respiratory distress syndrome (ARDS) due to 2019 novel coronavirus [U07.1, J80]  Yes    Debility [R53.81]  Yes    Sinus tachycardia [R00.0]  No    Anemia of chronic disease [D63.8]  Yes    Urinary retention [R33.9]  No    Hypertension [I10]  Yes    COVID-19 virus infection [U07.1]  Yes    Acute respiratory failure with hypoxia [J96.01]  Yes    Class 1 obesity with body mass index (BMI) of 34.0 to 34.9 in adult [E66.9, Z68.34]  Not Applicable    History of prostate cancer [Z85.46]  Not Applicable      Resolved Hospital Problems    Diagnosis Date Resolved POA    Hypokalemia [E87.6] 05/14/2020 No    Vomiting [R11.10] 05/14/2020 No    SIRS (systemic inflammatory response syndrome) [R65.10] 05/14/2020 Yes    Anemia [D64.9] 05/08/2020 Yes    Hyponatremia [E87.1] 05/14/2020 Yes    Ileus [K56.7] 05/08/2020 Yes    WILBUR (acute kidney injury) [N17.9] 05/14/2020 Yes       Allergies:Review of patient's allergies indicates:  No Known Allergies    Diet: regular diet     Activities: Activity as tolerated    Nursing: Routine.  Nursing to incorporate pressure prevention interventions into care which may include:  SILVIO mattress, EHOB waffle overlay,  heel protectors, urinary diversion device (condom catheter/Pur-wick, catheter), fecal management system(if needed for stools),turn q 2 hours, pillows/wedge for repositioning, blue pad to wick away moisture, HOB no higher than 30 degrees/knee gatch up unless otherwise indicated,  barrier cream/paste as  needed for moisture control, adequate calories.    Labs: Per Facility     CONSULTS:    Physical Therapy to evaluate and treat. , Occupational Therapy to evaluate and treat. and  to evaluate for community resources/long-range planning.    WOUND CARE ORDERS  Left hand eschar/forearm: daily paint with Betadine.  Gluteal/buttocks:  Nursing to cleanse perineal and affected area with perineal cleanser . Pat dry. Apply thin layer of Triad ointment BID and prn cleansing of incontinent episode. Do not apply cover dressing.  Right mid back: cleanse wound with sterile normal saline, apply sensicare around wound then cover wound with foam dressing weekly-  Tuesday-- until resolved.    Medications: Review discharge medications with patient and family and provide education.      Current Discharge Medication List      START taking these medications    Details   albuterol (PROVENTIL/VENTOLIN HFA) 90 mcg/actuation inhaler Inhale 2 puffs into the lungs every 6 (six) hours as needed for Wheezing. Rescue      docusate sodium (COLACE) 50 MG capsule Take 1 capsule (50 mg total) by mouth 2 (two) times daily as needed for Constipation.  Refills: 0      lactulose (CHRONULAC) 20 gram/30 mL Soln Take 30 mLs (20 g total) by mouth every 6 (six) hours as needed.      metoprolol succinate (TOPROL-XL) 25 MG 24 hr tablet Take 0.5 tablets (12.5 mg total) by mouth once daily.  Qty: 15 tablet, Refills: 11    Comments: .      senna-docusate 8.6-50 mg (PERICOLACE) 8.6-50 mg per tablet Take 1 tablet by mouth 2 (two) times daily.      white petrolatum-mineral oiL 83-15 % Oint Place into both eyes 2 (two) times daily.         CONTINUE these medications which have NOT CHANGED    Details   brimonidine 0.2% (ALPHAGAN) 0.2 % Drop Place 1 drop into both eyes 2 (two) times daily.  Refills: 6      latanoprost 0.005 % ophthalmic solution INTALL 1 DROP TO BOTH EYES CONNOR NIGHT  Refills: 6      timolol maleate 0.5% (TIMOPTIC) 0.5 % Drop Place 1 drop into  both eyes 2 (two) times daily.  Refills: 7         STOP taking these medications       bicalutamide (CASODEX) 50 MG Tab Comments:   Reason for Stopping:         sildenafil, antihypertensive, (REVATIO) 20 mg Tab Comments:   Reason for Stopping:              _______e-sig___________  Maribel Israel MD  05/20/2020

## 2020-05-19 NOTE — PLAN OF CARE
AAOx4. VSS. Patient currently on 1L NC with O2 sats %. No significant events overnight. Bed in lowest position, call light and personal items within reach. No acute needs at the moment. Will continue to monitor.

## 2020-05-19 NOTE — PT/OT/SLP PROGRESS
Occupational Therapy   Treatment    Name: Bharathi Garrido  MRN: [id number]  Admitting Diagnosis:  Pneumonia due to COVID-19 virus       Recommendations:     Discharge Recommendations: rehabilitation facility  Discharge Equipment Recommendations:  none      Assessment:     Bharathi Garrido is a 70 y.o. male with a medical diagnosis of Pneumonia due to COVID-19 virus.  He presents with good participation and motivation.  Pt continues to require (A) with all activities & is at risk for falls. Performance deficits affecting function are weakness, impaired endurance, impaired self care skills, impaired functional mobilty, impaired balance, decreased lower extremity function, decreased upper extremity function, decreased safety awareness, decreased coordination, impaired cardiopulmonary response to activity.     Rehab Prognosis:  Fair; patient would benefit from acute skilled OT services to address these deficits and reach maximum level of function.       Plan:     Patient to be seen 5 x/week to address the above listed problems via self-care/home management, therapeutic activities, therapeutic exercises, neuromuscular re-education  · Plan of Care Expires: 06/05/20  · Plan of Care Reviewed with: patient    Subjective     Pain/Comfort:  · Pain Rating 1: 0/10  · Pain Rating Post-Intervention 1: 0/10    Objective:     Communicated with: RN prior to session.  Patient found supine with blood pressure cuff, telemetry, pulse ox (continuous), oxygen, Condom Catheter upon OT entry to room.    General Precautions: Standard, airborne, aspiration, fall, droplet, contact     Occupational Performance:     Bed Mobility:    · Patient completed Rolling/Turning to Right with maximal assistance  · Patient completed Scooting/Bridging with maximal assistance scooting forward on EOB, Total (A) with drawsheet transfer up [facility] while supine  · Patient completed Supine to Sit with maximal assistance and 2 persons  · Patient completed Sit to Supine with  maximal assistance and 2 persons     Functional Mobility/Transfers:  · Patient completed Sit <> Stand Transfer with maximal assistance and of 2 persons  with  no assistive device x 2 trials from EOB    Activities of Daily Living:  · Grooming: minimum assistance brushing teeth while seated EOB & SBA washing face while seated EOB      AMPAC 6 Click ADL: 11    Treatment & Education:  Pt required SBA for postural control while seated EOB.  Provided verbal & physical cues to facilitate postural control. Pt performed AROM exercises with BUE in 3 planes x 10 reps each while supine.  Pt had no further questions & when asked whether there were any concerns pt reported none.    Patient left supine with all lines intact, call button in reach and RN notifiedEducation:      GOALS:   Multidisciplinary Problems     Occupational Therapy Goals        Problem: Occupational Therapy Goal    Goal Priority Disciplines Outcome Interventions   Occupational Therapy Goal     OT, PT/OT Ongoing, Progressing    Description:  Goals to be met by: 5/29/2020     Patient will increase functional independence with ADLs by performing:    Grooming while EOB with Supervision.  Toileting from bedside commode with Moderate Assistance for hygiene and clothing management.   Sitting at edge of bed x10 minutes with Supervision  Stand pivot transfers with Moderate Assistance.  Toilet transfer to bedside commode with Moderate Assistance.  UE dressing with SBA.  Supine to sit with Minimal (A)                        Time Tracking:     OT Date of Treatment: 05/19/20  OT Start Time: 0908  OT Stop Time: 0934  OT Total Time (min): 26 min    Billable Minutes:Self Care/Home Management 15  Therapeutic Exercise 10    LAURY Goldsmith  5/19/2020

## 2020-05-19 NOTE — PLAN OF CARE
Problem: Occupational Therapy Goal  Goal: Occupational Therapy Goal  Description  Goals to be met by: 5/29/2020     Patient will increase functional independence with ADLs by performing:    Grooming while EOB with Supervision.  Toileting from bedside commode with Moderate Assistance for hygiene and clothing management.   Sitting at edge of bed x10 minutes with Supervision  Stand pivot transfers with Moderate Assistance.  Toilet transfer to bedside commode with Moderate Assistance.  UE dressing with SBA.  Supine to sit with Minimal (A)       Outcome: Ongoing, Progressing     Goals updated.

## 2020-05-19 NOTE — PLAN OF CARE
"  Problem: Adult Inpatient Plan of Care  Goal: Plan of Care Review  Outcome: Ongoing, Progressing  Goal: Patient-Specific Goal (Individualization)  Outcome: Ongoing, Progressing  Flowsheets (Taken 5/19/2020 8216)  Individualized Care Needs: make sure wife's phone number is visible to pt and phone is within reach.  Patient-Specific Goals (Include Timeframe): pt hoping to go to rehab to "get my strength back"  Goal: Optimal Comfort and Wellbeing  Outcome: Ongoing, Progressing  Goal: Readiness for Transition of Care  Outcome: Ongoing, Progressing   POC reviewed with pt.  Potassium lvl of 3.3 orally replaced per MD order.  Last BM 5/16, given ordered senna, prn lactulose offered to pt, refused.   LUZ MARINA SWEETOX4, worked with therapy today.  PO intake of food an fluids enc, pt noting poor appetite, denies nausea vomiting.   "

## 2020-05-19 NOTE — PLAN OF CARE
Problem: Physical Therapy Goal  Goal: Physical Therapy Goal  Description  Goals to be met by:      Patient will increase functional independence with mobility by performin. Supine to sit with MInimal Assistance -not met  2. Sit to supine with MInimal Assistance -not met  3. Sit to stand transfer with Maximum Assistance -not met  4. Bed to chair transfer with Maximum Assistance using LRAD -not met  5. Gait  x 10 feet with Maximum Assistance using LRAD. -not met  6. Sitting at edge of bed x10 minutes with Stand-by Assistance while performing UE dynamic task to prepare for functional mobility and ADLs - met   7. Lower extremity exercise program x20 reps per handout, with independence -not met        Outcome: Ongoing, Progressing   Goals remain appropriate. Sheridan Russell, PT  2020

## 2020-05-20 VITALS
RESPIRATION RATE: 27 BRPM | HEIGHT: 71 IN | TEMPERATURE: 98 F | OXYGEN SATURATION: 98 % | HEART RATE: 116 BPM | WEIGHT: 232.38 LBS | DIASTOLIC BLOOD PRESSURE: 75 MMHG | SYSTOLIC BLOOD PRESSURE: 117 MMHG | BODY MASS INDEX: 32.53 KG/M2

## 2020-05-20 DIAGNOSIS — U07.1 COVID-19 VIRUS DETECTED: ICD-10-CM

## 2020-05-20 PROCEDURE — 99239 PR HOSPITAL DISCHARGE DAY,>30 MIN: ICD-10-PCS | Mod: ,,, | Performed by: INTERNAL MEDICINE

## 2020-05-20 PROCEDURE — 97535 SELF CARE MNGMENT TRAINING: CPT

## 2020-05-20 PROCEDURE — 25000003 PHARM REV CODE 250: Performed by: STUDENT IN AN ORGANIZED HEALTH CARE EDUCATION/TRAINING PROGRAM

## 2020-05-20 PROCEDURE — 94761 N-INVAS EAR/PLS OXIMETRY MLT: CPT

## 2020-05-20 PROCEDURE — 94799 UNLISTED PULMONARY SVC/PX: CPT

## 2020-05-20 PROCEDURE — 25000003 PHARM REV CODE 250: Performed by: INTERNAL MEDICINE

## 2020-05-20 PROCEDURE — 99239 HOSP IP/OBS DSCHRG MGMT >30: CPT | Mod: ,,, | Performed by: INTERNAL MEDICINE

## 2020-05-20 RX ADMIN — BRIMONIDINE TARTRATE 1 DROP: 2 SOLUTION OPHTHALMIC at 08:05

## 2020-05-20 RX ADMIN — STANDARDIZED SENNA CONCENTRATE AND DOCUSATE SODIUM 1 TABLET: 8.6; 5 TABLET ORAL at 08:05

## 2020-05-20 RX ADMIN — LACTULOSE 20 G: 20 SOLUTION ORAL at 08:05

## 2020-05-20 RX ADMIN — METOPROLOL SUCCINATE 12.5 MG: 25 TABLET, EXTENDED RELEASE ORAL at 08:05

## 2020-05-20 RX ADMIN — TIMOLOL MALEATE 1 DROP: 5 SOLUTION/ DROPS OPHTHALMIC at 08:05

## 2020-05-20 NOTE — PLAN OF CARE
Pt AAO x 4. Pt denies any pain. O2 weaned to 1L nasal cannula. Will wean to room air by shift change. Pt tolerating 1L well. Pt remains free from falls. Side rails up x 3. Call bell in reach. Condom cath intact. Urine output remains cloudy. Encouraged pt to drink water throughout shift. Pt w/out any questions or concerns. Discharge to rehab anticipated.

## 2020-05-20 NOTE — PLAN OF CARE
Problem: Occupational Therapy Goal  Goal: Occupational Therapy Goal  Description  Goals to be met by: 5/29/2020     Patient will increase functional independence with ADLs by performing:    Grooming while EOB with Supervision (progressing 5/20).  Toileting from bedside commode with Moderate Assistance for hygiene and clothing management.   Sitting at edge of bed x10 minutes with Supervision (progressing 5/20)  Stand pivot transfers with Moderate Assistance.  Toilet transfer to bedside commode with Moderate Assistance.  UE dressing with SBA.  Supine to sit with Minimal (A)      Pt progressing towards grooming golas and EOB sitting tolerance goals. Continue OT as able.  Jennifer Lind OT  5/20/2020 5/20/2020 1244 by Jennifer Lind OT  Outcome: Ongoing, Progressing  5/20/2020 1243 by Jennifer Lind OT  Reactivated

## 2020-05-20 NOTE — NURSING
Pt to be d/c to [facility] in [place]. Report called to RN. [facility] states they will set up transportation.  VSS. A&Ox4. Personal belongings with Pt. Pt updated on POC. D/c instructions given to Pt. PIV removed. All questions answered. Will continue to monitor.

## 2020-05-20 NOTE — PROGRESS NOTES
[facility] Hospital Medicine  Telemedicine Progress Note    Patient Name: Bharathi Garrido  MRN: [id number]  Patient Class: IP- Inpatient   Admission Date: 4/28/2020  Length of Stay: 21 days  Attending Physician: Maribel Israel MD  Primary Care Provider: Melvin Gee MD (Inactive)    [facility] Medicine Team[place] [facility] 10 Maribel Israel MD    This service was provided through telemedicine.  Start time: 1214  Chief complaint: COVID-19  The [facility]: [facility]  Present with the patient at the time of the telemed/virtual assessment:   End time: 1217  Total time spent with patient: 3 min  The attending portion of this evaluation, treatment, and documentation was performed per Maribel Israel MD via audiovisual.  Additional time spent in care of the patient: coordinating care including speaking with case management, reviewing records, discussing case with consultants, or discussing the plan of care with the patient or family.  Total 16 minutes.    Patient was transferred to the telemedicine service on: 5/16/2020    Subjective:     Principal Problem:Pneumonia due to COVID-19 virus    .Interval History / Events Overnight: No significant events reported by Nursing.  Patient complains of myalgias. Symptoms have been unchanged since yesterday. Associated symptoms include: fatigue and poor but improving appetite. Treatment thus far includes symptomatic management and supportive treatments.  SpO2 100% on 1 L NC  Date of initial symptoms:  3/30/2020  Data reviewed 5/19/2020:  H&H stable. WBC slightly elevated. Hypokalemia    I have assessed these findings virtually using telemed platform and with assistance of bedside nurse or telemedicine presenter.    Review of Systems   Constitutional: Negative for fever.   Gastrointestinal: Positive for constipation.     Objective:     Vital Signs (Most Recent):  Temp: 98.3 °F (36.8 °C) (05/19/20 1600)  Pulse: (!) 112 (05/19/20 1800)  Resp: 20  (05/19/20 1800)  BP: 109/66 (05/19/20 1800)  SpO2: 100 % (05/19/20 1800) Vital Signs (24h Range):  Temp:  [97.7 °F (36.5 °C)-98.3 °F (36.8 °C)] 98.3 °F (36.8 °C)  Pulse:  [] 112  Resp:  [20-43] 20  SpO2:  [92 %-100 %] 100 %  BP: (107-129)/(66-83) 109/66     Weight: 105.4 kg (232 lb 5.8 oz)  Body mass index is 32.41 kg/m².    Intake/Output Summary (Last 24 hours) at 5/19/2020 2015  Last data filed at 5/19/2020 1858  Gross per 24 hour   Intake 540 ml   Output 750 ml   Net -210 ml      Physical Exam   Constitutional: He is cooperative. No distress.   Eyes: Conjunctivae and lids are normal. Right eye exhibits no discharge. Left eye exhibits no discharge. No scleral icterus.   Cardiovascular: Tachycardia present.   Pulmonary/Chest: Effort normal. No accessory muscle usage. No tachypnea. No respiratory distress.   Abdominal: Normal appearance.   Neurological: He is alert. He is not disoriented.   Skin: No rash noted. He is not diaphoretic. No cyanosis. No pallor.       Significant Labs:     Recent Labs   Lab 04/04/20  0020   HGBA1C 6.0*     Recent Labs   Lab 05/19/20  0636   MG 1.8     Recent Labs   Lab 05/13/20  0422 05/17/20  0529 05/19/20  0636   WBC 12.55 11.66 13.93*   HGB 9.0* 9.1* 9.5*   HCT 29.1* 29.7* 29.3*    265 306      Recent Labs   Lab 05/17/20  0529   INR 1.0      Recent Labs   Lab 05/13/20  0422 05/14/20  0305 05/17/20  0529 05/19/20  0636    140 136 135*   K 3.2* 3.6 3.6 3.3*    102 98 97   CO2 28 26 25 28   BUN 17 17 15 17   CREATININE 0.9 1.0 0.9 1.1   ANIONGAP 10 12 13 10   CALCIUM 8.6* 8.7 8.7 8.8        Recent Labs   Lab 05/13/20  0422 05/17/20  0529 05/19/20  0636   PROT 7.2 7.3 7.3   ALBUMIN 2.3* 2.5* 2.5*   BILITOT 1.8* 1.4* 1.3*   ALKPHOS 129 128 130   AST 34 27 27   ALT 60* 48* 43      Recent Labs   Lab 05/14/20  0305 05/17/20  0529   BNP 12  --    CRP  --  50.7*   DDIMER  --  13.92*   LDH  --  388*      BNP (pg/mL)   Date Value   05/14/2020 12   04/28/2020 383 (H)      CRP (mg/L)   Date Value   05/17/2020 50.7 (H)   04/28/2020 307.2 (H)   04/27/2020 335.6 (H)   04/26/2020 380.2 (H)   04/25/2020 247.9 (H)     Ferritin (ng/mL)   Date Value   05/17/2020 1,691 (H)   04/28/2020 4,082 (H)   04/02/2020 1,911 (H)   03/31/2020 1,590 (H)     LD (U/L)   Date Value   05/17/2020 388 (H)   04/28/2020 477 (H)   04/02/2020 783 (H)   03/31/2020 580 (H)   03/30/2017 222     SARS-CoV2 (COVID-19) Qualitative PCR (no units)   Date Value   05/16/2020 Detected (A)   05/13/2020 Detected (A)   05/08/2020 Detected (A)   04/21/2020 Detected (A)   03/31/2020 Detected (A)     Blood Culture, Routine (no units)   Date Value   05/01/2020 No Growth after 4 days.    05/01/2020 No Growth after 4 days.    04/28/2020 No Growth after 4 days.    04/28/2020 No Growth after 4 days.    04/25/2020 No Growth after 4 days.       Significant Imaging:     Assessment/Plan:      Active Diagnoses:    Diagnosis Date Noted POA    PRINCIPAL PROBLEM:  Pneumonia due to COVID-19 virus [U07.1, J12.89] 03/31/2020 Yes    Acute respiratory distress syndrome (ARDS) due to 2019 novel coronavirus [U07.1, J80] 05/15/2020 Yes    Debility [R53.81] 05/14/2020 Yes    Sinus tachycardia [R00.0] 05/13/2020 No    Anemia of chronic disease [D63.8] 05/09/2020 Yes    Urinary retention [R33.9] 05/09/2020 No    Hypertension [I10] 05/09/2020 Yes    COVID-19 virus infection [U07.1] 04/02/2020 Yes    Acute respiratory failure with hypoxia [J96.01] 04/01/2020 Yes    Class 1 obesity with body mass index (BMI) of 34.0 to 34.9 in adult [E66.9, Z68.34] 02/01/2018 Not Applicable    History of prostate cancer [Z85.46] 02/01/2018 Not Applicable      Problems Resolved During this Admission:    Diagnosis Date Noted Date Resolved POA    Hypokalemia [E87.6] 05/12/2020 05/14/2020 No    Vomiting [R11.10] 05/09/2020 05/14/2020 No    SIRS (systemic inflammatory response syndrome) [R65.10] 05/02/2020 05/14/2020 Yes    Anemia [D64.9] 04/27/2020 05/08/2020  Yes    Hyponatremia [E87.1] 04/22/2020 05/14/2020 Yes    Ileus [K56.7] 04/21/2020 05/08/2020 Yes    WILBUR (acute kidney injury) [N17.9] 04/03/2020 05/14/2020 Yes       Inpatient Medications Prescribed for Management of Current Problems:     Scheduled Meds:    brimonidine 0.2%  1 drop Both Eyes BID    enoxaparin  40 mg Subcutaneous Daily    latanoprost  1 drop Both Eyes QHS    metoprolol succinate  12.5 mg Oral Daily    potassium chloride  30 mEq Oral TID    senna-docusate 8.6-50 mg  1 tablet Oral BID    timolol maleate 0.5%  1 drop Both Eyes BID    white petrolatum-mineral oiL   Both Eyes BID     Continuous Infusions:   As Needed: acetaminophen, albuterol, docusate sodium, famotidine, lactulose, ondansetron, promethazine, sodium chloride 0.9%    Assessment and Plan by Problem    Pneumonia due to COVID-19 virus  With acute respiratory failure  - Initially admitted 3/31 at OSH  - Intubated 34 days (starting 4/2), extubated 5/6 to 4 L NC, BiPAP at night. Awake and alert, intact.   - Completed course of Plaquenil, Azithromycin, and Rocephin   - Repeat COVID-19 on 4/21 and 5/8 positive; Positive 05/16  - He is noted to desaturate some when working with PT, but responds well to increase in supplemental oxygen  - Currently on NC 1L with adequate saturation. Wean as tolerated     Urinary retention  - Perez removed 5/8, replaced 5/10  - he reports he had a stent placed after his prostatectomy, and he has to press a button to release urine  - UA not overtly indicative of UTI  - removed Perez  5/13  -  cc/24 hours, monitor (05/17/20)     Sinus tachycardia  - Patient noted to have high-normal HR with range   - some tachycardia coincides with working with PT/OT  - last EKG 4/16/2020 showed sinus rhythm with PVCs  - Currently -115 bpm and patient is normotensive  - started metoprolol 12.5 mg daily on 05/17/20; remains tachycardic     Hypertension  - Patient without history of hypertension at  home  - occasionally still with some hypertension with systolic -150  - started metoprolol 12.5 mg daily started 05/17  - currently normotensive     Anemia of chronic disease  - Possibly from acute illness  - no active bleeding noted  - stable Hgb over several weeks around 7-8     Hyponatremia  - Patient has had both hypo and hypernatremia while inpatient  - Hypernatremia thought to be a result of Lasix use while in ICU  - Improved after free water boluses and now PO intake adequate  - RESOLVED      WILBUR (acute kidney injury)  - WILBUR improved  - Cr stabilizied around 1.1-1.3  - Off IV Lasix since 5/1/20  - Encouraged increased PO fluid intake  - RESOLVED      Debility  - due to critical illness and prolonged hospitalization due to COVID19  - needs Rehab vs LTAC- pending     HIGH RISK CONDITION(S):   Patient has a condition that poses threat to life and bodily function: Severe Respiratory Distress and Acute Renal Failure     Discharge plan and follow up  Rehab Facility  [facility]IL 5/20/2020  Previous admission:  3/31/20    Goals of Care:  Code Status: Full Code  Comfort Only: No  Hospice: No    Diet: Diet Adult Regular (IDDSI Level 7) Thin  GI Prophylaxis: Not indicated  Significant LDAs:   IV Access Type: Peripheral  Urinary Catheter Indication if present: Patient Does Not Have Urinary Catheter  Other Lines/Tubes/Drains:    VTE Risk Mitigation (From admission, onward)         Ordered     enoxaparin injection 40 mg  Daily      04/28/20 1535     IP VTE HIGH RISK PATIENT  Once      04/28/20 1535                Maribel Israel MD  Department of Hospital Medicine   [facility] - Respiratory ICU

## 2020-05-20 NOTE — DISCHARGE SUMMARY
[facility]  Hospital Medicine  Telemedicine Discharge Summary      Patient Name: Bharathi Garrido  MRN: [id number]  Admission Date: 4/28/2020  Hospital Length of Stay: 22 days  Discharge Date and Time:  05/20/2020 10:34 AM  Attending Physician: Maribel Israel MD   Discharging Provider: Maribel Israel MD  Primary Care Provider: Melvin Gee MD (Inactive)    Hospital Medicine Team: [facility] VIRTUAL TEAM 10 Maribel Israel MD    This service was provided through telemedicine.  Patient was transferred to the telemedicine service on: 5/16/2020      HPI:  71 yo male with obesity, history of prostate cancer (1/2018 s/p prostatectomy and radiation Dr. Pederson), history of difficult intubation for surgery and glaucoma presents with feeling unwell, fever, decreased appetite and shortness of breath. Patient transferred from [facility]/28/20 for higher level of care. Admitted to outside hospital on 3/31/20 for SOB and fever and tested positive for COVID-19.  Upon admission, CXR showed bilateral multifocal opacities. Procalcitonin, LDH, ferritin, CRP and D-dimer elevated. Renal function normal. Mild increase in LFTs. He was treated with Plaquinel, Rocephin and Azithromycin. His respiratory function declined and has been intubated since 4/2/20.      * No surgery found *      Hospital Course:  Patient was admitted to ICU for management of respiratory failure secondary to suspected and/or confirmed COVID-19 requiring mechanical ventilation for respiratory support. initially admitted 3/31 at OSH on 4/1/20.  Wife and son (entire household) were infected with COVID-19.  Transferred [facility] on 4/28 from [facility] for higher level of care as patient still intubated with high oxygen requirements. Sedated on Fentanyl and Precedex, but responding appropriately and following commands per nursing. Intubated on A/C, FiO2 75%, PEEP 10, , rate 18 with sat of 92%. CXR consistent with  multilobar pneumonia. On empiric Vanc and Zosyn for recent white count with leukocytosis. Intubated 34 days (starting 4/2), extubated 5/6 to 4 L NC, BiPAP at night.   [facility] [place]8[facility]20  Completed course of Plaquenil, Azithromycin, and Rocephin . Repeat COVID-19 on 3/31, 4/21 , 5/8 , and 5/13 positive  Remained on 1L NC. Significantly debilitated and needs rehab or SNF post hospitalization     Pneumonia due to COVID-19 virus  With acute respiratory failure  - Initially admitted 3/31 at OSH  - Intubated 34 days (starting 4/2), extubated 5/6 to 4 L NC, BiPAP at night. Awake and alert, intact.   - Completed course of Plaquenil, Azithromycin, and Rocephin   - Repeat COVID-19 on 4/21 and 5/8 positive; Positive 05/16  - He is noted to desaturate some when working with PT, but responds well to increase in supplemental oxygen  - Currently on NC 1L with adequate saturation. Wean as tolerated     Urinary retention  - Perez removed 5/8, replaced 5/10  - he reports he had a stent placed after his prostatectomy, and he has to press a button to release urine  - UA not overtly indicative of UTI  - removed Perez  5/13  - UOP 550 cc/24 hours, monitor (05/17/20)     Sinus tachycardia  - Patient noted to have high-normal HR with range   - some tachycardia coincides with working with PT/OT  - last EKG 4/16/2020 showed sinus rhythm with PVCs  - Currently HR 108-115 bpm and patient is normotensive  - started metoprolol 12.5 mg daily on 05/17/20; remains tachycardic     Hypertension  - Patient without history of hypertension at home  - occasionally still with some hypertension with systolic -150  - started metoprolol 12.5 mg daily started 05/17  - currently normotensive     Anemia of chronic disease  - Possibly from acute illness  - no active bleeding noted  - stable Hgb over several weeks around 7-8     Hyponatremia  - Patient has had both hypo and hypernatremia while inpatient  - Hypernatremia thought to be  a result of Lasix use while in ICU  - Improved after free water boluses and now PO intake adequate  - RESOLVED      WILBUR (acute kidney injury)  - WILBUR improved  - Cr stabilizied around 1.1-1.3  - Off IV Lasix since 5/1/20  - Encouraged increased PO fluid intake  - RESOLVED      Debility  - due to critical illness and prolonged hospitalization due to COVID19  - needs Rehab vs LTAC- pending     Consults:   Consults (From admission, onward)        Status Ordering Provider     Inpatient consult to Infectious Diseases  Once     Provider:  (Not yet assigned)    Completed ANNE MARIE IRVING     Inpatient consult to Physical Medicine Rehab  Once     Provider:  (Not yet assigned)    Completed LIU CHAO     Inpatient consult to Registered Dietitian/Nutritionist  Once     Provider:  (Not yet assigned)    Completed DENNIS COSTA     Inpatient virtual consult to Hospital Medicine  Once     Provider:  (Not yet assigned)    Completed BARTOLO MENJIVAR          Final Active Diagnoses:    Diagnosis Date Noted POA    PRINCIPAL PROBLEM:  Pneumonia due to COVID-19 virus [U07.1, J12.89] 03/31/2020 Yes    Acute respiratory distress syndrome (ARDS) due to 2019 novel coronavirus [U07.1, J80] 05/15/2020 Yes    Debility [R53.81] 05/14/2020 Yes    Sinus tachycardia [R00.0] 05/13/2020 No    Anemia of chronic disease [D63.8] 05/09/2020 Yes    Urinary retention [R33.9] 05/09/2020 No    Hypertension [I10] 05/09/2020 Yes    COVID-19 virus infection [U07.1] 04/02/2020 Yes    Acute respiratory failure with hypoxia [J96.01] 04/01/2020 Yes    Class 1 obesity with body mass index (BMI) of 34.0 to 34.9 in adult [E66.9, Z68.34] 02/01/2018 Not Applicable    History of prostate cancer [Z85.46] 02/01/2018 Not Applicable      Problems Resolved During this Admission:    Diagnosis Date Noted Date Resolved POA    Hypokalemia [E87.6] 05/12/2020 05/14/2020 No    Vomiting [R11.10] 05/09/2020 05/14/2020 No    SIRS (systemic inflammatory  response syndrome) [R65.10] 05/02/2020 05/14/2020 Yes    Anemia [D64.9] 04/27/2020 05/08/2020 Yes    Hyponatremia [E87.1] 04/22/2020 05/14/2020 Yes    Ileus [K56.7] 04/21/2020 05/08/2020 Yes    WILBUR (acute kidney injury) [N17.9] 04/03/2020 05/14/2020 Yes      Discharged Condition: stable    Disposition: Long Term Care    Follow Up:  Follow-up Information     Melvin Gee MD In 3 weeks.    Specialty:  Urology  Why:  For discharge from hospital follow up  Contact information:  [address] [place] [address]  [address]  [facility] [address] [phone]                   Patient Instructions:      Diet Adult Regular     Notify your health care provider if you experience any of the following:  temperature >100.4     Notify your health care provider if you experience any of the following:  persistent nausea and vomiting or diarrhea     Notify your health care provider if you experience any of the following:  difficulty breathing or increased cough     Notify your health care provider if you experience any of the following:  severe persistent headache     Notify your health care provider if you experience any of the following:  persistent dizziness, light-headedness, or visual disturbances     Notify your health care provider if you experience any of the following:  increased confusion or weakness     Change dressing (specify)   Order Comments: See LTAC orders     Activity as tolerated     Medications:  Reconciled Home Medications:      Medication List      START taking these medications    albuterol 90 mcg/actuation inhaler  Commonly known as:  PROVENTIL/VENTOLIN HFA  Inhale 2 puffs into the lungs every 6 (six) hours as needed for Wheezing. Rescue     docusate sodium 50 MG capsule  Commonly known as:  COLACE  Take 1 capsule (50 mg total) by mouth 2 (two) times daily as needed for Constipation.     lactulose 20 gram/30 mL Soln  Commonly known as:  CHRONULAC  Take 30 mLs (20 g total) by mouth every 6 (six) hours as  needed.     metoprolol succinate 25 MG 24 hr tablet  Commonly known as:  TOPROL-XL  Take 0.5 tablets (12.5 mg total) by mouth once daily.     senna-docusate 8.6-50 mg 8.6-50 mg per tablet  Commonly known as:  PERICOLACE  Take 1 tablet by mouth 2 (two) times daily.     white petrolatum-mineral oiL 83-15 % Oint  Place into both eyes 2 (two) times daily.        CONTINUE taking these medications    brimonidine 0.2% 0.2 % Drop  Commonly known as:  ALPHAGAN  Place 1 drop into both eyes 2 (two) times daily.     latanoprost 0.005 % ophthalmic solution  INTALL 1 DROP TO BOTH EYES CONNOR NIGHT     timolol maleate 0.5% 0.5 % Drop  Commonly known as:  TIMOPTIC  Place 1 drop into both eyes 2 (two) times daily.        STOP taking these medications    bicalutamide 50 MG Tab  Commonly known as:  CASODEX     sildenafil 20 mg Tab  Commonly known as:  REVATIO            Significant Diagnostic Studies:     Recent Labs   Lab 05/17/20 0529 05/19/20  0636   WBC 11.66 13.93*   HGB 9.1* 9.5*   HCT 29.7* 29.3*    306      Recent Labs   Lab 05/17/20 0529   INR 1.0      Recent Labs   Lab 05/14/20 0305 05/17/20 0529 05/19/20  0636    136 135*   K 3.6 3.6 3.3*    98 97   CO2 26 25 28   BUN 17 15 17   CREATININE 1.0 0.9 1.1   ANIONGAP 12 13 10   CALCIUM 8.7 8.7 8.8      Recent Labs   Lab 05/17/20 0529 05/19/20  0636   PROT 7.3 7.3   ALBUMIN 2.5* 2.5*   BILITOT 1.4* 1.3*   ALKPHOS 128 130   AST 27 27   ALT 48* 43      Recent Labs   Lab 05/14/20 0305 05/17/20  0529   BNP 12  --    CRP  --  50.7*   DDIMER  --  13.92*   LDH  --  388*      BNP (pg/mL)   Date Value   05/14/2020 12   04/28/2020 383 (H)     CRP (mg/L)   Date Value   05/17/2020 50.7 (H)   04/28/2020 307.2 (H)   04/27/2020 335.6 (H)   04/26/2020 380.2 (H)   04/25/2020 247.9 (H)     Ferritin (ng/mL)   Date Value   05/17/2020 1,691 (H)   04/28/2020 4,082 (H)   04/02/2020 1,911 (H)   03/31/2020 1,590 (H)     LD (U/L)   Date Value   05/17/2020 388 (H)   04/28/2020 477  (H)   04/02/2020 783 (H)   03/31/2020 580 (H)   03/30/2017 222     SARS-CoV2 (COVID-19) Qualitative PCR (no units)   Date Value   05/16/2020 Detected (A)   05/13/2020 Detected (A)   05/08/2020 Detected (A)   04/21/2020 Detected (A)   03/31/2020 Detected (A)       Blood Culture, Routine (no units)   Date Value   05/01/2020 No Growth after 4 days.    05/01/2020 No Growth after 4 days.    04/28/2020 No Growth after 4 days.    04/28/2020 No Growth after 4 days.    04/25/2020 No Growth after 4 days.       Lab Results   Component Value Date    GSRESP Rare WBC's 05/01/2020    GSRESP No organisms seen 05/01/2020    RESPIRATORYC Normal respiratory lashawn 05/01/2020    RESPIRATORYC No S aureus or Pseudomonas isolated. 05/01/2020     Results for orders placed or performed during the hospital encounter of 04/28/20   X-Ray Chest 1 View    Narrative    EXAMINATION:  XR CHEST 1 VIEW    CLINICAL HISTORY:  increasing white count;    TECHNIQUE:  Single frontal view of the chest was performed.    COMPARISON:  04/28/2020    FINDINGS:  Medical support devices project in unchanged changed radiographic position the cardiomediastinal silhouette is stable.  No significant interval detrimental change in lung aeration with patchy bilateral airspace opacities, similar in distribution and extent compared to most recent exam.  No evidence of pneumothorax or significant volume of pleural fluid.      Impression    No significant interval detrimental change in cardiopulmonary status from prior chest radiograph 04/28/2020      Electronically signed by: Mami Chapman MD  Date:    05/02/2020  Time:    23:44     Results for orders placed or performed during the hospital encounter of 04/28/20   X-Ray Abdomen AP 1 View    Narrative    EXAMINATION:  XR ABDOMEN AP 1 VIEW    CLINICAL HISTORY:  eval for ileus;    TECHNIQUE:  Single AP View of the abdomen was performed.    COMPARISON:  04/21/2020.    FINDINGS:  Nasogastric tube overlies the distal stomach.   There is mild gaseous distension the colon, overall nonobstructive bowel gas pattern.  Bowel distension overall appears improved when compared with the prior study.  No large volume stool burden identified.  Bones are stable.      Impression    Minimal gaseous distension of bowel with overall nonobstructive pattern.      Electronically signed by: Macario Murillo MD  Date:    04/29/2020  Time:    00:47     Indwelling Lines/Drains at time of discharge: none    Time spent on the discharge of patient: 50 minutes  Patient was seen and examined on the date of discharge and determined to be suitable for discharge.  This service was provided through telemedicine.  Start time: 1021  Chief complaint: COVID19  The patient location is: 7068/7068 A  Present with the patient at the time of the telemed/virtual assessment:  End time: 1024  Total time spent with patient: 3 min  The attending portion of this evaluation, treatment, and documentation was performed per Maribel Israel MD via audiovisual.Additional time spent in care of the patient: coordinating care including speaking with case management, reviewing records, discussing case with consultants, or discussing the plan of care with the patient or family.     Maribel Israel MD  Department of Hospital Medicine  [facility]

## 2020-05-20 NOTE — PT/OT/SLP PROGRESS
Occupational Therapy   Treatment    Name: Bharathi Garrido  MRN: [id number]  Admitting Diagnosis:  Pneumonia due to COVID-19 virus       Recommendations:     Discharge Recommendations: rehabilitation facility  Discharge Equipment Recommendations:  other (see comments)(TBD at higher level of care)  Barriers to discharge:       Assessment:     Bharathi Garrido is a 70 y.o. male with a medical diagnosis of Pneumonia due to COVID-19 virus.  He presents with impaired ADL and mobility performance deficits. Pt very pleasant and eager to participate in therapy. Session focus included bed mobility, postural control exercises, EOB ADLs, and safe setup for morning. Pt required max (A) and sat EOB ~12 min for ADLs. Pt on 1L 02 throughout with vital signs remaining normal.  Pt left comfortable in bed and voiced appreciation of session today as he was very active prior to this hospitalization and wishing to return home to stepdaughter and get back to biking with family. Performance deficits affecting function are weakness, impaired endurance, impaired self care skills, gait instability, impaired functional mobilty, impaired balance, decreased safety awareness, decreased coordination, decreased upper extremity function, impaired cardiopulmonary response to activity, decreased lower extremity function, impaired fine motor, decreased ROM. Pt would benefit from continued OT skilled services 4x/wk to improve daily living skills to optimize QOL. Pt is recommended to discharge to rehab at this time.      Rehab Prognosis:  Good; patient would benefit from acute skilled OT services to address these deficits and reach maximum level of function.       Plan:     Patient to be seen 5 x/week to address the above listed problems via self-care/home management, therapeutic activities, therapeutic exercises, neuromuscular re-education  · Plan of Care Expires: 06/05/20  · Plan of Care Reviewed with: patient    Subjective     Pain/Comfort:  · Pain Rating 1:  0/10  · Pain Rating Post-Intervention 1: 0/10    Objective:     Communicated with: RN prior to session.  Patient found HOB elevated with blood pressure cuff, pulse ox (continuous), telemetry, Condom Catheter upon OT entry to room.    General Precautions: Standard, airborne, aspiration, contact, fall, droplet(covid)   Orthopedic Precautions:N/A   Braces: N/A     Occupational Performance:     Bed Mobility:    · Patient completed Rolling/Turning to Left with  maximal assistance  · Patient completed Rolling/Turning to Right with maximal assistance  · Patient completed Scooting/Bridging with total assistance and 2 persons (drawsheet with PCT assist)  · Patient completed Supine to Sit with maximal assistance  · Patient completed Sit to Supine with maximal assistance     Activities of Daily Living:  · Feeding:  setup (A) of fresh drinking water with HOB elevated  · Grooming: setup (A) washing face and brushing teeth at EOB  .  · Lower Body Dressing: total assistance adjusting sock fit with HOB elevated.      [facility] 6 Click ADL: 11    Treatment & Education:  Pt educated on role of occupational therapy, POC, and safety during ADLs and functional mobility. Pt and OT discussed importance of safe, continued mobility to optimize daily living skills. Pt verbalized understanding. Pt completed the following during session: bed mobility, EOB ADLs (drinking water, setup with remaining meal), washing face, brushing teeth), safe return to bed (PCT assist with draw sheet). Pt responded well to verbal cues for posturing and tolerated EOB ~12 min.  White board updated during session. Pt given instruction to call for medical staff/nurse for assistance.       Patient left HOB elevated with all lines intact, call button in reach and RN notifiedEducation:      GOALS:   Multidisciplinary Problems     Occupational Therapy Goals        Problem: Occupational Therapy Goal    Goal Priority Disciplines Outcome Interventions   Occupational Therapy  Goal     OT, PT/OT Ongoing, Progressing    Description:  Goals to be met by: 5/29/2020     Patient will increase functional independence with ADLs by performing:    Grooming while EOB with Supervision (progressing 5/20).  Toileting from bedside commode with Moderate Assistance for hygiene and clothing management.   Sitting at edge of bed x10 minutes with Supervision (progressing 5/20)  Stand pivot transfers with Moderate Assistance.  Toilet transfer to bedside commode with Moderate Assistance.  UE dressing with SBA.  Supine to sit with Minimal (A)                         Time Tracking:     OT Date of Treatment: 05/20/20  OT Start Time: 0839  OT Stop Time: 0905  OT Total Time (min): 26 min    Billable Minutes:Self Care/Home Management 26 min    Jennifer Lind OT  5/20/2020

## 2020-05-20 NOTE — PLAN OF CARE
Pt to transfer to [facility].  KATHIA Lin to call report to [phone].  Per [facility] Kovacs to set up transport via stretcher after report is called.  KATHIA Lin notified of the above.     05/20/20 1233   Final Note   Assessment Type Final Discharge Note   Anticipated Discharge Disposition LTAC   Right Care Referral Info   Post Acute Recommendation Other   Referral Type LTAC   Facility Name [facility] [id number] [place] [address], [address]   Post-Acute Status   Post-Acute Authorization Placement   Post-Acute Placement Status Set-up Complete     Aiyana Cordova LMSW  [facility]  [id number]

## 2020-05-20 NOTE — NURSING
Followed up with [facility] about transportation set up. They stated that [facility] has been called but could not give me a time in which they would  patient. Will continue to monitor.

## 2020-05-21 NOTE — PT/OT/SLP DISCHARGE
Occupational Therapy Discharge Summary    Bharathi Garrido  MRN: [id number]   Principal Problem: Pneumonia due to COVID-19 virus      Patient Discharged from acute Occupational Therapy on 5/20/2020.  Please refer to prior OT note dated 5/20/2020 for functional status.    Assessment:      Goals partially met.    Objective:     GOALS:   Multidisciplinary Problems     Occupational Therapy Goals        Problem: Occupational Therapy Goal    Goal Priority Disciplines Outcome Interventions   Occupational Therapy Goal     OT, PT/OT Ongoing, Progressing    Description:  Goals to be met by: 5/29/2020     Patient will increase functional independence with ADLs by performing:    Grooming while EOB with Supervision (progressing 5/20).  Toileting from bedside commode with Moderate Assistance for hygiene and clothing management.   Sitting at edge of bed x10 minutes with Supervision (progressing 5/20)  Stand pivot transfers with Moderate Assistance.  Toilet transfer to bedside commode with Moderate Assistance.  UE dressing with SBA.  Supine to sit with Minimal (A)                         Reasons for Discontinuation of Therapy Services  Transfer to alternate level of care.      Plan:     Patient Discharged to: Long Term Acute Care    Jennifer Lind OT  5/21/2020

## 2020-05-22 ENCOUNTER — OUTPATIENT CASE MANAGEMENT (OUTPATIENT)
Dept: ADMINISTRATIVE | Facility: OTHER | Age: 70
End: 2020-05-22

## 2020-05-29 LAB — FUNGUS SPEC CULT: ABNORMAL

## 2021-03-12 ENCOUNTER — IMMUNIZATION (OUTPATIENT)
Dept: PRIMARY CARE CLINIC | Facility: CLINIC | Age: 71
End: 2021-03-12
Payer: COMMERCIAL

## 2021-03-12 DIAGNOSIS — Z23 NEED FOR VACCINATION: Primary | ICD-10-CM

## 2021-03-12 PROCEDURE — 91300 PR SARS-COV- 2 COVID-19 VACCINE, NO PRSV, 30MCG/0.3ML, IM: ICD-10-PCS | Mod: S$GLB,,, | Performed by: INTERNAL MEDICINE

## 2021-03-12 PROCEDURE — 0001A PR IMMUNIZ ADMIN, SARS-COV-2 COVID-19 VACC, 30MCG/0.3ML, 1ST DOSE: ICD-10-PCS | Mod: CV19,S$GLB,, | Performed by: INTERNAL MEDICINE

## 2021-03-12 PROCEDURE — 91300 PR SARS-COV- 2 COVID-19 VACCINE, NO PRSV, 30MCG/0.3ML, IM: CPT | Mod: S$GLB,,, | Performed by: INTERNAL MEDICINE

## 2021-03-12 PROCEDURE — 0001A PR IMMUNIZ ADMIN, SARS-COV-2 COVID-19 VACC, 30MCG/0.3ML, 1ST DOSE: CPT | Mod: CV19,S$GLB,, | Performed by: INTERNAL MEDICINE

## 2021-03-12 RX ADMIN — Medication 0.3 ML: at 02:03

## 2021-04-02 ENCOUNTER — IMMUNIZATION (OUTPATIENT)
Dept: PRIMARY CARE CLINIC | Facility: CLINIC | Age: 71
End: 2021-04-02
Payer: COMMERCIAL

## 2021-04-02 DIAGNOSIS — Z23 NEED FOR VACCINATION: Primary | ICD-10-CM

## 2021-04-02 PROCEDURE — 0002A PR IMMUNIZ ADMIN, SARS-COV-2 COVID-19 VACC, 30MCG/0.3ML, 2ND DOSE: ICD-10-PCS | Mod: CV19,S$GLB,, | Performed by: INTERNAL MEDICINE

## 2021-04-02 PROCEDURE — 0002A PR IMMUNIZ ADMIN, SARS-COV-2 COVID-19 VACC, 30MCG/0.3ML, 2ND DOSE: CPT | Mod: CV19,S$GLB,, | Performed by: INTERNAL MEDICINE

## 2021-04-02 PROCEDURE — 91300 PR SARS-COV- 2 COVID-19 VACCINE, NO PRSV, 30MCG/0.3ML, IM: ICD-10-PCS | Mod: S$GLB,,, | Performed by: INTERNAL MEDICINE

## 2021-04-02 PROCEDURE — 91300 PR SARS-COV- 2 COVID-19 VACCINE, NO PRSV, 30MCG/0.3ML, IM: CPT | Mod: S$GLB,,, | Performed by: INTERNAL MEDICINE

## 2021-04-02 RX ADMIN — Medication 0.3 ML: at 03:04

## 2021-06-24 NOTE — TELEPHONE ENCOUNTER
Faxed to correct number. Patient referred by retail pharmacy for assistance with Cialis. Drug not covered by his insurance. Mailed letter and hippa Auth form. Informed him that he must sign the Auth form and mail/email or fax  Back to me asap in order to apply.

## 2022-11-07 NOTE — MR AVS SNAPSHOT
[facility]  [address]  [place] [address]  Phone: [phone]                  Bharathi Garrido   3/30/2017 2:30 PM   Office Visit    Description:  Male : 1950   Provider:  Melvin Gee MD   Department:  [place] [facility]           Diagnoses this Visit        Comments    Infected sebaceous cyst of skin    -  Primary     Nonspecific (abnormal) findings on radiological and other examination of genitourinary organs         Prostate cancer screening         Seminoma, unspecified laterality                To Do List           Future Appointments        Provider Department Dept Phone    3/30/2017 3:40 PM APPOINTMENT LAB, [place] [facility] [phone]    2017 2:00 PM Melvin Gee MD [facility] [phone]    2017 1:45 PM Melvin Gee MD [facility] [phone]      Goals (5 Years of Data)     None      Follow-Up and Disposition     Return in about 1 week (around 2017).       These Medications        Disp Refills Start End    ciprofloxacin HCl (CIPRO) 500 MG tablet 28 tablet 0 3/30/2017 2017    Take 1 tablet (500 mg total) by mouth 2 (two) times daily. - Oral      [facility]     [facility] Care Line -  Assistance  Unless otherwise directed by your provider, please contact [facility], our nurse care line that is available for  assistance.     Registered nurses in the [facility] provide: appointment scheduling, clinical advisement, health education, and other advisory services.  Call: [phone] (toll free)               Medications           START taking these NEW medications        Refills    ciprofloxacin HCl (CIPRO) 500 MG tablet 0    Sig: Take 1 tablet (500 mg total) by mouth 2 (two) times daily.    Class: Print    Route: Oral           Verify that the below list of medications is an accurate representation of the medications you are currently taking.  If none  "reported, the list may be blank. If incorrect, please contact your healthcare provider. Carry this list with you in case of emergency.           Current Medications     brimonidine 0.2% (ALPHAGAN) 0.2 % Drop Place 1 drop into both eyes 2 (two) times daily.    doxycycline (VIBRAMYCIN) 100 MG Cap Take 1 capsule (100 mg total) by mouth 2 (two) times daily.    timolol maleate 0.5% (TIMOPTIC) 0.5 % Drop Place 1 drop into both eyes 2 (two) times daily.    ciprofloxacin HCl (CIPRO) 500 MG tablet Take 1 tablet (500 mg total) by mouth 2 (two) times daily.           Clinical Reference Information           Your Vitals Were     BP Pulse Temp Height Weight BMI    134/78 93 98.7 °F (37.1 °C) 5' 10" (1.778 m) 106.6 kg (235 lb) 33.72 kg/m2      Blood Pressure          Most Recent Value    BP  134/78      Allergies as of 3/30/2017     No Known Allergies      Immunizations Administered on Date of Encounter - 3/30/2017     None      Orders Placed During Today's Visit     Future Labs/Procedures Expected by Expires    AFP tumor marker  3/30/2017 3/30/2018    CBC auto differential  3/30/2017 5/29/2018    Comprehensive metabolic panel  3/30/2017 3/30/2018    Lactate dehydrogenase  3/30/2017 3/30/2018    PSA, Screening  3/30/2017 5/29/2018      [facility] Sign-Up     Activating your [facility] account is as easy as 1-2-3!     1) Visit [email or web address], select Sign Up Now, enter this activation code and your date of birth, then select Next.  [email or web address]  Expires: 5/13/2017  9:22 PM      2) Create a username and password to use when you visit [facility] in the future and select a security question in case you lose your password and select Next.    3) Enter your e-mail address and click Sign Up!    Additional Information  If you have questions, please e-mail [email or web address] or call [phone] to talk to our [facility] staff. Remember, [facility] is NOT to be used for urgent needs. For medical emergencies, dial 911.         Language " Assistance Services     ATTENTION: Language assistance services are available, free of charge. Please call [phone].      ATENCIÓN: Si habla hali, tiene a grissom disposición servicios gratuitos de asistencia lingüística. Llame al [phone].     CHÚ Ý: N?u b?n nói Ti?ng Vi?t, có các d?ch v? h? tr? ngôn ng? mi?n phí dành cho b?n. G?i s? [phone].         [facility] complies with applicable Federal civil rights laws and does not discriminate on the basis of race, color, national origin, age, disability, or sex.         negative...

## 2023-05-05 NOTE — ANESTHESIA PREPROCEDURE EVALUATION
11/07/2017     Bharathi Garrido is a 67 y.o., male is scheduled for prostate biopsy under MAC/gen  on 11/10//2017.    Past Surgical History:   Procedure Laterality Date    INCISION AND DRAINAGE OF WOUND Left 2011, 2017    scrotum; multiple I&D         Anesthesia Evaluation    I have reviewed the Patient Summary Reports.    I have reviewed the Nursing Notes.   I have reviewed the Medications.     Review of Systems  Anesthesia Hx:  No problems with previous Anesthesia  History of prior surgery of interest to airway management or planning: Previous anesthesia: General, MAC  Procedure performed at an [facility].  colonoscopy with MAC.  Procedure performed at an [facility]. Denies Family Hx of Anesthesia complications.   Denies Personal Hx of Anesthesia complications.   Social:  Former Smoker, Social Alcohol Use    Hematology/Oncology:  Hematology Normal        EENT/Dental:  EENT/Dental Normal Eyes: Eye Disease: Glaucoma:      Cardiovascular:   Exercise tolerance: good Denies Hypertension.  Denies Dysrhythmias.   Denies Angina.        Pulmonary:   Denies Shortness of breath.    Renal/:  Renal/ Normal  Elevated PSA; pt with mild, intermittent burning with urination   Hepatic/GI:   GERD, well controlled    Neurological:  Neurology Normal    Endocrine:  Endocrine Normal    Dermatological:   cyst to scrotum that pt reports is much improved and states is healing well       Physical Exam  General:  Obesity    Airway/Jaw/Neck:  Airway Findings: Mouth Opening: Small, but > 3cm Tongue: Normal  General Airway Assessment: Adult  Mallampati: II  TM Distance: Normal, at least 6 cm  Jaw/Neck Findings:  Neck ROM: Normal ROM  Neck Findings:  Girth Increased, Short Neck     Eyes/Ears/Nose:  EYES/EARS/NOSE FINDINGS: Normal   Dental:  Dental Findings: Periodontal disease, Severe    Chest/Lungs:  Chest/Lungs Clear   Attempted to call Betsey back, left a voicemail to call back, im unsure what order is needed?     Heart/Vascular:  Heart Findings: Normal Heart murmur: negative    Abdomen:  Abdomen Findings: Normal     Skin:  Skin Findings:  Sebaceous cyst to scrotum not examined     Mental Status:  Mental Status Findings: Normal      EKG 8/18/2017  Sinus bradycardia  Otherwise normal ECG  No previous ECGs available  Confirmed by Ambrocio ROGERS [facility] ([id number]) on 8/18/2017 1:05:22 PM    Anesthesia Plan  Type of Anesthesia, risks & benefits discussed:  Anesthesia Type:  general  Patient's Preference:   Intra-op Monitoring Plan:   Intra-op Monitoring Plan Comments:   Post Op Pain Control Plan:   Post Op Pain Control Plan Comments:   Induction:   IV  Beta Blocker:         Informed Consent: Patient understands risks and agrees with Anesthesia plan.  Questions answered.   ASA Score: 2     Day of Surgery Review of History & Physical: I have interviewed and examined the patient. I have reviewed the patient's H&P dated:        Anesthesia Plan Notes: Anesthesia consent will be obtained prior to procedure on 11/10//2017.          Ready For Surgery From Anesthesia Perspective.

## 2023-08-04 NOTE — ASSESSMENT & PLAN NOTE
- WILBUR improving  -Off IV Lasix since 5/1/20.   - Encouraged increased PO fluid intake   Carac Counseling:  I discussed with the patient the risks of Carac including but not limited to erythema, scaling, itching, weeping, crusting, and pain.

## 2025-05-27 NOTE — HOSPITAL COURSE
4/28/20: Patient transferred from [facility] for higher level of care as patient still intubated with high oxygen requirements. Sedated on Fentanyl and Precedex, but responding appropriately and following commands per nursing. Intubated on A/C, FiO2 75%, PEEP 10, , rate 18 with sat of 92%. CXR showed b/l patchy opacities unchanged from yesterday. MAPs . Not requiring pressors. Afebrile. On Vanc and Zosyn. Diflucan discontinued. Repeat cxs sent. WBC slightly improved. Hgb/Hct improved s/p transfusion yesterday. WILBUR continues to worsen. NG set to intermittent suction. Receiving TPN via PICC line.        Pt straight cath, sterile field maintained. Pt tolerated procedure with no difficulties. 150cc output. Urine sent out.

## (undated) DEVICE — SPONGE GAUZE 16PLY 4X4

## (undated) DEVICE — PORT AIRSEAL 12/120MM LPI

## (undated) DEVICE — COVER OVERHEAD SURG LT BLUE

## (undated) DEVICE — DRESSING GAUZE 6PLY 4X4

## (undated) DEVICE — SUPPORTER ADLT A3 3 IN. WB LA

## (undated) DEVICE — SOL NS 1000CC

## (undated) DEVICE — PACK BASIC

## (undated) DEVICE — KIT ROBOTIC 4 ARM DA VINCI SI

## (undated) DEVICE — SEE MEDLINE ITEM 157117

## (undated) DEVICE — DRESSING XEROFORM GAUZE 5X9

## (undated) DEVICE — SUT CTD VICRYL SUTUPAK

## (undated) DEVICE — SEE MEDLINE ITEM 152622

## (undated) DEVICE — NDL HYPO REG 25G X 1 1/2

## (undated) DEVICE — SUT ETHILON 2-0 PSLX 30IN

## (undated) DEVICE — CLIP HEMO-LOK MLX LARGE LF

## (undated) DEVICE — Device

## (undated) DEVICE — NDL INSUF ULTRA VERESS 120MM

## (undated) DEVICE — SEE MEDLINE ITEM 156955

## (undated) DEVICE — PAD PREP 50/CA

## (undated) DEVICE — SWAB CULTURETTE II DUAL

## (undated) DEVICE — SYR 10CC LUER LOCK

## (undated) DEVICE — TROCAR ENDOPATH XCEL 12X100MM

## (undated) DEVICE — COVER LIGHT HANDLE

## (undated) DEVICE — TRAY MINOR GEN SURG

## (undated) DEVICE — GLOVE SURGICAL LATEX SZ 8

## (undated) DEVICE — LUBRICANT SURGILUBE 2 OZ

## (undated) DEVICE — SUT MONOCRYL 3-0 UNDYED RB1

## (undated) DEVICE — DRAPE ABDOMINAL TIBURON 14X11

## (undated) DEVICE — GLOVE SURG BIOGEL LATEX SZ 7.5

## (undated) DEVICE — SUT MONOCRYL 3-0 RB1

## (undated) DEVICE — CLIPPER BLADE MOD 4406 (CAREF)

## (undated) DEVICE — SEE MEDLINE ITEM 157116

## (undated) DEVICE — CONTAINER SPECIMEN STRL 4OZ

## (undated) DEVICE — HEMOSTAT SURGICEL 4X8IN

## (undated) DEVICE — COVER TIP CURVED SCISSORS XI

## (undated) DEVICE — ELECTRODE REM PLYHSV RETURN 9

## (undated) DEVICE — KIT ANTIFOG

## (undated) DEVICE — GAUZE PACKING STRIP PLAIN 1X5

## (undated) DEVICE — SOL CLEARIFY VISUALIZATION LAP

## (undated) DEVICE — SUT CTD VICRYL CT-1 27

## (undated) DEVICE — BLADE CLIPPER SENICLIP SURGICA

## (undated) DEVICE — DRAPE ADOLESCENT LAP

## (undated) DEVICE — TROCAR ENDOPATH XCEL 5X100MM

## (undated) DEVICE — NDL 18GA X1 1/2 REG BEVEL

## (undated) DEVICE — SEE MEDLINE ITEM 154981

## (undated) DEVICE — SUT PDS II 0 CT-1 VIL MONO

## (undated) DEVICE — GAUZE SPONGE 4X4 12PLY

## (undated) DEVICE — DRAPE SCOPE PILLOW WARMER

## (undated) DEVICE — ADHESIVE DERMABOND ADVANCED

## (undated) DEVICE — SYR 30CC LUER LOCK

## (undated) DEVICE — SWAB BBL CULTURETTE

## (undated) DEVICE — SYR 50ML CATH TIP

## (undated) DEVICE — SUPPORT ULNA NERVE PROTECTOR

## (undated) DEVICE — SCISSOR 5MMX35CM DIRECT DRIVE

## (undated) DEVICE — BAG TISS RETRV MONARCH 10MM

## (undated) DEVICE — SEE MEDLINE ITEM 152741

## (undated) DEVICE — SEE MEDLINE ITEM 146345

## (undated) DEVICE — SET TRI-LUMEN FILTERED TUBE

## (undated) DEVICE — SOL WATER STRL IRR 1000ML

## (undated) DEVICE — BLADE SURG CARBON STEEL SZ11

## (undated) DEVICE — TROCAR ENDOPATH XCEL 5MM 7.5CM

## (undated) DEVICE — IRRIGATOR ENDOSCOPY DISP.

## (undated) DEVICE — BLADE SURG CARBON STEEL #10

## (undated) DEVICE — SOL ELECTROLUBE ANTI-STIC

## (undated) DEVICE — ELECTRODE NEEDLE 1IN

## (undated) DEVICE — SUT MCRYL PLUS 4-0 PS2 27IN

## (undated) DEVICE — LEGGINGS 48X31 INCH